# Patient Record
Sex: MALE | Race: WHITE | NOT HISPANIC OR LATINO | Employment: OTHER | URBAN - METROPOLITAN AREA
[De-identification: names, ages, dates, MRNs, and addresses within clinical notes are randomized per-mention and may not be internally consistent; named-entity substitution may affect disease eponyms.]

---

## 2018-05-30 LAB
ABSOL LYMPHOCYTES (HISTORICAL): 1.3 K/UL (ref 0.5–4)
ALBUMIN SERPL BCP-MCNC: 3.8 G/DL (ref 3–5.2)
ALP SERPL-CCNC: 51 U/L (ref 43–122)
ALT SERPL W P-5'-P-CCNC: 25 U/L (ref 9–52)
ANION GAP SERPL CALCULATED.3IONS-SCNC: 7 MMOL/L (ref 5–14)
AST SERPL W P-5'-P-CCNC: 19 U/L (ref 17–59)
BASOPHILS # BLD AUTO: 0.1 K/UL (ref 0–0.1)
BASOPHILS # BLD AUTO: 1 % (ref 0–1)
BILIRUB SERPL-MCNC: 0.4 MG/DL
BUN SERPL-MCNC: 24 MG/DL (ref 5–25)
CALCIUM SERPL-MCNC: 9.3 MG/DL (ref 8.4–10.2)
CHLORIDE SERPL-SCNC: 109 MEQ/L (ref 97–108)
CHOLEST SERPL-MCNC: 127 MG/DL
CHOLEST/HDLC SERPL: 2.7 {RATIO}
CO2 SERPL-SCNC: 25 MMOL/L (ref 22–30)
CREATINE, SERUM (HISTORICAL): 1.23 MG/DL (ref 0.7–1.5)
CREATININE, RANDOM URINE (HISTORICAL): 130.7 MG/DL (ref 50–200)
DEPRECATED RDW RBC AUTO: 13.9 %
EGFR (HISTORICAL): 56 ML/MIN/1.73 M2
EOSINOPHIL # BLD AUTO: 0.2 K/UL (ref 0–0.4)
EOSINOPHIL NFR BLD AUTO: 4 % (ref 0–6)
EST. AVERAGE GLUCOSE BLD GHB EST-MCNC: 143 MG/DL
GLUCOSE SERPL-MCNC: 105 MG/DL (ref 70–99)
HBA1C MFR BLD HPLC: 6.6 %
HCT VFR BLD AUTO: 43.6 % (ref 41–53)
HDLC SERPL-MCNC: 47 MG/DL
HGB BLD-MCNC: 14.4 G/DL (ref 13.5–17.5)
LDL/HDL RATIO (HISTORICAL): 1.2
LDLC SERPL CALC-MCNC: 58 MG/DL
LYMPHOCYTES NFR BLD AUTO: 21 % (ref 25–45)
MCH RBC QN AUTO: 30.8 PG (ref 26–34)
MCHC RBC AUTO-ENTMCNC: 32.9 % (ref 31–36)
MCV RBC AUTO: 94 FL (ref 80–100)
MICROALBUM.,U,RANDOM (HISTORICAL): <0.6 MG/DL
MICROALBUMIN/CREATININE RATIO (HISTORICAL): NORMAL
MONOCYTES # BLD AUTO: 0.6 K/UL (ref 0.2–0.9)
MONOCYTES NFR BLD AUTO: 10 % (ref 1–10)
NEUTROPHILS ABS COUNT (HISTORICAL): 4.1 K/UL (ref 1.8–7.8)
NEUTS SEG NFR BLD AUTO: 64 % (ref 45–65)
PLATELET # BLD AUTO: 200 K/MCL (ref 150–450)
POTASSIUM SERPL-SCNC: 4.7 MEQ/L (ref 3.6–5)
RBC # BLD AUTO: 4.67 M/MCL (ref 4.5–5.9)
SODIUM SERPL-SCNC: 142 MEQ/L (ref 137–147)
TOTAL PROTEIN (HISTORICAL): 6.2 G/DL (ref 5.9–8.4)
TRIGL SERPL-MCNC: 111 MG/DL
TSH SERPL DL<=0.05 MIU/L-ACNC: 2.84 UIU/ML (ref 0.47–4.68)
VLDLC SERPL CALC-MCNC: 22 MG/DL (ref 0–40)
WBC # BLD AUTO: 6.4 K/MCL (ref 4.5–11)

## 2018-07-31 DIAGNOSIS — I10 HYPERTENSION, UNSPECIFIED TYPE: Primary | ICD-10-CM

## 2018-07-31 RX ORDER — METOPROLOL SUCCINATE 100 MG/1
TABLET, EXTENDED RELEASE ORAL
COMMUNITY
Start: 2018-02-27 | End: 2018-07-31 | Stop reason: SDUPTHER

## 2018-07-31 RX ORDER — METOPROLOL SUCCINATE 100 MG/1
100 TABLET, EXTENDED RELEASE ORAL 2 TIMES DAILY
Qty: 60 TABLET | Refills: 2 | Status: SHIPPED | OUTPATIENT
Start: 2018-07-31 | End: 2018-11-05 | Stop reason: SDUPTHER

## 2018-07-31 RX ORDER — FENOFIBRATE 160 MG/1
160 TABLET ORAL DAILY
Refills: 2 | COMMUNITY
Start: 2018-04-26 | End: 2018-07-31 | Stop reason: SDUPTHER

## 2018-07-31 RX ORDER — FENOFIBRATE 160 MG/1
160 TABLET ORAL DAILY
Qty: 30 TABLET | Refills: 2 | Status: SHIPPED | OUTPATIENT
Start: 2018-07-31 | End: 2018-11-05 | Stop reason: SDUPTHER

## 2018-08-06 PROBLEM — I35.0 AORTIC VALVE STENOSIS: Status: ACTIVE | Noted: 2017-05-15

## 2018-08-08 ENCOUNTER — OFFICE VISIT (OUTPATIENT)
Dept: FAMILY MEDICINE CLINIC | Facility: CLINIC | Age: 83
End: 2018-08-08
Payer: MEDICARE

## 2018-08-08 VITALS
DIASTOLIC BLOOD PRESSURE: 80 MMHG | WEIGHT: 144 LBS | OXYGEN SATURATION: 98 % | SYSTOLIC BLOOD PRESSURE: 130 MMHG | HEART RATE: 66 BPM | HEIGHT: 60 IN | TEMPERATURE: 97.3 F | BODY MASS INDEX: 28.27 KG/M2

## 2018-08-08 DIAGNOSIS — Z11.4 ENCOUNTER FOR SCREENING FOR HIV: ICD-10-CM

## 2018-08-08 DIAGNOSIS — Z00.00 MEDICARE ANNUAL WELLNESS VISIT, SUBSEQUENT: Primary | ICD-10-CM

## 2018-08-08 DIAGNOSIS — Z11.59 NEED FOR HEPATITIS C SCREENING TEST: ICD-10-CM

## 2018-08-08 PROBLEM — H40.89 OTHER SPECIFIED GLAUCOMA: Status: ACTIVE | Noted: 2018-08-08

## 2018-08-08 PROCEDURE — G0439 PPPS, SUBSEQ VISIT: HCPCS | Performed by: FAMILY MEDICINE

## 2018-08-08 RX ORDER — NITROGLYCERIN 0.4 MG/1
TABLET SUBLINGUAL
COMMUNITY
Start: 2016-09-26

## 2018-08-08 RX ORDER — LATANOPROST 50 UG/ML
SOLUTION/ DROPS OPHTHALMIC
Refills: 3 | COMMUNITY
Start: 2018-07-19

## 2018-08-08 RX ORDER — CHOLECALCIFEROL (VITAMIN D3) 25 MCG
CAPSULE ORAL EVERY 24 HOURS
COMMUNITY
Start: 2017-08-23

## 2018-08-08 RX ORDER — DORZOLAMIDE HYDROCHLORIDE AND TIMOLOL MALEATE 20; 5 MG/ML; MG/ML
SOLUTION/ DROPS OPHTHALMIC EVERY 12 HOURS
COMMUNITY
Start: 2016-09-26

## 2018-08-08 RX ORDER — SIMVASTATIN 80 MG
40 TABLET ORAL DAILY
Refills: 2 | COMMUNITY
Start: 2018-06-27 | End: 2018-08-28 | Stop reason: SDUPTHER

## 2018-08-08 RX ORDER — EZETIMIBE 10 MG/1
10 TABLET ORAL DAILY
Refills: 2 | COMMUNITY
Start: 2018-06-27 | End: 2018-08-28 | Stop reason: SDUPTHER

## 2018-08-08 NOTE — PATIENT INSTRUCTIONS

## 2018-08-08 NOTE — PROGRESS NOTES
Assessment and Plan:    Problem List Items Addressed This Visit     None        Health Maintenance Due   Topic Date Due    Depression Screening PHQ-9  1934    Fall Risk  06/21/1999    GLAUCOMA SCREENING 72 + YR  06/21/2001    DTaP,Tdap,and Td Vaccines (1 - Tdap) 02/05/2010         HPI:  Beto Pendleton is a 80 y o  male here for his Subsequent Wellness Visit      Patient Active Problem List   Diagnosis    Anxiety    Aortic valve stenosis    Chronic coronary artery disease    [facility]    [facility] of [facility]    Coronary atherosclerosis    Osteoarthritis    Essential hypertension    Hepatitis A    Hyperlipidemia    Hydrocele    Hypertriglyceridemia    Hyperplasia of prostate without lower urinary tract symptoms (LUTS)    Impaired fasting glucose    Insomnia     Past Medical History:   Diagnosis Date    Anxiety     CAD (coronary artery disease)     Capsular cataract     mature    DJD (degenerative joint disease)     Hepatitis A     Hydrocele     Hyperglycemia     Hyperlipidemia     Hypertension     Myocardial infarct ([facility] ) 2007    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[facility])     Prostatic hyperplasia      Past Surgical History:   Procedure Laterality Date    ANGIOPLASTY  2008     Family History   Problem Relation Age of Onset    Coronary artery disease Father      History   Smoking Status    Former Smoker   Smokeless Tobacco    Former User     History   Alcohol Use No      History   Drug Use No       Current Outpatient Prescriptions   Medication Sig Dispense Refill    aspirin 81 MG tablet every 24 hours      Cholecalciferol (VITAMIN D-3) 1000 units CAPS every 24 hours      dorzolamide-timolol (COSOPT) 22 3-6 8 MG/ML ophthalmic solution Every 12 hours      ezetimibe (ZETIA) 10 mg tablet Take 10 mg by mouth daily  2    fenofibrate (TRIGLIDE) 160 MG tablet Take 1 tablet (160 mg total) by mouth daily 30 tablet 2    latanoprost (XALATAN) 0 005 % ophthalmic solution INSTILL 1 DROP AT BEDTIME INTO BOTH EYES  3    metoprolol succinate (TOPROL-XL) 100 mg 24 hr tablet Take 1 tablet (100 mg total) by mouth 2 (two) times a day 60 tablet 2    Multiple Vitamins-Minerals (MULTIVITAL) tablet every 24 hours      Multiple Vitamins-Minerals (PRESERVISION AREDS 2+MULTI VIT PO) take 2 tabs daily      nitroglycerin (NITROSTAT) 0 4 mg SL tablet place 1 tablet by sublingual route at the 1st sign of attack; may repeat every 5 min until relief; if pain persists after 3 tablets in 15 min, prompt medical attention is recommended      simvastatin (ZOCOR) 80 mg tablet 40 mg daily  2     No current facility-administered medications for this visit        No Known Allergies  Immunization History   Administered Date(s) Administered    Influenza Split 01/14/2014    Influenza Split High Dose Preservative Free IM 10/08/2014, 11/18/2015, 11/20/2015, 09/26/2016, 11/21/2017    Influenza TIV (IM) 11/01/2012    Pneumococcal Conjugate 13-Valent 01/29/2015    Pneumococcal Polysaccharide PPV23 11/01/1999, 11/06/2003    Td (adult), adsorbed 02/04/2010       Patient Care Team:  Priya Moses MD as PCP - General (Family Medicine)      Medicare Screening Tests and Risk Assessments:  AWV Clinical     ISAR:       Once in a Lifetime Medicare Screening:       Medicare Screening Tests and Risk Assessment:   AAA Risk Assessment    Osteoporosis Risk Assessment    HIV Risk Assessment        Drug and Alcohol Use:   Tobacco use    Cigarettes:  former smoker    Tobacco use duration    Tobacco Cessation Readiness    Alcohol use    Alcohol use:  never    Alcohol Treatment Readiness   Illicit Drug Use    Drug use:  never    Drug type:  no sedative use       Diet & Exercise:   Diet   What is your diet?:  Regular   How many servings a day of the following:   Fruits and Vegetables:  1-2 Meat:  1-2   Whole Grains:  0, 1 Simple Carbs:  2   Dairy:  1 Soda:  1   Coffee:  3 Tea:  1   Exercise    Do you currently exercise?:  currently not exercising       Cognitive Impairment Screening:   Anxiety screenings preformed:   Yes Anxiety screen score:  0   Depression screening preformed:  Yes Depression screen score:  0   Geriatric Depression scale score:  0 PHQ-9 Depression scale score:  0   Depression screening results:  negative for symptoms   Cognitive Impairment Screening    Do you have difficulty learning or retaining new information?:  No Do you have difficulty handling new tasks?:  No   Do you have difficulty with reasoning?:  No Do you have difficulty with spatial ability and orientation?:  No   Do you have difficulty with language?:  No Do you have difficulty with behavior?:  No       Functional Ability/Level of Safety:   Hearing    Hearing difficulties:  Yes Bilateral:  slightly decreased   Left:  slightly decreased Right:  slightly decreased   Hearing aid:  Yes    Hearing Impairment Assessment    Hearing status:  Hard of hearing   Do your family members ever complain that you turn on the radio or [employer]  too loudly?:  No Do you find that other people have to repeat themselves when talking to you?:  Yes   Do you have difficulty hearing while talking on the phone?:  No Has anyone ever told you that you are speaking too loudly when talking with them?:  No   Do you have trouble hearing the doorbell or phone ringing?:  No Do you have difficulty hearing such that you feel frustrated talking to people?:  No   Do you feel sad because you cannot hear well?:  No Do you feel inconvienced due to your hearing problem?:  No   Do you think you would be a happier person if you could hear better?:  Yes Would you be willing to go for a hearing aid fitting if suggested?:  Yes   Current Activities    Status:  unlimited ADL's, unlimited IADL's, unlimited social activities, unlimited driving   Help needed with the folllowing:    Using the phone:  No Transportation:  No   Shopping:  No Preparing Meals:  No   Doing Housework:  No Doing Laundry:  No   Managing Medications: No Managing Money:  No   ADL    Feeding:  Dependant for all tasks   Oral hygiene and Facial grooming:  Dependant for all tasks   Bathing:  Dependant for all tasks   Upper Body Dressing:  Dependant for all tasks   Lower Body Dressing:  Dependant for all tasks   Toileting:  Dependant for all tasks   Bed Mobility:  Dependant for all tasks   Fall Risk   Have you fallen in the last 12 months?:  No Are you unsteady on your feet?:  No    Are you taking any medications that may cause fatigue or dizziness?:  No    Do you rush to the bathroom potentially risking a fall?:  No   Injury History   Polypharmacy:  No Antidepressant Use:  No   Sedative Use:  No Antihypertensive Use:  No   Previous Fall:  No Alcohol Use:  No   Deconditioning:  No Visual Impairment:  No   Cogitive Impairment:  No Mmobility Impairment:  No   Postural Hypotension:  No Urinary Incontinence:  No       Home Safety:   Are there hazards in your environment?:  No   If you fell, would you need help to get back up from the ground?:  No Do you have problems or concerns getting in/out of a bed, chair, tub, or toilet?:  No   Do you feel unsteady when walking?:  No Is your activity limited by pain?:  No   Do you have handrails and grab-bars in the home?:  No Are emergency numbers kept by the phone and regularly updated?:  Yes   Are you and/or family members aware of the dangers of smoking in bed?:  No Are firearms stored securely?:  No   Do you have working smoke alarms and fire extinguisher?:  Yes Do all household members know how to use them?:  Yes   Have you left the stove on unsupervised?:  No    Home Safety Risk Factors   Unfamilar with surroundings:  No Uneven floors:  No   Stairs or handrail saftey risk:  No Loose rugs:  No   Household clutter:  No Poor household lighting:  No   No grab bars in bathroom:  No Further evaluation needed:  No       Advanced Directives:   Advanced Directives    Living Will:  Yes Durable POA for healthcare:  No   Advanced directive:  Yes    Patient's End of Life Decisions    Reviewed with patient:  Yes Provider agrees with end of life decisions:  Yes       Urinary Incontinence:   Do you have urinary incontinence?:  No Do you have incomplete emptying?:  No   Do you urinate frequently?:  No Do you have urinary urgency?:  No   Do you have urinary hesitancy?:  No Do you have dysuria (painful and/or difficult urination)?:  No   Do you have nocturia (waking up to urinate)?:  No Do you strain when urinating (have to push to urinate)?:  No   Do you have a weak stream when urinating?:  No Do you have intermittent streaming when urinating?:  No   Do you dribble urine after finishing?:  No        Glaucoma:            Provider Screening     Preventative Screening/Counseling:   Cardiovascular Screening/Counseling:   (Labs Q5 years, EKG optional one-time)   General:  Risks and Benefits Discussed, Screening Current Counseling:  Healthy Diet, Healthy Weight          Diabetes Screening/Counseling:   (2 tests/year if Pre-Diabetes or 1 test/year if no Diabetes)   General:  Risks and Benefits Discussed, Screening Current           Colorectal Cancer Screening/Counseling:   (FOBT Q1 yr; Flex Sig Q4 yrs or Q10 yrs after Screening Colonoscopy; Screening Colonoscpy Q2 yrs High Risk or Q10 yrs Low Risk; Barium Enema Q2 yrs High Risk or Q4 yrs Low Risk)   General:  Screening Not Indicated           Prostate Cancer Screening/Counseling:   (Annual)    General:  Screening Not Indicated          Breast Cancer Screening/Counseling:   (Baseline Age 28 - 43; Annual Age 36+)         Cervical Cancer Screening/Counseling:   (Annual for High Risk or Childbearing Age with Abnormal Pap in Last 3 yrs;  Every 2 all others)         Osteoporosis Screening/Counseling:   (Every 2 Yrs if at risk or more if medically necessary)   General:  Screening Not Indicated           AAA Screening/Counseling:   (Once per Lifetime with risk factors)    General:  Risks and Benefits Discussed, Screening Current           Glaucoma Screening/Counseling:   (Annual)   General:  Risks and Benefits Discussed, Screening Current          HIV Screening/Counseling:   (Voluntary; Once annually for high risk OR 3 times for Pregnancy at diagnosis of IUP; 3rd trimester; and at Labor   General:  Risks and Benefits Discussed  Due for: Labs:  Rapid Test         Hepatitis C Screening:   Hepatitis C Counseling Provided:  Yes Hepatitis C Screening Accepted: Yes              Immunizations:   Influenza (annual): Influenza UTD This Year   Pneumococcal (Once in a Lifetime):  Lifetime Vaccine Completed   Hepatitis B Series (low risk patients):  Prevention Counseling   Zostavax (Medicare D Coverage, Pt >72 yo):  Patient Declines   Tdap (Non-Medicare Wellness Visit required):   Tdap Vaccine UTD       Other Preventative Couseling (Non-Medicare Wellness Visit Required):   nutrition counseling performed, fall prevention education provided, dietary education for weight gain, sunscreen education provided       Referrals (Non-Medicare Wellness Visit Required):       Medical Equipment/Suppliers:

## 2018-08-28 DIAGNOSIS — E78.2 MIXED HYPERLIPIDEMIA: Primary | ICD-10-CM

## 2018-08-28 DIAGNOSIS — I10 ESSENTIAL HYPERTENSION: ICD-10-CM

## 2018-08-28 RX ORDER — SIMVASTATIN 80 MG
40 TABLET ORAL DAILY
Qty: 30 TABLET | Refills: 2 | Status: SHIPPED | OUTPATIENT
Start: 2018-08-28 | End: 2019-05-16 | Stop reason: SDUPTHER

## 2018-08-28 RX ORDER — EZETIMIBE 10 MG/1
10 TABLET ORAL DAILY
Qty: 30 TABLET | Refills: 2 | Status: SHIPPED | OUTPATIENT
Start: 2018-08-28 | End: 2018-12-07 | Stop reason: ALTCHOICE

## 2018-08-28 NOTE — TELEPHONE ENCOUNTER
Fax from [facility]  for Simvastatin 80 mg ezetimibe 10 mg req sent to [facility]

## 2018-09-07 ENCOUNTER — OFFICE VISIT (OUTPATIENT)
Dept: FAMILY MEDICINE CLINIC | Facility: CLINIC | Age: 83
End: 2018-09-07
Payer: MEDICARE

## 2018-09-07 VITALS
BODY MASS INDEX: 28.86 KG/M2 | WEIGHT: 147 LBS | HEIGHT: 60 IN | DIASTOLIC BLOOD PRESSURE: 60 MMHG | OXYGEN SATURATION: 99 % | HEART RATE: 61 BPM | SYSTOLIC BLOOD PRESSURE: 130 MMHG | TEMPERATURE: 97.8 F

## 2018-09-07 DIAGNOSIS — E78.2 MIXED HYPERLIPIDEMIA: Primary | ICD-10-CM

## 2018-09-07 DIAGNOSIS — I25.10 CHRONIC CORONARY ARTERY DISEASE: ICD-10-CM

## 2018-09-07 DIAGNOSIS — I10 ESSENTIAL HYPERTENSION: ICD-10-CM

## 2018-09-07 DIAGNOSIS — E55.9 VITAMIN D DEFICIENCY: ICD-10-CM

## 2018-09-07 DIAGNOSIS — R73.01 IMPAIRED FASTING GLUCOSE: ICD-10-CM

## 2018-09-07 PROCEDURE — 99214 OFFICE O/P EST MOD 30 MIN: CPT | Performed by: FAMILY MEDICINE

## 2018-09-07 NOTE — ASSESSMENT & PLAN NOTE
Uncontrolled start vitamin-D 35037 International Units once a week for 12 week proper use of medication discussed with the patient on possible side effect

## 2018-09-07 NOTE — PROGRESS NOTES
Assessment/Plan:    Hyperlipidemia   Chronic ,fair control on current medication  Advised to maintain a low-fat low-cholesterol diet consult regarding potential comorbidity including cardiovascular disease consult regarding important weight loss      Essential hypertension  Chronic with controlled continue current medication low-salt diet less than 2 g a day ,   low caffeine intake   regular aerobic exercise 20 to totally minute a day diet and important lose weight discussed with the patient      Impaired fasting glucose  Chronic fair control with a low carb diet continue encouraged patient to lose weight    Chronic coronary artery disease  Chronic asymptomatic patient already on statin beta-blocker and he is on aspirin    Vitamin D deficiency  Uncontrolled start vitamin-D 13739 International Units once a week for 12 week proper use of medication discussed with the patient on possible side effect       Diagnoses and all orders for this visit:    Mixed hyperlipidemia  -     CBC and differential; Future  -     Comprehensive metabolic panel; Future  -     Lipid Panel with Direct LDL reflex; Future  -     Vitamin D 25 hydroxy; Future  -     TSH, 3rd generation with Free T4 reflex; Future    Essential hypertension  -     CBC and differential; Future  -     Comprehensive metabolic panel; Future  -     Lipid Panel with Direct LDL reflex; Future  -     Vitamin D 25 hydroxy; Future  -     TSH, 3rd generation with Free T4 reflex; Future    Vitamin D deficiency  -     CBC and differential; Future  -     Comprehensive metabolic panel; Future  -     Lipid Panel with Direct LDL reflex; Future  -     Vitamin D 25 hydroxy; Future  -     TSH, 3rd generation with Free T4 reflex; Future    Impaired fasting glucose  -     CBC and differential; Future  -     Comprehensive metabolic panel; Future  -     Lipid Panel with Direct LDL reflex; Future  -     Vitamin D 25 hydroxy;  Future  -     TSH, 3rd generation with Free T4 reflex; Future    Chronic coronary artery disease    Other orders  -     Multiple Vitamins-Minerals (PRESERVISION AREDS 2+MULTI VIT PO); take 2 tabs daily          Subjective:   Chief Complaint   Patient presents with    Follow-up     chronic conditions        Patient ID: Maldonado Mccarty is a 80 y o  male  Patient here follow-up with a chronic condition The patient has long history of hypertension the blood pressure today is in control patient tolerates medication well and no chest pain or short of breath no palpitation no headache also patient has history of hyperlipidemia on statin tolerated well no side effect patient was history of coronary artery disease he is asymptomatic he is on beta-blocker and he is on statin and aspirin 81 mg patient's history of impaired fasting glucose a control with a low carb diet asymptomatic  Recent blood work discussed with the patient        The following portions of the patient's history were reviewed and updated as appropriate: allergies, current medications, past family history, past medical history, past social history, past surgical history and problem list     Review of Systems   Constitutional: Negative for fatigue and fever  HENT: Negative for ear pain, sinus pain, sinus pressure and sore throat  Eyes: Negative for pain and redness  Respiratory: Negative for cough, chest tightness and shortness of breath  Cardiovascular: Negative for chest pain, palpitations and leg swelling  Gastrointestinal: Negative for abdominal pain, blood in stool, constipation, diarrhea and nausea  Genitourinary: Negative for flank pain, frequency and hematuria  Musculoskeletal: Negative for back pain and joint swelling  Skin: Negative for rash  Neurological: Negative for dizziness, numbness and headaches  Hematological: Does not bruise/bleed easily           Objective:  Vitals:    09/07/18 0828   BP: 130/60   Pulse: 61   Temp: 97 8 °F (36 6 °C)   TempSrc: Oral   SpO2: 99%   Weight: 66 7 kg (147 lb)   Height: 5' (1 524 m)      Physical Exam   Constitutional: He is oriented to person, place, and time  He appears well-developed and well-nourished  HENT:   Head: Normocephalic  Right Ear: External ear normal    Left Ear: External ear normal    Eyes: Conjunctivae and EOM are normal  Right eye exhibits no discharge  Left eye exhibits no discharge  Neck: No JVD present  Cardiovascular: Normal rate and regular rhythm  Exam reveals no gallop  Murmur heard  Pulmonary/Chest: Effort normal  No respiratory distress  He has no wheezes  He has no rales  He exhibits no tenderness  Abdominal: He exhibits no mass  There is no tenderness  There is no rebound  Musculoskeletal: He exhibits no edema or tenderness  Neurological: He is alert and oriented to person, place, and time  Skin: No rash noted  No erythema

## 2018-09-07 NOTE — PATIENT INSTRUCTIONS

## 2018-11-05 DIAGNOSIS — I10 HYPERTENSION, UNSPECIFIED TYPE: ICD-10-CM

## 2018-11-05 RX ORDER — FENOFIBRATE 160 MG/1
160 TABLET ORAL DAILY
Qty: 30 TABLET | Refills: 2 | Status: SHIPPED | OUTPATIENT
Start: 2018-11-05 | End: 2019-02-05 | Stop reason: SDUPTHER

## 2018-11-05 RX ORDER — METOPROLOL SUCCINATE 100 MG/1
100 TABLET, EXTENDED RELEASE ORAL 2 TIMES DAILY
Qty: 60 TABLET | Refills: 2 | Status: SHIPPED | OUTPATIENT
Start: 2018-11-05 | End: 2019-02-05 | Stop reason: SDUPTHER

## 2018-12-07 ENCOUNTER — OFFICE VISIT (OUTPATIENT)
Dept: FAMILY MEDICINE CLINIC | Facility: CLINIC | Age: 83
End: 2018-12-07
Payer: MEDICARE

## 2018-12-07 VITALS
BODY MASS INDEX: 29.06 KG/M2 | HEIGHT: 60 IN | HEART RATE: 70 BPM | DIASTOLIC BLOOD PRESSURE: 70 MMHG | TEMPERATURE: 97.2 F | WEIGHT: 148 LBS | OXYGEN SATURATION: 98 % | SYSTOLIC BLOOD PRESSURE: 140 MMHG

## 2018-12-07 DIAGNOSIS — Z23 NEED FOR DIPHTHERIA-TETANUS-PERTUSSIS (TDAP) VACCINE: ICD-10-CM

## 2018-12-07 DIAGNOSIS — E78.2 MIXED HYPERLIPIDEMIA: ICD-10-CM

## 2018-12-07 DIAGNOSIS — I25.10 ATHEROSCLEROSIS OF CORONARY ARTERY OF NATIVE HEART WITHOUT ANGINA PECTORIS, UNSPECIFIED VESSEL OR LESION TYPE: ICD-10-CM

## 2018-12-07 DIAGNOSIS — R73.01 IMPAIRED FASTING GLUCOSE: Primary | ICD-10-CM

## 2018-12-07 DIAGNOSIS — I10 ESSENTIAL HYPERTENSION: ICD-10-CM

## 2018-12-07 DIAGNOSIS — E66.3 OVERWEIGHT (BMI 25.0-29.9): ICD-10-CM

## 2018-12-07 DIAGNOSIS — E55.9 VITAMIN D DEFICIENCY: ICD-10-CM

## 2018-12-07 DIAGNOSIS — I65.23 BILATERAL CAROTID ARTERY STENOSIS: ICD-10-CM

## 2018-12-07 PROCEDURE — 99214 OFFICE O/P EST MOD 30 MIN: CPT | Performed by: FAMILY MEDICINE

## 2018-12-07 RX ORDER — MECLIZINE HCL 12.5 MG/1
12.5 TABLET ORAL AS NEEDED
COMMUNITY

## 2018-12-07 RX ORDER — NETARSUDIL 0.2 MG/ML
SOLUTION/ DROPS OPHTHALMIC; TOPICAL
Refills: 1 | COMMUNITY
Start: 2018-11-12

## 2018-12-07 RX ORDER — ALPRAZOLAM 0.5 MG/1
0.5 TABLET ORAL AS NEEDED
COMMUNITY
End: 2019-06-19 | Stop reason: ALTCHOICE

## 2018-12-07 RX ORDER — ERGOCALCIFEROL 1.25 MG/1
50000 CAPSULE ORAL WEEKLY
Qty: 4 CAPSULE | Refills: 2 | Status: SHIPPED | OUTPATIENT
Start: 2018-12-07 | End: 2019-03-06 | Stop reason: SDUPTHER

## 2018-12-07 RX ORDER — ERYTHROMYCIN 5 MG/G
0.5 OINTMENT OPHTHALMIC
COMMUNITY
End: 2019-12-27 | Stop reason: ALTCHOICE

## 2018-12-07 NOTE — PROGRESS NOTES
Subjective:   Chief Complaint   Patient presents with    Follow-up     chronic conditions        Patient ID: Kiko Mims is a 80 y o  male  Patient here follow-up with a chronic condition  The patient has long history of hypertension the blood pressure today is in control patient tolerates medication well and no chest pain or short of breath no palpitation no headache also patient has history of hyperlipidemia on simvastatin 80 mg and Zetia 10 mg patient tolerated well without any muscle pain and no rash patient deny any chest pain short of breath no palpitation no headache no blurred vision no weakness or lateralized of the symptom  Patient's history of vitamin-D deficiency on vitamin-D supplement 1000 International Units once a day  Also patient was history of impaired fasting glucose the try to control it with the low carb diet and asymptomatic  Recent blood work discussed with the patient        The following portions of the patient's history were reviewed and updated as appropriate: allergies, current medications, past family history, past medical history, past social history, past surgical history and problem list     Review of Systems   Constitutional: Negative for fatigue and fever  HENT: Negative for ear pain, sinus pain, sinus pressure and sore throat  Eyes: Negative for pain and redness  Respiratory: Negative for cough, chest tightness and shortness of breath  Cardiovascular: Negative for chest pain, palpitations and leg swelling  Gastrointestinal: Negative for abdominal pain, blood in stool, constipation, diarrhea and nausea  Genitourinary: Negative for flank pain, frequency and hematuria  Musculoskeletal: Negative for back pain and joint swelling  Skin: Negative for rash  Neurological: Negative for dizziness, numbness and headaches  Hematological: Does not bruise/bleed easily           Objective:  Vitals:    12/07/18 0813   BP: 140/70   Pulse: 70   Temp: (!) 97 2 °F (36 2 °C)   TempSrc: Oral   SpO2: 98%   Weight: 67 1 kg (148 lb)   Height: 5' (1 524 m)      Physical Exam   Constitutional: He is oriented to person, place, and time  He appears well-developed and well-nourished  HENT:   Head: Normocephalic  Right Ear: External ear normal    Left Ear: External ear normal    Eyes: Conjunctivae and EOM are normal  Right eye exhibits no discharge  Left eye exhibits no discharge  Neck: No JVD present  Cardiovascular: Normal rate and regular rhythm  Exam reveals no gallop  Murmur heard  Pulmonary/Chest: Effort normal  No respiratory distress  He has no wheezes  He has no rales  He exhibits no tenderness  Abdominal: He exhibits no mass  There is no tenderness  There is no rebound  Musculoskeletal: He exhibits no edema or tenderness  Neurological: He is alert and oriented to person, place, and time  Skin: No rash noted  No erythema           Assessment/Plan:    Impaired fasting glucose  A chronic fair control with the low carb diet encouraged patient to continue , also discussed with the patient important lose weight    Carotid artery disease (HCC)  Chronic asymptomatic patient already on simvastatin 80 mg and he is on aspirin and patient is due for carotid duplex    Coronary atherosclerosis  Chronic asymptomatic status post stent the patient already on beta-blocker and he is on statin and aspirin her will order echocardiogram to check on the function of the heart    Essential hypertension  Chronic asymptomatic well-controlled patient on metoprolol 100 mg twice a day    low-salt diet less than 2 g a day ,   low caffeine intake   regular aerobic exercise 20 to totally minute a day diet and important lose weight discussed with the patient      Hyperlipidemia  Chronic ,fair control will continue simvastatin 80 mg once a day will stop the Zetia  Advised to maintain a low-fat low-cholesterol diet consult regarding potential comorbidity including cardiovascular disease consult regarding important weight loss      Vitamin D deficiency  Chronic fair control asymptomatic patient on vitamin-D 1000 once a day we encouraged patient to to take the vitamin D rich diet       Diagnoses and all orders for this visit:    Impaired fasting glucose  -     CBC and differential; Future  -     Comprehensive metabolic panel; Future  -     Lipid panel; Future  -     TSH, 3rd generation with Free T4 reflex; Future  -     Hemoglobin A1C; Future    Essential hypertension  -     CBC and differential; Future  -     Comprehensive metabolic panel; Future  -     Lipid panel; Future  -     TSH, 3rd generation with Free T4 reflex; Future  -     Hemoglobin A1C; Future    Mixed hyperlipidemia  -     CBC and differential; Future  -     Comprehensive metabolic panel; Future  -     Lipid panel; Future  -     TSH, 3rd generation with Free T4 reflex; Future  -     Hemoglobin A1C; Future    Vitamin D deficiency  -     ergocalciferol (VITAMIN D2) 50,000 units; Take 1 capsule (50,000 Units total) by mouth once a week  -     CBC and differential; Future  -     Comprehensive metabolic panel; Future  -     Lipid panel; Future  -     TSH, 3rd generation with Free T4 reflex; Future  -     Hemoglobin A1C; Future  -     Vitamin D 25 hydroxy    Atherosclerosis of coronary artery of native heart without angina pectoris, unspecified vessel or lesion type  -     VAS carotid complete study; Future  -     Echo complete with contrast if indicated; Future    Bilateral carotid artery stenosis  -     VAS carotid complete study; Future  -     Echo complete with contrast if indicated; Future    Overweight (BMI 25 0-29  9)    Need for diphtheria-tetanus-pertussis (Tdap) vaccine  -     tetanus-diphtheria-acellular pertussis (BOOSTRIX) injection; Inject 0 5 mL into a muscle once for 1 dose    Other orders  -     RHOPRESSA 0 02 % SOLN; INSTILL 1 DROP AT BEDTIME INTO BOTH EYES  -     ALPRAZolam (XANAX) 0 5 mg tablet;  Take 0 5 mg by mouth as needed for anxiety  -     meclizine (ANTIVERT) 12 5 MG tablet; Take 12 5 mg by mouth as needed for dizziness  -     erythromycin (ILOTYCIN) ophthalmic ointment; 0 5 inches daily at bedtime            BMI Counseling: Body mass index is 28 9 kg/m²  Discussed the patient's BMI with him  The BMI is above average  BMI counseling and education was provided to the patient  Nutrition recommendations include reducing portion sizes, decreasing overall calorie intake, 3-5 servings of fruits/vegetables daily, reducing fast food intake, consuming healthier snacks, decreasing soda and/or juice intake, moderation in carbohydrate intake and reducing intake of cholesterol  Exercise recommendations include exercising 3-5 times per week

## 2018-12-07 NOTE — PATIENT INSTRUCTIONS

## 2018-12-09 NOTE — ASSESSMENT & PLAN NOTE
Chronic asymptomatic patient already on simvastatin 80 mg and he is on aspirin and patient is due for carotid duplex

## 2018-12-09 NOTE — ASSESSMENT & PLAN NOTE
A chronic fair control with the low carb diet encouraged patient to continue , also discussed with the patient important lose weight

## 2018-12-09 NOTE — ASSESSMENT & PLAN NOTE
Chronic fair control asymptomatic patient on vitamin-D 1000 once a day we encouraged patient to to take the vitamin D rich diet

## 2018-12-09 NOTE — ASSESSMENT & PLAN NOTE
Chronic ,fair control will continue simvastatin 80 mg once a day will stop the Zetia  Advised to maintain a low-fat low-cholesterol diet consult regarding potential comorbidity including cardiovascular disease consult regarding important weight loss

## 2018-12-09 NOTE — ASSESSMENT & PLAN NOTE
Chronic asymptomatic well-controlled patient on metoprolol 100 mg twice a day    low-salt diet less than 2 g a day ,   low caffeine intake   regular aerobic exercise 20 to totally minute a day diet and important lose weight discussed with the patient

## 2018-12-09 NOTE — ASSESSMENT & PLAN NOTE
Chronic asymptomatic status post stent the patient already on beta-blocker and he is on statin and aspirin her will order echocardiogram to check on the function of the heart

## 2019-02-05 DIAGNOSIS — I10 HYPERTENSION, UNSPECIFIED TYPE: ICD-10-CM

## 2019-02-05 RX ORDER — FENOFIBRATE 160 MG/1
160 TABLET ORAL DAILY
Qty: 30 TABLET | Refills: 2 | Status: SHIPPED | OUTPATIENT
Start: 2019-02-05 | End: 2019-05-16 | Stop reason: SDUPTHER

## 2019-02-05 RX ORDER — METOPROLOL SUCCINATE 100 MG/1
100 TABLET, EXTENDED RELEASE ORAL 2 TIMES DAILY
Qty: 60 TABLET | Refills: 2 | Status: SHIPPED | OUTPATIENT
Start: 2019-02-05 | End: 2019-05-16 | Stop reason: SDUPTHER

## 2019-02-06 LAB
LEFT EYE DIABETIC RETINOPATHY: NORMAL
RIGHT EYE DIABETIC RETINOPATHY: NORMAL

## 2019-02-26 LAB — HBA1C MFR BLD HPLC: 7 %

## 2019-03-06 ENCOUNTER — OFFICE VISIT (OUTPATIENT)
Dept: FAMILY MEDICINE CLINIC | Facility: CLINIC | Age: 84
End: 2019-03-06
Payer: MEDICARE

## 2019-03-06 VITALS
SYSTOLIC BLOOD PRESSURE: 140 MMHG | TEMPERATURE: 96.1 F | HEIGHT: 61 IN | DIASTOLIC BLOOD PRESSURE: 70 MMHG | OXYGEN SATURATION: 98 % | HEART RATE: 66 BPM | BODY MASS INDEX: 27.94 KG/M2 | WEIGHT: 148 LBS

## 2019-03-06 DIAGNOSIS — N40.0 HYPERPLASIA OF PROSTATE WITHOUT LOWER URINARY TRACT SYMPTOMS (LUTS): ICD-10-CM

## 2019-03-06 DIAGNOSIS — B35.1 ONYCHOMYCOSIS OF GREAT TOE: ICD-10-CM

## 2019-03-06 DIAGNOSIS — E55.9 VITAMIN D DEFICIENCY: ICD-10-CM

## 2019-03-06 DIAGNOSIS — E78.2 MIXED HYPERLIPIDEMIA: ICD-10-CM

## 2019-03-06 DIAGNOSIS — I10 ESSENTIAL HYPERTENSION: ICD-10-CM

## 2019-03-06 DIAGNOSIS — E11.9 TYPE 2 DIABETES MELLITUS WITHOUT COMPLICATION, WITHOUT LONG-TERM CURRENT USE OF INSULIN (HCC): Primary | ICD-10-CM

## 2019-03-06 DIAGNOSIS — I65.23 BILATERAL CAROTID ARTERY STENOSIS: ICD-10-CM

## 2019-03-06 PROCEDURE — 99214 OFFICE O/P EST MOD 30 MIN: CPT | Performed by: FAMILY MEDICINE

## 2019-03-06 NOTE — PROGRESS NOTES
Subjective:   Chief Complaint   Patient presents with    Follow-up     chronic conditions        Patient ID: Mona Baron is a 80 y o  male  Patient here follow-up with a chronic condition patient who known to have history of hyperlipidemia on statin tolerated well without any side effect deny any and chest pain short of breath no palpitation no headache no blurred vision no weakness or lateralized of the symptom patient history of hypertension will control with the current the her regimen and high tolerated well deny any chest pain short of breath no palpitation no headache no weakness and no dyspnea on exertion patient's history of impaired fasting glucose the asymptomatic no increased thirsty no increased frequency urination no headache and no dizziness patient has not been compliant with his the diet and his current blood work show hemoglobin A1c is increasing per patient around the holiday time he has been eating more cookie patient was history of benign prostatic hypertrophy the asymptomatic deny any flank pain abdomen pain no increased frequency urination no blood in the urine  Recent blood work discussed with the patient      The following portions of the patient's history were reviewed and updated as appropriate: allergies, current medications, past family history, past medical history, past social history, past surgical history and problem list     Review of Systems   Constitutional: Negative for fatigue and fever  HENT: Negative for ear pain, sinus pressure, sinus pain and sore throat  Eyes: Negative for pain and redness  Respiratory: Negative for cough, chest tightness and shortness of breath  Cardiovascular: Negative for chest pain, palpitations and leg swelling  Gastrointestinal: Negative for abdominal pain, blood in stool, constipation, diarrhea and nausea  Genitourinary: Negative for flank pain, frequency and hematuria     Musculoskeletal: Negative for back pain and joint swelling  Skin: Negative for rash  Neurological: Negative for dizziness, numbness and headaches  Hematological: Does not bruise/bleed easily  Objective:  Vitals:    03/06/19 0920   BP: 140/70   Pulse: 66   Temp: (!) 96 1 °F (35 6 °C)   TempSrc: Oral   SpO2: 98%   Weight: 67 1 kg (148 lb)   Height: 5' 0 5" (1 537 m)      Physical Exam   Constitutional: He is oriented to person, place, and time  He appears well-developed and well-nourished  HENT:   Head: Normocephalic  Right Ear: External ear normal    Left Ear: External ear normal    Eyes: Conjunctivae and EOM are normal  Right eye exhibits no discharge  Left eye exhibits no discharge  Neck: No JVD present  Cardiovascular: Normal rate and regular rhythm  Exam reveals no gallop  Pulses are no weak pulses  Murmur heard  Pulses:       Dorsalis pedis pulses are 2+ on the right side, and 2+ on the left side  Pulmonary/Chest: Effort normal  No respiratory distress  He has no wheezes  He has no rales  He exhibits no tenderness  Abdominal: He exhibits no mass  There is no tenderness  There is no rebound  Musculoskeletal: He exhibits no edema or tenderness  Feet:    Feet:   Right Foot:   Skin Integrity: Negative for warmth  Left Foot:   Skin Integrity: Negative for warmth  Neurological: He is alert and oriented to person, place, and time  Skin: No rash noted  No erythema  Patient's shoes and socks removed  Right Foot/Ankle   Right Foot Inspection  Skin Exam: skin intact no warmth and no pre-ulcer                          Toe Exam: no swelling and erythema  Sensory       Monofilament testing: intact  Vascular  Capillary refills: < 3 seconds  The right DP pulse is 2+       Left Foot/Ankle  Left Foot Inspection  Skin Exam: skin intactno warmth and no pre-ulcer                         Toe Exam: no swelling and no erythema                   Sensory       Monofilament: intact  Vascular  Capillary refills: < 3 seconds  The left DP pulse is 2+  Assign Risk Category:  No deformity present; No loss of protective sensation; No weak pulses       Risk: 0      Assessment/Plan:    Type 2 diabetes mellitus without complication, without long-term current use of insulin ([facility])  Lab Results   Component Value Date    HGBA1C 7 0 02/26/2019    New diagnosis hemoglobin A1c 7 0 patient declined medication for today he wanted try to control his diet and increase the physical activity   patient already on statin    Essential hypertension  Chronic asymptomatic fair control continue current management encouraged patient to lose weight increased physical activity and    Hyperplasia of prostate without lower urinary tract symptoms (LUTS)  Chronic asymptomatic not on any medication    Hyperlipidemia  Chronic asymptomatic fair control with the current regimen continue current medication including simvastatin and fenofibrate the encouraged patient to follow low fat diet increase physical activity    Onychomycosis of great toe  New diagnosis discussed with the patient not sure of the problem recommend proper shoes where keep the area dry       Diagnoses and all orders for this visit:    Type 2 diabetes mellitus without complication, without long-term current use of insulin ([facility] [address] )  -     CBC and differential; Future  -     Comprehensive metabolic panel; Future  -     Lipid Panel with Direct LDL reflex; Future  -     TSH, 3rd generation with Free T4 reflex; Future  -     Vitamin D 25 hydroxy; Future  -     Microalbumin / creatinine urine ratio; Future    Bilateral carotid artery stenosis  -     CBC and differential; Future  -     Comprehensive metabolic panel; Future  -     Lipid Panel with Direct LDL reflex; Future  -     TSH, 3rd generation with Free T4 reflex; Future  -     Vitamin D 25 hydroxy; Future  -     Microalbumin / creatinine urine ratio; Future    Essential hypertension  -     CBC and differential; Future  -     Comprehensive metabolic panel;  Future  - Lipid Panel with Direct LDL reflex; Future  -     TSH, 3rd generation with Free T4 reflex; Future  -     Vitamin D 25 hydroxy; Future  -     Microalbumin / creatinine urine ratio; Future    Mixed hyperlipidemia  -     CBC and differential; Future  -     Comprehensive metabolic panel; Future  -     Lipid Panel with Direct LDL reflex; Future  -     TSH, 3rd generation with Free T4 reflex; Future  -     Vitamin D 25 hydroxy; Future  -     Microalbumin / creatinine urine ratio; Future    Hyperplasia of prostate without lower urinary tract symptoms (LUTS)  -     CBC and differential; Future  -     Comprehensive metabolic panel; Future  -     Lipid Panel with Direct LDL reflex; Future  -     TSH, 3rd generation with Free T4 reflex; Future  -     Vitamin D 25 hydroxy; Future  -     Microalbumin / creatinine urine ratio; Future    Vitamin D deficiency  -     CBC and differential; Future  -     Comprehensive metabolic panel; Future  -     Lipid Panel with Direct LDL reflex; Future  -     TSH, 3rd generation with Free T4 reflex; Future  -     Vitamin D 25 hydroxy; Future  -     Microalbumin / creatinine urine ratio;  Future    Onychomycosis of great toe

## 2019-03-06 NOTE — ASSESSMENT & PLAN NOTE
Chronic asymptomatic fair control with the current regimen continue current medication including simvastatin and fenofibrate the encouraged patient to follow low fat diet increase physical activity

## 2019-03-06 NOTE — ASSESSMENT & PLAN NOTE
New diagnosis discussed with the patient not sure of the problem recommend proper shoes where keep the area dry

## 2019-03-06 NOTE — PATIENT INSTRUCTIONS

## 2019-03-06 NOTE — ASSESSMENT & PLAN NOTE
Chronic asymptomatic fair control continue current management encouraged patient to lose weight increased physical activity and

## 2019-03-06 NOTE — ASSESSMENT & PLAN NOTE
Lab Results   Component Value Date    HGBA1C 7 0 02/26/2019    New diagnosis hemoglobin A1c 7 0 patient declined medication for today he wanted try to control his diet and increase the physical activity   patient already on statin

## 2019-03-08 ENCOUNTER — TELEPHONE (OUTPATIENT)
Dept: FAMILY MEDICINE CLINIC | Facility: CLINIC | Age: 84
End: 2019-03-08

## 2019-04-29 LAB
LEFT EYE DIABETIC RETINOPATHY: NORMAL
RIGHT EYE DIABETIC RETINOPATHY: NORMAL

## 2019-05-16 DIAGNOSIS — E78.2 MIXED HYPERLIPIDEMIA: ICD-10-CM

## 2019-05-16 DIAGNOSIS — I10 ESSENTIAL HYPERTENSION: Primary | ICD-10-CM

## 2019-05-16 DIAGNOSIS — I10 HYPERTENSION, UNSPECIFIED TYPE: ICD-10-CM

## 2019-05-16 RX ORDER — FENOFIBRATE 160 MG/1
160 TABLET ORAL DAILY
Qty: 30 TABLET | Refills: 2 | Status: SHIPPED | OUTPATIENT
Start: 2019-05-16 | End: 2019-08-13 | Stop reason: SDUPTHER

## 2019-05-16 RX ORDER — SIMVASTATIN 80 MG
40 TABLET ORAL DAILY
Qty: 30 TABLET | Refills: 2 | Status: SHIPPED | OUTPATIENT
Start: 2019-05-16 | End: 2019-11-18 | Stop reason: SDUPTHER

## 2019-05-16 RX ORDER — METOPROLOL SUCCINATE 100 MG/1
100 TABLET, EXTENDED RELEASE ORAL 2 TIMES DAILY
Qty: 60 TABLET | Refills: 2 | Status: SHIPPED | OUTPATIENT
Start: 2019-05-16 | End: 2019-08-13 | Stop reason: SDUPTHER

## 2019-06-19 ENCOUNTER — OFFICE VISIT (OUTPATIENT)
Dept: FAMILY MEDICINE CLINIC | Facility: CLINIC | Age: 84
End: 2019-06-19
Payer: MEDICARE

## 2019-06-19 VITALS
HEIGHT: 61 IN | WEIGHT: 143 LBS | TEMPERATURE: 97.3 F | BODY MASS INDEX: 27 KG/M2 | SYSTOLIC BLOOD PRESSURE: 114 MMHG | RESPIRATION RATE: 16 BRPM | DIASTOLIC BLOOD PRESSURE: 70 MMHG | OXYGEN SATURATION: 97 % | HEART RATE: 64 BPM

## 2019-06-19 DIAGNOSIS — E11.9 TYPE 2 DIABETES MELLITUS WITHOUT COMPLICATION, WITHOUT LONG-TERM CURRENT USE OF INSULIN (HCC): Primary | ICD-10-CM

## 2019-06-19 DIAGNOSIS — E78.2 MIXED HYPERLIPIDEMIA: ICD-10-CM

## 2019-06-19 DIAGNOSIS — E55.9 VITAMIN D DEFICIENCY: ICD-10-CM

## 2019-06-19 DIAGNOSIS — I10 ESSENTIAL HYPERTENSION: ICD-10-CM

## 2019-06-19 DIAGNOSIS — I25.10 ATHEROSCLEROSIS OF CORONARY ARTERY OF NATIVE HEART WITHOUT ANGINA PECTORIS, UNSPECIFIED VESSEL OR LESION TYPE: ICD-10-CM

## 2019-06-19 PROCEDURE — 99214 OFFICE O/P EST MOD 30 MIN: CPT | Performed by: FAMILY MEDICINE

## 2019-08-01 ENCOUNTER — TELEPHONE (OUTPATIENT)
Dept: FAMILY MEDICINE CLINIC | Facility: CLINIC | Age: 84
End: 2019-08-01

## 2019-08-01 NOTE — TELEPHONE ENCOUNTER
Incoming voicemail from [address] (daughter) to [facility]  Regarding bill for hiv test  Tara Mccarty it had wrong dx code  He gave verbal consent for her  to speak with us

## 2019-08-08 ENCOUNTER — TELEPHONE (OUTPATIENT)
Dept: FAMILY MEDICINE CLINIC | Facility: CLINIC | Age: 84
End: 2019-08-08

## 2019-08-13 DIAGNOSIS — E78.2 MIXED HYPERLIPIDEMIA: ICD-10-CM

## 2019-08-13 DIAGNOSIS — I10 ESSENTIAL HYPERTENSION: ICD-10-CM

## 2019-08-13 RX ORDER — METOPROLOL SUCCINATE 100 MG/1
100 TABLET, EXTENDED RELEASE ORAL 2 TIMES DAILY
Qty: 60 TABLET | Refills: 2 | Status: SHIPPED | OUTPATIENT
Start: 2019-08-13 | End: 2019-11-19 | Stop reason: SDUPTHER

## 2019-08-13 RX ORDER — FENOFIBRATE 160 MG/1
160 TABLET ORAL DAILY
Qty: 30 TABLET | Refills: 2 | Status: SHIPPED | OUTPATIENT
Start: 2019-08-13 | End: 2019-11-19 | Stop reason: SDUPTHER

## 2019-08-21 ENCOUNTER — OFFICE VISIT (OUTPATIENT)
Dept: FAMILY MEDICINE CLINIC | Facility: CLINIC | Age: 84
End: 2019-08-21
Payer: MEDICARE

## 2019-08-21 VITALS
OXYGEN SATURATION: 97 % | SYSTOLIC BLOOD PRESSURE: 124 MMHG | WEIGHT: 142 LBS | TEMPERATURE: 96.5 F | HEART RATE: 60 BPM | HEIGHT: 61 IN | BODY MASS INDEX: 26.81 KG/M2 | DIASTOLIC BLOOD PRESSURE: 72 MMHG | RESPIRATION RATE: 16 BRPM

## 2019-08-21 DIAGNOSIS — I10 ESSENTIAL HYPERTENSION: ICD-10-CM

## 2019-08-21 DIAGNOSIS — E11.9 TYPE 2 DIABETES MELLITUS WITHOUT COMPLICATION, WITHOUT LONG-TERM CURRENT USE OF INSULIN (HCC): ICD-10-CM

## 2019-08-21 DIAGNOSIS — Z00.01 ENCOUNTER FOR ROUTINE ADULT MEDICAL EXAM WITH ABNORMAL FINDINGS: Primary | ICD-10-CM

## 2019-08-21 DIAGNOSIS — E78.2 MIXED HYPERLIPIDEMIA: ICD-10-CM

## 2019-08-21 DIAGNOSIS — Z12.5 SCREENING PSA (PROSTATE SPECIFIC ANTIGEN): ICD-10-CM

## 2019-08-21 DIAGNOSIS — Z01.00 DIABETIC EYE EXAM (HCC): ICD-10-CM

## 2019-08-21 DIAGNOSIS — E11.9 DIABETIC EYE EXAM (HCC): ICD-10-CM

## 2019-08-21 LAB
CREAT UR-MCNC: 143 MG/DL
MICROALBUMIN UR-MCNC: 9.1 MG/L (ref 0–20)
MICROALBUMIN/CREAT 24H UR: 6 MG/G CREATININE (ref 0–30)

## 2019-08-21 PROCEDURE — 82043 UR ALBUMIN QUANTITATIVE: CPT | Performed by: FAMILY MEDICINE

## 2019-08-21 PROCEDURE — 82570 ASSAY OF URINE CREATININE: CPT | Performed by: FAMILY MEDICINE

## 2019-08-21 PROCEDURE — G0439 PPPS, SUBSEQ VISIT: HCPCS | Performed by: FAMILY MEDICINE

## 2019-08-21 NOTE — ASSESSMENT & PLAN NOTE
Advice and education were given regarding nutrition, aerobic exercises, weight bearing exercises, cardiovascular risk reduction, fall risk reduction, and age appropriate supplements  The patient was counseled regarding instructions for management, risk factor reductions, prognosis, risks and benefits of treatment options, patient and family education, and importance of compliance with treatment

## 2019-08-21 NOTE — PATIENT INSTRUCTIONS
Obesity   AMBULATORY CARE:   Obesity  is when your body mass index (BMI) is greater than 30  Your healthcare provider will use your height and weight to measure your BMI  The risks of obesity include  many health problems, such as injuries or physical disability  You may need tests to check for the following:  · Diabetes     · High blood pressure or high cholesterol     · Heart disease     · Gallbladder or liver disease     · Cancer of the colon, breast, prostate, liver, or kidney     · Sleep apnea     · Arthritis or gout  Seek care immediately if:   · You have a severe headache, confusion, or difficulty speaking  · You have weakness on one side of your body  · You have chest pain, sweating, or shortness of breath  Contact your healthcare provider if:   · You have symptoms of gallbladder or liver disease, such as pain in your upper abdomen  · You have knee or hip pain and discomfort while walking  · You have symptoms of diabetes, such as intense hunger and thirst, and frequent urination  · You have symptoms of sleep apnea, such as snoring or daytime sleepiness  · You have questions or concerns about your condition or care  Treatment for obesity  focuses on helping you lose weight to improve your health  Even a small decrease in BMI can reduce the risk for many health problems  Your healthcare provider will help you set a weight-loss goal   · Lifestyle changes  are the first step in treating obesity  These include making healthy food choices and getting regular physical activity  Your healthcare provider may suggest a weight-loss program that involves coaching, education, and therapy  · Medicine  may help you lose weight when it is used with a healthy diet and physical activity  · Surgery  can help you lose weight if you are very obese and have other health problems  There are several types of weight-loss surgery  Ask your healthcare provider for more information    Be successful losing weight:   · Set small, realistic goals  An example of a small goal is to walk for 20 minutes 5 days a week  Anther goal is to lose 5% of your body weight  · Tell friends, family members, and coworkers about your goals  and ask for their support  Ask a friend to lose weight with you, or join a weight-loss support group  · Identify foods or triggers that may cause you to overeat , and find ways to avoid them  Remove tempting high-calorie foods from your home and workplace  Place a bowl of fresh fruit on your kitchen counter  If stress causes you to eat, then find other ways to cope with stress  · Keep a diary to track what you eat and drink  Also write down how many minutes of physical activity you do each day  Weigh yourself once a week and record it in your diary  Eating changes: You will need to eat 500 to 1,000 fewer calories each day than you currently eat to lose 1 to 2 pounds a week  The following changes will help you cut calories:  · Eat smaller portions  Use small plates, no larger than 9 inches in diameter  Fill your plate half full of fruits and vegetables  Measure your food using measuring cups until you know what a serving size looks like  · Eat 3 meals and 1 or 2 snacks each day  Plan your meals in advance  Roxana Howell and eat at home most of the time  Eat slowly  · Eat fruits and vegetables at every meal   They are low in calories and high in fiber, which makes you feel full  Do not add butter, margarine, or cream sauce to vegetables  Use herbs to season steamed vegetables  · Eat less fat and fewer fried foods  Eat more baked or grilled chicken and fish  These protein sources are lower in calories and fat than red meat  Limit fast food  Dress your salads with olive oil and vinegar instead of bottled dressing  · Limit the amount of sugar you eat  Do not drink sugary beverages  Limit alcohol  Activity changes:  Physical activity is good for your body in many ways   It helps you burn calories and build strong muscles  It decreases stress and depression, and improves your mood  It can also help you sleep better  Talk to your healthcare provider before you begin an exercise program   · Exercise for at least 30 minutes 5 days a week  Start slowly  Set aside time each day for physical activity that you enjoy and that is convenient for you  It is best to do both weight training and an activity that increases your heart rate, such as walking, bicycling, or swimming  · Find ways to be more active  Do yard work and housecleaning  Walk up the stairs instead of using elevators  Spend your leisure time going to events that require walking, such as outdoor festivals or fairs  This extra physical activity can help you lose weight and keep it off  Follow up with your healthcare provider as directed: You may need to meet with a dietitian  Write down your questions so you remember to ask them during your visits  © 2017 [address] [place] Information is for End User's use only and may not be sold, redistributed or otherwise used for commercial purposes  All illustrations and images included in CareNotes® are the copyrighted property of [employer]  or Beny Lui  The above information is an  only  It is not intended as medical advice for individual conditions or treatments  Talk to your doctor, nurse or pharmacist before following any medical regimen to see if it is safe and effective for you  Urinary Incontinence   WHAT YOU NEED TO KNOW:   What is urinary incontinence? Urinary incontinence (UI) is when you lose control of your bladder  What causes UI? UI occurs because your bladder cannot store or empty urine properly  The following are the most common types of UI:  · Stress incontinence  is when you leak urine due to increased bladder pressure  This may happen when you cough, sneeze, or exercise       · Urge incontinence  is when you feel the need to urinate right away and leak urine accidentally  · Mixed incontinence  is when you have both stress and urge UI  What are the signs and symptoms of UI?   · You feel like your bladder does not empty completely when you urinate  · You urinate often and need to urinate immediately  · You leak urine when you sleep, or you wake up with the urge to urinate  · You leak urine when you cough, sneeze, exercise, or laugh  How is UI diagnosed? Your healthcare provider will ask how often you leak urine and whether you have stress or urge symptoms  Tell him which medicines you take, how often you urinate, and how much liquid you drink each day  You may need any of the following tests:  · Urine tests  may show infection or kidney function  · A pelvic exam  may be done to check for blockages  A pelvic exam will also show if your bladder, uterus, or other organs have moved out of place  · An x-ray, ultrasound, or CT  may show problems with parts of your urinary system  You may be given contrast liquid to help your organs show up better in the pictures  Tell the healthcare provider if you have ever had an allergic reaction to contrast liquid  Do not enter the MRI room with anything metal  Metal can cause serious injury  Tell the healthcare provider if you have any metal in or on your body  · A bladder scan  will show how much urine is left in your bladder after you urinate  You will be asked to urinate and then healthcare providers will use a small ultrasound machine to check the urine left in your bladder  · Cystometry  is used to check the function of your urinary system  Your healthcare provider checks the pressure in your bladder while filling it with fluid  Your bladder pressure may also be tested when your bladder is full and while you urinate  How is UI treated? · Medicines  can help strengthen your bladder control      · Electrical stimulation  is used to send a small amount of electrical energy to your pelvic floor muscles  This helps control your bladder function  Electrodes may be placed outside your body or in your rectum  For women, the electrodes may be placed in the vagina  · A bulking agent  may be injected into the wall of your urethra to make it thicker  This helps keep your urethra closed and decreases urine leakage  · Devices  such as a clamp, pessary, or tampon may help stop urine leaks  Ask your healthcare provider for more information about these and other devices  · Surgery  may be needed if other treatments do not work  Several types of surgery can help improve your bladder control  Ask your healthcare provider for more information about the surgery you may need  How can I manage my symptoms? · Do pelvic muscle exercises often  Your pelvic muscles help you stop urinating  Squeeze these muscles tight for 5 seconds, then relax for 5 seconds  Gradually work up to squeezing for 10 seconds  Do 3 sets of 15 repetitions a day, or as directed  This will help strengthen your pelvic muscles and improve bladder control  · A catheter  may be used to help empty your bladder  A catheter is a tiny, plastic tube that is put into your bladder to drain your urine  Your healthcare provider may tell you to use a catheter to prevent your bladder from getting too full and leaking urine  · Keep a UI record  Write down how often you leak urine and how much you leak  Make a note of what you were doing when you leaked urine  · Train your bladder  Go to the bathroom at set times, such as every 2 hours, even if you do not feel the urge to go  You can also try to hold your urine when you feel the urge to go  For example, hold your urine for 5 minutes when you feel the urge to go  As that becomes easier, hold your urine for 10 minutes  · Drink liquids as directed  Ask your healthcare provider how much liquid to drink each day and which liquids are best for you   You may need to limit the amount of liquid you drink to help control your urine leakage  Limit or do not have drinks that contain caffeine or alcohol  Do not drink any liquid right before you go to bed  · Prevent constipation  Eat a variety of high-fiber foods  Good examples are high-fiber cereals, beans, vegetables, and whole-grain breads  Prune juice may help make your bowel movement softer  Walking is the best way to trigger your intestines to have a bowel movement  · Exercise regularly and maintain a healthy weight  Ask your healthcare provider how much you should weigh and about the best exercise plan for you  Weight loss and exercise will decrease pressure on your bladder and help you control your leakage  Ask him to help you create a weight loss plan if you are overweight  When should I seek immediate care? · You have severe pain  · You are confused or cannot think clearly  When should I contact my healthcare provider? · You have a fever  · You see blood in your urine  · You have pain when you urinate  · You have new or worse pain, even after treatment  · Your mouth feels dry or you have vision changes  · Your urine is cloudy or smells bad  · You have questions or concerns about your condition or care  CARE AGREEMENT:   You have the right to help plan your care  Learn about your health condition and how it may be treated  Discuss treatment options with your caregivers to decide what care you want to receive  You always have the right to refuse treatment  The above information is an  only  It is not intended as medical advice for individual conditions or treatments  Talk to your doctor, nurse or pharmacist before following any medical regimen to see if it is safe and effective for you  © 2017 [address] [employer] [place] Information is for End User's use only and may not be sold, redistributed or otherwise used for commercial purposes   All illustrations and images included in CareNotes® are the copyrighted property of [employer]  or Beny Lui  Cigarette Smoking and Your Health   AMBULATORY CARE:   Risks to your health if you smoke:  Nicotine and other chemicals found in tobacco damage every cell in your body  Even if you are a light smoker, you have an increased risk for cancer, heart disease, and lung disease  If you are pregnant or have diabetes, smoking increases your risk for complications  Benefits to your health if you stop smoking:   · You decrease respiratory symptoms such as coughing, wheezing, and shortness of breath  · You reduce your risk for cancers of the lung, mouth, throat, kidney, bladder, pancreas, stomach, and cervix  If you already have cancer, you increase the benefits of chemotherapy  You also reduce your risk for cancer returning or a second cancer from developing  · You reduce your risk for heart disease, blood clots, heart attack, and stroke  · You reduce your risk for lung infections, and diseases such as pneumonia, asthma, chronic bronchitis, and emphysema  · Your circulation improves  More oxygen can be delivered to your body  If you have diabetes, you lower your risk for complications, such as kidney, artery, and eye diseases  You also lower your risk for nerve damage  Nerve damage can lead to amputations, poor vision, and blindness  · You improve your body's ability to heal and to fight infections  Benefits to the health of others if you stop smoking:  Tobacco is harmful to nonsmokers who breathe in your secondhand smoke  The following are ways the health of others around you may improve when you stop smoking:  · You lower the risks for lung cancer and heart disease in nonsmoking adults  · If you are pregnant, you lower the risk for miscarriage, early delivery, low birth weight, and stillbirth  You also lower your baby's risk for SIDS, obesity, developmental delay, and neurobehavioral problems, such as ADHD  · If you have children, you lower their risk for ear infections, colds, pneumonia, bronchitis, and asthma  For more information and support to stop smoking:   · Smokefree  gov  Phone: 7- 211 - 199-4156  Web Address: www smokefrQuantConnect  Follow up with your healthcare provider as directed:  Write down your questions so you remember to ask them during your visits  © 2017 [address] [place] Information is for End User's use only and may not be sold, redistributed or otherwise used for commercial purposes  All illustrations and images included in CareNotes® are the copyrighted property of [employer]  or Beny Lui  The above information is an  only  It is not intended as medical advice for individual conditions or treatments  Talk to your doctor, nurse or pharmacist before following any medical regimen to see if it is safe and effective for you  Fall Prevention   WHAT YOU NEED TO KNOW:   What is fall prevention? Fall prevention includes ways to make your home and other areas safer  It also includes ways you can move more carefully to prevent a fall  What increases my risk for falls? · Lack of vitamin D    · Not getting enough sleep each night    · Trouble walking or keeping your balance, or foot problems    · Health conditions that cause changes in your blood pressure, vision, or muscle strength and coordination    · Medicines that make you dizzy, weak, or sleepy    · Problems seeing clearly    · Shoes that have high heels or are not supportive    · Tripping hazards, such as items left on the floor, no handrails on the stairs, or broken steps  How can I help protect myself from falls? · Stand or sit up slowly  This may help you keep your balance and prevent falls  If you need to get up during the night, sit up first  Be sure you are fully awake before you stand  Turn on the light before you start walking  Go slowly in case you are still sleepy   Make sure you will not trip over any pets sleeping in the bedroom  · Use assistive devices as directed  Your healthcare provider may suggest that you use a cane or walker to help you keep your balance  You may need to have grab bars put in your bathroom near the toilet or in the shower  · Wear shoes that fit well and have soles that   Wear shoes both inside and outside  Use slippers with good   Do not wear shoes with high heels  · Wear a personal alarm  This is a device that allows you to call 911 if you fall and need help  Ask your healthcare provider for more information  · Stay active  Exercise can help strengthen your muscles and improve your balance  Your healthcare provider may recommend water aerobics or walking  He or she may also recommend physical therapy to improve your coordination  Never start an exercise program without talking to your healthcare provider first      · Manage medical conditions  Keep all appointments with your healthcare providers  Visit your eye doctor as directed  How can I make my home safer? · Add items to prevent falls in the bathroom  Put nonslip strips on your bath or shower floor to prevent you from slipping  Use a bath mat if you do not have carpet in the bathroom  This will prevent you from falling when you step out of the bath or shower  Use a shower seat so you do not need to stand while you shower  Sit on the toilet or a chair in your bathroom to dry yourself and put on clothing  This will prevent you from losing your balance from drying or dressing yourself while you are standing  · Keep paths clear  Remove books, shoes, and other objects from walkways and stairs  Place cords for telephones and lamps out of the way so that you do not need to walk over them  Tape them down if you cannot move them  Remove small rugs  If you cannot remove a rug, secure it with double-sided tape  This will prevent you from tripping  · Install bright lights in your home  Use night lights to help light paths to the bathroom or kitchen  Always turn on the light before you start walking  · Keep items you use often on shelves within reach  Do not use a step stool to help you reach an item  · Paint or place reflective tape on the edges of your stairs  This will help you see the stairs better  Call 911 or have someone else call if:   · You have fallen and are unconscious  · You have fallen and cannot move part of your body  Contact your healthcare provider if:   · You have fallen and have pain or a headache  · You have questions or concerns about your condition or care  CARE AGREEMENT:   You have the right to help plan your care  Learn about your health condition and how it may be treated  Discuss treatment options with your caregivers to decide what care you want to receive  You always have the right to refuse treatment  The above information is an  only  It is not intended as medical advice for individual conditions or treatments  Talk to your doctor, nurse or pharmacist before following any medical regimen to see if it is safe and effective for you  © 2017 [address] [facility] Information is for End User's use only and may not be sold, redistributed or otherwise used for commercial purposes  All illustrations and images included in CareNotes® are the copyrighted property of [employer]  or Beny Lui  Advance Directives   WHAT YOU NEED TO KNOW:   What are advance directives? Advance directives are legal documents that state your wishes and plans for medical care  These plans are made ahead of time in case you lose your ability to make decisions for yourself  Advance directives can apply to any medical decision, such as the treatments you want, and if you want to donate organs  What are the types of advance directives? There are many types of advance directives, and each state has rules about how to use them   You may choose a combination of any of the following:  · Living will: This is a written record of the treatment you want  You can also choose which treatments you do not want, which to limit, and which to stop at a certain time  This includes surgery, medicine, IV fluid, and tube feedings  · Durable power of  for healthcare [facility]): This is a written record that states who you want to make healthcare choices for you when you are unable to make them for yourself  This person, called a proxy, is usually a family member or a friend  You may choose more than 1 proxy  · Do not resuscitate (DNR) order:  A DNR order is used in case your heart stops beating or you stop breathing  It is a request not to have certain forms of treatment, such as CPR  A DNR order may be included in other types of advance directives  · Medical directive: This covers the care that you want if you are in a coma, near death, or unable to make decisions for yourself  You can list the treatments you want for each condition  Treatment may include pain medicine, surgery, blood transfusions, dialysis, IV or tube feedings, and a ventilator (breathing machine)  · Values history: This document has questions about your views, beliefs, and how you feel and think about life  This information can help others choose the care that you would choose  Why are advance directives important? An advance directive helps you control your care  Although spoken wishes may be used, it is better to have your wishes written down  Spoken wishes can be misunderstood, or not followed  Treatments may be given even if you do not want them  An advance directive may make it easier for your family to make difficult choices about your care  How do I decide what to put in my advance directives? · Make informed decisions:  Make sure you fully understand treatments or care you may receive   Think about the benefits and problems your decisions could cause for you or your family  Talk to healthcare providers if you have concerns or questions before you write down your wishes  You may also want to talk with your Jainism or , or a   Check your state laws to make sure that what you put in your advance directive is legal      · Sign all forms:  Sign and date your advance directive when you have finished  You may also need 2 witnesses to sign the forms  Witnesses cannot be your doctor or his staff, your spouse, heirs or beneficiaries, people you owe money to, or your chosen proxy  Talk to your family, proxy, and healthcare providers about your advance directive  Give each person a copy, and keep one for yourself in a place you can get to easily  Do not keep it hidden or locked away  · Review and revise your plans: You can revise your advance directive at any time, as long as you are able to make decisions  Review your plan every year, and when there are changes in your life, or your health  When you make changes, let your family, proxy, and healthcare providers know  Give each a new copy  Where can I find more information? · [employer]  [address] , [address]  Phone: 3- 458 - 100-1943  Phone: 1- 692 - [phone]  Web Address: http://www  aafp org  · [address] [facility])  [address] [facility] [place] [address]  Phone: 8- 871 - [phone]  Phone: [phone]  Web Address: Michael hood  [facility] AGREEMENT:   You have the right to help plan your care  To help with this plan, you must learn about your health condition and treatment options  You must also learn about advance directives and how they are used  Work with your healthcare providers to decide what care will be used to treat you  You always have the right to refuse treatment  The above information is an  only   It is not intended as medical advice for individual conditions or treatments  Talk to your doctor, nurse or pharmacist before following any medical regimen to see if it is safe and effective for you  © 2017 [address] [employer] Information is for End User's use only and may not be sold, redistributed or otherwise used for commercial purposes  All illustrations and images included in CareNotes® are the copyrighted property of [employer]  or Beny Lui

## 2019-08-21 NOTE — PROGRESS NOTES
Assessment and Plan:     Problem List Items Addressed This Visit        Endocrine    Type 2 diabetes mellitus without complication, without long-term current use of insulin ([facility] [address] )    Relevant Orders    CBC and differential    Basic metabolic panel    Lipid Panel with Direct LDL reflex    Hemoglobin A1C    Microalbumin / creatinine urine ratio (Completed)       Cardiovascular and Mediastinum    Essential hypertension    Relevant Orders    CBC and differential    Basic metabolic panel    Lipid Panel with Direct LDL reflex    Hemoglobin A1C       Other    Hyperlipidemia    Relevant Orders    CBC and differential    Basic metabolic panel    Lipid Panel with Direct LDL reflex    Hemoglobin A1C    Encounter for routine adult medical exam with abnormal findings - Primary       Advice and education were given regarding nutrition, aerobic exercises, weight bearing exercises, cardiovascular risk reduction, fall risk reduction, and age appropriate supplements  The patient was counseled regarding instructions for management, risk factor reductions, prognosis, risks and benefits of treatment options, patient and family education, and importance of compliance with treatment  BMI 27 0-27 9,adult      The BMI is above average  BMI counseling and education was provided to the patient  Nutrition recommendations include reducing portion sizes, decreasing overall calorie intake, 3-5 servings of fruits/vegetables daily, reducing fast food intake, consuming healthier snacks, decreasing soda and/or juice intake, moderation in carbohydrate intake and reducing intake of saturated fat and trans fat  Exercise recommendations include moderate aerobic physical activity for 150 minutes/week, exercising 3-5 times per week and joining a gym             Other Visit Diagnoses     Diabetic eye exam [facility])        Relevant Orders    Ambulatory Referral to Ophthalmology    Screening PSA (prostate specific antigen)        Relevant Orders    PSA, Total Screen         History of Present Illness:     Patient presents for Medicare Annual Wellness visit    Patient Care Team:  Lynn Ritchie MD as PCP - General (Family Medicine)     Problem List:     Patient Active Problem List   Diagnosis    Anxiety    Aortic valve stenosis    Chronic coronary artery disease    Carotid artery disease ([facility] )    Coronary atherosclerosis    Osteoarthritis    Essential hypertension    Hepatitis A    Hyperlipidemia    Hydrocele    Hypertriglyceridemia    Hyperplasia of prostate without lower urinary tract symptoms (LUTS)    Insomnia    Other specified glaucoma    Vitamin D deficiency    Type 2 diabetes mellitus without complication, without long-term current use of insulin ([facility] )    Onychomycosis of great toe    Encounter for routine adult medical exam with abnormal findings    BMI 27 0-27 9,adult      Past Medical and Surgical History:     Past Medical History:   Diagnosis Date    Anxiety     CAD (coronary artery disease)     Capsular cataract     mature    Chronic ulcer of skin ([facility] ) 5/18/2015    DJD (degenerative joint disease)     Hepatitis A     Hydrocele     Hyperglycemia     Hyperlipidemia     Hypertension     Impaired fasting glucose 4/30/2014    Myocardial infarct ([place] [address] ) 2007    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[facility])     Prostatic hyperplasia     Type 2 diabetes mellitus without complication, without long-term current use of insulin ([place] [address] ) 3/6/2019     Past Surgical History:   Procedure Laterality Date    ANGIOPLASTY  2008      Family History:     Family History   Problem Relation Age of Onset    Coronary artery disease Father       Social History:     Social History     Tobacco Use   Smoking Status Former Smoker   Smokeless Tobacco Former User     Social History     Substance and Sexual Activity   Alcohol Use No    Frequency: Never    Binge frequency: Never     Social History     Substance and Sexual Activity   Drug Use No      Medications and Allergies:     Current Outpatient Medications   Medication Sig Dispense Refill    aspirin 81 MG tablet every 24 hours      Cholecalciferol (VITAMIN D-3) 1000 units CAPS every 24 hours      dorzolamide-timolol (COSOPT) 22 3-6 8 MG/ML ophthalmic solution Every 12 hours      erythromycin (ILOTYCIN) ophthalmic ointment 0 5 inches daily at bedtime      fenofibrate (TRIGLIDE) 160 MG tablet Take 1 tablet (160 mg total) by mouth daily 30 tablet 2    latanoprost (XALATAN) 0 005 % ophthalmic solution INSTILL 1 DROP AT BEDTIME INTO BOTH EYES  3    meclizine (ANTIVERT) 12 5 MG tablet Take 12 5 mg by mouth as needed for dizziness      metoprolol succinate (TOPROL-XL) 100 mg 24 hr tablet Take 1 tablet (100 mg total) by mouth 2 (two) times a day 60 tablet 2    Multiple Vitamins-Minerals (MULTIVITAL) tablet every 24 hours      Multiple Vitamins-Minerals (PRESERVISION AREDS 2+MULTI VIT PO) take 2 tabs daily      nitroglycerin (NITROSTAT) 0 4 mg SL tablet place 1 tablet by sublingual route at the 1st sign of attack; may repeat every 5 min until relief; if pain persists after 3 tablets in 15 min, prompt medical attention is recommended      RHOPRESSA 0 02 % SOLN INSTILL 1 DROP AT BEDTIME INTO BOTH EYES  1    simvastatin (ZOCOR) 80 mg tablet Take 0 5 tablets (40 mg total) by mouth daily 30 tablet 2     No current facility-administered medications for this visit        No Known Allergies   Immunizations:     Immunization History   Administered Date(s) Administered     Influenza (IM) Preservative Free 11/06/2018    Influenza Split 01/14/2014    Influenza Split High Dose Preservative Free IM 10/08/2014, 11/18/2015, 11/20/2015, 09/26/2016, 11/21/2017    Influenza TIV (IM) 11/01/2012    Pneumococcal Conjugate 13-Valent 01/29/2015    Pneumococcal Polysaccharide PPV23 11/01/1999, 11/06/2003    Td (adult), adsorbed 02/04/2010    Tdap 01/31/2019      Medicare Screening Tests and Risk Assessments:     Cecy Kang is here for his Subsequent Wellness visit  Last Medicare Wellness visit information reviewed, patient interviewed and updates made to the record today  Health Risk Assessment:  Patient rates overall health as good  Patient feels that their physical health rating is Same  Eyesight was rated as Same  Hearing was rated as Same  Patient feels that their emotional and mental health rating is Slightly worse  Pain experienced by patient in the last 7 days has been Some  Patient's pain rating has been 5/10  Patient states that he has experienced no weight loss or gain in last 6 months  Emotional/Mental Health:  Patient has been feeling nervous/anxious  PHQ-9 Depression Screening:    Frequency of the following problems over the past two weeks:      1  Little interest or pleasure in doing things: 0 - not at all      2  Feeling down, depressed, or hopeless: 0 - not at all  PHQ-2 Score: 0          Broken Bones/Falls: Fall Risk Assessment:    In the past year, patient has experienced: No history of falling in past year          Bladder/Bowel:  Patient has not leaked urine accidently in the last six months  Patient reports no loss of bowel control  Immunizations:  Patient has had a flu vaccination within the last year  Patient has received a pneumonia shot  Patient has not received a shingles shot  Patient has received tetanus/diphtheria shot  Date of tetanus/diphtheria shot: 1/31/2019    Home Safety:  Patient does not have trouble with stairs inside or outside of their home  Patient currently reports that there are no safety hazards present in home, working smoke alarms, working carbon monoxide detectors        Preventative Screenings:   no prostate cancer screen performed, no colon cancer screen completed, cholesterol screen completed, glaucoma eye exam completed,     Nutrition:  Current diet: Regular and Limited junk food with servings of the following:    Medications:  Patient is currently taking over-the-counter supplements  List of OTC medications includes: vitamin d3  Patient is able to manage medications  Lifestyle Choices:  Patient reports no tobacco use  Patient has not smoked or used tobacco in the past   Patient has stopped his tobacco use  Patient reports no alcohol use  Patient drives a vehicle  Patient wears seat belt  Current level of exercise of physical activity described by patient as: active daily   Activities of Daily Living:  Can get out of bed by his or her self, able to dress self, able to make own meals, able to do own shopping, able to bathe self, can do own laundry/housekeeping, can manage own money, pay bills and track expenses    Previous Hospitalizations:  No hospitalization or ED visit in past 12 months        Advanced Directives:  Patient has decided on a power of   Patient has spoken to designated power of   Patient has completed advanced directive  Preventative Screening/Counseling:      Cardiovascular:      General: Risks and Benefits Discussed and Screening Current          Diabetes:      General: Risks and Benefits Discussed and Screening Current          Colorectal Cancer:      General: Risks and Benefits Discussed and Screening Not Indicated          Prostate Cancer:      General: Risks and Benefits Discussed          Osteoporosis:      General: Risks and Benefits Discussed and Screening Not Indicated          AAA:      General: Risks and Benefits Discussed          Glaucoma:      General: Risks and Benefits Discussed and Screening Current      Comments: Patient does F/up with Ophthomology        HIV:      General: Risks and Benefits Discussed and Screening Current          Hepatitis C:      General: Risks and Benefits Discussed and Screening Current        Advanced Directives:   5 wishes given  BMI Counseling: Body mass index is 27 28 kg/m²  Discussed the patient's BMI with him  The BMI is above average   BMI counseling and education was provided to the patient  Nutrition recommendations include 3-5 servings of fruits/vegetables daily and moderation in carbohydrate intake  Exercise recommendations include moderate aerobic physical activity for 150 minutes/week

## 2019-08-22 ENCOUNTER — TELEPHONE (OUTPATIENT)
Dept: FAMILY MEDICINE CLINIC | Facility: CLINIC | Age: 84
End: 2019-08-22

## 2019-08-22 NOTE — TELEPHONE ENCOUNTER
Spoke with patient and she wants to speak only with dr [employer]   Sent message to dr Mathieu Gregory

## 2019-08-22 NOTE — TELEPHONE ENCOUNTER
Dr [employer] please call maryana (daughter to riley medrano) only wants to speak with you regarding bill for hiv test  Her # [phone]

## 2019-09-19 LAB — HBA1C MFR BLD HPLC: 6.7 %

## 2019-09-27 ENCOUNTER — OFFICE VISIT (OUTPATIENT)
Dept: FAMILY MEDICINE CLINIC | Facility: CLINIC | Age: 84
End: 2019-09-27
Payer: MEDICARE

## 2019-09-27 VITALS
DIASTOLIC BLOOD PRESSURE: 84 MMHG | BODY MASS INDEX: 27.19 KG/M2 | TEMPERATURE: 97.4 F | WEIGHT: 144 LBS | SYSTOLIC BLOOD PRESSURE: 132 MMHG | RESPIRATION RATE: 16 BRPM | HEIGHT: 61 IN | OXYGEN SATURATION: 98 % | HEART RATE: 64 BPM

## 2019-09-27 DIAGNOSIS — E78.2 MIXED HYPERLIPIDEMIA: ICD-10-CM

## 2019-09-27 DIAGNOSIS — I35.0 NONRHEUMATIC AORTIC VALVE STENOSIS: ICD-10-CM

## 2019-09-27 DIAGNOSIS — E11.9 TYPE 2 DIABETES MELLITUS WITHOUT COMPLICATION, WITHOUT LONG-TERM CURRENT USE OF INSULIN (HCC): ICD-10-CM

## 2019-09-27 DIAGNOSIS — Z23 NEED FOR INFLUENZA VACCINATION: Primary | ICD-10-CM

## 2019-09-27 DIAGNOSIS — I10 ESSENTIAL HYPERTENSION: ICD-10-CM

## 2019-09-27 PROCEDURE — G0008 ADMIN INFLUENZA VIRUS VAC: HCPCS | Performed by: FAMILY MEDICINE

## 2019-09-27 PROCEDURE — 90662 IIV NO PRSV INCREASED AG IM: CPT | Performed by: FAMILY MEDICINE

## 2019-09-27 PROCEDURE — 99214 OFFICE O/P EST MOD 30 MIN: CPT | Performed by: FAMILY MEDICINE

## 2019-09-27 NOTE — PROGRESS NOTES
Subjective:   Chief Complaint   Patient presents with    Follow-up     chronic conditions        Patient ID: Sundeep Medellin is a 80 y o  male  Patient here follow-up with a chronic condition patient was history of non-insulin-dependent diabetic try to controlled with the low carb diet and he deny any increased thirsty increased frequency urination no dizziness no headache and no abdomen pain his hemoglobin A1c dropped from 7 0-6 7  Patient with a history of hyperlipidemia on statin tolerated well deny any chest pain short of breath no palpitation no TIA symptom patient's history of coronary artery disease a he has a on the beta-blocker and statin and aspirin asymptomatic and no chest pain or short of breath no palpitation and he had history also aortic stenosis echocardiogram done on 2018 no change in the size of the vomit he deny any light headache no syncope and no short of breath on activity patient history of hypertension fair control on current medication deny any chest pain short of breath no palpitation no dyspnea on exertion no lower extremity edema  Recent blood work discussed with the patient      The following portions of the patient's history were reviewed and updated as appropriate: allergies, current medications, past family history, past medical history, past social history, past surgical history and problem list     Review of Systems   Constitutional: Negative for fatigue and fever  HENT: Negative for ear pain, sinus pressure, sinus pain and sore throat  Eyes: Negative for pain and redness  Respiratory: Negative for cough, chest tightness and shortness of breath  Cardiovascular: Negative for chest pain, palpitations and leg swelling  Gastrointestinal: Negative for abdominal pain, blood in stool, constipation, diarrhea and nausea  Genitourinary: Negative for flank pain, frequency and hematuria  Musculoskeletal: Negative for back pain and joint swelling  Skin: Negative for rash  Neurological: Negative for dizziness, numbness and headaches  Hematological: Does not bruise/bleed easily  Objective:  Vitals:    09/27/19 0819   BP: 132/84   BP Location: Left arm   Patient Position: Sitting   Cuff Size: Large   Pulse: 64   Resp: 16   Temp: (!) 97 4 °F (36 3 °C)   TempSrc: Tympanic   SpO2: 98%   Weight: 65 3 kg (144 lb)   Height: 5' 0 5" (1 537 m)      Physical Exam   Constitutional: He is oriented to person, place, and time  He appears well-developed and well-nourished  HENT:   Head: Normocephalic  Right Ear: External ear normal    Left Ear: External ear normal    Eyes: Conjunctivae and EOM are normal  Right eye exhibits no discharge  Left eye exhibits no discharge  Neck: No JVD present  Cardiovascular: Normal rate and regular rhythm  Exam reveals no gallop  Murmur heard  Systolic murmur 2/6 in the 2nd intercostal on the right side   Pulmonary/Chest: Effort normal  No respiratory distress  He has no wheezes  He has no rales  He exhibits no tenderness  Abdominal: He exhibits no mass  There is no tenderness  There is no rebound  Musculoskeletal: He exhibits no edema or tenderness  Neurological: He is alert and oriented to person, place, and time  Skin: No rash noted  No erythema           Assessment/Plan:    Type 2 diabetes mellitus without complication, without long-term current use of insulin ([facility])    Lab Results   Component Value Date    HGBA1C 6 7 09/19/2019    Chronic asymptomatic improve with the low carb diet hemoglobin A1c dropped from 7 0-6 7 patient still declined medication recommend the to continue with the low carb diet and important lose weight  The patient already on statin    Essential hypertension  A chronic asymptomatic fair control continue current management encouraged patient to lose weight low-salt diet and increased physical activity    Aortic valve stenosis  Chronic asymptomatic echocardiogram was done in 2018 stable    Hyperlipidemia  Chronic asymptomatic fair control continue current management encouraged patient to continue low fat diet and important lose weight       Diagnoses and all orders for this visit:    Need for influenza vaccination  -     influenza vaccine, 5684-0250, high-dose, PF 0 5 mL (FLUZONE HIGH-DOSE)    Type 2 diabetes mellitus without complication, without long-term current use of insulin (HCC)  -     CBC and differential; Future  -     Basic metabolic panel; Future  -     Lipid Panel with Direct LDL reflex; Future  -     TSH, 3rd generation with Free T4 reflex; Future  -     Hemoglobin A1C; Future    Essential hypertension  -     CBC and differential; Future  -     Basic metabolic panel; Future  -     Lipid Panel with Direct LDL reflex; Future  -     TSH, 3rd generation with Free T4 reflex; Future  -     Hemoglobin A1C; Future    Nonrheumatic aortic valve stenosis  -     CBC and differential; Future  -     Basic metabolic panel; Future  -     Lipid Panel with Direct LDL reflex; Future  -     TSH, 3rd generation with Free T4 reflex; Future  -     Hemoglobin A1C; Future    Mixed hyperlipidemia  -     CBC and differential; Future  -     Basic metabolic panel; Future  -     Lipid Panel with Direct LDL reflex; Future  -     TSH, 3rd generation with Free T4 reflex;  Future  -     Hemoglobin A1C; Future

## 2019-09-27 NOTE — ASSESSMENT & PLAN NOTE
Lab Results   Component Value Date    HGBA1C 6 7 09/19/2019    Chronic asymptomatic improve with the low carb diet hemoglobin A1c dropped from 7 0-6 7 patient still declined medication recommend the to continue with the low carb diet and important lose weight  The patient already on statin

## 2019-09-29 NOTE — ASSESSMENT & PLAN NOTE
A chronic asymptomatic fair control continue current management encouraged patient to lose weight low-salt diet and increased physical activity

## 2019-09-29 NOTE — ASSESSMENT & PLAN NOTE
Chronic asymptomatic fair control continue current management encouraged patient to continue low fat diet and important lose weight

## 2019-11-18 DIAGNOSIS — E78.2 MIXED HYPERLIPIDEMIA: ICD-10-CM

## 2019-11-18 RX ORDER — SIMVASTATIN 80 MG
40 TABLET ORAL DAILY
Qty: 30 TABLET | Refills: 2 | Status: SHIPPED | OUTPATIENT
Start: 2019-11-18 | End: 2020-05-12 | Stop reason: SDUPTHER

## 2019-11-19 DIAGNOSIS — E78.2 MIXED HYPERLIPIDEMIA: ICD-10-CM

## 2019-11-19 DIAGNOSIS — I10 ESSENTIAL HYPERTENSION: ICD-10-CM

## 2019-11-19 RX ORDER — METOPROLOL SUCCINATE 100 MG/1
TABLET, EXTENDED RELEASE ORAL
Qty: 60 TABLET | Refills: 2 | Status: SHIPPED | OUTPATIENT
Start: 2019-11-19 | End: 2020-02-18 | Stop reason: SDUPTHER

## 2019-11-19 RX ORDER — FENOFIBRATE 160 MG/1
TABLET ORAL
Qty: 30 TABLET | Refills: 2 | Status: SHIPPED | OUTPATIENT
Start: 2019-11-19 | End: 2020-02-18 | Stop reason: SDUPTHER

## 2019-12-20 LAB — HBA1C MFR BLD HPLC: 6.7 %

## 2019-12-27 ENCOUNTER — OFFICE VISIT (OUTPATIENT)
Dept: FAMILY MEDICINE CLINIC | Facility: CLINIC | Age: 84
End: 2019-12-27
Payer: MEDICARE

## 2019-12-27 VITALS
OXYGEN SATURATION: 99 % | DIASTOLIC BLOOD PRESSURE: 80 MMHG | BODY MASS INDEX: 27 KG/M2 | RESPIRATION RATE: 16 BRPM | HEIGHT: 61 IN | SYSTOLIC BLOOD PRESSURE: 128 MMHG | WEIGHT: 143 LBS | TEMPERATURE: 96.9 F | HEART RATE: 59 BPM

## 2019-12-27 DIAGNOSIS — I65.23 BILATERAL CAROTID ARTERY STENOSIS: ICD-10-CM

## 2019-12-27 DIAGNOSIS — I25.10 CHRONIC CORONARY ARTERY DISEASE: ICD-10-CM

## 2019-12-27 DIAGNOSIS — E87.5 POTASSIUM (K) EXCESS: ICD-10-CM

## 2019-12-27 DIAGNOSIS — E78.2 MIXED HYPERLIPIDEMIA: ICD-10-CM

## 2019-12-27 DIAGNOSIS — E11.9 TYPE 2 DIABETES MELLITUS WITHOUT COMPLICATION, WITHOUT LONG-TERM CURRENT USE OF INSULIN (HCC): Primary | ICD-10-CM

## 2019-12-27 DIAGNOSIS — I10 ESSENTIAL HYPERTENSION: ICD-10-CM

## 2019-12-27 PROCEDURE — 99214 OFFICE O/P EST MOD 30 MIN: CPT | Performed by: FAMILY MEDICINE

## 2019-12-27 NOTE — ASSESSMENT & PLAN NOTE
Chronic asymptomatic fair control patient already on beta-blocker and patient on aspirin and statin patient is due for echocardiogram check no dysfunction of the heart will order today

## 2019-12-27 NOTE — PROGRESS NOTES
Subjective:   Chief Complaint   Patient presents with    Follow-up     chronic conditions        Patient ID: Kaye Case is a 80 y o  male  Patient here follow-up with a chronic condition patient history of non-insulin-dependent diabetic try to controlled with the low carb diet deny increased thirsty increased frequency urination no dizziness no headache and no abdomen pain his hemoglobin A1c is stable patient history of hyperlipidemia on simvastatin 80 mg once a day tolerated well deny any muscle pain or rash and he is asymptomatic no chest pain or short of breath no palpitation no TIA symptom patient history of hypertension blood pressure fair control on current medication tolerated well without side effect deny any chest pain short of breath no palpitation no dyspnea on exertion and no lower extremity edema patient history of coronary artery disease a deny any chest pain short of breath and no lower extremity edema no dyspnea on exertion patient on beta-blocker he is due for echocardiogram was order in December 2018 patient did not do it in patient also had history of carotid  Artery stenosis no headache no dizziness no light headache no blurred vision no weakness no numbness or lateralized of the symptom   recent blood work discussed with the patient      The following portions of the patient's history were reviewed and updated as appropriate: allergies, current medications, past family history, past medical history, past social history, past surgical history and problem list     Review of Systems   Constitutional: Negative for fatigue and fever  HENT: Negative for ear pain, sinus pressure, sinus pain and sore throat  Eyes: Negative for pain and redness  Respiratory: Negative for cough, chest tightness and shortness of breath  Cardiovascular: Negative for chest pain, palpitations and leg swelling     Gastrointestinal: Negative for abdominal pain, blood in stool, constipation, diarrhea and nausea  Genitourinary: Negative for flank pain, frequency and hematuria  Musculoskeletal: Negative for back pain and joint swelling  Skin: Negative for rash  Neurological: Negative for dizziness, numbness and headaches  Hematological: Does not bruise/bleed easily  Objective:  Vitals:    12/27/19 0752   BP: 128/80   BP Location: Left arm   Patient Position: Sitting   Cuff Size: Adult   Pulse: 59   Resp: 16   Temp: (!) 96 9 °F (36 1 °C)   TempSrc: Tympanic   SpO2: 99%   Weight: 64 9 kg (143 lb)   Height: 5' 0 5" (1 537 m)      Physical Exam   Constitutional: He is oriented to person, place, and time  He appears well-developed and well-nourished  HENT:   Head: Normocephalic  Right Ear: External ear normal    Left Ear: External ear normal    Eyes: Conjunctivae and EOM are normal  Right eye exhibits no discharge  Left eye exhibits no discharge  Neck: No JVD present  Cardiovascular: Normal rate and regular rhythm  Exam reveals no gallop  Murmur heard  Systolic heart murmur in the 2nd intercostal right side 3/6   Pulmonary/Chest: Effort normal  No respiratory distress  He has no wheezes  He has no rales  He exhibits no tenderness  Abdominal: He exhibits no mass  There is no tenderness  There is no rebound  Musculoskeletal: He exhibits no edema or tenderness  Neurological: He is alert and oriented to person, place, and time  Skin: No rash noted  No erythema           Assessment/Plan:    Type 2 diabetes mellitus without complication, without long-term current use of insulin ([facility])    Lab Results   Component Value Date    HGBA1C 6 7 12/20/2019   A chronic asymptomatic patient try to controlled with the low carb diet hemoglobin A1c is stable encouraged patient to continue with the low carb diet and important lose weight patient is up-to-date with his diabetic eye exam and no retinopathy and diabetic foot exam also patient negative for microalbuminuria    Chronic coronary artery disease Chronic asymptomatic fair control patient already on beta-blocker and patient on aspirin and statin patient is due for echocardiogram check no dysfunction of the heart will order today    Essential hypertension   A chronic asymptomatic fair control continue current management low-salt diet discussed with the patient    Carotid artery disease ([address] )   Chronic asymptomatic fair control patient now and statin on on aspirin patient is due for carotid duplex     Hyperlipidemia   Chronic asymptomatic fair control LDL close to therapeutic in diabetic patient encouraged patient to follow up with the low-fat diet encouraged continue simvastatin 80 mg once a day    Potassium (K) excess   A new finding on recent blood work will repeat potassium level to rule out  Lab error        Diagnoses and all orders for this visit:    Type 2 diabetes mellitus without complication, without long-term current use of insulin (HCC)  -     CBC and differential; Future  -     Basic metabolic panel; Future  -     Hemoglobin A1C; Future    Chronic coronary artery disease  -     Echo complete with contrast if indicated; Future    Essential hypertension  -     CBC and differential; Future  -     Basic metabolic panel; Future  -     Hemoglobin A1C; Future    Mixed hyperlipidemia  -     CBC and differential; Future  -     Basic metabolic panel; Future  -     Hemoglobin A1C; Future    Bilateral carotid artery stenosis  -     VAS carotid complete study; Future    Potassium (K) excess  -     Potassium;  Future

## 2019-12-27 NOTE — ASSESSMENT & PLAN NOTE
Chronic asymptomatic fair control patient now and statin on on aspirin patient is due for carotid duplex

## 2019-12-27 NOTE — ASSESSMENT & PLAN NOTE
A chronic asymptomatic fair control continue current management low-salt diet discussed with the patient

## 2019-12-27 NOTE — ASSESSMENT & PLAN NOTE
Chronic asymptomatic fair control LDL close to therapeutic in diabetic patient encouraged patient to follow up with the low-fat diet encouraged continue simvastatin 80 mg once a day

## 2019-12-27 NOTE — ASSESSMENT & PLAN NOTE
Lab Results   Component Value Date    HGBA1C 6 7 12/20/2019   A chronic asymptomatic patient try to controlled with the low carb diet hemoglobin A1c is stable encouraged patient to continue with the low carb diet and important lose weight patient is up-to-date with his diabetic eye exam and no retinopathy and diabetic foot exam also patient negative for microalbuminuria

## 2019-12-30 ENCOUNTER — TELEPHONE (OUTPATIENT)
Dept: FAMILY MEDICINE CLINIC | Facility: CLINIC | Age: 84
End: 2019-12-30

## 2020-02-18 DIAGNOSIS — I10 ESSENTIAL HYPERTENSION: ICD-10-CM

## 2020-02-18 DIAGNOSIS — E78.2 MIXED HYPERLIPIDEMIA: ICD-10-CM

## 2020-02-19 RX ORDER — METOPROLOL SUCCINATE 100 MG/1
100 TABLET, EXTENDED RELEASE ORAL 2 TIMES DAILY
Qty: 60 TABLET | Refills: 2 | Status: SHIPPED | OUTPATIENT
Start: 2020-02-19 | End: 2020-05-25

## 2020-02-19 RX ORDER — FENOFIBRATE 160 MG/1
160 TABLET ORAL DAILY
Qty: 30 TABLET | Refills: 2 | Status: SHIPPED | OUTPATIENT
Start: 2020-02-19 | End: 2020-05-25

## 2020-03-21 ENCOUNTER — TELEPHONE (OUTPATIENT)
Dept: OTHER | Facility: OTHER | Age: 85
End: 2020-03-21

## 2020-05-05 LAB — HBA1C MFR BLD HPLC: 6.7 %

## 2020-05-12 ENCOUNTER — OFFICE VISIT (OUTPATIENT)
Dept: FAMILY MEDICINE CLINIC | Facility: CLINIC | Age: 85
End: 2020-05-12
Payer: MEDICARE

## 2020-05-12 VITALS
WEIGHT: 142 LBS | HEIGHT: 60 IN | DIASTOLIC BLOOD PRESSURE: 70 MMHG | HEART RATE: 64 BPM | OXYGEN SATURATION: 98 % | SYSTOLIC BLOOD PRESSURE: 130 MMHG | BODY MASS INDEX: 27.88 KG/M2 | TEMPERATURE: 96.5 F

## 2020-05-12 DIAGNOSIS — E11.9 TYPE 2 DIABETES MELLITUS WITHOUT COMPLICATION, WITHOUT LONG-TERM CURRENT USE OF INSULIN (HCC): ICD-10-CM

## 2020-05-12 DIAGNOSIS — E78.2 MIXED HYPERLIPIDEMIA: ICD-10-CM

## 2020-05-12 DIAGNOSIS — I65.23 BILATERAL CAROTID ARTERY STENOSIS: ICD-10-CM

## 2020-05-12 DIAGNOSIS — I10 ESSENTIAL HYPERTENSION: ICD-10-CM

## 2020-05-12 PROCEDURE — 2022F DILAT RTA XM EVC RTNOPTHY: CPT | Performed by: FAMILY MEDICINE

## 2020-05-12 PROCEDURE — 4040F PNEUMOC VAC/ADMIN/RCVD: CPT | Performed by: FAMILY MEDICINE

## 2020-05-12 PROCEDURE — 3075F SYST BP GE 130 - 139MM HG: CPT | Performed by: FAMILY MEDICINE

## 2020-05-12 PROCEDURE — 1036F TOBACCO NON-USER: CPT | Performed by: FAMILY MEDICINE

## 2020-05-12 PROCEDURE — 1160F RVW MEDS BY RX/DR IN RCRD: CPT | Performed by: FAMILY MEDICINE

## 2020-05-12 PROCEDURE — 3078F DIAST BP <80 MM HG: CPT | Performed by: FAMILY MEDICINE

## 2020-05-12 PROCEDURE — 3044F HG A1C LEVEL LT 7.0%: CPT | Performed by: FAMILY MEDICINE

## 2020-05-12 PROCEDURE — 3008F BODY MASS INDEX DOCD: CPT | Performed by: FAMILY MEDICINE

## 2020-05-12 PROCEDURE — 99214 OFFICE O/P EST MOD 30 MIN: CPT | Performed by: FAMILY MEDICINE

## 2020-05-12 RX ORDER — ERYTHROMYCIN 5 MG/G
0.5 OINTMENT OPHTHALMIC
COMMUNITY
End: 2021-03-23 | Stop reason: ALTCHOICE

## 2020-05-12 RX ORDER — SIMVASTATIN 80 MG
40 TABLET ORAL DAILY
Qty: 30 TABLET | Refills: 2 | Status: SHIPPED | OUTPATIENT
Start: 2020-05-12 | End: 2020-10-02

## 2020-05-12 RX ORDER — ALPRAZOLAM 0.5 MG/1
0.5 TABLET ORAL DAILY PRN
COMMUNITY

## 2020-05-24 DIAGNOSIS — I10 ESSENTIAL HYPERTENSION: ICD-10-CM

## 2020-05-24 DIAGNOSIS — E78.2 MIXED HYPERLIPIDEMIA: ICD-10-CM

## 2020-05-25 RX ORDER — FENOFIBRATE 160 MG/1
TABLET ORAL
Qty: 30 TABLET | Refills: 2 | Status: SHIPPED | OUTPATIENT
Start: 2020-05-25 | End: 2020-09-07

## 2020-05-25 RX ORDER — METOPROLOL SUCCINATE 100 MG/1
TABLET, EXTENDED RELEASE ORAL
Qty: 60 TABLET | Refills: 2 | Status: SHIPPED | OUTPATIENT
Start: 2020-05-25 | End: 2020-09-07

## 2020-08-21 ENCOUNTER — OFFICE VISIT (OUTPATIENT)
Dept: FAMILY MEDICINE CLINIC | Facility: CLINIC | Age: 85
End: 2020-08-21
Payer: MEDICARE

## 2020-08-21 VITALS
BODY MASS INDEX: 26.9 KG/M2 | DIASTOLIC BLOOD PRESSURE: 80 MMHG | HEIGHT: 60 IN | WEIGHT: 137 LBS | SYSTOLIC BLOOD PRESSURE: 120 MMHG | OXYGEN SATURATION: 99 % | TEMPERATURE: 97.9 F | HEART RATE: 66 BPM

## 2020-08-21 DIAGNOSIS — E11.9 TYPE 2 DIABETES MELLITUS WITHOUT COMPLICATION, WITHOUT LONG-TERM CURRENT USE OF INSULIN (HCC): ICD-10-CM

## 2020-08-21 DIAGNOSIS — Z78.9: ICD-10-CM

## 2020-08-21 DIAGNOSIS — E55.9 VITAMIN D DEFICIENCY: ICD-10-CM

## 2020-08-21 DIAGNOSIS — E78.2 MIXED HYPERLIPIDEMIA: ICD-10-CM

## 2020-08-21 DIAGNOSIS — Z00.00 MEDICARE ANNUAL WELLNESS VISIT, SUBSEQUENT: Primary | ICD-10-CM

## 2020-08-21 DIAGNOSIS — I65.23 BILATERAL CAROTID ARTERY STENOSIS: ICD-10-CM

## 2020-08-21 DIAGNOSIS — I10 ESSENTIAL HYPERTENSION: ICD-10-CM

## 2020-08-21 LAB
CREAT UR-MCNC: 69.6 MG/DL
MICROALBUMIN UR-MCNC: 6.5 MG/L (ref 0–20)
MICROALBUMIN/CREAT 24H UR: 9 MG/G CREATININE (ref 0–30)

## 2020-08-21 PROCEDURE — 82043 UR ALBUMIN QUANTITATIVE: CPT | Performed by: FAMILY MEDICINE

## 2020-08-21 PROCEDURE — 2022F DILAT RTA XM EVC RTNOPTHY: CPT | Performed by: FAMILY MEDICINE

## 2020-08-21 PROCEDURE — 1036F TOBACCO NON-USER: CPT | Performed by: FAMILY MEDICINE

## 2020-08-21 PROCEDURE — 1125F AMNT PAIN NOTED PAIN PRSNT: CPT | Performed by: FAMILY MEDICINE

## 2020-08-21 PROCEDURE — 3008F BODY MASS INDEX DOCD: CPT | Performed by: FAMILY MEDICINE

## 2020-08-21 PROCEDURE — 4040F PNEUMOC VAC/ADMIN/RCVD: CPT | Performed by: FAMILY MEDICINE

## 2020-08-21 PROCEDURE — G0439 PPPS, SUBSEQ VISIT: HCPCS | Performed by: FAMILY MEDICINE

## 2020-08-21 PROCEDURE — 3044F HG A1C LEVEL LT 7.0%: CPT | Performed by: FAMILY MEDICINE

## 2020-08-21 PROCEDURE — 99214 OFFICE O/P EST MOD 30 MIN: CPT | Performed by: FAMILY MEDICINE

## 2020-08-21 PROCEDURE — 1160F RVW MEDS BY RX/DR IN RCRD: CPT | Performed by: FAMILY MEDICINE

## 2020-08-21 PROCEDURE — 82570 ASSAY OF URINE CREATININE: CPT | Performed by: FAMILY MEDICINE

## 2020-08-21 PROCEDURE — 3079F DIAST BP 80-89 MM HG: CPT | Performed by: FAMILY MEDICINE

## 2020-08-21 PROCEDURE — 1170F FXNL STATUS ASSESSED: CPT | Performed by: FAMILY MEDICINE

## 2020-08-21 PROCEDURE — 3074F SYST BP LT 130 MM HG: CPT | Performed by: FAMILY MEDICINE

## 2020-08-21 NOTE — PATIENT INSTRUCTIONS

## 2020-08-21 NOTE — PROGRESS NOTES
Assessment and Plan:     Problem List Items Addressed This Visit        Endocrine    Type 2 diabetes mellitus without complication, without long-term current use of insulin ([facility] [address] )       Lab Results   Component Value Date    HGBA1C 6 7 (H) 05/05/2020     Chronic asymptomatic hemoglobin A1c 6 7 patient try to controlled with the low carb diet and he does not want to be on any medication a continue low carb diet continue watch for the portion and important lose weight patient already on statin  A negative for microalbuminuria         Relevant Orders    Microalbumin / creatinine urine ratio    CBC and differential    Basic metabolic panel    Lipid Panel with Direct LDL reflex       Cardiovascular and Mediastinum    Carotid artery disease (HCC)     A chronic asymptomatic patient already on statin and aspirin in he does not want do a carotid duplex         Essential hypertension     Chronic asymptomatic fair control continue current medication including metoprolol succinate low-salt diet and important lose weight discussed with the patient         Relevant Orders    CBC and differential    Basic metabolic panel    Lipid Panel with Direct LDL reflex       Other    Hyperlipidemia     Chronic asymptomatic the patient continue with the simvastatin 80 mg half tablet once a day and he is on fenofibrate low-fat diet important lose weight discussed the patient due for blood work for next appointment         Relevant Orders    CBC and differential    Basic metabolic panel    Lipid Panel with Direct LDL reflex    Vitamin D deficiency     Chronic asymptomatic continue vitamin-D supplement 1000 International Units once a day due for vitamin-D level before next appointment         Unable to determine patient do not resuscitate (DNR) status     I discussed with the patient a DNR status and he did not make up his mind yet the the 5 wishes booklet has been given previously and he did not feel it yet            Other Visit Diagnoses Medicare annual wellness visit, subsequent    -  Primary           Preventive health issues were discussed with patient, and age appropriate screening tests were ordered as noted in patient's After Visit Summary  Personalized health advice and appropriate referrals for health education or preventive services given if needed, as noted in patient's After Visit Summary       History of Present Illness:     Patient presents for Medicare Annual Wellness visit    Patient Care Team:  Sallee Goldmann, MD as PCP - General (Family Medicine)  Hortencia Arreola MD (Ophthalmology)  Vikki Dubois MD (Ophthalmology)     Problem List:     Patient Active Problem List   Diagnosis    Anxiety    Aortic valve stenosis    Chronic coronary artery disease    Carotid artery disease ([facility] [address] )    Coronary atherosclerosis    Osteoarthritis    Essential hypertension    Hepatitis A    Hyperlipidemia    Hydrocele    Hypertriglyceridemia    Hyperplasia of prostate without lower urinary tract symptoms (LUTS)    Insomnia    Other specified glaucoma    Vitamin D deficiency    Type 2 diabetes mellitus without complication, without long-term current use of insulin ([facility] [address] )    Onychomycosis of great toe    Encounter for routine adult medical exam with abnormal findings    BMI 27 0-27 9,adult    Potassium (K) excess    Unable to determine patient do not resuscitate (DNR) status      Past Medical and Surgical History:     Past Medical History:   Diagnosis Date    Anxiety     CAD (coronary artery disease)     Capsular cataract     mature    Chronic ulcer of skin ([facility] [address] ) 5/18/2015    DJD (degenerative joint disease)     Hepatitis A     Hydrocele     Hyperglycemia     Hyperlipidemia     Hypertension     Impaired fasting glucose 4/30/2014    Myocardial infarct ([facility] [address] ) 2007    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)     Prostatic hyperplasia     Type 2 diabetes mellitus without complication, without long-term current use of insulin [facility] 3/6/2019     Past Surgical History:   Procedure Laterality Date    ANGIOPLASTY  2008      Family History:     Family History   Problem Relation Age of Onset    Coronary artery disease Father       Social History:        Social History     Socioeconomic History    Marital status:      Spouse name: None    Number of children: None    Years of education: None    Highest education level: None   Occupational History    None   Social Needs    Financial resource strain: Not hard at all   Whitney-Louann insecurity     Worry: Never true     Inability: Never true   [employer] needs     Medical: No     Non-medical: No   Tobacco Use    Smoking status: Former Smoker    Smokeless tobacco: Former User   Substance and Sexual Activity    Alcohol use: No     Frequency: Never     Binge frequency: Never    Drug use: No    Sexual activity: Not Currently     Partners: Female   Lifestyle    Physical activity     Days per week: 0 days     Minutes per session: 0 min    Stress: Not at all   Relationships    Social connections     Talks on phone: More than three times a week     Gets together: More than three times a week     Attends Religion service: Never     Active member of club or organization: No     Attends meetings of clubs or organizations: Never     Relationship status:      Intimate partner violence     Fear of current or ex partner: No     Emotionally abused: No     Physically abused: No     Forced sexual activity: No   Other Topics Concern    None   Social History Narrative    No risk for falls    Activity level: sedentary    No sleep changes    No animals    No firearms    Always uses a seatbelt      Medications and Allergies:     Current Outpatient Medications   Medication Sig Dispense Refill    ALPRAZolam (XANAX) 0 5 mg tablet Take 0 5 mg by mouth daily as needed for anxiety      aspirin 81 MG tablet every 24 hours      Cholecalciferol (VITAMIN D-3) 1000 units CAPS every 24 hours      dorzolamide-timolol (COSOPT) 22 3-6 8 MG/ML ophthalmic solution Every 12 hours      erythromycin (ILOTYCIN) ophthalmic ointment 0 5 inches daily at bedtime      fenofibrate (TRIGLIDE) 160 MG tablet TAKE 1 TABLET BY MOUTH EVERY DAY 30 tablet 2    latanoprost (XALATAN) 0 005 % ophthalmic solution INSTILL 1 DROP AT BEDTIME INTO BOTH EYES  3    meclizine (ANTIVERT) 12 5 MG tablet Take 12 5 mg by mouth as needed for dizziness      metoprolol succinate (TOPROL-XL) 100 mg 24 hr tablet TAKE 1 TABLET BY MOUTH TWICE A DAY 60 tablet 2    Multiple Vitamins-Minerals (MULTIVITAL) tablet every 24 hours      Multiple Vitamins-Minerals (PRESERVISION AREDS 2+MULTI VIT PO) take 2 tabs daily      nitroglycerin (NITROSTAT) 0 4 mg SL tablet place 1 tablet by sublingual route at the 1st sign of attack; may repeat every 5 min until relief; if pain persists after 3 tablets in 15 min, prompt medical attention is recommended      RHOPRESSA 0 02 % SOLN INSTILL 1 DROP AT BEDTIME INTO BOTH EYES  1    simvastatin (ZOCOR) 80 mg tablet Take 0 5 tablets (40 mg total) by mouth daily 30 tablet 2     No current facility-administered medications for this visit  No Known Allergies   Immunizations:     Immunization History   Administered Date(s) Administered     Influenza (IM) Preservative Free 11/06/2018    Influenza Split 01/14/2014    Influenza Split High Dose Preservative Free IM 10/08/2014, 11/18/2015, 11/20/2015, 09/26/2016, 11/21/2017    Influenza TIV (IM) 11/01/2012    Influenza, high dose seasonal 0 7 mL 09/27/2019    Pneumococcal Conjugate 13-Valent 01/29/2015    Pneumococcal Polysaccharide PPV23 11/01/1999, 11/06/2003    Td (adult), adsorbed 02/04/2010    Tdap 01/31/2019      Health Maintenance: There are no preventive care reminders to display for this patient        Topic Date Due    Influenza Vaccine  07/01/2020      Medicare Health Risk Assessment:     /80   Pulse 66   Temp 97 9 °F (36 6 °C) (Tympanic)   Ht 4' 11" (1 499 m) Wt 62 1 kg (137 lb)   SpO2 99%   BMI 27 67 kg/m²      Daphne Burton is here for his Subsequent Wellness visit  Last Medicare Wellness visit information reviewed, patient interviewed and updates made to the record today  Health Risk Assessment:   Patient rates overall health as very good  Patient feels that their physical health rating is same  Eyesight was rated as same  Hearing was rated as same  Patient feels that their emotional and mental health rating is same  Pain experienced in the last 7 days has been none  Patient states that he has experienced no weight loss or gain in last 6 months  Depression Screening:   PHQ-2 Score: 0      Fall Risk Screening: In the past year, patient has experienced: no history of falling in past year      Home Safety:  Patient does not have trouble with stairs inside or outside of their home  Patient has working smoke alarms and has working carbon monoxide detector  Home safety hazards include: none  Nutrition:   Current diet is Regular  Medications:   Patient is currently taking over-the-counter supplements  OTC medications include: see medication list  Patient is able to manage medications  Activities of Daily Living (ADLs)/Instrumental Activities of Daily Living (IADLs):   Walk and transfer into and out of bed and chair?: No  Dress and groom yourself?: No    Bathe or shower yourself?: No    Feed yourself?  No  Do your laundry/housekeeping?: No  Manage your money, pay your bills and track your expenses?: No  Make your own meals?: No    Do your own shopping?: No    Durable Medical Equipment Suppliers  none    Previous Hospitalizations:   Any hospitalizations or ED visits within the last 12 months?: No      Advance Care Planning:   Living will: Yes    Durable POA for healthcare: No    Advanced directive: Yes    Advanced directive counseling given: Yes    Five wishes given: Yes    Patient declined ACP directive: No    End of Life Decisions reviewed with patient: Yes    Provider agrees with end of life decisions: Yes      Cognitive Screening:   Provider or family/friend/caregiver concerned regarding cognition?: No    PREVENTIVE SCREENINGS      Cardiovascular Screening:    General: Screening Not Indicated and History Lipid Disorder      Diabetes Screening:     General: Screening Not Indicated and History Diabetes      Colorectal Cancer Screening:     General: Screening Not Indicated      Prostate Cancer Screening:    General: Screening Not Indicated      Osteoporosis Screening:    General: Screening Not Indicated      Abdominal Aortic Aneurysm (AAA) Screening:    Risk factors include: tobacco use        Lung Cancer Screening:     General: Screening Not Indicated      Hepatitis C Screening:    General: Screening Not Indicated      Sony Barboza MD

## 2020-08-21 NOTE — ASSESSMENT & PLAN NOTE
Chronic asymptomatic continue vitamin-D supplement 1000 International Units once a day due for vitamin-D level before next appointment

## 2020-08-21 NOTE — ASSESSMENT & PLAN NOTE
Chronic asymptomatic fair control continue current medication including metoprolol succinate low-salt diet and important lose weight discussed with the patient

## 2020-08-21 NOTE — ASSESSMENT & PLAN NOTE
A chronic asymptomatic patient already on statin and aspirin in he does not want do a carotid duplex

## 2020-08-21 NOTE — ASSESSMENT & PLAN NOTE
Chronic asymptomatic the patient continue with the simvastatin 80 mg half tablet once a day and he is on fenofibrate low-fat diet important lose weight discussed the patient due for blood work for next appointment

## 2020-08-21 NOTE — PROGRESS NOTES
Subjective:   Chief Complaint   Patient presents with    Medicare Wellness Visit        Patient ID: Joan Jha is a 80 y o  male  Patient here for Medicare physical exam follow-up with a chronic condition patient history of non-insulin-dependent diabetic try to controlled with the low carb diet deny increased thirsty increased frequency urination no dizziness no headache and no abdomen pain patient already on statin he is not on Ace or Arb secondary no protein in the urine patient's history of hyperlipidemia on statin deny any chest pain or short of breath no palpitation no a TIA symptom and he tolerated well without side effect no rash or muscle pain patient history of hypertension blood pressure fair control on current medication tolerated well without any side effect deny any chest pain short of breath no palpitation no lower extremity edema no dyspnea on exertion patient history of vitamin-D deficiency on vitamin-D supplement deny any bone pain joint pain no muscle pain no fatigue and no fall  Patient did not do the blood work yet      The following portions of the patient's history were reviewed and updated as appropriate: allergies, current medications, past family history, past medical history, past social history, past surgical history and problem list     Review of Systems   Constitutional: Negative for activity change, appetite change, fatigue and fever  HENT: Negative for congestion, ear pain, sinus pressure, sinus pain and sore throat  Eyes: Negative for pain, discharge, redness and itching  Respiratory: Negative for cough, chest tightness, shortness of breath and stridor  Cardiovascular: Negative for chest pain, palpitations and leg swelling  Gastrointestinal: Negative for abdominal pain, blood in stool, constipation, diarrhea and nausea  Genitourinary: Negative for dysuria, flank pain, frequency and hematuria     Musculoskeletal: Negative for back pain, joint swelling and neck pain    Skin: Negative for pallor and rash  Neurological: Negative for dizziness, tremors, weakness, numbness and headaches  Hematological: Does not bruise/bleed easily  Objective:  Vitals:    08/21/20 0840   BP: 120/80   Pulse: 66   Temp: 97 9 °F (36 6 °C)   TempSrc: Tympanic   SpO2: 99%   Weight: 62 1 kg (137 lb)   Height: 4' 11" (1 499 m)      Physical Exam  Constitutional:       General: He is not in acute distress  Appearance: He is well-developed  He is not diaphoretic  HENT:      Head: Normocephalic  Right Ear: Tympanic membrane, ear canal and external ear normal       Left Ear: Tympanic membrane, ear canal and external ear normal       Mouth/Throat:      Mouth: Mucous membranes are moist       Pharynx: Oropharynx is clear  No oropharyngeal exudate or posterior oropharyngeal erythema  Eyes:      General:         Right eye: No discharge  Left eye: No discharge  Conjunctiva/sclera: Conjunctivae normal    Neck:      Musculoskeletal: Normal range of motion and neck supple  Vascular: No JVD  Cardiovascular:      Rate and Rhythm: Normal rate and regular rhythm  Heart sounds: Normal heart sounds  No murmur  No gallop  Pulmonary:      Effort: Pulmonary effort is normal  No respiratory distress  Breath sounds: Normal breath sounds  No stridor  No wheezing or rales  Chest:      Chest wall: No tenderness  Abdominal:      General: There is no distension  Palpations: Abdomen is soft  There is no mass  Tenderness: There is no abdominal tenderness  There is no rebound  Musculoskeletal: Normal range of motion  General: No tenderness  Lymphadenopathy:      Cervical: No cervical adenopathy  Skin:     General: Skin is warm  Findings: No erythema or rash  Neurological:      Mental Status: He is alert and oriented to person, place, and time  Sensory: No sensory deficit        Gait: Gait normal    Psychiatric:         Mood and Affect: Mood normal          Behavior: Behavior normal            Assessment/Plan:    Encounter for routine adult medical exam with abnormal findings  Advice and education were given regarding nutrition, aerobic exercises, weight bearing exercises, cardiovascular risk reduction, fall risk reduction, and age appropriate supplements  The patient was counseled regarding instructions for management, risk factor reductions, prognosis, risks and benefits of treatment options, patient and family education, and importance of compliance with treatment           Type 2 diabetes mellitus without complication, without long-term current use of insulin ([facility])    Lab Results   Component Value Date    HGBA1C 6 7 (H) 05/05/2020     Chronic asymptomatic hemoglobin A1c 6 7 patient try to controlled with the low carb diet and he does not want to be on any medication a continue low carb diet continue watch for the portion and important lose weight patient already on statin  A negative for microalbuminuria    Carotid artery disease ([facility] [address] )  A chronic asymptomatic patient already on statin and aspirin in he does not want do a carotid duplex    Essential hypertension  Chronic asymptomatic fair control continue current medication including metoprolol succinate low-salt diet and important lose weight discussed with the patient    Hyperlipidemia  Chronic asymptomatic the patient continue with the simvastatin 80 mg half tablet once a day and he is on fenofibrate low-fat diet important lose weight discussed the patient due for blood work for next appointment    Vitamin D deficiency  Chronic asymptomatic continue vitamin-D supplement 1000 International Units once a day due for vitamin-D level before next appointment    Unable to determine patient do not resuscitate (DNR) status  I discussed with the patient a DNR status and he did not make up his mind yet the the 5 wishes booklet has been given previously and he did not feel it yet Diagnoses and all orders for this visit:    Medicare annual wellness visit, subsequent    Unable to determine patient do not resuscitate (DNR) status    Type 2 diabetes mellitus without complication, without long-term current use of insulin [facility]  -     Microalbumin / creatinine urine ratio  -     CBC and differential; Future  -     Basic metabolic panel; Future  -     Lipid Panel with Direct LDL reflex; Future    Mixed hyperlipidemia  -     CBC and differential; Future  -     Basic metabolic panel; Future  -     Lipid Panel with Direct LDL reflex; Future    Essential hypertension  -     CBC and differential; Future  -     Basic metabolic panel; Future  -     Lipid Panel with Direct LDL reflex;  Future    Bilateral carotid artery stenosis    Vitamin D deficiency

## 2020-08-21 NOTE — ASSESSMENT & PLAN NOTE
Lab Results   Component Value Date    HGBA1C 6 7 (H) 05/05/2020     Chronic asymptomatic hemoglobin A1c 6 7 patient try to controlled with the low carb diet and he does not want to be on any medication a continue low carb diet continue watch for the portion and important lose weight patient already on statin  A negative for microalbuminuria

## 2020-09-05 DIAGNOSIS — E78.2 MIXED HYPERLIPIDEMIA: ICD-10-CM

## 2020-09-05 DIAGNOSIS — I10 ESSENTIAL HYPERTENSION: ICD-10-CM

## 2020-09-07 RX ORDER — FENOFIBRATE 160 MG/1
TABLET ORAL
Qty: 30 TABLET | Refills: 2 | Status: SHIPPED | OUTPATIENT
Start: 2020-09-07 | End: 2020-12-17

## 2020-09-07 RX ORDER — METOPROLOL SUCCINATE 100 MG/1
TABLET, EXTENDED RELEASE ORAL
Qty: 60 TABLET | Refills: 2 | Status: SHIPPED | OUTPATIENT
Start: 2020-09-07 | End: 2020-12-17

## 2020-10-02 DIAGNOSIS — E78.2 MIXED HYPERLIPIDEMIA: ICD-10-CM

## 2020-10-02 RX ORDER — SIMVASTATIN 80 MG
40 TABLET ORAL DAILY
Qty: 15 TABLET | Refills: 2 | Status: SHIPPED | OUTPATIENT
Start: 2020-10-02 | End: 2021-01-18

## 2020-11-23 ENCOUNTER — OFFICE VISIT (OUTPATIENT)
Dept: FAMILY MEDICINE CLINIC | Facility: CLINIC | Age: 85
End: 2020-11-23
Payer: MEDICARE

## 2020-11-23 VITALS
DIASTOLIC BLOOD PRESSURE: 60 MMHG | BODY MASS INDEX: 25.91 KG/M2 | WEIGHT: 132 LBS | TEMPERATURE: 97.7 F | SYSTOLIC BLOOD PRESSURE: 120 MMHG | OXYGEN SATURATION: 100 % | HEIGHT: 60 IN | HEART RATE: 60 BPM

## 2020-11-23 DIAGNOSIS — E78.2 MIXED HYPERLIPIDEMIA: ICD-10-CM

## 2020-11-23 DIAGNOSIS — I10 ESSENTIAL HYPERTENSION: ICD-10-CM

## 2020-11-23 DIAGNOSIS — I65.23 BILATERAL CAROTID ARTERY STENOSIS: ICD-10-CM

## 2020-11-23 DIAGNOSIS — E55.9 VITAMIN D DEFICIENCY: ICD-10-CM

## 2020-11-23 DIAGNOSIS — E11.9 TYPE 2 DIABETES MELLITUS WITHOUT COMPLICATION, WITHOUT LONG-TERM CURRENT USE OF INSULIN (HCC): Primary | ICD-10-CM

## 2020-11-23 PROBLEM — E87.5 POTASSIUM (K) EXCESS: Status: RESOLVED | Noted: 2019-12-27 | Resolved: 2020-11-23

## 2020-11-23 LAB — SL AMB POCT HEMOGLOBIN AIC: 6 (ref ?–6.5)

## 2020-11-23 PROCEDURE — 83036 HEMOGLOBIN GLYCOSYLATED A1C: CPT | Performed by: FAMILY MEDICINE

## 2020-11-23 PROCEDURE — 99214 OFFICE O/P EST MOD 30 MIN: CPT | Performed by: FAMILY MEDICINE

## 2020-12-17 DIAGNOSIS — I10 ESSENTIAL HYPERTENSION: ICD-10-CM

## 2020-12-17 DIAGNOSIS — E78.2 MIXED HYPERLIPIDEMIA: ICD-10-CM

## 2020-12-17 RX ORDER — METOPROLOL SUCCINATE 100 MG/1
TABLET, EXTENDED RELEASE ORAL
Qty: 60 TABLET | Refills: 2 | Status: SHIPPED | OUTPATIENT
Start: 2020-12-17 | End: 2021-03-26

## 2020-12-17 RX ORDER — FENOFIBRATE 160 MG/1
TABLET ORAL
Qty: 30 TABLET | Refills: 2 | Status: SHIPPED | OUTPATIENT
Start: 2020-12-17 | End: 2021-03-26

## 2021-01-18 DIAGNOSIS — E78.2 MIXED HYPERLIPIDEMIA: ICD-10-CM

## 2021-01-18 RX ORDER — SIMVASTATIN 80 MG
40 TABLET ORAL DAILY
Qty: 45 TABLET | Refills: 0 | Status: SHIPPED | OUTPATIENT
Start: 2021-01-18 | End: 2021-04-01

## 2021-03-23 ENCOUNTER — OFFICE VISIT (OUTPATIENT)
Dept: FAMILY MEDICINE CLINIC | Facility: CLINIC | Age: 86
End: 2021-03-23
Payer: MEDICARE

## 2021-03-23 ENCOUNTER — TELEPHONE (OUTPATIENT)
Dept: FAMILY MEDICINE CLINIC | Facility: CLINIC | Age: 86
End: 2021-03-23

## 2021-03-23 VITALS
OXYGEN SATURATION: 98 % | HEIGHT: 60 IN | HEART RATE: 63 BPM | DIASTOLIC BLOOD PRESSURE: 70 MMHG | BODY MASS INDEX: 26.7 KG/M2 | TEMPERATURE: 96.9 F | WEIGHT: 136 LBS | SYSTOLIC BLOOD PRESSURE: 120 MMHG

## 2021-03-23 DIAGNOSIS — I65.23 BILATERAL CAROTID ARTERY STENOSIS: ICD-10-CM

## 2021-03-23 DIAGNOSIS — M21.619 BUNION OF UNSPECIFIED FOOT: ICD-10-CM

## 2021-03-23 DIAGNOSIS — E11.9 TYPE 2 DIABETES MELLITUS WITHOUT COMPLICATION, WITHOUT LONG-TERM CURRENT USE OF INSULIN (HCC): Primary | ICD-10-CM

## 2021-03-23 DIAGNOSIS — I10 ESSENTIAL HYPERTENSION: ICD-10-CM

## 2021-03-23 DIAGNOSIS — E78.2 MIXED HYPERLIPIDEMIA: ICD-10-CM

## 2021-03-23 LAB — SL AMB POCT HEMOGLOBIN AIC: 6.1 (ref ?–6.5)

## 2021-03-23 PROCEDURE — 99214 OFFICE O/P EST MOD 30 MIN: CPT | Performed by: FAMILY MEDICINE

## 2021-03-23 PROCEDURE — 83036 HEMOGLOBIN GLYCOSYLATED A1C: CPT | Performed by: FAMILY MEDICINE

## 2021-03-23 NOTE — ASSESSMENT & PLAN NOTE
Lab Results   Component Value Date    HGBA1C 6 1 03/23/2021      chronic asymptomatic fair control low carb diet the encouraged patient to continue discussed also important lose weight the patient continue with the statin a patient in the due for diabetic eye exam

## 2021-03-23 NOTE — ASSESSMENT & PLAN NOTE
Chronic asymptomatic fair control continue with the simvastatin 80 mg once a day low-fat diet discussed the patient

## 2021-03-23 NOTE — PROGRESS NOTES
BMI Counseling: Body mass index is 27 01 kg/m²  The BMI is above normal  Nutrition recommendations include decreasing portion sizes, decreasing fast food intake and limiting drinks that contain sugar  Exercise recommendations include exercising 3-5 times per week  No pharmacotherapy was ordered  Patient referred to PCP due to patient being overweight  Subjective:   Chief Complaint   Patient presents with    Follow-up     chronic conditions        Patient ID: Sundar Johnson is a 80 y o  male  Patient follow-up with a chronic condition had history of hypertension blood pressure fair control current medication and tolerated well without side effect patient history of hyperlipidemia on statin tolerated well without any rash or muscle pain patient asymptomatic deny any chest pain short of breath no palpitation no lower extremity edema no TIA symptom patient with the history crusted artery stenosis deny any dizziness no light headache no numbness no weakness no lateralized of the symptom patient on statin and aspirin her recent blood work discussed the patient      The following portions of the patient's history were reviewed and updated as appropriate: allergies, current medications, past family history, past medical history, past social history, past surgical history and problem list     Review of Systems   Constitutional: Negative for activity change, appetite change, fatigue and fever  HENT: Negative for congestion, ear pain, sinus pressure, sinus pain and sore throat  Eyes: Negative for pain, discharge, redness and itching  Respiratory: Negative for cough, chest tightness, shortness of breath and stridor  Cardiovascular: Negative for chest pain, palpitations and leg swelling  Gastrointestinal: Negative for abdominal pain, blood in stool, constipation, diarrhea and nausea  Genitourinary: Negative for dysuria, flank pain, frequency and hematuria     Musculoskeletal: Negative for back pain, joint swelling and neck pain  Skin: Negative for pallor and rash  Neurological: Negative for dizziness, tremors, weakness, numbness and headaches  Hematological: Does not bruise/bleed easily  Objective:  Vitals:    03/23/21 0833   BP: 120/70   Pulse: 63   Temp: (!) 96 9 °F (36 1 °C)   TempSrc: Tympanic   SpO2: 98%   Weight: 61 7 kg (136 lb)   Height: 4' 11 5" (1 511 m)      Physical Exam  Vitals signs and nursing note reviewed  Constitutional:       General: He is not in acute distress  Appearance: Normal appearance  He is well-developed  He is not diaphoretic  HENT:      Head: Normocephalic  Right Ear: Tympanic membrane, ear canal and external ear normal       Left Ear: Tympanic membrane, ear canal and external ear normal       Nose: Nose normal  No congestion or rhinorrhea  Mouth/Throat:      Mouth: Mucous membranes are moist       Pharynx: Oropharynx is clear  No oropharyngeal exudate or posterior oropharyngeal erythema  Eyes:      General:         Right eye: No discharge  Left eye: No discharge  Conjunctiva/sclera: Conjunctivae normal    Neck:      Musculoskeletal: Normal range of motion and neck supple  Vascular: No JVD  Cardiovascular:      Rate and Rhythm: Normal rate and regular rhythm  Pulses: no weak pulses          Dorsalis pedis pulses are 2+ on the right side and 2+ on the left side  Heart sounds: Normal heart sounds  No murmur  No gallop  Pulmonary:      Effort: Pulmonary effort is normal  No respiratory distress  Breath sounds: Normal breath sounds  No stridor  No wheezing or rales  Chest:      Chest wall: No tenderness  Abdominal:      General: There is no distension  Palpations: Abdomen is soft  There is no mass  Tenderness: There is no abdominal tenderness  There is no rebound  Musculoskeletal: Normal range of motion  General: No tenderness          Feet:    Feet:      Right foot:      Skin integrity: No warmth  Left foot:      Skin integrity: No warmth  Lymphadenopathy:      Cervical: No cervical adenopathy  Skin:     General: Skin is warm  Findings: No erythema or rash  Neurological:      Mental Status: He is alert and oriented to person, place, and time  Sensory: No sensory deficit  Gait: Gait normal    Psychiatric:         Mood and Affect: Mood normal          Behavior: Behavior normal          Patient's shoes and socks removed  Right Foot/Ankle   Right Foot Inspection  Skin Exam: skin intact no warmth and no pre-ulcer                          Toe Exam: no swelling and erythema  Sensory       Monofilament testing: intact  Vascular  Capillary refills: < 3 seconds  The right DP pulse is 2+  Left Foot/Ankle  Left Foot Inspection  Skin Exam: skin intactno warmth and no pre-ulcer                         Toe Exam: no swelling and no erythema                   Sensory       Monofilament: intact  Vascular  Capillary refills: < 3 seconds  The left DP pulse is 2+  Assign Risk Category:  No deformity present; No loss of protective sensation;  No weak pulses       Risk: 0      Assessment/Plan:    Type 2 diabetes mellitus without complication, without long-term current use of insulin ([facility])    Lab Results   Component Value Date    HGBA1C 6 1 03/23/2021      chronic asymptomatic fair control low carb diet the encouraged patient to continue discussed also important lose weight the patient continue with the statin a patient in the due for diabetic eye exam     Carotid artery disease ([facility] [address] )   Chronic asymptomatic patient on statin aspirin patient decline to do the carotid duplex    Essential hypertension   A chronic asymptomatic fair control continue current management including metoprolol succinate 100 mg once a day low-salt diet discussed the patient    BMI 27 0-27 9,adult  Chronic ,portion control ,low carb ow fat diet discuss with patient    Hyperlipidemia   Chronic asymptomatic fair control continue with the simvastatin 80 mg once a day low-fat diet discussed the patient    Bunion of unspecified foot  Finding on recent foot exam ,disucss refer to Podiatric   Proper shoes wear and proper foot exam discuss with patient       Diagnoses and all orders for this visit:    Type 2 diabetes mellitus without complication, without long-term current use of insulin (HCC)  -     POCT hemoglobin A1c  -     CBC and differential; Future  -     Basic metabolic panel; Future  -     Lipid Panel with Direct LDL reflex; Future  -     TSH, 3rd generation with Free T4 reflex; Future  -     Hemoglobin A1C; Future    BMI 27 0-27 9,adult  -     CBC and differential; Future  -     Basic metabolic panel; Future  -     Lipid Panel with Direct LDL reflex; Future  -     TSH, 3rd generation with Free T4 reflex;  Future  -     Hemoglobin A1C; Future    Bilateral carotid artery stenosis    Essential hypertension    Mixed hyperlipidemia    Bunion of unspecified foot

## 2021-03-23 NOTE — ASSESSMENT & PLAN NOTE
A chronic asymptomatic fair control continue current management including metoprolol succinate 100 mg once a day low-salt diet discussed the patient

## 2021-03-23 NOTE — ASSESSMENT & PLAN NOTE
Finding on recent foot exam ,disucss refer to Podiatric   Proper shoes wear and proper foot exam discuss with patient

## 2021-03-26 DIAGNOSIS — I10 ESSENTIAL HYPERTENSION: ICD-10-CM

## 2021-03-26 DIAGNOSIS — E78.2 MIXED HYPERLIPIDEMIA: ICD-10-CM

## 2021-03-26 RX ORDER — METOPROLOL SUCCINATE 100 MG/1
TABLET, EXTENDED RELEASE ORAL
Qty: 60 TABLET | Refills: 2 | Status: SHIPPED | OUTPATIENT
Start: 2021-03-26 | End: 2021-07-05

## 2021-03-26 RX ORDER — FENOFIBRATE 160 MG/1
TABLET ORAL
Qty: 30 TABLET | Refills: 2 | Status: SHIPPED | OUTPATIENT
Start: 2021-03-26 | End: 2021-07-05

## 2021-04-01 DIAGNOSIS — E78.2 MIXED HYPERLIPIDEMIA: ICD-10-CM

## 2021-04-01 RX ORDER — SIMVASTATIN 80 MG
40 TABLET ORAL DAILY
Qty: 45 TABLET | Refills: 0 | Status: SHIPPED | OUTPATIENT
Start: 2021-04-01 | End: 2021-06-24

## 2021-06-24 DIAGNOSIS — E78.2 MIXED HYPERLIPIDEMIA: ICD-10-CM

## 2021-06-24 RX ORDER — SIMVASTATIN 80 MG
40 TABLET ORAL DAILY
Qty: 45 TABLET | Refills: 0 | Status: SHIPPED | OUTPATIENT
Start: 2021-06-24 | End: 2021-07-28 | Stop reason: SDUPTHER

## 2021-07-03 DIAGNOSIS — I10 ESSENTIAL HYPERTENSION: ICD-10-CM

## 2021-07-03 DIAGNOSIS — E78.2 MIXED HYPERLIPIDEMIA: ICD-10-CM

## 2021-07-05 RX ORDER — FENOFIBRATE 160 MG/1
TABLET ORAL
Qty: 30 TABLET | Refills: 2 | Status: SHIPPED | OUTPATIENT
Start: 2021-07-05 | End: 2021-08-25 | Stop reason: SDUPTHER

## 2021-07-05 RX ORDER — METOPROLOL SUCCINATE 100 MG/1
TABLET, EXTENDED RELEASE ORAL
Qty: 60 TABLET | Refills: 2 | Status: SHIPPED | OUTPATIENT
Start: 2021-07-05 | End: 2021-08-25 | Stop reason: SDUPTHER

## 2021-07-28 ENCOUNTER — OFFICE VISIT (OUTPATIENT)
Dept: FAMILY MEDICINE CLINIC | Facility: CLINIC | Age: 86
End: 2021-07-28
Payer: MEDICARE

## 2021-07-28 VITALS
TEMPERATURE: 97.7 F | DIASTOLIC BLOOD PRESSURE: 80 MMHG | SYSTOLIC BLOOD PRESSURE: 140 MMHG | HEART RATE: 61 BPM | HEIGHT: 60 IN | BODY MASS INDEX: 26.7 KG/M2 | OXYGEN SATURATION: 100 % | WEIGHT: 136 LBS

## 2021-07-28 DIAGNOSIS — F32.1 MODERATE MAJOR DEPRESSION (HCC): ICD-10-CM

## 2021-07-28 DIAGNOSIS — E11.9 TYPE 2 DIABETES MELLITUS WITHOUT COMPLICATION, WITHOUT LONG-TERM CURRENT USE OF INSULIN (HCC): ICD-10-CM

## 2021-07-28 DIAGNOSIS — I65.23 BILATERAL CAROTID ARTERY STENOSIS: ICD-10-CM

## 2021-07-28 DIAGNOSIS — E78.2 MIXED HYPERLIPIDEMIA: Primary | ICD-10-CM

## 2021-07-28 DIAGNOSIS — I10 ESSENTIAL HYPERTENSION: ICD-10-CM

## 2021-07-28 PROCEDURE — 99214 OFFICE O/P EST MOD 30 MIN: CPT | Performed by: FAMILY MEDICINE

## 2021-07-28 RX ORDER — SIMVASTATIN 80 MG
40 TABLET ORAL DAILY
Qty: 45 TABLET | Refills: 0 | Status: SHIPPED | OUTPATIENT
Start: 2021-07-28 | End: 2021-08-25 | Stop reason: SDUPTHER

## 2021-07-28 NOTE — PROGRESS NOTES
Depression Screening and Follow-up Plan: Patient's depression screening was positive with a PHQ-2 score of 3  Their PHQ-9 score was 10  Patient assessed for underlying major depression  Brief counseling provided and recommend additional follow-up/re-evaluation next office visit  Subjective:   Chief Complaint   Patient presents with    Follow-up     chronic conditions        Patient ID: Hannah Odom is a 80 y o  male  Patient here follow-up with a chronic condition patient history of non-insulin-dependent diabetic try to controlled with the low carb diet deny increased thirsty increased frequency urination no dizziness no headache and no abdomen pain he was not compliant with his diet for the lost the couple months some family member brought him a candy and the a sweet and the he been 8 in the every day the patient's history of hypertension blood pressure fair control on current medication deny any chest pain short of breath no palpitation no lower extremity edema patient's history of hyperlipidemia on statin also deny any chest pain short of breath no TIA symptom the recent blood work review with the patient      The following portions of the patient's history were reviewed and updated as appropriate: allergies, current medications, past family history, past medical history, past social history, past surgical history and problem list     Review of Systems   Constitutional: Negative for activity change, appetite change, fatigue and fever  HENT: Negative for congestion, ear pain, sinus pressure, sinus pain and sore throat  Eyes: Negative for pain, discharge, redness and itching  Respiratory: Negative for cough, chest tightness, shortness of breath and stridor  Cardiovascular: Negative for chest pain, palpitations and leg swelling  Gastrointestinal: Negative for abdominal pain, blood in stool, constipation, diarrhea and nausea     Genitourinary: Negative for dysuria, flank pain, frequency and hematuria  Musculoskeletal: Negative for back pain, joint swelling and neck pain  Skin: Negative for pallor and rash  Neurological: Negative for dizziness, tremors, weakness, numbness and headaches  Hematological: Does not bruise/bleed easily  Psychiatric/Behavioral: Positive for sleep disturbance  Negative for agitation, self-injury and suicidal ideas  Objective:  Vitals:    07/28/21 0807   BP: 140/80   Pulse: 61   Temp: 97 7 °F (36 5 °C)   TempSrc: Tympanic   SpO2: 100%   Weight: 61 7 kg (136 lb)   Height: 5' (1 524 m)      Physical Exam  Vitals and nursing note reviewed  Constitutional:       General: He is not in acute distress  Appearance: Normal appearance  He is well-developed  He is not diaphoretic  HENT:      Head: Normocephalic  Right Ear: Tympanic membrane, ear canal and external ear normal       Left Ear: Tympanic membrane, ear canal and external ear normal       Nose: Nose normal  No congestion or rhinorrhea  Mouth/Throat:      Mouth: Mucous membranes are moist       Pharynx: Oropharynx is clear  No oropharyngeal exudate or posterior oropharyngeal erythema  Eyes:      General:         Right eye: No discharge  Left eye: No discharge  Conjunctiva/sclera: Conjunctivae normal    Neck:      Vascular: No JVD  Cardiovascular:      Rate and Rhythm: Normal rate and regular rhythm  Heart sounds: Normal heart sounds  No gallop  Pulmonary:      Effort: Pulmonary effort is normal  No respiratory distress  Breath sounds: Normal breath sounds  No stridor  No wheezing or rales  Chest:      Chest wall: No tenderness  Abdominal:      General: There is no distension  Palpations: Abdomen is soft  There is no mass  Tenderness: There is no abdominal tenderness  There is no rebound  Musculoskeletal:         General: No tenderness  Normal range of motion  Cervical back: Normal range of motion and neck supple     Lymphadenopathy: Cervical: No cervical adenopathy  Skin:     General: Skin is warm  Findings: No erythema or rash  Neurological:      Mental Status: He is alert and oriented to person, place, and time  Sensory: No sensory deficit  Gait: Gait normal    Psychiatric:         Mood and Affect: Mood normal          Behavior: Behavior normal            Assessment/Plan:    Type 2 diabetes mellitus without complication, without long-term current use of insulin ([facility])    Lab Results   Component Value Date    HGBA1C 6 8 (H) 2021      chronic asymptomatic increase in the hemoglobin A1c compared with before we discussed the patient start him on medication he 1 hold on that for now the patient was not compliant with his low carb diet patient will try to be strict with low carb diet and till next blood work a discussed important lose weight patient already on statin   discussed with the patient he is due for diabetic eye exam    Essential hypertension   Chronic asymptomatic fair control continue current management including metoprolol succinate 100 mg once a day low-salt diet and important lose weight discussed with the patient    Carotid artery disease ([facility] [address] )   A chronic asymptomatic patient already on statin and aspirin he decline the carotid duplex    Moderate major depression ([facility] [address] )   A new diagnosis his PHQ -9=10  Symptomatic patient since his wife  live alone and he does have family member with regards to have a multiple problem and he sometimes feels overwhelmed with the problem no suicide the ideation or attempt a discussed with the patient the refer to therapy and he will hold on that for now    Hyperlipidemia   Chronic asymptomatic fair control continue with the current management simvastatin 80 mg once a day       Diagnoses and all orders for this visit:    Mixed hyperlipidemia  -     simvastatin (ZOCOR) 80 mg tablet;  Take 0 5 tablets (40 mg total) by mouth daily    Type 2 diabetes mellitus without complication, without long-term current use of insulin (HCC)    Essential hypertension    Moderate major depression ([address] )    Bilateral carotid artery stenosis

## 2021-07-29 NOTE — ASSESSMENT & PLAN NOTE
A new diagnosis his PHQ -9=10  Symptomatic patient since his wife  live alone and he does have family member with regards to have a multiple problem and he sometimes feels overwhelmed with the problem no suicide the ideation or attempt a discussed with the patient the refer to therapy and he will hold on that for now

## 2021-07-29 NOTE — ASSESSMENT & PLAN NOTE
Chronic asymptomatic fair control continue current management including metoprolol succinate 100 mg once a day low-salt diet and important lose weight discussed with the patient

## 2021-07-29 NOTE — ASSESSMENT & PLAN NOTE
Lab Results   Component Value Date    HGBA1C 6 8 (H) 07/21/2021      chronic asymptomatic increase in the hemoglobin A1c compared with before we discussed the patient start him on medication he 1 hold on that for now the patient was not compliant with his low carb diet patient will try to be strict with low carb diet and till next blood work a discussed important lose weight patient already on statin   discussed with the patient he is due for diabetic eye exam

## 2021-08-25 ENCOUNTER — OFFICE VISIT (OUTPATIENT)
Dept: FAMILY MEDICINE CLINIC | Facility: CLINIC | Age: 86
End: 2021-08-25
Payer: MEDICARE

## 2021-08-25 VITALS
BODY MASS INDEX: 26.31 KG/M2 | TEMPERATURE: 97 F | DIASTOLIC BLOOD PRESSURE: 60 MMHG | OXYGEN SATURATION: 99 % | SYSTOLIC BLOOD PRESSURE: 130 MMHG | HEART RATE: 64 BPM | WEIGHT: 134 LBS | HEIGHT: 60 IN

## 2021-08-25 DIAGNOSIS — Z66 DNR (DO NOT RESUSCITATE): ICD-10-CM

## 2021-08-25 DIAGNOSIS — E11.9 TYPE 2 DIABETES MELLITUS WITHOUT COMPLICATION, WITHOUT LONG-TERM CURRENT USE OF INSULIN (HCC): ICD-10-CM

## 2021-08-25 DIAGNOSIS — E66.3 OVERWEIGHT WITH BODY MASS INDEX (BMI) OF 27 TO 27.9 IN ADULT: ICD-10-CM

## 2021-08-25 DIAGNOSIS — I10 ESSENTIAL HYPERTENSION: ICD-10-CM

## 2021-08-25 DIAGNOSIS — Z13.820 SCREENING FOR OSTEOPOROSIS: ICD-10-CM

## 2021-08-25 DIAGNOSIS — E78.2 MIXED HYPERLIPIDEMIA: ICD-10-CM

## 2021-08-25 DIAGNOSIS — I65.23 BILATERAL CAROTID ARTERY STENOSIS: ICD-10-CM

## 2021-08-25 DIAGNOSIS — Z00.00 MEDICARE ANNUAL WELLNESS VISIT, SUBSEQUENT: Primary | ICD-10-CM

## 2021-08-25 LAB
CREAT UR-MCNC: 125 MG/DL
MICROALBUMIN UR-MCNC: 5.7 MG/L (ref 0–20)
MICROALBUMIN/CREAT 24H UR: 5 MG/G CREATININE (ref 0–30)

## 2021-08-25 PROCEDURE — G0439 PPPS, SUBSEQ VISIT: HCPCS | Performed by: FAMILY MEDICINE

## 2021-08-25 PROCEDURE — 82570 ASSAY OF URINE CREATININE: CPT | Performed by: FAMILY MEDICINE

## 2021-08-25 PROCEDURE — 1123F ACP DISCUSS/DSCN MKR DOCD: CPT | Performed by: FAMILY MEDICINE

## 2021-08-25 PROCEDURE — 82043 UR ALBUMIN QUANTITATIVE: CPT | Performed by: FAMILY MEDICINE

## 2021-08-25 RX ORDER — METOPROLOL SUCCINATE 100 MG/1
100 TABLET, EXTENDED RELEASE ORAL 2 TIMES DAILY
Qty: 60 TABLET | Refills: 2 | Status: SHIPPED | OUTPATIENT
Start: 2021-08-25 | End: 2021-10-01

## 2021-08-25 RX ORDER — SIMVASTATIN 80 MG
40 TABLET ORAL DAILY
Qty: 45 TABLET | Refills: 0 | Status: SHIPPED | OUTPATIENT
Start: 2021-08-25 | End: 2021-11-03 | Stop reason: SDUPTHER

## 2021-08-25 RX ORDER — FENOFIBRATE 160 MG/1
160 TABLET ORAL DAILY
Qty: 30 TABLET | Refills: 2 | Status: SHIPPED | OUTPATIENT
Start: 2021-08-25 | End: 2021-10-01

## 2021-08-25 NOTE — PATIENT INSTRUCTIONS

## 2021-08-25 NOTE — PROGRESS NOTES
Assessment and Plan:     Problem List Items Addressed This Visit        Endocrine    Type 2 diabetes mellitus without complication, without long-term current use of insulin ([facility] )    Relevant Orders    Microalbumin / creatinine urine ratio (Completed)    CBC and differential    Basic metabolic panel    Lipid panel    Hemoglobin A1C       Cardiovascular and Mediastinum    Carotid artery disease ([facility] [address] )      A carotid artery stenosis patient is asymptomatic already on a statin and he is on aspirin patient is due for carotid duplex discussed the patient and he decline it          Essential hypertension    Relevant Medications    metoprolol succinate (TOPROL-XL) 100 mg 24 hr tablet    Other Relevant Orders    CBC and differential    Basic metabolic panel    Lipid panel    Hemoglobin A1C       Other    Hyperlipidemia    Relevant Medications    fenofibrate 160 MG tablet    simvastatin (ZOCOR) 80 mg tablet    Other Relevant Orders    CBC and differential    Basic metabolic panel    Lipid panel    Hemoglobin A1C    Overweight with body mass index (BMI) of 27 to 27 9 in adult      The BMI is above average  BMI counseling and education was provided to the patient  Nutrition recommendations include reducing portion sizes, decreasing overall calorie intake, 3-5 servings of fruits/vegetables daily, reducing fast food intake, consuming healthier snacks, decreasing soda and/or juice intake, moderation in carbohydrate intake and reducing intake of saturated fat and trans fat  Exercise recommendations include moderate aerobic physical activity for 150 minutes/week, exercising 3-5 times per week and joining a gym           DNR (do not resuscitate)     The patient is a DNR we did give him the 5 wishes booklet the he did not fit yet we discussed the patient important discussed the his wishes with the family and have a copy at home and copy at his the medical record         Medicare annual wellness visit, subsequent - Primary    Screening for osteoporosis      A discussed with the patient in osteoporosis and risk of fall and fracture and recommend the DEXA scan as screening patient decline             BMI Counseling: Body mass index is 27 06 kg/m²  The BMI is above normal  Nutrition recommendations include decreasing portion sizes, decreasing fast food intake and limiting drinks that contain sugar  Exercise recommendations include exercising 3-5 times per week  No pharmacotherapy was ordered  Preventive health issues were discussed with patient, and age appropriate screening tests were ordered as noted in patient's After Visit Summary  Personalized health advice and appropriate referrals for health education or preventive services given if needed, as noted in patient's After Visit Summary       History of Present Illness:     Patient presents for Medicare Annual Wellness visit    Patient Care Team:  Rick Metcalf MD as PCP - General (Family Medicine)  Andreas Parson MD (Ophthalmology)  Gab Amaro MD (Ophthalmology)     Problem List:     Patient Active Problem List   Diagnosis    Anxiety    Aortic valve stenosis    Chronic coronary artery disease    Carotid artery disease ([facility] [address] )    Coronary atherosclerosis    Osteoarthritis    Essential hypertension    Hepatitis A    Hyperlipidemia    Hydrocele    Hypertriglyceridemia    Hyperplasia of prostate without lower urinary tract symptoms (LUTS)    Insomnia    Other specified glaucoma    Vitamin D deficiency    Type 2 diabetes mellitus without complication, without long-term current use of insulin ([facility] [address] )    Onychomycosis of great toe    Encounter for routine adult medical exam with abnormal findings    Overweight with body mass index (BMI) of 27 to 27 9 in adult    DNR (do not resuscitate)    Bunion of unspecified foot    Moderate major depression ([facility] )    Medicare annual wellness visit, subsequent    Screening for osteoporosis      Past Medical and Surgical History: Past Medical History:   Diagnosis Date    Anxiety     CAD (coronary artery disease)     Capsular cataract     mature    Chronic ulcer of skin ([place] [address] ) 5/18/2015    DJD (degenerative joint disease)     Hepatitis A     Hydrocele     Hyperglycemia     Hyperlipidemia     Hypertension     Impaired fasting glucose 4/30/2014    Myocardial infarct ([place] [address] ) 2007    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[address] )     Potassium (K) excess 12/27/2019    Prostatic hyperplasia     Type 2 diabetes mellitus without complication, without long-term current use of insulin ([place] [address] ) 3/6/2019     Past Surgical History:   Procedure Laterality Date    ANGIOPLASTY  2008      Family History:     Family History   Problem Relation Age of Onset    Coronary artery disease Father       Social History:     Social History     Socioeconomic History    Marital status:      Spouse name: None    Number of children: None    Years of education: None    Highest education level: None   Occupational History    None   Tobacco Use    Smoking status: Former Smoker    Smokeless tobacco: Former User   Substance and Sexual Activity    Alcohol use: No    Drug use: No    Sexual activity: Not Currently     Partners: Female   Other Topics Concern    None   Social History Narrative    No risk for falls    Activity level: sedentary    No sleep changes    No animals    No firearms    Always uses a seatbelt     Social Determinants of Health     Financial Resource Strain: Low Risk     Difficulty of Paying Living Expenses: Not hard at all   Food Insecurity: No Food Insecurity    Worried About [address] [employer] in the Last Year: Never true    [facility] in the Last Year: Never true   Transportation Needs: No Transportation Needs    Lack of Transportation (Medical): No    Lack of Transportation (Non-Medical):  No   Physical Activity: Inactive    Days of Exercise per Week: 0 days    Minutes of Exercise per Session: 0 min   Stress: No Stress Concern Present  Feeling of Stress : Not at all   Social Connections: Socially Isolated    Frequency of Communication with Friends and Family: More than three times a week    Frequency of Social Gatherings with Friends and Family: More than three times a week    Attends Confucianism Services: Never    Active Member of Clubs or Organizations: No    Attends Club or Organization Meetings: Never    Marital Status:     Intimate Partner Violence: Not At Risk    Fear of Current or Ex-Partner: No    Emotionally Abused: No    Physically Abused: No    Sexually Abused: No      Medications and Allergies:     Current Outpatient Medications   Medication Sig Dispense Refill    ALPRAZolam (XANAX) 0 5 mg tablet Take 0 5 mg by mouth daily as needed for anxiety      aspirin 81 MG tablet every 24 hours      Cholecalciferol (VITAMIN D-3) 1000 units CAPS every 24 hours      dorzolamide-timolol (COSOPT) 22 3-6 8 MG/ML ophthalmic solution Every 12 hours      fenofibrate 160 MG tablet Take 1 tablet (160 mg total) by mouth daily 30 tablet 2    latanoprost (XALATAN) 0 005 % ophthalmic solution INSTILL 1 DROP AT BEDTIME INTO BOTH EYES  3    meclizine (ANTIVERT) 12 5 MG tablet Take 12 5 mg by mouth as needed for dizziness      metoprolol succinate (TOPROL-XL) 100 mg 24 hr tablet Take 1 tablet (100 mg total) by mouth 2 (two) times a day 60 tablet 2    Multiple Vitamins-Minerals (MULTIVITAL) tablet every 24 hours      Multiple Vitamins-Minerals (PRESERVISION AREDS 2+MULTI VIT PO) take 2 tabs daily      nitroglycerin (NITROSTAT) 0 4 mg SL tablet place 1 tablet by sublingual route at the 1st sign of attack; may repeat every 5 min until relief; if pain persists after 3 tablets in 15 min, prompt medical attention is recommended      RHOPRESSA 0 02 % SOLN INSTILL 1 DROP AT BEDTIME INTO BOTH EYES  1    simvastatin (ZOCOR) 80 mg tablet Take 0 5 tablets (40 mg total) by mouth daily 45 tablet 0     No current facility-administered medications for this visit  No Known Allergies   Immunizations:     Immunization History   Administered Date(s) Administered    Influenza Split 01/14/2014    Influenza Split High Dose Preservative Free IM 10/08/2014, 11/18/2015, 11/20/2015, 09/26/2016, 11/21/2017    Influenza, high dose seasonal 0 7 mL 09/27/2019, 10/30/2020    Influenza, seasonal, injectable 11/01/2012    Influenza, seasonal, injectable, preservative free 11/06/2018    Pneumococcal Conjugate 13-Valent 01/29/2015    Pneumococcal Polysaccharide PPV23 11/01/1999, 11/06/2003    SARS-CoV-2 / COVID-19 mRNA IM (Moderna) 01/05/2021, 02/05/2021    Td (adult), adsorbed 02/04/2010    Tdap 01/31/2019      Health Maintenance: There are no preventive care reminders to display for this patient  Topic Date Due    Influenza Vaccine (1) 09/01/2021      Medicare Health Risk Assessment:     /60   Pulse 64   Temp (!) 97 °F (36 1 °C) (Tympanic)   Ht 4' 11" (1 499 m)   Wt 60 8 kg (134 lb)   SpO2 99%   BMI 27 06 kg/m²      Chucho Kirk is here for his Subsequent Wellness visit  Last Medicare Wellness visit information reviewed, patient interviewed and updates made to the record today  Health Risk Assessment:   Patient rates overall health as good  Patient feels that their physical health rating is same  Patient is satisfied with their life  Eyesight was rated as same  Hearing was rated as same  Patient feels that their emotional and mental health rating is slightly worse  Patients states they are never, rarely angry  Patient states they are often unusually tired/fatigued  Pain experienced in the last 7 days has been none  Patient states that he has experienced no weight loss or gain in last 6 months  Depression Screening:   PHQ-2 Score: 0  PHQ-9 Score: 0      Fall Risk Screening:    In the past year, patient has experienced: no history of falling in past year      Home Safety:  Patient does not have trouble with stairs inside or outside of their home  Patient has working smoke alarms and has working carbon monoxide detector  Home safety hazards include: none  Nutrition:   Current diet is Regular  Medications:   Patient is currently taking over-the-counter supplements  OTC medications include: see medication list  Patient is able to manage medications  Activities of Daily Living (ADLs)/Instrumental Activities of Daily Living (IADLs):   Walk and transfer into and out of bed and chair?: Yes  Dress and groom yourself?: Yes    Bathe or shower yourself?: Yes    Feed yourself?  Yes  Do your laundry/housekeeping?: Yes  Manage your money, pay your bills and track your expenses?: Yes  Make your own meals?: Yes    Do your own shopping?: Yes    Durable Medical Equipment Suppliers  none    Previous Hospitalizations:   Any hospitalizations or ED visits within the last 12 months?: No      Advance Care Planning:   Living will: Yes    Durable POA for healthcare: No    Advanced directive: Yes    Advanced directive counseling given: Yes    Five wishes given: Yes    Patient declined ACP directive: No    End of Life Decisions reviewed with patient: Yes    Provider agrees with end of life decisions: Yes      Cognitive Screening:   Provider or family/friend/caregiver concerned regarding cognition?: No    PREVENTIVE SCREENINGS      Cardiovascular Screening:    General: Screening Not Indicated and History Lipid Disorder      Diabetes Screening:     General: Screening Not Indicated and History Diabetes      Colorectal Cancer Screening:     General: Screening Not Indicated      Prostate Cancer Screening:    General: Screening Not Indicated      Osteoporosis Screening:    General: Patient Declines      Abdominal Aortic Aneurysm (AAA) Screening:    Risk factors include: tobacco use        General: Patient Declines      Lung Cancer Screening:     General: Screening Not Indicated      Hepatitis C Screening:    General: Screening Not Indicated    Screening, Brief Intervention, and Referral to Treatment (SBIRT)    Screening  Typical number of drinks in a day: 0  Typical number of drinks in a week: 0  Interpretation: Low risk drinking behavior  Single Item Drug Screening:  How often have you used an illegal drug (including marijuana) or a prescription medication for non-medical reasons in the past year? never    Single Item Drug Screen Score: 0  Interpretation: Negative screen for possible drug use disorder    Brief Intervention  Alcohol & drug use screenings were reviewed  No concerns regarding substance use disorder identified  Other Counseling Topics:   Calcium and vitamin D intake and regular weightbearing exercise         Felicita Cullen MD

## 2021-08-28 PROBLEM — E66.3 OVERWEIGHT WITH BODY MASS INDEX (BMI) OF 27 TO 27.9 IN ADULT: Status: ACTIVE | Noted: 2019-08-21

## 2021-08-28 PROBLEM — Z13.820 SCREENING FOR OSTEOPOROSIS: Status: ACTIVE | Noted: 2021-08-25

## 2021-08-28 PROBLEM — Z13.820 SCREENING FOR OSTEOPOROSIS: Status: ACTIVE | Noted: 2021-08-28

## 2021-08-28 NOTE — ASSESSMENT & PLAN NOTE
A carotid artery stenosis patient is asymptomatic already on a statin and he is on aspirin patient is due for carotid duplex discussed the patient and he decline it

## 2021-08-28 NOTE — ASSESSMENT & PLAN NOTE
A discussed with the patient in osteoporosis and risk of fall and fracture and recommend the DEXA scan as screening patient decline

## 2021-10-01 DIAGNOSIS — I10 ESSENTIAL HYPERTENSION: ICD-10-CM

## 2021-10-01 DIAGNOSIS — E78.2 MIXED HYPERLIPIDEMIA: ICD-10-CM

## 2021-10-01 RX ORDER — FENOFIBRATE 160 MG/1
TABLET ORAL
Qty: 90 TABLET | Refills: 0 | Status: SHIPPED | OUTPATIENT
Start: 2021-10-01 | End: 2021-11-03 | Stop reason: SDUPTHER

## 2021-10-01 RX ORDER — METOPROLOL SUCCINATE 100 MG/1
TABLET, EXTENDED RELEASE ORAL
Qty: 180 TABLET | Refills: 0 | Status: SHIPPED | OUTPATIENT
Start: 2021-10-01 | End: 2021-11-03 | Stop reason: SDUPTHER

## 2021-11-03 ENCOUNTER — OFFICE VISIT (OUTPATIENT)
Dept: FAMILY MEDICINE CLINIC | Facility: CLINIC | Age: 86
End: 2021-11-03
Payer: MEDICARE

## 2021-11-03 VITALS — DIASTOLIC BLOOD PRESSURE: 80 MMHG | SYSTOLIC BLOOD PRESSURE: 130 MMHG | HEART RATE: 69 BPM

## 2021-11-03 DIAGNOSIS — I10 ESSENTIAL HYPERTENSION: ICD-10-CM

## 2021-11-03 DIAGNOSIS — Z23 NEED FOR INFLUENZA VACCINATION: ICD-10-CM

## 2021-11-03 DIAGNOSIS — E11.9 TYPE 2 DIABETES MELLITUS WITHOUT COMPLICATION, WITHOUT LONG-TERM CURRENT USE OF INSULIN (HCC): ICD-10-CM

## 2021-11-03 DIAGNOSIS — E55.9 VITAMIN D DEFICIENCY: ICD-10-CM

## 2021-11-03 DIAGNOSIS — I65.23 BILATERAL CAROTID ARTERY STENOSIS: ICD-10-CM

## 2021-11-03 DIAGNOSIS — E78.2 MIXED HYPERLIPIDEMIA: Primary | ICD-10-CM

## 2021-11-03 PROCEDURE — G0008 ADMIN INFLUENZA VIRUS VAC: HCPCS | Performed by: FAMILY MEDICINE

## 2021-11-03 PROCEDURE — 90662 IIV NO PRSV INCREASED AG IM: CPT | Performed by: FAMILY MEDICINE

## 2021-11-03 PROCEDURE — 99214 OFFICE O/P EST MOD 30 MIN: CPT | Performed by: FAMILY MEDICINE

## 2021-11-03 RX ORDER — SIMVASTATIN 80 MG
40 TABLET ORAL DAILY
Qty: 45 TABLET | Refills: 0 | Status: SHIPPED | OUTPATIENT
Start: 2021-11-03 | End: 2022-03-14 | Stop reason: SDUPTHER

## 2021-11-03 RX ORDER — FENOFIBRATE 160 MG/1
160 TABLET ORAL DAILY
Qty: 90 TABLET | Refills: 0 | Status: SHIPPED | OUTPATIENT
Start: 2021-11-03 | End: 2022-03-14 | Stop reason: SDUPTHER

## 2021-11-03 RX ORDER — METOPROLOL SUCCINATE 100 MG/1
100 TABLET, EXTENDED RELEASE ORAL 2 TIMES DAILY
Qty: 180 TABLET | Refills: 0 | Status: SHIPPED | OUTPATIENT
Start: 2021-11-03 | End: 2022-03-14 | Stop reason: SDUPTHER

## 2021-11-04 ENCOUNTER — TELEPHONE (OUTPATIENT)
Dept: ADMINISTRATIVE | Facility: OTHER | Age: 86
End: 2021-11-04

## 2022-02-03 ENCOUNTER — TELEPHONE (OUTPATIENT)
Dept: ADMINISTRATIVE | Facility: OTHER | Age: 87
End: 2022-02-03

## 2022-02-03 NOTE — TELEPHONE ENCOUNTER
----- Message from Alis Velasco sent at 2/2/2022  4:10 PM EST -----  02/02/22 4:11 PM    Hello, our patient Darion Odonnell has had Diabetic Eye Exam completed/performed  Please assist in updating the patient chart by pulling the document from the Media Tab     Thank you,  Kyle Summers MA  [address]

## 2022-02-03 NOTE — TELEPHONE ENCOUNTER
Upon review of the In Basket request we have found this is a duplicate request and no further action is needed  This request was completed upon initial request, the patient chart is up to date, and this message will now be closed  Any additional questions or concerns should be emailed to the Practice Liaisons via [email or web address] email, please do not reply via In Basket   requires one Annual DM eye exam resulted to satisfy measure  All other eye exams for the year should be scanned into media section -   Next eye exam resulted should be for the 2022 year      Thank you  Alexia Arreola MA

## 2022-02-04 ENCOUNTER — OFFICE VISIT (OUTPATIENT)
Dept: FAMILY MEDICINE CLINIC | Facility: CLINIC | Age: 87
End: 2022-02-04
Payer: MEDICARE

## 2022-02-04 VITALS
DIASTOLIC BLOOD PRESSURE: 70 MMHG | WEIGHT: 135 LBS | HEART RATE: 63 BPM | SYSTOLIC BLOOD PRESSURE: 132 MMHG | TEMPERATURE: 97.4 F | BODY MASS INDEX: 26.5 KG/M2 | OXYGEN SATURATION: 100 % | HEIGHT: 60 IN

## 2022-02-04 DIAGNOSIS — E66.3 OVERWEIGHT WITH BODY MASS INDEX (BMI) OF 27 TO 27.9 IN ADULT: ICD-10-CM

## 2022-02-04 DIAGNOSIS — E11.9 TYPE 2 DIABETES MELLITUS WITHOUT COMPLICATION, WITHOUT LONG-TERM CURRENT USE OF INSULIN (HCC): ICD-10-CM

## 2022-02-04 DIAGNOSIS — B35.1 ONYCHOMYCOSIS OF TOENAIL: ICD-10-CM

## 2022-02-04 DIAGNOSIS — L84 CALLUS OF FOOT: ICD-10-CM

## 2022-02-04 DIAGNOSIS — F32.1 MODERATE MAJOR DEPRESSION (HCC): ICD-10-CM

## 2022-02-04 DIAGNOSIS — M21.619 BUNION OF UNSPECIFIED FOOT: Primary | ICD-10-CM

## 2022-02-04 DIAGNOSIS — I65.23 BILATERAL CAROTID ARTERY STENOSIS: ICD-10-CM

## 2022-02-04 PROCEDURE — 99214 OFFICE O/P EST MOD 30 MIN: CPT | Performed by: FAMILY MEDICINE

## 2022-02-04 NOTE — PROGRESS NOTES
BMI Counseling: Body mass index is 27 27 kg/m²  The BMI is above normal  Nutrition recommendations include decreasing portion sizes, decreasing fast food intake and limiting drinks that contain sugar  Exercise recommendations include exercising 3-5 times per week  No pharmacotherapy was ordered  Rationale for BMI follow-up plan is due to patient being overweight or obese  Subjective:   Chief Complaint   Patient presents with    Follow-up     chronic conditions        Patient ID: Moy Mckeon is a 80 y o  male  Patient here follow-up with a chronic condition patient history of non-insulin-dependent diabetic carotid artery stenosis a patient tolerated his medication well without side effect the no new concern recent blood work review with the patient      The following portions of the patient's history were reviewed and updated as appropriate: allergies, current medications, past family history, past medical history, past social history, past surgical history and problem list     Review of Systems   Constitutional: Negative for activity change, appetite change, fatigue and fever  HENT: Negative for congestion, ear pain, sinus pressure, sinus pain and sore throat  Eyes: Negative for pain, discharge, redness and itching  Respiratory: Negative for cough, chest tightness, shortness of breath and stridor  Cardiovascular: Negative for chest pain, palpitations and leg swelling  Gastrointestinal: Negative for abdominal pain, blood in stool, constipation, diarrhea and nausea  Genitourinary: Negative for dysuria, flank pain, frequency and hematuria  Musculoskeletal: Negative for back pain, joint swelling and neck pain  Skin: Negative for pallor and rash  Neurological: Negative for dizziness, tremors, weakness, numbness and headaches  Hematological: Does not bruise/bleed easily               Objective:  Vitals:    02/04/22 0839   BP: 132/70   Pulse: 63   Temp: (!) 97 4 °F (36 3 °C)   TempSrc: Tympanic   SpO2: 100%   Weight: 61 2 kg (135 lb)   Height: 4' 11" (1 499 m)      Physical Exam  Constitutional:       General: He is not in acute distress  Appearance: He is well-developed  He is not diaphoretic  HENT:      Head: Normocephalic and atraumatic  Nose: Nose normal    Eyes:      General: No scleral icterus  Right eye: No discharge  Left eye: No discharge  Conjunctiva/sclera: Conjunctivae normal    Neck:      Vascular: No JVD  Comments: No visible masses  Cardiovascular:      Pulses: no weak pulses          Dorsalis pedis pulses are 2+ on the right side and 2+ on the left side  Pulmonary:      Effort: Pulmonary effort is normal  No respiratory distress  Breath sounds: Normal breath sounds  No stridor  No wheezing or rales  Abdominal:      General: There is no distension  Palpations: Abdomen is soft  Comments: Per patient abdomen is soft nontender and also abdomen not distended   Patient deny any visible or palpable bulging so just hernia  I was able to visualize the abdomen no erythema no distension no bulging so just mass or hernia   Musculoskeletal:         General: Normal range of motion  Cervical back: Normal range of motion and neck supple  Feet:    Feet:      Right foot:      Skin integrity: Dry skin present  No warmth  Left foot:      Skin integrity: Dry skin present  No warmth  Lymphadenopathy:      Cervical: No cervical adenopathy  Skin:     Coloration: Skin is not pale  Findings: No rash  Neurological:      Mental Status: He is alert and oriented to person, place, and time  Psychiatric:         Behavior: Behavior normal          Patient's shoes and socks removed  Right Foot/Ankle   Right Foot Inspection  Skin Exam: skin intact and dry skin  No warmth and no pre-ulcer       Toe Exam: No swelling and erythema    Sensory   Monofilament testing: intact    Vascular  Capillary refills: < 3 seconds  The right DP pulse is 2+  Left Foot/Ankle  Left Foot Inspection  Skin Exam: skin intact and dry skin  No warmth and no pre-ulcer  Toe Exam: No swelling and no erythema  Sensory   Monofilament testing: intact    Vascular  Capillary refills: < 3 seconds  The left DP pulse is 2+       Assign Risk Category  No deformity present  No loss of protective sensation  No weak pulses  Risk: 2        Assessment/Plan:    Type 2 diabetes mellitus without complication, without long-term current use of insulin ([facility])    Lab Results   Component Value Date    HGBA1C 6 6 (H) 01/28/2022   Chronic asymptomatic fair control with the diet encouraged patient to continue the patient already on statin a patient up-to-date with the a diabetic eye exam recommend patient to be follow-up with the podiatric    Carotid artery disease ([facility] [address] )  Chronic asymptomatic patient already on statin and aspirin patient is due for carotid duplex a patient decline it    Bunion of unspecified foot  The finding on the exam chronic bilateral foot the patient's history of diabetic we discussed the proper shoes wear proper foot care and recommend to be evaluated by podiatric    Onychomycosis of toenail  Chronic uncontrolled and with long kneel will recommend the patient proper shoes wear and recommend to be evaluated by podiatric    Callus of foot  A new diagnosis finding on the physical exam callus on the bottom of the right foot the refer the patient to see podiatric    Overweight with body mass index (BMI) of 27 to 27 9 in adult  A discussed the patient portion control low carb low-fat diet and increased physical activity    Moderate major depression ([address] )  A chronic patient try to treated conservatively with the supportive treatment not interested in medication       Diagnoses and all orders for this visit:    Bunion of unspecified foot  -     Ambulatory Referral to ; Future    Moderate major depression ([address] )  -     CBC and differential; Future  -     Basic metabolic panel; Future    Bilateral carotid artery stenosis  -     CBC and differential; Future  -     Basic metabolic panel; Future    Type 2 diabetes mellitus without complication, without long-term current use of insulin ([facility])  -     CBC and differential; Future  -     Basic metabolic panel; Future  -     Hemoglobin A1C; Future  -     Ambulatory Referral to ; Future    Callus of foot  -     Ambulatory Referral to ; Future    Onychomycosis of toenail  -     Ambulatory Referral to ; Future    Overweight with body mass index (BMI) of 27 to 27 9 in adult  -     CBC and differential; Future  -     Basic metabolic panel;  Future

## 2022-02-06 NOTE — ASSESSMENT & PLAN NOTE
Chronic uncontrolled and with long kneel will recommend the patient proper shoes wear and recommend to be evaluated by podiatric

## 2022-02-06 NOTE — ASSESSMENT & PLAN NOTE
A chronic patient try to treated conservatively with the supportive treatment not interested in medication

## 2022-02-06 NOTE — ASSESSMENT & PLAN NOTE
A new diagnosis finding on the physical exam callus on the bottom of the right foot the refer the patient to see podiatric

## 2022-02-06 NOTE — ASSESSMENT & PLAN NOTE
The finding on the exam chronic bilateral foot the patient's history of diabetic we discussed the proper shoes wear proper foot care and recommend to be evaluated by podiatric

## 2022-02-06 NOTE — ASSESSMENT & PLAN NOTE
Chronic asymptomatic patient already on statin and aspirin patient is due for carotid duplex a patient decline it

## 2022-02-06 NOTE — ASSESSMENT & PLAN NOTE
Lab Results   Component Value Date    HGBA1C 6 6 (H) 01/28/2022   Chronic asymptomatic fair control with the diet encouraged patient to continue the patient already on statin a patient up-to-date with the a diabetic eye exam recommend patient to be follow-up with the podiatric

## 2022-03-14 DIAGNOSIS — E78.2 MIXED HYPERLIPIDEMIA: ICD-10-CM

## 2022-03-14 DIAGNOSIS — I10 ESSENTIAL HYPERTENSION: ICD-10-CM

## 2022-03-14 RX ORDER — FENOFIBRATE 160 MG/1
160 TABLET ORAL DAILY
Qty: 90 TABLET | Refills: 0 | Status: SHIPPED | OUTPATIENT
Start: 2022-03-14 | End: 2022-07-12 | Stop reason: SDUPTHER

## 2022-03-14 RX ORDER — METOPROLOL SUCCINATE 100 MG/1
100 TABLET, EXTENDED RELEASE ORAL 2 TIMES DAILY
Qty: 180 TABLET | Refills: 0 | Status: SHIPPED | OUTPATIENT
Start: 2022-03-14 | End: 2022-07-12 | Stop reason: SDUPTHER

## 2022-03-14 RX ORDER — SIMVASTATIN 80 MG
40 TABLET ORAL DAILY
Qty: 45 TABLET | Refills: 0 | Status: SHIPPED | OUTPATIENT
Start: 2022-03-14 | End: 2022-07-12 | Stop reason: SDUPTHER

## 2022-04-28 ENCOUNTER — APPOINTMENT (OUTPATIENT)
Dept: LAB | Facility: CLINIC | Age: 87
End: 2022-04-28
Payer: MEDICARE

## 2022-04-28 DIAGNOSIS — E11.9 TYPE 2 DIABETES MELLITUS WITHOUT COMPLICATION, WITHOUT LONG-TERM CURRENT USE OF INSULIN (HCC): ICD-10-CM

## 2022-04-28 DIAGNOSIS — E66.3 OVERWEIGHT WITH BODY MASS INDEX (BMI) OF 27 TO 27.9 IN ADULT: ICD-10-CM

## 2022-04-28 DIAGNOSIS — I65.23 BILATERAL CAROTID ARTERY STENOSIS: ICD-10-CM

## 2022-04-28 DIAGNOSIS — F32.1 MODERATE MAJOR DEPRESSION (HCC): ICD-10-CM

## 2022-04-28 LAB
ANION GAP SERPL CALCULATED.3IONS-SCNC: 5 MMOL/L (ref 4–13)
BASOPHILS # BLD AUTO: 0.08 THOUSANDS/ΜL (ref 0–0.1)
BASOPHILS NFR BLD AUTO: 1 % (ref 0–1)
BUN SERPL-MCNC: 24 MG/DL (ref 5–25)
CALCIUM SERPL-MCNC: 9.2 MG/DL (ref 8.3–10.1)
CHLORIDE SERPL-SCNC: 105 MMOL/L (ref 100–108)
CO2 SERPL-SCNC: 26 MMOL/L (ref 21–32)
CREAT SERPL-MCNC: 1.27 MG/DL (ref 0.6–1.3)
EOSINOPHIL # BLD AUTO: 0.37 THOUSAND/ΜL (ref 0–0.61)
EOSINOPHIL NFR BLD AUTO: 5 % (ref 0–6)
ERYTHROCYTE [DISTWIDTH] IN BLOOD BY AUTOMATED COUNT: 13.9 % (ref 11.6–15.1)
EST. AVERAGE GLUCOSE BLD GHB EST-MCNC: 131 MG/DL
GFR SERPL CREATININE-BSD FRML MDRD: 50 ML/MIN/1.73SQ M
GLUCOSE P FAST SERPL-MCNC: 109 MG/DL (ref 65–99)
HBA1C MFR BLD: 6.2 %
HCT VFR BLD AUTO: 46.7 % (ref 36.5–49.3)
HGB BLD-MCNC: 14.3 G/DL (ref 12–17)
IMM GRANULOCYTES # BLD AUTO: 0.04 THOUSAND/UL (ref 0–0.2)
IMM GRANULOCYTES NFR BLD AUTO: 1 % (ref 0–2)
LYMPHOCYTES # BLD AUTO: 1.45 THOUSANDS/ΜL (ref 0.6–4.47)
LYMPHOCYTES NFR BLD AUTO: 18 % (ref 14–44)
MCH RBC QN AUTO: 29 PG (ref 26.8–34.3)
MCHC RBC AUTO-ENTMCNC: 30.6 G/DL (ref 31.4–37.4)
MCV RBC AUTO: 95 FL (ref 82–98)
MONOCYTES # BLD AUTO: 0.82 THOUSAND/ΜL (ref 0.17–1.22)
MONOCYTES NFR BLD AUTO: 10 % (ref 4–12)
NEUTROPHILS # BLD AUTO: 5.34 THOUSANDS/ΜL (ref 1.85–7.62)
NEUTS SEG NFR BLD AUTO: 65 % (ref 43–75)
NRBC BLD AUTO-RTO: 0 /100 WBCS
PLATELET # BLD AUTO: 243 THOUSANDS/UL (ref 149–390)
PMV BLD AUTO: 10.9 FL (ref 8.9–12.7)
POTASSIUM SERPL-SCNC: 4.8 MMOL/L (ref 3.5–5.3)
RBC # BLD AUTO: 4.93 MILLION/UL (ref 3.88–5.62)
SODIUM SERPL-SCNC: 136 MMOL/L (ref 136–145)
WBC # BLD AUTO: 8.1 THOUSAND/UL (ref 4.31–10.16)

## 2022-04-28 PROCEDURE — 85025 COMPLETE CBC W/AUTO DIFF WBC: CPT

## 2022-04-28 PROCEDURE — 80048 BASIC METABOLIC PNL TOTAL CA: CPT

## 2022-04-28 PROCEDURE — 83036 HEMOGLOBIN GLYCOSYLATED A1C: CPT

## 2022-04-28 PROCEDURE — 36415 COLL VENOUS BLD VENIPUNCTURE: CPT

## 2022-05-04 ENCOUNTER — OFFICE VISIT (OUTPATIENT)
Dept: FAMILY MEDICINE CLINIC | Facility: CLINIC | Age: 87
End: 2022-05-04
Payer: MEDICARE

## 2022-05-04 VITALS
HEART RATE: 68 BPM | DIASTOLIC BLOOD PRESSURE: 60 MMHG | HEIGHT: 60 IN | TEMPERATURE: 97.7 F | OXYGEN SATURATION: 98 % | SYSTOLIC BLOOD PRESSURE: 128 MMHG | BODY MASS INDEX: 26.58 KG/M2 | WEIGHT: 135.4 LBS

## 2022-05-04 DIAGNOSIS — I65.23 BILATERAL CAROTID ARTERY STENOSIS: ICD-10-CM

## 2022-05-04 DIAGNOSIS — E11.9 TYPE 2 DIABETES MELLITUS WITHOUT COMPLICATION, WITHOUT LONG-TERM CURRENT USE OF INSULIN (HCC): ICD-10-CM

## 2022-05-04 DIAGNOSIS — E78.2 MIXED HYPERLIPIDEMIA: ICD-10-CM

## 2022-05-04 DIAGNOSIS — I25.10 CHRONIC CORONARY ARTERY DISEASE: ICD-10-CM

## 2022-05-04 DIAGNOSIS — N18.31 STAGE 3A CHRONIC KIDNEY DISEASE (HCC): Primary | ICD-10-CM

## 2022-05-04 PROCEDURE — 99214 OFFICE O/P EST MOD 30 MIN: CPT | Performed by: FAMILY MEDICINE

## 2022-05-04 NOTE — ASSESSMENT & PLAN NOTE
Lab Results   Component Value Date    HGBA1C 6 2 (H) 04/28/2022   A chronic asymptomatic fair control continue current management low carb diet and weight loss patient already on statin the patient up-to-date with diabetic eye in foot exam

## 2022-05-04 NOTE — ASSESSMENT & PLAN NOTE
A chronic asymptomatic patient on metoprolol early statin and aspirin due for echocardiogram a decline it

## 2022-05-04 NOTE — PROGRESS NOTES
Subjective:   Chief Complaint   Patient presents with    Follow-up     3 month    Results     Labs 04/28/22        Patient ID: Kadi Frye is a 80 y o  male  Patient here follow-up with a chronic condition compliant with the medication tolerated well without side effect no new concern recent blood work review with the patient      The following portions of the patient's history were reviewed and updated as appropriate: allergies, current medications, past family history, past medical history, past social history, past surgical history and problem list     Review of Systems   Constitutional: Negative for activity change, appetite change, fatigue and fever  HENT: Negative for congestion, ear pain, sinus pressure, sinus pain and sore throat  Eyes: Negative for pain, discharge, redness and itching  Respiratory: Negative for cough, chest tightness, shortness of breath and stridor  Cardiovascular: Negative for chest pain, palpitations and leg swelling  Gastrointestinal: Negative for abdominal pain, blood in stool, constipation, diarrhea and nausea  Genitourinary: Negative for dysuria, flank pain, frequency and hematuria  Musculoskeletal: Negative for back pain, joint swelling and neck pain  Skin: Negative for pallor and rash  Neurological: Negative for dizziness, tremors, weakness, numbness and headaches  Hematological: Does not bruise/bleed easily  Objective:  Vitals:    05/04/22 1029   BP: 128/60   BP Location: Left arm   Patient Position: Sitting   Cuff Size: Standard   Pulse: 68   Temp: 97 7 °F (36 5 °C)   TempSrc: Tympanic   SpO2: 98%   Weight: 61 4 kg (135 lb 6 4 oz)   Height: 4' 11" (1 499 m)      Physical Exam  Vitals and nursing note reviewed  Constitutional:       General: He is not in acute distress  Appearance: Normal appearance  He is well-developed  He is not diaphoretic  HENT:      Head: Normocephalic        Right Ear: Tympanic membrane, ear canal and external ear normal       Left Ear: Tympanic membrane, ear canal and external ear normal       Nose: Nose normal  No congestion or rhinorrhea  Mouth/Throat:      Mouth: Mucous membranes are moist       Pharynx: Oropharynx is clear  No oropharyngeal exudate or posterior oropharyngeal erythema  Eyes:      General:         Right eye: No discharge  Left eye: No discharge  Conjunctiva/sclera: Conjunctivae normal    Neck:      Vascular: No JVD  Cardiovascular:      Rate and Rhythm: Normal rate and regular rhythm  Heart sounds: Normal heart sounds  No murmur heard  No gallop  Pulmonary:      Effort: Pulmonary effort is normal  No respiratory distress  Breath sounds: Normal breath sounds  No stridor  No wheezing or rales  Chest:      Chest wall: No tenderness  Abdominal:      General: There is no distension  Palpations: Abdomen is soft  There is no mass  Tenderness: There is no abdominal tenderness  There is no rebound  Musculoskeletal:         General: No tenderness  Normal range of motion  Cervical back: Normal range of motion and neck supple  Lymphadenopathy:      Cervical: No cervical adenopathy  Skin:     General: Skin is warm  Findings: No erythema or rash  Neurological:      Mental Status: He is alert and oriented to person, place, and time  Sensory: No sensory deficit        Gait: Gait normal    Psychiatric:         Mood and Affect: Mood normal          Behavior: Behavior normal            Assessment/Plan:    Type 2 diabetes mellitus without complication, without long-term current use of insulin ([facility])    Lab Results   Component Value Date    HGBA1C 6 2 (H) 04/28/2022   A chronic asymptomatic fair control continue current management low carb diet and weight loss patient already on statin the patient up-to-date with diabetic eye in foot exam    Carotid artery disease ([address] )  A chronic asymptomatic patient is due for carotid duplex he decline it patient on statin and aspirin    Chronic coronary artery disease  A chronic asymptomatic patient on metoprolol early statin and aspirin due for echocardiogram a decline it    Stage 3a chronic kidney disease ([facility] [address] )  Lab Results   Component Value Date    EGFR 50 04/28/2022    CREATININE 1 27 04/28/2022   A new diagnosis finding on recent blood work it can be multifactorial including hypertension and age discussed the patient will hydration avoid nonsteroidal anti-inflammatory drugs a blood pressure at the goal and the hemoglobin A1c at the goal for patient's chronic kidney disease    Hyperlipidemia  A chronic asymptomatic LDL therapeutic in diabetic patient continue current dose of simvastatin 80 milligram once a day low-fat diet review with the patient       Diagnoses and all orders for this visit:    Stage 3a chronic kidney disease ([address] )    Bilateral carotid artery stenosis    Type 2 diabetes mellitus without complication, without long-term current use of insulin ([facility] [address] )    Chronic coronary artery disease    Mixed hyperlipidemia

## 2022-05-04 NOTE — ASSESSMENT & PLAN NOTE
A chronic asymptomatic LDL therapeutic in diabetic patient continue current dose of simvastatin 80 milligram once a day low-fat diet review with the patient

## 2022-05-04 NOTE — ASSESSMENT & PLAN NOTE
A chronic asymptomatic patient is due for carotid duplex he decline it patient on statin and aspirin

## 2022-05-04 NOTE — ASSESSMENT & PLAN NOTE
Lab Results   Component Value Date    EGFR 50 04/28/2022    CREATININE 1 27 04/28/2022   A new diagnosis finding on recent blood work it can be multifactorial including hypertension and age discussed the patient will hydration avoid nonsteroidal anti-inflammatory drugs a blood pressure at the goal and the hemoglobin A1c at the goal for patient's chronic kidney disease

## 2022-05-06 ENCOUNTER — TELEPHONE (OUTPATIENT)
Dept: ADMINISTRATIVE | Facility: OTHER | Age: 87
End: 2022-05-06

## 2022-05-06 NOTE — TELEPHONE ENCOUNTER
----- Message from Robina Esparza, [address] [place] sent at 5/6/2022 11:06 AM EDT -----  05/06/22 11:06 AM    Hello, our patient Laura Chapa has had Diabetic Foot Exam completed/performed  Please assist in updating the patient chart by pulling the document from the Media Tab       Thank you,  Robina Esparza MA  [address]  [facility] [address]

## 2022-05-06 NOTE — TELEPHONE ENCOUNTER
Upon review of the In Basket request we were able to locate, review, and update the patient chart as requested for Diabetic Foot Exam     Any additional questions or concerns should be emailed to the Practice Liaisons via [email or web address] email, please do not reply via In Basket      Thank you  Shin Carrillo MA

## 2022-07-12 DIAGNOSIS — E78.2 MIXED HYPERLIPIDEMIA: ICD-10-CM

## 2022-07-12 DIAGNOSIS — I10 ESSENTIAL HYPERTENSION: ICD-10-CM

## 2022-07-12 RX ORDER — SIMVASTATIN 80 MG
40 TABLET ORAL DAILY
Qty: 45 TABLET | Refills: 0 | Status: SHIPPED | OUTPATIENT
Start: 2022-07-12 | End: 2022-09-21 | Stop reason: SDUPTHER

## 2022-07-12 RX ORDER — FENOFIBRATE 160 MG/1
160 TABLET ORAL DAILY
Qty: 90 TABLET | Refills: 0 | Status: SHIPPED | OUTPATIENT
Start: 2022-07-12 | End: 2022-09-21 | Stop reason: SDUPTHER

## 2022-07-12 RX ORDER — METOPROLOL SUCCINATE 100 MG/1
100 TABLET, EXTENDED RELEASE ORAL 2 TIMES DAILY
Qty: 180 TABLET | Refills: 0 | Status: SHIPPED | OUTPATIENT
Start: 2022-07-12 | End: 2022-09-21 | Stop reason: SDUPTHER

## 2022-09-20 ENCOUNTER — TELEPHONE (OUTPATIENT)
Dept: ADMINISTRATIVE | Facility: OTHER | Age: 87
End: 2022-09-20

## 2022-09-20 NOTE — TELEPHONE ENCOUNTER
Upon review of the In Basket request we have found/obtained the documentation  After careful review of the document we are unable to complete this request for Diabetic Eye Exam because the documentation does not have the proper verbiage (wording) needed to close the requested care gap(s)  Any additional questions or concerns should be emailed to the Practice Liaisons via [email or web address] email, please do not reply via In Basket      Thank you  Olinda Jolley

## 2022-09-20 NOTE — TELEPHONE ENCOUNTER
----- Message from Michelle Vaz sent at 9/19/2022  1:16 PM EDT -----  Regarding: care gap request  09/19/22 1:16 PM    Hello, our patient attached above has had Diabetic Eye Exam completed/performed  Please assist in updating the patient chart by pulling the document from the Media Tab  The date of service is 8/18/2022       Thank you,  [address] [place] [id number]

## 2022-09-21 ENCOUNTER — OFFICE VISIT (OUTPATIENT)
Dept: FAMILY MEDICINE CLINIC | Facility: CLINIC | Age: 87
End: 2022-09-21
Payer: MEDICARE

## 2022-09-21 VITALS
HEIGHT: 60 IN | SYSTOLIC BLOOD PRESSURE: 124 MMHG | HEART RATE: 75 BPM | TEMPERATURE: 97.1 F | RESPIRATION RATE: 16 BRPM | OXYGEN SATURATION: 100 % | BODY MASS INDEX: 26.11 KG/M2 | WEIGHT: 133 LBS | DIASTOLIC BLOOD PRESSURE: 62 MMHG

## 2022-09-21 DIAGNOSIS — E11.9 TYPE 2 DIABETES MELLITUS WITHOUT COMPLICATION, WITHOUT LONG-TERM CURRENT USE OF INSULIN (HCC): ICD-10-CM

## 2022-09-21 DIAGNOSIS — I10 ESSENTIAL HYPERTENSION: ICD-10-CM

## 2022-09-21 DIAGNOSIS — Z23 NEED FOR INFLUENZA VACCINATION: ICD-10-CM

## 2022-09-21 DIAGNOSIS — I65.23 BILATERAL CAROTID ARTERY STENOSIS: ICD-10-CM

## 2022-09-21 DIAGNOSIS — I35.0 NONRHEUMATIC AORTIC VALVE STENOSIS: ICD-10-CM

## 2022-09-21 DIAGNOSIS — E78.2 MIXED HYPERLIPIDEMIA: ICD-10-CM

## 2022-09-21 DIAGNOSIS — E66.3 OVERWEIGHT WITH BODY MASS INDEX (BMI) OF 26 TO 26.9 IN ADULT: ICD-10-CM

## 2022-09-21 DIAGNOSIS — Z00.00 MEDICARE ANNUAL WELLNESS VISIT, SUBSEQUENT: Primary | ICD-10-CM

## 2022-09-21 PROBLEM — Z00.01 ENCOUNTER FOR ROUTINE ADULT MEDICAL EXAM WITH ABNORMAL FINDINGS: Status: RESOLVED | Noted: 2019-08-21 | Resolved: 2022-09-21

## 2022-09-21 LAB
CREAT UR-MCNC: 70.5 MG/DL
MICROALBUMIN UR-MCNC: <5 MG/L (ref 0–20)
MICROALBUMIN/CREAT 24H UR: <7 MG/G CREATININE (ref 0–30)

## 2022-09-21 PROCEDURE — 90662 IIV NO PRSV INCREASED AG IM: CPT | Performed by: FAMILY MEDICINE

## 2022-09-21 PROCEDURE — 99214 OFFICE O/P EST MOD 30 MIN: CPT | Performed by: FAMILY MEDICINE

## 2022-09-21 PROCEDURE — G0008 ADMIN INFLUENZA VIRUS VAC: HCPCS | Performed by: FAMILY MEDICINE

## 2022-09-21 PROCEDURE — 82043 UR ALBUMIN QUANTITATIVE: CPT | Performed by: FAMILY MEDICINE

## 2022-09-21 PROCEDURE — 82570 ASSAY OF URINE CREATININE: CPT | Performed by: FAMILY MEDICINE

## 2022-09-21 PROCEDURE — G0439 PPPS, SUBSEQ VISIT: HCPCS | Performed by: FAMILY MEDICINE

## 2022-09-21 RX ORDER — FENOFIBRATE 160 MG/1
160 TABLET ORAL DAILY
Qty: 90 TABLET | Refills: 0 | Status: SHIPPED | OUTPATIENT
Start: 2022-09-21

## 2022-09-21 RX ORDER — SIMVASTATIN 80 MG
40 TABLET ORAL DAILY
Qty: 45 TABLET | Refills: 0 | Status: SHIPPED | OUTPATIENT
Start: 2022-09-21

## 2022-09-21 RX ORDER — METOPROLOL SUCCINATE 100 MG/1
100 TABLET, EXTENDED RELEASE ORAL 2 TIMES DAILY
Qty: 180 TABLET | Refills: 0 | Status: SHIPPED | OUTPATIENT
Start: 2022-09-21

## 2022-09-21 NOTE — ASSESSMENT & PLAN NOTE
BMI today 26 86 a better from before encouraged patient to watch for the portion low carb low-fat diet and increased physical activity

## 2022-09-21 NOTE — PROGRESS NOTES
Assessment and Plan:     Problem List Items Addressed This Visit        Endocrine    Type 2 diabetes mellitus without complication, without long-term current use of insulin ([facility] [address] )    Relevant Orders    CBC and differential    Basic metabolic panel    Lipid panel    Hemoglobin A1C    Microalbumin / creatinine urine ratio       Cardiovascular and Mediastinum    Aortic valve stenosis     Chronic asymptomatic the patient is due for echocardiogram and he decline it         Relevant Medications    metoprolol succinate (TOPROL-XL) 100 mg 24 hr tablet    Carotid artery disease (H[facility])     Chronic asymptomatic patient decline carotid duplex a patient already on statin and aspirin         Essential hypertension     Chronic asymptomatic fair control continue current management low-salt diet and important lose weight review         Relevant Medications    metoprolol succinate (TOPROL-XL) 100 mg 24 hr tablet    Other Relevant Orders    CBC and differential    Basic metabolic panel    Lipid panel    Hemoglobin A1C       Other    Hyperlipidemia    Relevant Medications    simvastatin (ZOCOR) 80 mg tablet    fenofibrate 160 MG tablet    Other Relevant Orders    CBC and differential    Basic metabolic panel    Lipid panel    Hemoglobin A1C    Overweight with body mass index (BMI) of 26 to 26 9 in adult     BMI today 26 86 a better from before encouraged patient to watch for the portion low carb low-fat diet and increased physical activity         Medicare annual wellness visit, subsequent - Primary     Advice and education were given regarding nutrition, aerobic exercises, weight bearing exercises, cardiovascular risk reduction, fall risk reduction, and age appropriate supplements  The patient was counseled regarding instructions for management, risk factor reductions, prognosis, risks and benefits of treatment options, patient and family education, and importance of compliance with treatment       Discussed with the patient flu shot and he agree a day tolerated well without side effect           Other Visit Diagnoses     Need for influenza vaccination        Relevant Orders    FLUZONE HIGH-DOSE: influenza vaccine, high-dose, preservative-free 0 7 mL (Completed)        BMI Counseling: Body mass index is 26 86 kg/m²  The BMI is above normal  Nutrition recommendations include decreasing portion sizes, decreasing fast food intake and limiting drinks that contain sugar  Exercise recommendations include exercising 3-5 times per week  No pharmacotherapy was ordered  Rationale for BMI follow-up plan is due to patient being overweight or obese  Preventive health issues were discussed with patient, and age appropriate screening tests were ordered as noted in patient's After Visit Summary  Personalized health advice and appropriate referrals for health education or preventive services given if needed, as noted in patient's After Visit Summary  History of Present Illness:     Patient presents for a Medicare Wellness Visit    Patient here for well exam follow-up with a chronic condition patient compliant with the medication tolerated well without side effect no new concern     Patient Care Team:  William Dumont MD as PCP - General (Family Medicine)  Caity Vale MD (Ophthalmology)  Gissel Marquez MD (Ophthalmology)     Review of Systems:     Review of Systems   Constitutional: Negative for chills and fever  HENT: Negative for ear pain and sore throat  Eyes: Negative for pain and visual disturbance  Respiratory: Negative for cough and shortness of breath  Cardiovascular: Negative for chest pain and palpitations  Gastrointestinal: Negative for abdominal pain and vomiting  Genitourinary: Negative for dysuria and hematuria  Musculoskeletal: Negative for arthralgias and back pain  Skin: Negative for color change and rash  Neurological: Negative for seizures and syncope  All other systems reviewed and are negative  Problem List:     Patient Active Problem List   Diagnosis    Anxiety    Aortic valve stenosis    Chronic coronary artery disease    Carotid artery disease ([place] [address] )    Coronary atherosclerosis    Osteoarthritis    Essential hypertension    Hepatitis A    Hyperlipidemia    Hydrocele    Hypertriglyceridemia    Hyperplasia of prostate without lower urinary tract symptoms (LUTS)    Insomnia    Other specified glaucoma    Vitamin D deficiency    Type 2 diabetes mellitus without complication, without long-term current use of insulin ([facility])    Onychomycosis of toenail    Overweight with body mass index (BMI) of 26 to 26 9 in adult    DNR (do not resuscitate)    Bunion of unspecified foot    Moderate major depression ([place] [address] )    Medicare annual wellness visit, subsequent    Screening for osteoporosis    Callus of foot    Stage 3a chronic kidney disease ([place] [address] )      Past Medical and Surgical History:     Past Medical History:   Diagnosis Date    Anxiety     CAD (coronary artery disease)     Capsular cataract     mature    Chronic ulcer of skin ([place] [address] ) 5/18/2015    DJD (degenerative joint disease)     Encounter for routine adult medical exam with abnormal findings 8/21/2019    Hepatitis A     Hydrocele     Hyperglycemia     Hyperlipidemia     Hypertension     Impaired fasting glucose 4/30/2014    Myocardial infarct ([place] [address] ) 2007    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[address] )     Potassium (K) excess 12/27/2019    Prostatic hyperplasia     Stage 3a chronic kidney disease ([place] [address] ) 5/4/2022    Type 2 diabetes mellitus without complication, without long-term current use of insulin ([place] [address] ) 3/6/2019     Past Surgical History:   Procedure Laterality Date    ANGIOPLASTY  2008      Family History:     Family History   Problem Relation Age of Onset    Coronary artery disease Father       Social History:     Social History     Socioeconomic History    Marital status:       Spouse name: None    Number of children: None    Years of education: None    Highest education level: None   Occupational History    None   Tobacco Use    Smoking status: Never Smoker    Smokeless tobacco: Never Used   Vaping Use    Vaping Use: Never used   Substance and Sexual Activity    Alcohol use: No    Drug use: No    Sexual activity: Not Currently     Partners: Female   Other Topics Concern    None   Social History Narrative    No risk for falls    Activity level: sedentary    No sleep changes    No animals    No firearms    Always uses a seatbelt     Social Determinants of Health     Financial Resource Strain: Low Risk     Difficulty of Paying Living Expenses: Not hard at all   Food Insecurity: No Food Insecurity    Worried About Running Out of Food in the Last Year: Never true    Valentina of Food in the Last Year: Never true   Transportation Needs: No Transportation Needs    Lack of Transportation (Medical): No    Lack of Transportation (Non-Medical): No   Physical Activity: Insufficiently Active    Days of Exercise per Week: 2 days    Minutes of Exercise per Session: 30 min   Stress: Stress Concern Present    Feeling of Stress : To some extent   Social Connections: Moderately Isolated    Frequency of Communication with Friends and Family: More than three times a week    Frequency of Social Gatherings with Friends and Family: More than three times a week    Attends Mosque Services: More than 4 times per year    Active Member of Clubs or Organizations: No    Attends Club or Organization Meetings: Never    Marital Status:     Intimate Partner Violence: Not At Risk    Fear of Current or Ex-Partner: No    Emotionally Abused: No    Physically Abused: No    Sexually Abused: No   Housing Stability: Unknown    Unable to Pay for Housing in the Last Year: No    Number of Places Lived in the Last Year: Not on file    Unstable Housing in the Last Year: No      Medications and Allergies:     Current Outpatient Medications Medication Sig Dispense Refill    ALPRAZolam (XANAX) 0 5 mg tablet Take 0 5 mg by mouth daily as needed for anxiety      aspirin 81 MG tablet every 24 hours      Cholecalciferol (VITAMIN D-3) 1000 units CAPS every 24 hours      dorzolamide-timolol (COSOPT) 22 3-6 8 MG/ML ophthalmic solution Every 12 hours      fenofibrate 160 MG tablet Take 1 tablet (160 mg total) by mouth daily 90 tablet 0    latanoprost (XALATAN) 0 005 % ophthalmic solution INSTILL 1 DROP AT BEDTIME INTO BOTH EYES  3    meclizine (ANTIVERT) 12 5 MG tablet Take 12 5 mg by mouth as needed for dizziness      metoprolol succinate (TOPROL-XL) 100 mg 24 hr tablet Take 1 tablet (100 mg total) by mouth 2 (two) times a day 180 tablet 0    Multiple Vitamins-Minerals (MULTIVITAL) tablet every 24 hours      Multiple Vitamins-Minerals (PRESERVISION AREDS 2+MULTI VIT PO) take 2 tabs daily      nitroglycerin (NITROSTAT) 0 4 mg SL tablet place 1 tablet by sublingual route at the 1st sign of attack; may repeat every 5 min until relief; if pain persists after 3 tablets in 15 min, prompt medical attention is recommended      RHOPRESSA 0 02 % SOLN INSTILL 1 DROP AT BEDTIME INTO BOTH EYES  1    simvastatin (ZOCOR) 80 mg tablet Take 0 5 tablets (40 mg total) by mouth daily 45 tablet 0     No current facility-administered medications for this visit       No Known Allergies   Immunizations:     Immunization History   Administered Date(s) Administered    COVID-19 MODERNA VACC 0 5 ML IM 01/05/2021, 02/05/2021    Influenza Split 01/14/2014    Influenza Split High Dose Preservative Free IM 10/08/2014, 11/18/2015, 11/20/2015, 09/26/2016, 11/21/2017    Influenza, high dose seasonal 0 7 mL 09/27/2019, 10/30/2020, 11/03/2021, 09/21/2022    Influenza, seasonal, injectable 11/01/2012    Influenza, seasonal, injectable, preservative free 11/06/2018    Pneumococcal Conjugate 13-Valent 01/29/2015    Pneumococcal Polysaccharide PPV23 11/01/1999, 11/06/2003    Td (adult), adsorbed 02/04/2010    Tdap 01/31/2019      Health Maintenance: There are no preventive care reminders to display for this patient  Topic Date Due    COVID-19 Vaccine (3 - Booster for Moderna series) 07/05/2021      Medicare Screening Tests and Risk Assessments:     Vonda Falcon is here for his Subsequent Wellness visit  Last Medicare Wellness visit information reviewed, patient interviewed and updates made to the record today  Health Risk Assessment:   Patient rates overall health as very good  Patient feels that their physical health rating is same  Patient is satisfied with their life  Eyesight was rated as slightly worse  Hearing was rated as slightly worse  Patient feels that their emotional and mental health rating is same  Patient states they are sometimes unusually tired/fatigued  Pain experienced in the last 7 days has been none  Patient states that he has experienced no weight loss or gain in last 6 months  Depression Screening:   PHQ-2 Score: 0      Fall Risk Screening: In the past year, patient has experienced: no history of falling in past year      Home Safety:  Patient does not have trouble with stairs inside or outside of their home  Patient has working smoke alarms and has working carbon monoxide detector  Home safety hazards include: none  Nutrition:   Current diet is Regular  Medications:   Patient is currently taking over-the-counter supplements  OTC medications include: see medication list  Patient is able to manage medications  Activities of Daily Living (ADLs)/Instrumental Activities of Daily Living (IADLs):   Walk and transfer into and out of bed and chair?: Yes  Dress and groom yourself?: Yes    Bathe or shower yourself?: Yes    Feed yourself?  Yes  Do your laundry/housekeeping?: Yes  Manage your money, pay your bills and track your expenses?: Yes  Make your own meals?: Yes    Do your own shopping?: Yes    Previous Hospitalizations:   Any hospitalizations or ED visits within the last 12 months?: No      Advance Care Planning:   Living will: No    Durable POA for healthcare: No    Advanced directive: No    Five wishes given: Yes      Cognitive Screening:   Provider or family/friend/caregiver concerned regarding cognition?: No    PREVENTIVE SCREENINGS      Cardiovascular Screening:    General: Screening Not Indicated and History Lipid Disorder      Diabetes Screening:     General: Screening Not Indicated and History Diabetes      Colorectal Cancer Screening:     General: Screening Not Indicated      Prostate Cancer Screening:    General: Screening Not Indicated      Osteoporosis Screening:    General: Patient Declines      Abdominal Aortic Aneurysm (AAA) Screening:        General: Screening Not Indicated      Lung Cancer Screening:     General: Screening Not Indicated      Hepatitis C Screening:    General: Screening Not Indicated    Screening, Brief Intervention, and Referral to Treatment (SBIRT)    Screening  Typical number of drinks in a day: 0  Typical number of drinks in a week: 0  Interpretation: Low risk drinking behavior      AUDIT-C Screenin) How often did you have a drink containing alcohol in the past year? never  2) How many drinks did you have on a typical day when you were drinking in the past year? 0  3) How often did you have 6 or more drinks on one occasion in the past year? never    AUDIT-C Score: 0  Interpretation: Score 0-3 (male): Negative screen for alcohol misuse    Single Item Drug Screening:  How often have you used an illegal drug (including marijuana) or a prescription medication for non-medical reasons in the past year? never    Single Item Drug Screen Score: 0  Interpretation: Negative screen for possible drug use disorder    No exam data present     Physical Exam:     /62 (BP Location: Left arm, Patient Position: Sitting, Cuff Size: Large)   Pulse 75   Temp (!) 97 1 °F (36 2 °C) (Tympanic)   Resp 16   Ht 4' 11" (1 499 m) Wt 60 3 kg (133 lb)   SpO2 100%   BMI 26 86 kg/m²     Physical Exam  Vitals and nursing note reviewed  Constitutional:       Appearance: He is well-developed  HENT:      Head: Normocephalic and atraumatic  Right Ear: Tympanic membrane normal       Left Ear: Tympanic membrane normal    Eyes:      Conjunctiva/sclera: Conjunctivae normal    Cardiovascular:      Rate and Rhythm: Normal rate and regular rhythm  Heart sounds: No murmur heard  Pulmonary:      Effort: Pulmonary effort is normal  No respiratory distress  Breath sounds: Normal breath sounds  Abdominal:      Palpations: Abdomen is soft  Tenderness: There is no abdominal tenderness  Musculoskeletal:      Cervical back: Neck supple  Skin:     General: Skin is warm and dry  Findings: No rash  Neurological:      Mental Status: He is alert and oriented to person, place, and time     Psychiatric:         Mood and Affect: Mood normal           Lizzie Pickens MD

## 2022-09-21 NOTE — ASSESSMENT & PLAN NOTE
Advice and education were given regarding nutrition, aerobic exercises, weight bearing exercises, cardiovascular risk reduction, fall risk reduction, and age appropriate supplements  The patient was counseled regarding instructions for management, risk factor reductions, prognosis, risks and benefits of treatment options, patient and family education, and importance of compliance with treatment       Discussed with the patient flu shot and he agree a day tolerated well without side effect

## 2022-09-21 NOTE — PATIENT INSTRUCTIONS
Medicare Preventive Visit Patient Instructions  Thank you for completing your Welcome to Medicare Visit or Medicare Annual Wellness Visit today  Your next wellness visit will be due in one year (9/22/2023)  The screening/preventive services that you may require over the next 5-10 years are detailed below  Some tests may not apply to you based off risk factors and/or age  Screening tests ordered at today's visit but not completed yet may show as past due  Also, please note that scanned in results may not display below  Preventive Screenings:  Service Recommendations Previous Testing/Comments   Colorectal Cancer Screening  · Colonoscopy    · Fecal Occult Blood Test (FOBT)/Fecal Immunochemical Test (FIT)  · Fecal DNA/Cologuard Test  · Flexible Sigmoidoscopy Age: 39-70 years old   Colonoscopy: every 10 years (May be performed more frequently if at higher risk)  OR  FOBT/FIT: every 1 year  OR  Cologuard: every 3 years  OR  Sigmoidoscopy: every 5 years  Screening may be recommended earlier than age 39 if at higher risk for colorectal cancer  Also, an individualized decision between you and your healthcare provider will decide whether screening between the ages of 74-80 would be appropriate   Colonoscopy: Not on file  FOBT/FIT: Not on file  Cologuard: Not on file  Sigmoidoscopy: Not on file    Screening Not Indicated     Prostate Cancer Screening Individualized decision between patient and health care provider in men between ages of 53-78   Medicare will cover every 12 months beginning on the day after your 50th birthday PSA: No results in last 5 years     Screening Not Indicated     Hepatitis C Screening Once for adults born between [birth year] and 1965  More frequently in patients at high risk for Hepatitis C Hep C Antibody: Not on file        Diabetes Screening 1-2 times per year if you're at risk for diabetes or have pre-diabetes Fasting glucose: 109 mg/dL (4/28/2022)  A1C: 6 2 % (4/28/2022)  Screening Not Indicated  History Diabetes   Cholesterol Screening Once every 5 years if you don't have a lipid disorder  May order more often based on risk factors  Lipid panel: 01/28/2022  Screening Not Indicated  History Lipid Disorder      Other Preventive Screenings Covered by Medicare:  1  Abdominal Aortic Aneurysm (AAA) Screening: covered once if your at risk  You're considered to be at risk if you have a family history of AAA or a male between the age of 73-68 who smoking at least 100 cigarettes in your lifetime  2  Lung Cancer Screening: covers low dose CT scan once per year if you meet all of the following conditions: (1) Age 50-69; (2) No signs or symptoms of lung cancer; (3) Current smoker or have quit smoking within the last 15 years; (4) You have a tobacco smoking history of at least 20 pack years (packs per day x number of years you smoked); (5) You get a written order from a healthcare provider  3  Glaucoma Screening: covered annually if you're considered high risk: (1) You have diabetes OR (2) Family history of glaucoma OR (3)  aged 48 and older OR (3)  American aged 72 and older  3  Osteoporosis Screening: covered every 2 years if you meet one of the following conditions: (1) Have a vertebral abnormality; (2) On glucocorticoid therapy for more than 3 months; (3) Have primary hyperparathyroidism; (4) On osteoporosis medications and need to assess response to drug therapy  5  HIV Screening: covered annually if you're between the age of 12-76  Also covered annually if you are younger than 13 and older than 72 with risk factors for HIV infection  For pregnant patients, it is covered up to 3 times per pregnancy      Immunizations:  Immunization Recommendations   Influenza Vaccine Annual influenza vaccination during flu season is recommended for all persons aged >= 6 months who do not have contraindications   Pneumococcal Vaccine   * Pneumococcal conjugate vaccine = PCV13 (Prevnar 13), PCV15 (Vaxneuvance), PCV20 (Prevnar 20)  * Pneumococcal polysaccharide vaccine = PPSV23 (Pneumovax) Adults 2364 years old: 1-3 doses may be recommended based on certain risk factors  Adults 72 years old: 1-2 doses may be recommended based off what pneumonia vaccine you previously received   Hepatitis B Vaccine 3 dose series if at intermediate or high risk (ex: diabetes, end stage renal disease, liver disease)   Tetanus (Td) Vaccine - COST NOT COVERED BY MEDICARE PART B Following completion of primary series, a booster dose should be given every 10 years to maintain immunity against tetanus  Td may also be given as tetanus wound prophylaxis  Tdap Vaccine - COST NOT COVERED BY MEDICARE PART B Recommended at least once for all adults  For pregnant patients, recommended with each pregnancy  Shingles Vaccine (Shingrix) - COST NOT COVERED BY MEDICARE PART B  2 shot series recommended in those aged 48 and above     Health Maintenance Due:  There are no preventive care reminders to display for this patient  Immunizations Due:      Topic Date Due    COVID-19 Vaccine (3 - Booster for Moderna series) 07/05/2021    Influenza Vaccine (1) 09/01/2022     Advance Directives   What are advance directives? Advance directives are legal documents that state your wishes and plans for medical care  These plans are made ahead of time in case you lose your ability to make decisions for yourself  Advance directives can apply to any medical decision, such as the treatments you want, and if you want to donate organs  What are the types of advance directives? There are many types of advance directives, and each state has rules about how to use them  You may choose a combination of any of the following:  · Living will: This is a written record of the treatment you want  You can also choose which treatments you do not want, which to limit, and which to stop at a certain time  This includes surgery, medicine, IV fluid, and tube feedings  · Durable power of  for healthcare [facility]): This is a written record that states who you want to make healthcare choices for you when you are unable to make them for yourself  This person, called a proxy, is usually a family member or a friend  You may choose more than 1 proxy  · Do not resuscitate (DNR) order:  A DNR order is used in case your heart stops beating or you stop breathing  It is a request not to have certain forms of treatment, such as CPR  A DNR order may be included in other types of advance directives  · Medical directive: This covers the care that you want if you are in a coma, near death, or unable to make decisions for yourself  You can list the treatments you want for each condition  Treatment may include pain medicine, surgery, blood transfusions, dialysis, IV or tube feedings, and a ventilator (breathing machine)  · Values history: This document has questions about your views, beliefs, and how you feel and think about life  This information can help others choose the care that you would choose  Why are advance directives important? An advance directive helps you control your care  Although spoken wishes may be used, it is better to have your wishes written down  Spoken wishes can be misunderstood, or not followed  Treatments may be given even if you do not want them  An advance directive may make it easier for your family to make difficult choices about your care  Weight Management   Why it is important to manage your weight:  Being overweight increases your risk of health conditions such as heart disease, high blood pressure, type 2 diabetes, and certain types of cancer  It can also increase your risk for osteoarthritis, sleep apnea, and other respiratory problems  Aim for a slow, steady weight loss  Even a small amount of weight loss can lower your risk of health problems    How to lose weight safely:  A safe and healthy way to lose weight is to eat fewer calories and get regular exercise  You can lose up about 1 pound a week by decreasing the number of calories you eat by 500 calories each day  Healthy meal plan for weight management:  A healthy meal plan includes a variety of foods, contains fewer calories, and helps you stay healthy  A healthy meal plan includes the following:  · Eat whole-grain foods more often  A healthy meal plan should contain fiber  Fiber is the part of grains, fruits, and vegetables that is not broken down by your body  Whole-grain foods are healthy and provide extra fiber in your diet  Some examples of whole-grain foods are whole-wheat breads and pastas, oatmeal, brown rice, and bulgur  · Eat a variety of vegetables every day  Include dark, leafy greens such as spinach, kale, roque greens, and mustard greens  Eat yellow and orange vegetables such as carrots, sweet potatoes, and winter squash  · Eat a variety of fruits every day  Choose fresh or canned fruit (canned in its own juice or light syrup) instead of juice  Fruit juice has very little or no fiber  · Eat low-fat dairy foods  Drink fat-free (skim) milk or 1% milk  Eat fat-free yogurt and low-fat cottage cheese  Try low-fat cheeses such as mozzarella and other reduced-fat cheeses  · Choose meat and other protein foods that are low in fat  Choose beans or other legumes such as split peas or lentils  Choose fish, skinless poultry (chicken or turkey), or lean cuts of red meat (beef or pork)  Before you cook meat or poultry, cut off any visible fat  · Use less fat and oil  Try baking foods instead of frying them  Add less fat, such as margarine, sour cream, regular salad dressing and mayonnaise to foods  Eat fewer high-fat foods  Some examples of high-fat foods include french fries, doughnuts, ice cream, and cakes  · Eat fewer sweets  Limit foods and drinks that are high in sugar  This includes candy, cookies, regular soda, and sweetened drinks    Exercise:  Exercise at least 30 minutes per day on most days of the week  Some examples of exercise include walking, biking, dancing, and swimming  You can also fit in more physical activity by taking the stairs instead of the elevator or parking farther away from stores  Ask your healthcare provider about the best exercise plan for you  © Copyright [employer] 2018 Information is for End User's use only and may not be sold, redistributed or otherwise used for commercial purposes   All illustrations and images included in CareNotes® are the copyrighted property of [employer]  or [address] [place] [address] [employer]

## 2022-09-21 NOTE — ASSESSMENT & PLAN NOTE
Chronic asymptomatic fair control continue current management low-salt diet and important lose weight review

## 2022-10-12 PROBLEM — Z00.00 MEDICARE ANNUAL WELLNESS VISIT, SUBSEQUENT: Status: RESOLVED | Noted: 2021-08-25 | Resolved: 2022-10-12

## 2022-10-12 PROBLEM — Z13.820 SCREENING FOR OSTEOPOROSIS: Status: RESOLVED | Noted: 2021-08-25 | Resolved: 2022-10-12

## 2022-11-03 ENCOUNTER — APPOINTMENT (OUTPATIENT)
Dept: LAB | Facility: CLINIC | Age: 87
End: 2022-11-03

## 2022-11-03 DIAGNOSIS — E55.9 VITAMIN D DEFICIENCY: ICD-10-CM

## 2022-11-03 DIAGNOSIS — E78.2 MIXED HYPERLIPIDEMIA: ICD-10-CM

## 2022-11-03 DIAGNOSIS — E11.9 TYPE 2 DIABETES MELLITUS WITHOUT COMPLICATION, WITHOUT LONG-TERM CURRENT USE OF INSULIN (HCC): ICD-10-CM

## 2022-11-03 DIAGNOSIS — I10 ESSENTIAL HYPERTENSION: ICD-10-CM

## 2022-11-03 LAB
ANION GAP SERPL CALCULATED.3IONS-SCNC: -1 MMOL/L (ref 4–13)
BASOPHILS # BLD AUTO: 0.1 THOUSANDS/ÂΜL (ref 0–0.1)
BASOPHILS NFR BLD AUTO: 1 % (ref 0–1)
BUN SERPL-MCNC: 21 MG/DL (ref 5–25)
CALCIUM SERPL-MCNC: 9.2 MG/DL (ref 8.3–10.1)
CHLORIDE SERPL-SCNC: 109 MMOL/L (ref 96–108)
CHOLEST SERPL-MCNC: 157 MG/DL
CO2 SERPL-SCNC: 28 MMOL/L (ref 21–32)
CREAT SERPL-MCNC: 1.19 MG/DL (ref 0.6–1.3)
EOSINOPHIL # BLD AUTO: 0.29 THOUSAND/ÂΜL (ref 0–0.61)
EOSINOPHIL NFR BLD AUTO: 4 % (ref 0–6)
ERYTHROCYTE [DISTWIDTH] IN BLOOD BY AUTOMATED COUNT: 14.3 % (ref 11.6–15.1)
EST. AVERAGE GLUCOSE BLD GHB EST-MCNC: 134 MG/DL
GFR SERPL CREATININE-BSD FRML MDRD: 54 ML/MIN/1.73SQ M
GLUCOSE P FAST SERPL-MCNC: 110 MG/DL (ref 65–99)
HBA1C MFR BLD: 6.3 %
HCT VFR BLD AUTO: 44.5 % (ref 36.5–49.3)
HDLC SERPL-MCNC: 53 MG/DL
HGB BLD-MCNC: 13.7 G/DL (ref 12–17)
IMM GRANULOCYTES # BLD AUTO: 0.02 THOUSAND/UL (ref 0–0.2)
IMM GRANULOCYTES NFR BLD AUTO: 0 % (ref 0–2)
LDLC SERPL CALC-MCNC: 84 MG/DL (ref 0–100)
LYMPHOCYTES # BLD AUTO: 1.51 THOUSANDS/ÂΜL (ref 0.6–4.47)
LYMPHOCYTES NFR BLD AUTO: 22 % (ref 14–44)
MCH RBC QN AUTO: 29.3 PG (ref 26.8–34.3)
MCHC RBC AUTO-ENTMCNC: 30.8 G/DL (ref 31.4–37.4)
MCV RBC AUTO: 95 FL (ref 82–98)
MONOCYTES # BLD AUTO: 0.69 THOUSAND/ÂΜL (ref 0.17–1.22)
MONOCYTES NFR BLD AUTO: 10 % (ref 4–12)
NEUTROPHILS # BLD AUTO: 4.34 THOUSANDS/ÂΜL (ref 1.85–7.62)
NEUTS SEG NFR BLD AUTO: 63 % (ref 43–75)
NONHDLC SERPL-MCNC: 104 MG/DL
NRBC BLD AUTO-RTO: 0 /100 WBCS
PLATELET # BLD AUTO: 260 THOUSANDS/UL (ref 149–390)
PMV BLD AUTO: 10.4 FL (ref 8.9–12.7)
POTASSIUM SERPL-SCNC: 4.8 MMOL/L (ref 3.5–5.3)
RBC # BLD AUTO: 4.67 MILLION/UL (ref 3.88–5.62)
SODIUM SERPL-SCNC: 136 MMOL/L (ref 135–147)
TRIGL SERPL-MCNC: 101 MG/DL
TSH SERPL DL<=0.05 MIU/L-ACNC: 2.63 UIU/ML (ref 0.45–4.5)
WBC # BLD AUTO: 6.95 THOUSAND/UL (ref 4.31–10.16)

## 2022-11-11 ENCOUNTER — OFFICE VISIT (OUTPATIENT)
Dept: FAMILY MEDICINE CLINIC | Facility: CLINIC | Age: 87
End: 2022-11-11

## 2022-11-11 VITALS
TEMPERATURE: 96.4 F | DIASTOLIC BLOOD PRESSURE: 60 MMHG | SYSTOLIC BLOOD PRESSURE: 118 MMHG | OXYGEN SATURATION: 99 % | BODY MASS INDEX: 25.87 KG/M2 | RESPIRATION RATE: 16 BRPM | WEIGHT: 131.8 LBS | HEART RATE: 65 BPM | HEIGHT: 60 IN

## 2022-11-11 DIAGNOSIS — I65.23 BILATERAL CAROTID ARTERY STENOSIS: ICD-10-CM

## 2022-11-11 DIAGNOSIS — E11.9 TYPE 2 DIABETES MELLITUS WITHOUT COMPLICATION, WITHOUT LONG-TERM CURRENT USE OF INSULIN (HCC): Primary | ICD-10-CM

## 2022-11-11 DIAGNOSIS — E78.2 MIXED HYPERLIPIDEMIA: ICD-10-CM

## 2022-11-11 DIAGNOSIS — N18.31 STAGE 3A CHRONIC KIDNEY DISEASE (HCC): ICD-10-CM

## 2022-11-11 NOTE — ASSESSMENT & PLAN NOTE
Lab Results   Component Value Date    HGBA1C 6 3 (H) 11/03/2022   Chronic asymptomatic slight increase in the hemoglobin A1c from 6 2-6 3 a discussed result with the patient recommend the low carb diet important lose weight

## 2022-11-11 NOTE — ASSESSMENT & PLAN NOTE
Chronic asymptomatic fair control continue with the current dose of statin a LDL therapeutic in patient with carotid artery stenosis No pain meds ordered, per MD

## 2022-11-11 NOTE — ASSESSMENT & PLAN NOTE
Lab Results   Component Value Date    EGFR 54 11/03/2022    EGFR 50 04/28/2022    CREATININE 1 19 11/03/2022    CREATININE 1 27 04/28/2022   A chronic asymptomatic can be multifactorial including age high blood pressure patient improve in the GFR recommend the increased fluid intake will hydration avoid nonsteroidal anti-inflammatory drugs

## 2022-11-11 NOTE — ASSESSMENT & PLAN NOTE
Chronic asymptomatic patient the a decline carotid duplex the he is already on statin and the aspirin

## 2022-11-11 NOTE — PROGRESS NOTES
Name: Lu Florence      : 1934      MRN: [id number]  Encounter Provider: Lola Hitchcock MD  Encounter Date: 2022   Encounter department: [place] [address]  Type 2 diabetes mellitus without complication, without long-term current use of insulin ([facility]ly [facility])  Assessment & Plan:    Lab Results   Component Value Date    HGBA1C 6 3 (H) 2022   Chronic asymptomatic slight increase in the hemoglobin A1c from 6 2-6 3 a discussed result with the patient recommend the low carb diet important lose weight    Orders:  -     CBC and differential; Future; Expected date: 2023  -     Basic metabolic panel; Future; Expected date: 2023  -     Lipid panel; Future; Expected date: 2023    2  Bilateral carotid artery stenosis  Assessment & Plan:  Chronic asymptomatic patient the a decline carotid duplex the he is already on statin and the aspirin    Orders:  -     CBC and differential; Future; Expected date: 2023  -     Basic metabolic panel; Future; Expected date: 2023  -     Lipid panel; Future; Expected date: 2023    3  Mixed hyperlipidemia  Assessment & Plan:  Chronic asymptomatic fair control continue with the current dose of statin a LDL therapeutic in patient with carotid artery stenosis    Orders:  -     CBC and differential; Future; Expected date: 2023  -     Basic metabolic panel; Future; Expected date: 2023  -     Lipid panel; Future; Expected date: 2023    4   Stage 3a chronic kidney disease [facility])  Assessment & Plan:  Lab Results   Component Value Date    EGFR 54 2022    EGFR 50 2022    CREATININE 1 19 2022    CREATININE 1 27 2022   A chronic asymptomatic can be multifactorial including age high blood pressure patient improve in the GFR recommend the increased fluid intake will hydration avoid nonsteroidal anti-inflammatory drugs             Subjective      Patient here follow-up with a chronic condition compliant with the medication tolerated well without side effect no new concern recent blood work review with the patient    Review of Systems   Constitutional: Negative for chills and fever  HENT: Negative for ear pain and sore throat  Eyes: Negative for pain and visual disturbance  Respiratory: Negative for cough and shortness of breath  Cardiovascular: Negative for chest pain and palpitations  Gastrointestinal: Negative for abdominal pain and vomiting  Genitourinary: Negative for dysuria and hematuria  Musculoskeletal: Negative for arthralgias and back pain  Skin: Negative for color change and rash  Neurological: Negative for seizures and syncope  All other systems reviewed and are negative        Current Outpatient Medications on File Prior to Visit   Medication Sig   • ALPRAZolam (XANAX) 0 5 mg tablet Take 0 5 mg by mouth daily as needed for anxiety   • aspirin 81 MG tablet every 24 hours   • Cholecalciferol (VITAMIN D-3) 1000 units CAPS every 24 hours   • dorzolamide-timolol (COSOPT) 22 3-6 8 MG/ML ophthalmic solution Every 12 hours   • fenofibrate 160 MG tablet Take 1 tablet (160 mg total) by mouth daily   • latanoprost (XALATAN) 0 005 % ophthalmic solution INSTILL 1 DROP AT BEDTIME INTO BOTH EYES   • meclizine (ANTIVERT) 12 5 MG tablet Take 12 5 mg by mouth as needed for dizziness   • metoprolol succinate (TOPROL-XL) 100 mg 24 hr tablet Take 1 tablet (100 mg total) by mouth 2 (two) times a day   • Multiple Vitamins-Minerals (MULTIVITAL) tablet every 24 hours   • Multiple Vitamins-Minerals (PRESERVISION AREDS 2+MULTI VIT PO) take 2 tabs daily   • nitroglycerin (NITROSTAT) 0 4 mg SL tablet place 1 tablet by sublingual route at the 1st sign of attack; may repeat every 5 min until relief; if pain persists after 3 tablets in 15 min, prompt medical attention is recommended   • RHOPRESSA 0 02 % SOLN INSTILL 1 DROP AT BEDTIME INTO BOTH EYES   • simvastatin (ZOCOR) 80 mg tablet Take 0 5 tablets (40 mg total) by mouth daily       Objective     /60 (BP Location: Left arm, Patient Position: Sitting, Cuff Size: Large)   Pulse 65   Temp (!) 96 4 °F (35 8 °C) (Tympanic)   Resp 16   Ht 4' 11" (1 499 m)   Wt 59 8 kg (131 lb 12 8 oz)   SpO2 99%   BMI 26 62 kg/m²     Physical Exam  Vitals and nursing note reviewed  Constitutional:       General: He is not in acute distress  Appearance: He is well-developed  He is not diaphoretic  HENT:      Head: Normocephalic  Right Ear: External ear normal       Left Ear: External ear normal       Nose: Nose normal    Eyes:      General:         Right eye: No discharge  Left eye: No discharge  Conjunctiva/sclera: Conjunctivae normal    Neck:      Vascular: No JVD  Cardiovascular:      Rate and Rhythm: Normal rate and regular rhythm  Heart sounds: Murmur heard  No gallop  Pulmonary:      Effort: Pulmonary effort is normal  No respiratory distress  Breath sounds: Normal breath sounds  No stridor  No wheezing or rales  Chest:      Chest wall: No tenderness  Abdominal:      General: There is no distension  Palpations: Abdomen is soft  There is no mass  Tenderness: There is no abdominal tenderness  There is no rebound  Musculoskeletal:         General: No tenderness  Cervical back: Normal range of motion and neck supple  Lymphadenopathy:      Cervical: No cervical adenopathy  Skin:     General: Skin is warm  Findings: No erythema or rash  Neurological:      Mental Status: He is alert and oriented to person, place, and time         Juany Millard MD

## 2023-03-03 ENCOUNTER — APPOINTMENT (OUTPATIENT)
Dept: LAB | Facility: CLINIC | Age: 88
End: 2023-03-03

## 2023-03-03 DIAGNOSIS — I65.23 BILATERAL CAROTID ARTERY STENOSIS: ICD-10-CM

## 2023-03-03 DIAGNOSIS — E78.2 MIXED HYPERLIPIDEMIA: ICD-10-CM

## 2023-03-03 DIAGNOSIS — E11.9 TYPE 2 DIABETES MELLITUS WITHOUT COMPLICATION, WITHOUT LONG-TERM CURRENT USE OF INSULIN (HCC): ICD-10-CM

## 2023-03-03 LAB
ANION GAP SERPL CALCULATED.3IONS-SCNC: 0 MMOL/L (ref 4–13)
BASOPHILS # BLD AUTO: 0.09 THOUSANDS/ÂΜL (ref 0–0.1)
BASOPHILS NFR BLD AUTO: 1 % (ref 0–1)
BUN SERPL-MCNC: 30 MG/DL (ref 5–25)
CALCIUM SERPL-MCNC: 9.1 MG/DL (ref 8.3–10.1)
CHLORIDE SERPL-SCNC: 108 MMOL/L (ref 96–108)
CHOLEST SERPL-MCNC: 162 MG/DL
CO2 SERPL-SCNC: 30 MMOL/L (ref 21–32)
CREAT SERPL-MCNC: 1.24 MG/DL (ref 0.6–1.3)
EOSINOPHIL # BLD AUTO: 0.36 THOUSAND/ÂΜL (ref 0–0.61)
EOSINOPHIL NFR BLD AUTO: 5 % (ref 0–6)
ERYTHROCYTE [DISTWIDTH] IN BLOOD BY AUTOMATED COUNT: 14.1 % (ref 11.6–15.1)
GFR SERPL CREATININE-BSD FRML MDRD: 51 ML/MIN/1.73SQ M
GLUCOSE P FAST SERPL-MCNC: 113 MG/DL (ref 65–99)
HCT VFR BLD AUTO: 41.7 % (ref 36.5–49.3)
HDLC SERPL-MCNC: 56 MG/DL
HGB BLD-MCNC: 13 G/DL (ref 12–17)
IMM GRANULOCYTES # BLD AUTO: 0.03 THOUSAND/UL (ref 0–0.2)
IMM GRANULOCYTES NFR BLD AUTO: 0 % (ref 0–2)
LDLC SERPL CALC-MCNC: 88 MG/DL (ref 0–100)
LYMPHOCYTES # BLD AUTO: 1.37 THOUSANDS/ÂΜL (ref 0.6–4.47)
LYMPHOCYTES NFR BLD AUTO: 18 % (ref 14–44)
MCH RBC QN AUTO: 29.5 PG (ref 26.8–34.3)
MCHC RBC AUTO-ENTMCNC: 31.2 G/DL (ref 31.4–37.4)
MCV RBC AUTO: 95 FL (ref 82–98)
MONOCYTES # BLD AUTO: 0.76 THOUSAND/ÂΜL (ref 0.17–1.22)
MONOCYTES NFR BLD AUTO: 10 % (ref 4–12)
NEUTROPHILS # BLD AUTO: 5.21 THOUSANDS/ÂΜL (ref 1.85–7.62)
NEUTS SEG NFR BLD AUTO: 66 % (ref 43–75)
NONHDLC SERPL-MCNC: 106 MG/DL
NRBC BLD AUTO-RTO: 0 /100 WBCS
PLATELET # BLD AUTO: 264 THOUSANDS/UL (ref 149–390)
PMV BLD AUTO: 10.5 FL (ref 8.9–12.7)
POTASSIUM SERPL-SCNC: 4.6 MMOL/L (ref 3.5–5.3)
RBC # BLD AUTO: 4.4 MILLION/UL (ref 3.88–5.62)
SODIUM SERPL-SCNC: 138 MMOL/L (ref 135–147)
TRIGL SERPL-MCNC: 89 MG/DL
WBC # BLD AUTO: 7.82 THOUSAND/UL (ref 4.31–10.16)

## 2023-03-10 ENCOUNTER — OFFICE VISIT (OUTPATIENT)
Dept: FAMILY MEDICINE CLINIC | Facility: CLINIC | Age: 88
End: 2023-03-10

## 2023-03-10 VITALS
DIASTOLIC BLOOD PRESSURE: 70 MMHG | HEART RATE: 76 BPM | HEIGHT: 60 IN | TEMPERATURE: 97.8 F | WEIGHT: 133 LBS | BODY MASS INDEX: 26.11 KG/M2 | OXYGEN SATURATION: 98 % | SYSTOLIC BLOOD PRESSURE: 120 MMHG

## 2023-03-10 DIAGNOSIS — I65.23 BILATERAL CAROTID ARTERY STENOSIS: ICD-10-CM

## 2023-03-10 DIAGNOSIS — I10 ESSENTIAL HYPERTENSION: ICD-10-CM

## 2023-03-10 DIAGNOSIS — N18.31 STAGE 3A CHRONIC KIDNEY DISEASE (HCC): ICD-10-CM

## 2023-03-10 DIAGNOSIS — F32.1 MODERATE MAJOR DEPRESSION (HCC): ICD-10-CM

## 2023-03-10 DIAGNOSIS — E78.2 MIXED HYPERLIPIDEMIA: ICD-10-CM

## 2023-03-10 DIAGNOSIS — E11.9 TYPE 2 DIABETES MELLITUS WITHOUT COMPLICATION, WITHOUT LONG-TERM CURRENT USE OF INSULIN (HCC): Primary | ICD-10-CM

## 2023-03-10 DIAGNOSIS — M21.619 BUNION: ICD-10-CM

## 2023-03-10 PROBLEM — E11.51 ANGIOPATHY, DIABETIC (HCC): Status: ACTIVE | Noted: 2023-03-10

## 2023-03-10 PROBLEM — E11.51 ANGIOPATHY, DIABETIC (HCC): Status: RESOLVED | Noted: 2023-03-10 | Resolved: 2023-03-10

## 2023-03-10 LAB
CREAT UR-MCNC: 117 MG/DL
MICROALBUMIN UR-MCNC: 7.3 MG/L (ref 0–20)
MICROALBUMIN/CREAT 24H UR: 6 MG/G CREATININE (ref 0–30)
SL AMB POCT HEMOGLOBIN AIC: 5.9 (ref ?–6.5)

## 2023-03-10 RX ORDER — SIMVASTATIN 80 MG
40 TABLET ORAL DAILY
Qty: 45 TABLET | Refills: 0 | Status: SHIPPED | OUTPATIENT
Start: 2023-03-10

## 2023-03-10 RX ORDER — METOPROLOL SUCCINATE 100 MG/1
100 TABLET, EXTENDED RELEASE ORAL 2 TIMES DAILY
Qty: 180 TABLET | Refills: 0 | Status: SHIPPED | OUTPATIENT
Start: 2023-03-10

## 2023-03-10 RX ORDER — FENOFIBRATE 160 MG/1
160 TABLET ORAL DAILY
Qty: 90 TABLET | Refills: 0 | Status: SHIPPED | OUTPATIENT
Start: 2023-03-10

## 2023-03-10 NOTE — PROGRESS NOTES
Name: Sayda Quezada      : 1934      MRN: [id number]  Encounter Provider: Erin Carroll MD  Encounter Date: 3/10/2023   Encounter department: [place] [address]  Type 2 diabetes mellitus without complication, without long-term current use of insulin (HCC)  Assessment & Plan:    Lab Results   Component Value Date    HGBA1C 5 9 03/10/2023     Chronic asymptomatic fair control continue current management patient doing well carb diet discussed with potential lose weight patient already on statin    Orders:  -     POCT hemoglobin A1c  -     Microalbumin / creatinine urine ratio  -     CBC and differential; Future  -     Comprehensive metabolic panel; Future  -     Lipid Panel with Direct LDL reflex; Future  -     TSH, 3rd generation with Free T4 reflex; Future  -     Hemoglobin A1C; Future    2  Mixed hyperlipidemia  -     fenofibrate 160 MG tablet; Take 1 tablet (160 mg total) by mouth daily  -     simvastatin (ZOCOR) 80 mg tablet; Take 0 5 tablets (40 mg total) by mouth daily  -     CBC and differential; Future  -     Comprehensive metabolic panel; Future  -     Lipid Panel with Direct LDL reflex; Future  -     TSH, 3rd generation with Free T4 reflex; Future    3  Essential hypertension  Assessment & Plan:  Chronic asymptomatic continue current management including metoprolol succinate 100 mg tolerated well low-salt diet reviewed and important lose weight    Orders:  -     metoprolol succinate (TOPROL-XL) 100 mg 24 hr tablet; Take 1 tablet (100 mg total) by mouth 2 (two) times a day  -     CBC and differential; Future  -     Comprehensive metabolic panel; Future  -     Lipid Panel with Direct LDL reflex; Future  -     TSH, 3rd generation with Free T4 reflex; Future    4   Moderate major depression (HCC)  Assessment & Plan:  Chronic in remission currently not on any medication    Orders:  -     CBC and differential; Future  -     Comprehensive metabolic panel; Future  -     TSH, 3rd generation with Free T4 reflex; Future    5  Bilateral carotid artery stenosis  Assessment & Plan:  Chronic asymptomatic patient on statin and aspirin he declined carotid duplex before    Orders:  -     CBC and differential; Future  -     Comprehensive metabolic panel; Future  -     Lipid Panel with Direct LDL reflex; Future  -     TSH, 3rd generation with Free T4 reflex; Future    6  Stage 3a chronic kidney disease [facility])  Assessment & Plan:  Lab Results   Component Value Date    EGFR 51 03/03/2023    EGFR 54 11/03/2022    EGFR 50 04/28/2022    CREATININE 1 24 03/03/2023    CREATININE 1 19 11/03/2022    CREATININE 1 27 04/28/2022     Chronic asymptomatic patient has declined GFR compared with before patient hemoglobin and A1c at the goal and his blood pressure at the goal discussed so well hydration avoid midstate anti-inflammatory drugs    Orders:  -     CBC and differential; Future  -     Comprehensive metabolic panel; Future  -     Lipid Panel with Direct LDL reflex; Future  -     TSH, 3rd generation with Free T4 reflex; Future    7  Bunion  Assessment & Plan:  Chronic patient aware of diagnosis he follow-up with the podiatric proper shoes wear proper foot care discussed with patient             Subjective      Patient here follow-up with a chronic condition patient tolerated well without side effect no new concerns recent blood work discussed with the patient    Review of Systems   Constitutional: Negative for chills and fever  HENT: Negative for ear pain and sore throat  Eyes: Negative for pain and visual disturbance  Respiratory: Negative for cough and shortness of breath  Cardiovascular: Negative for chest pain and palpitations  Gastrointestinal: Negative for abdominal pain, constipation, diarrhea and vomiting  Genitourinary: Negative for dysuria and hematuria  Musculoskeletal: Negative for arthralgias and back pain  Skin: Negative for color change and rash  Neurological: Negative for seizures and syncope  All other systems reviewed and are negative  Current Outpatient Medications on File Prior to Visit   Medication Sig   • ALPRAZolam (XANAX) 0 5 mg tablet Take 0 5 mg by mouth daily as needed for anxiety   • aspirin 81 MG tablet every 24 hours   • Cholecalciferol (VITAMIN D-3) 1000 units CAPS every 24 hours   • dorzolamide-timolol (COSOPT) 22 3-6 8 MG/ML ophthalmic solution Every 12 hours   • latanoprost (XALATAN) 0 005 % ophthalmic solution INSTILL 1 DROP AT BEDTIME INTO BOTH EYES   • meclizine (ANTIVERT) 12 5 MG tablet Take 12 5 mg by mouth as needed for dizziness   • Multiple Vitamins-Minerals (MULTIVITAL) tablet every 24 hours   • Multiple Vitamins-Minerals (PRESERVISION AREDS 2+MULTI VIT PO) take 2 tabs daily   • nitroglycerin (NITROSTAT) 0 4 mg SL tablet place 1 tablet by sublingual route at the 1st sign of attack; may repeat every 5 min until relief; if pain persists after 3 tablets in 15 min, prompt medical attention is recommended   • RHOPRESSA 0 02 % SOLN INSTILL 1 DROP AT BEDTIME INTO BOTH EYES   • [DISCONTINUED] fenofibrate 160 MG tablet Take 1 tablet (160 mg total) by mouth daily   • [DISCONTINUED] metoprolol succinate (TOPROL-XL) 100 mg 24 hr tablet Take 1 tablet (100 mg total) by mouth 2 (two) times a day   • [DISCONTINUED] simvastatin (ZOCOR) 80 mg tablet Take 0 5 tablets (40 mg total) by mouth daily       Objective     /70 (BP Location: Left arm, Patient Position: Sitting)   Pulse 76   Temp 97 8 °F (36 6 °C) (Tympanic)   Ht 4' 11" (1 499 m)   Wt 60 3 kg (133 lb)   SpO2 98%   BMI 26 86 kg/m²     Physical Exam  Vitals and nursing note reviewed  Constitutional:       General: He is not in acute distress  Appearance: He is well-developed  He is not diaphoretic  HENT:      Head: Normocephalic  Right Ear: External ear normal       Left Ear: External ear normal       Nose: No congestion        Mouth/Throat:      Pharynx: No oropharyngeal exudate  Eyes:      General:         Right eye: No discharge  Left eye: No discharge  Conjunctiva/sclera: Conjunctivae normal    Neck:      Vascular: No JVD  Cardiovascular:      Rate and Rhythm: Normal rate and regular rhythm  Pulses: no weak pulses          Dorsalis pedis pulses are 2+ on the right side and 2+ on the left side  Heart sounds: Normal heart sounds  No gallop  Pulmonary:      Effort: Pulmonary effort is normal  No respiratory distress  Breath sounds: Normal breath sounds  No stridor  No wheezing or rales  Chest:      Chest wall: No tenderness  Abdominal:      General: There is no distension  Palpations: Abdomen is soft  There is no mass  Tenderness: There is no abdominal tenderness  There is no rebound  Musculoskeletal:         General: No tenderness  Cervical back: Normal range of motion and neck supple  Feet:    Feet:      Right foot:      Skin integrity: No warmth  Left foot:      Skin integrity: No warmth  Lymphadenopathy:      Cervical: No cervical adenopathy  Skin:     General: Skin is warm  Findings: No erythema or rash  Neurological:      Mental Status: He is alert and oriented to person, place, and time  Patient's shoes and socks removed  Right Foot/Ankle   Right Foot Inspection  Skin Exam: skin intact  No warmth and no pre-ulcer  Toe Exam: No swelling and erythema    Sensory   Monofilament testing: intact    Vascular  Capillary refills: < 3 seconds  The right DP pulse is 2+  Left Foot/Ankle  Left Foot Inspection  Skin Exam: skin intact  No warmth and no pre-ulcer  Toe Exam: No swelling and no erythema  Sensory   Monofilament testing: intact    Vascular  Capillary refills: < 3 seconds  The left DP pulse is 2+       Assign Risk Category  No deformity present  No loss of protective sensation  No weak pulses  Risk: 2        Veronia Hodgkins, MD

## 2023-03-10 NOTE — ASSESSMENT & PLAN NOTE
Lab Results   Component Value Date    EGFR 51 03/03/2023    EGFR 54 11/03/2022    EGFR 50 04/28/2022    CREATININE 1 24 03/03/2023    CREATININE 1 19 11/03/2022    CREATININE 1 27 04/28/2022     Chronic asymptomatic patient has declined GFR compared with before patient hemoglobin and A1c at the goal and his blood pressure at the goal discussed so well hydration avoid midstate anti-inflammatory drugs

## 2023-03-10 NOTE — ASSESSMENT & PLAN NOTE
Chronic patient aware of diagnosis he follow-up with the podiatric proper shoes wear proper foot care discussed with patient

## 2023-03-10 NOTE — ASSESSMENT & PLAN NOTE
Lab Results   Component Value Date    HGBA1C 5 9 03/10/2023     Chronic asymptomatic fair control continue current management patient doing well carb diet discussed with potential lose weight patient already on statin

## 2023-03-10 NOTE — ASSESSMENT & PLAN NOTE
Chronic asymptomatic continue current management including metoprolol succinate 100 mg tolerated well low-salt diet reviewed and important lose weight

## 2023-07-07 ENCOUNTER — APPOINTMENT (OUTPATIENT)
Dept: LAB | Facility: CLINIC | Age: 88
End: 2023-07-07
Payer: COMMERCIAL

## 2023-07-07 DIAGNOSIS — I65.23 BILATERAL CAROTID ARTERY STENOSIS: ICD-10-CM

## 2023-07-07 DIAGNOSIS — F32.1 MODERATE MAJOR DEPRESSION (HCC): ICD-10-CM

## 2023-07-07 DIAGNOSIS — E11.9 TYPE 2 DIABETES MELLITUS WITHOUT COMPLICATION, WITHOUT LONG-TERM CURRENT USE OF INSULIN (HCC): ICD-10-CM

## 2023-07-07 DIAGNOSIS — N18.31 STAGE 3A CHRONIC KIDNEY DISEASE (HCC): ICD-10-CM

## 2023-07-07 DIAGNOSIS — E78.2 MIXED HYPERLIPIDEMIA: ICD-10-CM

## 2023-07-07 DIAGNOSIS — I10 ESSENTIAL HYPERTENSION: ICD-10-CM

## 2023-07-07 LAB
ALBUMIN SERPL BCP-MCNC: 3.5 G/DL (ref 3.5–5)
ALP SERPL-CCNC: 62 U/L (ref 46–116)
ALT SERPL W P-5'-P-CCNC: 23 U/L (ref 12–78)
ANION GAP SERPL CALCULATED.3IONS-SCNC: 7 MMOL/L
AST SERPL W P-5'-P-CCNC: 16 U/L (ref 5–45)
BASOPHILS # BLD AUTO: 0.1 THOUSANDS/ÂΜL (ref 0–0.1)
BASOPHILS NFR BLD AUTO: 1 % (ref 0–1)
BILIRUB SERPL-MCNC: 0.51 MG/DL (ref 0.2–1)
BUN SERPL-MCNC: 21 MG/DL (ref 5–25)
CALCIUM SERPL-MCNC: 9 MG/DL (ref 8.3–10.1)
CHLORIDE SERPL-SCNC: 112 MMOL/L (ref 96–108)
CHOLEST SERPL-MCNC: 158 MG/DL
CO2 SERPL-SCNC: 25 MMOL/L (ref 21–32)
CREAT SERPL-MCNC: 1.33 MG/DL (ref 0.6–1.3)
EOSINOPHIL # BLD AUTO: 0.39 THOUSAND/ÂΜL (ref 0–0.61)
EOSINOPHIL NFR BLD AUTO: 5 % (ref 0–6)
ERYTHROCYTE [DISTWIDTH] IN BLOOD BY AUTOMATED COUNT: 14.2 % (ref 11.6–15.1)
GFR SERPL CREATININE-BSD FRML MDRD: 47 ML/MIN/1.73SQ M
GLUCOSE P FAST SERPL-MCNC: 122 MG/DL (ref 65–99)
HCT VFR BLD AUTO: 41.8 % (ref 36.5–49.3)
HDLC SERPL-MCNC: 49 MG/DL
HGB BLD-MCNC: 13.1 G/DL (ref 12–17)
IMM GRANULOCYTES # BLD AUTO: 0.02 THOUSAND/UL (ref 0–0.2)
IMM GRANULOCYTES NFR BLD AUTO: 0 % (ref 0–2)
LDLC SERPL CALC-MCNC: 87 MG/DL (ref 0–100)
LYMPHOCYTES # BLD AUTO: 1.57 THOUSANDS/ÂΜL (ref 0.6–4.47)
LYMPHOCYTES NFR BLD AUTO: 20 % (ref 14–44)
MCH RBC QN AUTO: 28.7 PG (ref 26.8–34.3)
MCHC RBC AUTO-ENTMCNC: 31.3 G/DL (ref 31.4–37.4)
MCV RBC AUTO: 92 FL (ref 82–98)
MONOCYTES # BLD AUTO: 0.84 THOUSAND/ÂΜL (ref 0.17–1.22)
MONOCYTES NFR BLD AUTO: 11 % (ref 4–12)
NEUTROPHILS # BLD AUTO: 4.95 THOUSANDS/ÂΜL (ref 1.85–7.62)
NEUTS SEG NFR BLD AUTO: 63 % (ref 43–75)
NRBC BLD AUTO-RTO: 0 /100 WBCS
PLATELET # BLD AUTO: 272 THOUSANDS/UL (ref 149–390)
PMV BLD AUTO: 10 FL (ref 8.9–12.7)
POTASSIUM SERPL-SCNC: 4.2 MMOL/L (ref 3.5–5.3)
PROT SERPL-MCNC: 6.7 G/DL (ref 6.4–8.4)
RBC # BLD AUTO: 4.56 MILLION/UL (ref 3.88–5.62)
SODIUM SERPL-SCNC: 144 MMOL/L (ref 135–147)
TRIGL SERPL-MCNC: 110 MG/DL
TSH SERPL DL<=0.05 MIU/L-ACNC: 2.62 UIU/ML (ref 0.45–4.5)
WBC # BLD AUTO: 7.87 THOUSAND/UL (ref 4.31–10.16)

## 2023-07-07 PROCEDURE — 80053 COMPREHEN METABOLIC PANEL: CPT

## 2023-07-07 PROCEDURE — 85025 COMPLETE CBC W/AUTO DIFF WBC: CPT

## 2023-07-07 PROCEDURE — 84443 ASSAY THYROID STIM HORMONE: CPT

## 2023-07-07 PROCEDURE — 83036 HEMOGLOBIN GLYCOSYLATED A1C: CPT

## 2023-07-07 PROCEDURE — 36415 COLL VENOUS BLD VENIPUNCTURE: CPT

## 2023-07-07 PROCEDURE — 80061 LIPID PANEL: CPT

## 2023-07-09 LAB
EST. AVERAGE GLUCOSE BLD GHB EST-MCNC: 131 MG/DL
HBA1C MFR BLD: 6.2 %

## 2023-07-14 ENCOUNTER — OFFICE VISIT (OUTPATIENT)
Dept: FAMILY MEDICINE CLINIC | Facility: CLINIC | Age: 88
End: 2023-07-14
Payer: COMMERCIAL

## 2023-07-14 VITALS
OXYGEN SATURATION: 99 % | HEIGHT: 60 IN | HEART RATE: 70 BPM | WEIGHT: 131.8 LBS | TEMPERATURE: 97.9 F | DIASTOLIC BLOOD PRESSURE: 62 MMHG | SYSTOLIC BLOOD PRESSURE: 100 MMHG | BODY MASS INDEX: 25.87 KG/M2

## 2023-07-14 DIAGNOSIS — E78.2 MIXED HYPERLIPIDEMIA: ICD-10-CM

## 2023-07-14 DIAGNOSIS — I65.23 BILATERAL CAROTID ARTERY STENOSIS: ICD-10-CM

## 2023-07-14 DIAGNOSIS — N18.31 STAGE 3A CHRONIC KIDNEY DISEASE (HCC): Primary | ICD-10-CM

## 2023-07-14 DIAGNOSIS — E11.9 TYPE 2 DIABETES MELLITUS WITHOUT COMPLICATION, WITHOUT LONG-TERM CURRENT USE OF INSULIN (HCC): ICD-10-CM

## 2023-07-14 DIAGNOSIS — I10 ESSENTIAL HYPERTENSION: ICD-10-CM

## 2023-07-14 PROCEDURE — 99214 OFFICE O/P EST MOD 30 MIN: CPT | Performed by: FAMILY MEDICINE

## 2023-07-14 RX ORDER — SIMVASTATIN 80 MG
40 TABLET ORAL DAILY
Qty: 45 TABLET | Refills: 0 | Status: CANCELLED | OUTPATIENT
Start: 2023-07-14

## 2023-07-14 RX ORDER — FENOFIBRATE 54 MG/1
54 TABLET ORAL DAILY
Qty: 30 TABLET | Refills: 5 | Status: SHIPPED | OUTPATIENT
Start: 2023-07-14

## 2023-07-14 RX ORDER — SIMVASTATIN 40 MG
40 TABLET ORAL
Qty: 90 TABLET | Refills: 0 | Status: SHIPPED | OUTPATIENT
Start: 2023-07-14

## 2023-07-14 RX ORDER — METOPROLOL SUCCINATE 100 MG/1
100 TABLET, EXTENDED RELEASE ORAL 2 TIMES DAILY
Qty: 180 TABLET | Refills: 0 | Status: SHIPPED | OUTPATIENT
Start: 2023-07-14

## 2023-07-14 RX ORDER — FENOFIBRATE 160 MG/1
160 TABLET ORAL DAILY
Qty: 90 TABLET | Refills: 0 | Status: CANCELLED | OUTPATIENT
Start: 2023-07-14

## 2023-07-14 NOTE — ASSESSMENT & PLAN NOTE
Chronic asymptomatic fair control continue current management metoprolol succinate 100 mg once a day low-salt diet review

## 2023-07-14 NOTE — ASSESSMENT & PLAN NOTE
Chronic discussed with patient to decrease the dose of simvastatin from 20 mg to 40 mg and the fenofibrate to 54 mg once a day proper use and possible side effects reviewed

## 2023-07-14 NOTE — ASSESSMENT & PLAN NOTE
Lab Results   Component Value Date    HGBA1C 6.2 (H) 07/07/2023   Chronic asymptomatic slight increase in the fasting blood sugar were discussed with the patient low-carb diet important lose weight

## 2023-07-14 NOTE — ASSESSMENT & PLAN NOTE
Lab Results   Component Value Date    EGFR 47 07/07/2023    EGFR 51 03/03/2023    EGFR 54 11/03/2022    CREATININE 1.33 (H) 07/07/2023    CREATININE 1.24 03/03/2023    CREATININE 1.19 11/03/2022   Chronic slight decline in the kidney function discussed with patient keep well hydration he is at the goal for the blood pressure at the goal for the hemoglobin A1c avoid NSAIDs

## 2023-07-14 NOTE — PROGRESS NOTES
Name: Barbette Gosselin      : 1934      MRN: [id number]  Encounter Provider: Penelope Escalante MD  Encounter Date: 2023   Encounter department: [address] [place] [address]     1. Stage 3a chronic kidney disease [facility])  Assessment & Plan:  Lab Results   Component Value Date    EGFR 47 2023    EGFR 51 2023    EGFR 54 2022    CREATININE 1.33 (H) 2023    CREATININE 1.24 2023    CREATININE 1.19 2022   Chronic slight decline in the kidney function discussed with patient keep well hydration he is at the goal for the blood pressure at the goal for the hemoglobin A1c avoid NSAIDs      2. Mixed hyperlipidemia  Assessment & Plan:  Chronic discussed with patient to decrease the dose of simvastatin from 20 mg to 40 mg and the fenofibrate to 54 mg once a day proper use and possible side effects reviewed    Orders:  -     simvastatin (ZOCOR) 40 mg tablet; Take 1 tablet (40 mg total) by mouth daily at bedtime  -     fenofibrate (TRICOR) 54 MG tablet; Take 1 tablet (54 mg total) by mouth daily    3. Essential hypertension  Assessment & Plan:  Chronic asymptomatic fair control continue current management metoprolol succinate 100 mg once a day low-salt diet review    Orders:  -     metoprolol succinate (TOPROL-XL) 100 mg 24 hr tablet; Take 1 tablet (100 mg total) by mouth 2 (two) times a day    4. Bilateral carotid artery stenosis  Assessment & Plan:  Patient declined carotid duplex he is already on statin and aspirin for symptomatic      5.  Type 2 diabetes mellitus without complication, without long-term current use of insulin ([facility])  Assessment & Plan:    Lab Results   Component Value Date    HGBA1C 6.2 (H) 2023   Chronic asymptomatic slight increase in the fasting blood sugar were discussed with the patient low-carb diet important lose weight             Subjective      Patient here follow-up with a chronic condition compliant with the medication tolerated well without side effect no new concerns recent blood work reviewed with the patient    Review of Systems   Constitutional: Negative for chills and fever. HENT: Negative for ear pain and sore throat. Eyes: Negative for pain and visual disturbance. Respiratory: Negative for cough and shortness of breath. Cardiovascular: Negative for chest pain and palpitations. Gastrointestinal: Negative for abdominal pain, constipation, diarrhea and vomiting. Genitourinary: Negative for dysuria and hematuria. Musculoskeletal: Negative for arthralgias and back pain. Skin: Negative for color change and rash. Neurological: Negative for seizures and syncope. All other systems reviewed and are negative.       Current Outpatient Medications on File Prior to Visit   Medication Sig   • ALPRAZolam (XANAX) 0.5 mg tablet Take 0.5 mg by mouth daily as needed for anxiety   • aspirin 81 MG tablet every 24 hours   • dorzolamide-timolol (COSOPT) 22.3-6.8 MG/ML ophthalmic solution Every 12 hours   • latanoprost (XALATAN) 0.005 % ophthalmic solution INSTILL 1 DROP AT BEDTIME INTO BOTH EYES   • Multiple Vitamins-Minerals (MULTIVITAL) tablet every 24 hours   • Multiple Vitamins-Minerals (PRESERVISION AREDS 2+MULTI VIT PO) take 2 tabs daily   • nitroglycerin (NITROSTAT) 0.4 mg SL tablet place 1 tablet by sublingual route at the 1st sign of attack; may repeat every 5 min until relief; if pain persists after 3 tablets in 15 min, prompt medical attention is recommended   • RHOPRESSA 0.02 % SOLN INSTILL 1 DROP AT BEDTIME INTO BOTH EYES   • [DISCONTINUED] fenofibrate 160 MG tablet Take 1 tablet (160 mg total) by mouth daily   • [DISCONTINUED] metoprolol succinate (TOPROL-XL) 100 mg 24 hr tablet Take 1 tablet (100 mg total) by mouth 2 (two) times a day   • [DISCONTINUED] simvastatin (ZOCOR) 80 mg tablet Take 0.5 tablets (40 mg total) by mouth daily   • Cholecalciferol (VITAMIN D-3) 1000 units CAPS every 24 hours (Patient not taking: Reported on 7/14/2023)   • meclizine (ANTIVERT) 12.5 MG tablet Take 12.5 mg by mouth as needed for dizziness (Patient not taking: Reported on 7/14/2023)       Objective     /62 (BP Location: Left arm, Patient Position: Sitting)   Pulse 70   Temp 97.9 °F (36.6 °C) (Tympanic)   Ht 4' 11" (1.499 m)   Wt 59.8 kg (131 lb 12.8 oz)   SpO2 99%   BMI 26.62 kg/m²     Physical Exam  Vitals and nursing note reviewed. Constitutional:       General: He is not in acute distress. Appearance: He is well-developed. He is not diaphoretic. HENT:      Head: Normocephalic. Right Ear: External ear normal.      Left Ear: External ear normal.      Nose: No congestion or rhinorrhea. Mouth/Throat:      Pharynx: No oropharyngeal exudate or posterior oropharyngeal erythema. Eyes:      General:         Right eye: No discharge. Left eye: No discharge. Conjunctiva/sclera: Conjunctivae normal.   Neck:      Vascular: No JVD. Cardiovascular:      Rate and Rhythm: Normal rate and regular rhythm. Heart sounds: Murmur heard. No gallop. Pulmonary:      Effort: Pulmonary effort is normal. No respiratory distress. Breath sounds: Normal breath sounds. No stridor. No wheezing or rales. Chest:      Chest wall: No tenderness. Abdominal:      General: There is no distension. Palpations: Abdomen is soft. There is no mass. Tenderness: There is no abdominal tenderness. There is no rebound. Musculoskeletal:         General: No tenderness. Cervical back: Normal range of motion and neck supple. Lymphadenopathy:      Cervical: No cervical adenopathy. Skin:     General: Skin is warm. Findings: No erythema or rash. Neurological:      Mental Status: He is alert and oriented to person, place, and time.        Leonie Senior MD

## 2023-09-22 ENCOUNTER — OFFICE VISIT (OUTPATIENT)
Dept: FAMILY MEDICINE CLINIC | Facility: CLINIC | Age: 88
End: 2023-09-22
Payer: COMMERCIAL

## 2023-09-22 VITALS
DIASTOLIC BLOOD PRESSURE: 82 MMHG | WEIGHT: 135.4 LBS | SYSTOLIC BLOOD PRESSURE: 120 MMHG | HEART RATE: 60 BPM | OXYGEN SATURATION: 98 % | HEIGHT: 60 IN | TEMPERATURE: 96.9 F | BODY MASS INDEX: 26.58 KG/M2

## 2023-09-22 DIAGNOSIS — Z00.00 MEDICARE ANNUAL WELLNESS VISIT, SUBSEQUENT: Primary | ICD-10-CM

## 2023-09-22 DIAGNOSIS — I25.10 CHRONIC CORONARY ARTERY DISEASE: ICD-10-CM

## 2023-09-22 DIAGNOSIS — F32.1 MODERATE MAJOR DEPRESSION (HCC): ICD-10-CM

## 2023-09-22 DIAGNOSIS — N40.0 HYPERPLASIA OF PROSTATE WITHOUT LOWER URINARY TRACT SYMPTOMS (LUTS): ICD-10-CM

## 2023-09-22 DIAGNOSIS — I65.23 BILATERAL CAROTID ARTERY STENOSIS: ICD-10-CM

## 2023-09-22 DIAGNOSIS — E66.3 OVERWEIGHT (BMI 25.0-29.9): ICD-10-CM

## 2023-09-22 DIAGNOSIS — E78.2 MIXED HYPERLIPIDEMIA: ICD-10-CM

## 2023-09-22 DIAGNOSIS — E55.9 VITAMIN D DEFICIENCY: ICD-10-CM

## 2023-09-22 DIAGNOSIS — Z23 NEED FOR IMMUNIZATION AGAINST INFLUENZA: ICD-10-CM

## 2023-09-22 PROCEDURE — 99214 OFFICE O/P EST MOD 30 MIN: CPT | Performed by: FAMILY MEDICINE

## 2023-09-22 PROCEDURE — 90662 IIV NO PRSV INCREASED AG IM: CPT

## 2023-09-22 PROCEDURE — G0008 ADMIN INFLUENZA VIRUS VAC: HCPCS

## 2023-09-22 PROCEDURE — G0439 PPPS, SUBSEQ VISIT: HCPCS | Performed by: FAMILY MEDICINE

## 2023-09-22 NOTE — ASSESSMENT & PLAN NOTE
Increase in the BMI compared with before his BMI today 27.35 discussed with patient portion control low-carb low-fat diet increase physical activity

## 2023-09-22 NOTE — PROGRESS NOTES
Assessment and Plan:     Problem List Items Addressed This Visit        Cardiovascular and Mediastinum    Chronic coronary artery disease     Chronic asymptomatic. Patient already on beta-blocker he is on statin and aspirin continue current management         Relevant Orders    CBC and differential    Basic metabolic panel    Lipid panel    Carotid artery disease (HCC)     Chronic asymptomatic patient on statin and aspirin he declined carotid duplex            Genitourinary    Hyperplasia of prostate without lower urinary tract symptoms (LUTS)    Relevant Orders    CBC and differential    Basic metabolic panel    Lipid panel       Other    Hyperlipidemia    Vitamin D deficiency    Relevant Orders    CBC and differential    Basic metabolic panel    Lipid panel    Overweight (BMI 25.0-29. 9)     Increase in the BMI compared with before his BMI today 27.35 discussed with patient portion control low-carb low-fat diet increase physical activity         Moderate major depression (HCC)     Chronic asymptomatic currently not on any medication in remission         Medicare annual wellness visit, subsequent - Primary     Advice and education were given regarding nutrition, aerobic exercises, weight-bearing exercises, cardiovascular risk reduction, fall risk reduction, and age-appropriate supplements. The patient was counseled regarding instructions for management, risk factor reductions, prognosis, risks and benefits of treatment options, patient and family education, and importance of compliance with treatment. Discussed with the patient flu shot he agreed he tolerated well without side effect            Other Visit Diagnoses     Need for immunization against influenza        Relevant Orders    FLUZONE HIGH-DOSE: influenza vaccine, high-dose, preservative-free 0.7 mL (Completed)        BMI Counseling: Body mass index is 27.35 kg/m².  The BMI is above normal. Nutrition recommendations include decreasing portion sizes, decreasing fast food intake and limiting drinks that contain sugar. Exercise recommendations include exercising 3-5 times per week. No pharmacotherapy was ordered. Rationale for BMI follow-up plan is due to patient being overweight or obese. Preventive health issues were discussed with patient, and age appropriate screening tests were ordered as noted in patient's After Visit Summary. Personalized health advice and appropriate referrals for health education or preventive services given if needed, as noted in patient's After Visit Summary. History of Present Illness:     Patient presents for a Medicare Wellness Visit    Patient here for Medicare exam and follow-up with a chronic condition he been compliant with the medication tolerated well without side effect no new concerns recent     Patient Care Team:  Mar Lozano MD as PCP - General (Family Medicine)  Karen Concepcion MD (Ophthalmology)  Josue Dumont MD (Ophthalmology)     Review of Systems:     Review of Systems   Constitutional: Negative for chills and fever. HENT: Negative for ear pain and sore throat. Eyes: Negative for pain and visual disturbance. Respiratory: Negative for cough and shortness of breath. Cardiovascular: Negative for chest pain and palpitations. Gastrointestinal: Negative for abdominal pain, blood in stool, constipation, diarrhea and vomiting. Genitourinary: Negative for dysuria and hematuria. Musculoskeletal: Negative for arthralgias and back pain. Skin: Negative for color change and rash. Neurological: Negative for seizures and syncope. All other systems reviewed and are negative.        Problem List:     Patient Active Problem List   Diagnosis   • Anxiety   • Aortic valve stenosis   • Chronic coronary artery disease   • Carotid artery disease (HCC)   • Coronary atherosclerosis   • Osteoarthritis   • Essential hypertension   • Hepatitis A   • Hyperlipidemia   • Hydrocele   • Hypertriglyceridemia   • Hyperplasia of prostate without lower urinary tract symptoms (LUTS)   • Insomnia   • Other specified glaucoma   • Vitamin D deficiency   • Type 2 diabetes mellitus without complication, without long-term current use of insulin ([facility])   • Onychomycosis of toenail   • Overweight (BMI 25.0-29. 9)   • DNR (do not resuscitate)   • Bunion   • Moderate major depression ([address])   • Callus of foot   • Stage 3a chronic kidney disease ([address])   • Medicare annual wellness visit, subsequent      Past Medical and Surgical History:     Past Medical History:   Diagnosis Date   • Anxiety    • CAD (coronary artery disease)    • Capsular cataract     mature   • Chronic ulcer of skin ([address]) 5/18/2015   • DJD (degenerative joint disease)    • Encounter for routine adult medical exam with abnormal findings 8/21/2019   • Hepatitis A    • Hydrocele    • Hyperglycemia    • Hyperlipidemia    • Hypertension    • Impaired fasting glucose 4/30/2014   • Myocardial infarct ([address]) 2007   • 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[address])    • Potassium (K) excess 12/27/2019   • Prostatic hyperplasia    • Stage 3a chronic kidney disease ([address]) 5/4/2022   • Type 2 diabetes mellitus without complication, without long-term current use of insulin ([address]) 3/6/2019     Past Surgical History:   Procedure Laterality Date   • ANGIOPLASTY  2008      Family History:     Family History   Problem Relation Age of Onset   • Coronary artery disease Father       Social History:     Social History     Socioeconomic History   • Marital status:       Spouse name: None   • Number of children: None   • Years of education: None   • Highest education level: None   Occupational History   • None   Tobacco Use   • Smoking status: Never     Passive exposure: Never   • Smokeless tobacco: Never   Vaping Use   • Vaping Use: Never used   Substance and Sexual Activity   • Alcohol use: No   • Drug use: No   • Sexual activity: Not Currently     Partners: Female   Other Topics Concern   • None   Social History Narrative    No risk for falls    Activity level: sedentary    No sleep changes    No animals    No firearms    Always uses a seatbelt     Social Determinants of Health     Financial Resource Strain: Medium Risk (9/22/2023)    Overall Financial Resource Strain (CARDIA)    • Difficulty of Paying Living Expenses: Somewhat hard   Food Insecurity: No Food Insecurity (9/21/2022)    Hunger Vital Sign    • Worried About Running Out of Food in the Last Year: Never true    • Ran Out of Food in the Last Year: Never true   Transportation Needs: No Transportation Needs (9/22/2023)    PRAPARE - Transportation    • Lack of Transportation (Medical): No    • Lack of Transportation (Non-Medical): No   Physical Activity: Insufficiently Active (9/21/2022)    Exercise Vital Sign    • Days of Exercise per Week: 2 days    • Minutes of Exercise per Session: 30 min   Stress: Stress Concern Present (9/21/2022)    [facility]    • Feeling of Stress : To some extent   Social Connections: Moderately Isolated (9/21/2022)    Social Connection and Isolation Panel [NHANES]    • Frequency of Communication with Friends and Family: More than three times a week    • Frequency of Social Gatherings with Friends and Family: More than three times a week    • Attends [employer] Services: More than 4 times per year    • Active Member of Clubs or Organizations: No    • Attends Club or Organization Meetings: Never    • Marital Status:     Intimate Partner Violence: Not At Risk (9/21/2022)    Humiliation, Afraid, Rape, and Kick questionnaire    • Fear of Current or Ex-Partner: No    • Emotionally Abused: No    • Physically Abused: No    • Sexually Abused: No   Housing Stability: Unknown (9/21/2022)    Housing Stability Vital Sign    • Unable to Pay for Housing in the Last Year: No    • Number of Places Lived in the Last Year: Not on file    • Unstable Housing in the Last Year: No Medications and Allergies:     Current Outpatient Medications   Medication Sig Dispense Refill   • ALPRAZolam (XANAX) 0.5 mg tablet Take 0.5 mg by mouth daily as needed for anxiety     • aspirin 81 MG tablet every 24 hours     • dorzolamide-timolol (COSOPT) 22.3-6.8 MG/ML ophthalmic solution Every 12 hours     • fenofibrate (TRICOR) 54 MG tablet Take 1 tablet (54 mg total) by mouth daily 30 tablet 5   • latanoprost (XALATAN) 0.005 % ophthalmic solution INSTILL 1 DROP AT BEDTIME INTO BOTH EYES  3   • metoprolol succinate (TOPROL-XL) 100 mg 24 hr tablet Take 1 tablet (100 mg total) by mouth 2 (two) times a day 180 tablet 0   • Multiple Vitamins-Minerals (MULTIVITAL) tablet every 24 hours     • Multiple Vitamins-Minerals (PRESERVISION AREDS 2+MULTI VIT PO) take 2 tabs daily     • nitroglycerin (NITROSTAT) 0.4 mg SL tablet place 1 tablet by sublingual route at the 1st sign of attack; may repeat every 5 min until relief; if pain persists after 3 tablets in 15 min, prompt medical attention is recommended     • RHOPRESSA 0.02 % SOLN INSTILL 1 DROP AT BEDTIME INTO BOTH EYES  1   • simvastatin (ZOCOR) 40 mg tablet Take 1 tablet (40 mg total) by mouth daily at bedtime 90 tablet 0   • Cholecalciferol (VITAMIN D-3) 1000 units CAPS every 24 hours (Patient not taking: Reported on 7/14/2023)     • meclizine (ANTIVERT) 12.5 MG tablet Take 12.5 mg by mouth as needed for dizziness (Patient not taking: Reported on 7/14/2023)       No current facility-administered medications for this visit.      No Known Allergies   Immunizations:     Immunization History   Administered Date(s) Administered   • COVID-19 MODERNA VACC 0.5 ML IM 01/05/2021, 02/05/2021   • Influenza Split 01/14/2014   • Influenza Split High Dose Preservative Free IM 10/08/2014, 11/18/2015, 11/20/2015, 09/26/2016, 11/21/2017   • Influenza, high dose seasonal 0.7 mL 09/27/2019, 10/30/2020, 11/03/2021, 09/21/2022, 09/22/2023   • Influenza, seasonal, injectable 11/01/2012   • Influenza, seasonal, injectable, preservative free 11/06/2018   • Pneumococcal Conjugate 13-Valent 01/29/2015   • Pneumococcal Polysaccharide PPV23 11/01/1999, 11/06/2003   • Td (adult), adsorbed 02/04/2010   • Tdap 01/31/2019      Health Maintenance: There are no preventive care reminders to display for this patient. Topic Date Due   • COVID-19 Vaccine (3 - Moderna series) 04/02/2021      Medicare Screening Tests and Risk Assessments:     Fany Angel is here for his Subsequent Wellness visit. Health Risk Assessment:   Patient rates overall health as very good. Patient feels that their physical health rating is same. Patient is satisfied with their life. Eyesight was rated as same. Hearing was rated as same. Patient feels that their emotional and mental health rating is slightly worse. Patients states they are sometimes angry. Patient states they are sometimes unusually tired/fatigued. Pain experienced in the last 7 days has been none. Patient states that he has experienced no weight loss or gain in last 6 months. Depression Screening:   PHQ-9 Score: 0      Fall Risk Screening: In the past year, patient has experienced: no history of falling in past year      Home Safety:  Patient does not have trouble with stairs inside or outside of their home. Patient has working smoke alarms and has working carbon monoxide detector. Home safety hazards include: none. Nutrition:   Current diet is Regular. Medications:   Patient is currently taking over-the-counter supplements. OTC medications include: see medication list. Patient is able to manage medications. Activities of Daily Living (ADLs)/Instrumental Activities of Daily Living (IADLs):   Walk and transfer into and out of bed and chair?: Yes  Dress and groom yourself?: Yes    Bathe or shower yourself?: Yes    Feed yourself?  Yes  Do your laundry/housekeeping?: Yes  Manage your money, pay your bills and track your expenses?: Yes  Make your own meals?: Yes    Do your own shopping?: Yes    Previous Hospitalizations:   Any hospitalizations or ED visits within the last 12 months?: No      Advance Care Planning:   Living will: No    Durable POA for healthcare: No    Advanced directive: No      Cognitive Screening:   Provider or family/friend/caregiver concerned regarding cognition?: No    PREVENTIVE SCREENINGS      Cardiovascular Screening:    General: Screening Not Indicated and History Lipid Disorder      Diabetes Screening:     General: Screening Not Indicated and History Diabetes      Colorectal Cancer Screening:     General: Screening Not Indicated      Prostate Cancer Screening:    General: Screening Not Indicated      Osteoporosis Screening:    General: Screening Not Indicated      Abdominal Aortic Aneurysm (AAA) Screening:        General: Screening Not Indicated      Lung Cancer Screening:     General: Screening Not Indicated      Hepatitis C Screening:    General: Patient Declines    Screening, Brief Intervention, and Referral to Treatment (SBIRT)    Screening      AUDIT-C Screenin) How often did you have a drink containing alcohol in the past year? never  2) How many drinks did you have on a typical day when you were drinking in the past year? 0  3) How often did you have 6 or more drinks on one occasion in the past year? never    AUDIT-C Score: 0  Interpretation: Score 0-3 (male): Negative screen for alcohol misuse    Single Item Drug Screening:  How often have you used an illegal drug (including marijuana) or a prescription medication for non-medical reasons in the past year? never    Single Item Drug Screen Score: 0  Interpretation: Negative screen for possible drug use disorder    Brief Intervention  Alcohol & drug use screenings were reviewed. No concerns regarding substance use disorder identified. No results found.      Physical Exam:     /82 (BP Location: Left arm, Patient Position: Sitting, Cuff Size: Standard)   Pulse 60   Temp (!) 96.9 °F (36.1 °C) (Tympanic)   Ht 4' 11" (1.499 m)   Wt 61.4 kg (135 lb 6.4 oz)   SpO2 98%   BMI 27.35 kg/m²     Physical Exam  Vitals and nursing note reviewed. Constitutional:       General: He is not in acute distress. Appearance: He is well-developed. HENT:      Head: Normocephalic and atraumatic. Right Ear: Tympanic membrane normal. There is no impacted cerumen. Left Ear: Tympanic membrane normal. There is no impacted cerumen. Nose: No rhinorrhea. Mouth/Throat:      Pharynx: No posterior oropharyngeal erythema. Eyes:      General:         Right eye: No discharge. Left eye: No discharge. Conjunctiva/sclera: Conjunctivae normal.   Cardiovascular:      Rate and Rhythm: Normal rate and regular rhythm. Heart sounds: Murmur heard. Pulmonary:      Effort: Pulmonary effort is normal. No respiratory distress. Breath sounds: Normal breath sounds. Abdominal:      Palpations: Abdomen is soft. Tenderness: There is no abdominal tenderness. Musculoskeletal:         General: No swelling. Cervical back: Neck supple. Skin:     General: Skin is warm and dry. Capillary Refill: Capillary refill takes less than 2 seconds. Findings: No erythema or rash. Neurological:      Mental Status: He is alert and oriented to person, place, and time.    Psychiatric:         Mood and Affect: Mood normal.          Marthena Denver, MD

## 2023-09-22 NOTE — ASSESSMENT & PLAN NOTE
Advice and education were given regarding nutrition, aerobic exercises, weight-bearing exercises, cardiovascular risk reduction, fall risk reduction, and age-appropriate supplements. The patient was counseled regarding instructions for management, risk factor reductions, prognosis, risks and benefits of treatment options, patient and family education, and importance of compliance with treatment.   Discussed with the patient flu shot he agreed he tolerated well without side effect

## 2023-09-22 NOTE — PATIENT INSTRUCTIONS
Medicare Preventive Visit Patient Instructions  Thank you for completing your Welcome to Medicare Visit or Medicare Annual Wellness Visit today. Your next wellness visit will be due in one year (9/22/2024). The screening/preventive services that you may require over the next 5-10 years are detailed below. Some tests may not apply to you based off risk factors and/or age. Screening tests ordered at today's visit but not completed yet may show as past due. Also, please note that scanned in results may not display below. Preventive Screenings:  Service Recommendations Previous Testing/Comments   Colorectal Cancer Screening  · Colonoscopy    · Fecal Occult Blood Test (FOBT)/Fecal Immunochemical Test (FIT)  · Fecal DNA/Cologuard Test  · Flexible Sigmoidoscopy Age: 43-73 years old   Colonoscopy: every 10 years (May be performed more frequently if at higher risk)  OR  FOBT/FIT: every 1 year  OR  Cologuard: every 3 years  OR  Sigmoidoscopy: every 5 years  Screening may be recommended earlier than age 39 if at higher risk for colorectal cancer. Also, an individualized decision between you and your healthcare provider will decide whether screening between the ages of 77-80 would be appropriate.  Colonoscopy: Not on file  FOBT/FIT: Not on file  Cologuard: Not on file  Sigmoidoscopy: Not on file    Screening Not Indicated     Prostate Cancer Screening Individualized decision between patient and health care provider in men between ages of 53-66   Medicare will cover every 12 months beginning on the day after your 50th birthday PSA: No results in last 5 years     Screening Not Indicated     Hepatitis C Screening Once for adults born between 1945 and 1965  More frequently in patients at high risk for Hepatitis C Hep C Antibody: Not on file        Diabetes Screening 1-2 times per year if you're at risk for diabetes or have pre-diabetes Fasting glucose: 122 mg/dL (7/7/2023)  A1C: 6.2 % (7/7/2023)  Screening Not Indicated  History Diabetes   Cholesterol Screening Once every 5 years if you don't have a lipid disorder. May order more often based on risk factors. Lipid panel: 07/07/2023  Screening Not Indicated  History Lipid Disorder      Other Preventive Screenings Covered by Medicare:  1. Abdominal Aortic Aneurysm (AAA) Screening: covered once if your at risk. You're considered to be at risk if you have a family history of AAA or a male between the age of 70-76 who smoking at least 100 cigarettes in your lifetime. 2. Lung Cancer Screening: covers low dose CT scan once per year if you meet all of the following conditions: (1) Age 48-67; (2) No signs or symptoms of lung cancer; (3) Current smoker or have quit smoking within the last 15 years; (4) You have a tobacco smoking history of at least 20 pack years (packs per day x number of years you smoked); (5) You get a written order from a healthcare provider. 3. Glaucoma Screening: covered annually if you're considered high risk: (1) You have diabetes OR (2) Family history of glaucoma OR (3)  aged 48 and older OR (3)  American aged 72 and older  3. Osteoporosis Screening: covered every 2 years if you meet one of the following conditions: (1) Have a vertebral abnormality; (2) On glucocorticoid therapy for more than 3 months; (3) Have primary hyperparathyroidism; (4) On osteoporosis medications and need to assess response to drug therapy. 5. HIV Screening: covered annually if you're between the age of 14-79. Also covered annually if you are younger than 13 and older than 72 with risk factors for HIV infection. For pregnant patients, it is covered up to 3 times per pregnancy.     Immunizations:  Immunization Recommendations   Influenza Vaccine Annual influenza vaccination during flu season is recommended for all persons aged >= 6 months who do not have contraindications   Pneumococcal Vaccine   * Pneumococcal conjugate vaccine = PCV13 (Prevnar 13), PCV15 (Vaxneuvance), PCV20 (Prevnar 20)  * Pneumococcal polysaccharide vaccine = PPSV23 (Pneumovax) Adults 2364 years old: 1-3 doses may be recommended based on certain risk factors  Adults 72 years old: 1-2 doses may be recommended based off what pneumonia vaccine you previously received   Hepatitis B Vaccine 3 dose series if at intermediate or high risk (ex: diabetes, end stage renal disease, liver disease)   Tetanus (Td) Vaccine - COST NOT COVERED BY MEDICARE PART B Following completion of primary series, a booster dose should be given every 10 years to maintain immunity against tetanus. Td may also be given as tetanus wound prophylaxis. Tdap Vaccine - COST NOT COVERED BY MEDICARE PART B Recommended at least once for all adults. For pregnant patients, recommended with each pregnancy. Shingles Vaccine (Shingrix) - COST NOT COVERED BY MEDICARE PART B  2 shot series recommended in those aged 48 and above     Health Maintenance Due:  There are no preventive care reminders to display for this patient. Immunizations Due:      Topic Date Due   • COVID-19 Vaccine (3 - Moderna series) 04/02/2021   • Influenza Vaccine (1) 09/01/2023     Advance Directives   What are advance directives? Advance directives are legal documents that state your wishes and plans for medical care. These plans are made ahead of time in case you lose your ability to make decisions for yourself. Advance directives can apply to any medical decision, such as the treatments you want, and if you want to donate organs. What are the types of advance directives? There are many types of advance directives, and each state has rules about how to use them. You may choose a combination of any of the following:  · Living will: This is a written record of the treatment you want. You can also choose which treatments you do not want, which to limit, and which to stop at a certain time. This includes surgery, medicine, IV fluid, and tube feedings.    · Durable power of  for [facility]): This is a written record that states who you want to make healthcare choices for you when you are unable to make them for yourself. This person, called a proxy, is usually a family member or a friend. You may choose more than 1 proxy. · Do not resuscitate (DNR) order:  A DNR order is used in case your heart stops beating or you stop breathing. It is a request not to have certain forms of treatment, such as CPR. A DNR order may be included in other types of advance directives. · Medical directive: This covers the care that you want if you are in a coma, near death, or unable to make decisions for yourself. You can list the treatments you want for each condition. Treatment may include pain medicine, surgery, blood transfusions, dialysis, IV or tube feedings, and a ventilator (breathing machine). · Values history: This document has questions about your views, beliefs, and how you feel and think about life. This information can help others choose the care that you would choose. Why are advance directives important? An advance directive helps you control your care. Although spoken wishes may be used, it is better to have your wishes written down. Spoken wishes can be misunderstood, or not followed. Treatments may be given even if you do not want them. An advance directive may make it easier for your family to make difficult choices about your care. Weight Management   Why it is important to manage your weight:  Being overweight increases your risk of health conditions such as heart disease, high blood pressure, type 2 diabetes, and certain types of cancer. It can also increase your risk for osteoarthritis, sleep apnea, and other respiratory problems. Aim for a slow, steady weight loss. Even a small amount of weight loss can lower your risk of health problems. How to lose weight safely:  A safe and healthy way to lose weight is to eat fewer calories and get regular exercise.  You can lose up about 1 pound a week by decreasing the number of calories you eat by 500 calories each day. Healthy meal plan for weight management:  A healthy meal plan includes a variety of foods, contains fewer calories, and helps you stay healthy. A healthy meal plan includes the following:  · Eat whole-grain foods more often. A healthy meal plan should contain fiber. Fiber is the part of grains, fruits, and vegetables that is not broken down by your body. Whole-grain foods are healthy and provide extra fiber in your diet. Some examples of whole-grain foods are whole-wheat breads and pastas, oatmeal, brown rice, and bulgur. · Eat a variety of vegetables every day. Include dark, leafy greens such as spinach, kale, roque greens, and mustard greens. Eat yellow and orange vegetables such as carrots, sweet potatoes, and winter squash. · Eat a variety of fruits every day. Choose fresh or canned fruit (canned in its own juice or light syrup) instead of juice. Fruit juice has very little or no fiber. · Eat low-fat dairy foods. Drink fat-free (skim) milk or 1% milk. Eat fat-free yogurt and low-fat cottage cheese. Try low-fat cheeses such as mozzarella and other reduced-fat cheeses. · Choose meat and other protein foods that are low in fat. Choose beans or other legumes such as split peas or lentils. Choose fish, skinless poultry (chicken or turkey), or lean cuts of red meat (beef or pork). Before you cook meat or poultry, cut off any visible fat. · Use less fat and oil. Try baking foods instead of frying them. Add less fat, such as margarine, sour cream, regular salad dressing and mayonnaise to foods. Eat fewer high-fat foods. Some examples of high-fat foods include french fries, doughnuts, ice cream, and cakes. · Eat fewer sweets. Limit foods and drinks that are high in sugar. This includes candy, cookies, regular soda, and sweetened drinks. Exercise:  Exercise at least 30 minutes per day on most days of the week. Some examples of exercise include walking, biking, dancing, and swimming. You can also fit in more physical activity by taking the stairs instead of the elevator or parking farther away from stores. Ask your healthcare provider about the best exercise plan for you. © Copyright [address] 2018 Information is for End User's use only and may not be sold, redistributed or otherwise used for commercial purposes.  All illustrations and images included in CareNotes® are the copyrighted property of [employer] Inc[employer] or [address] [place] [address]

## 2023-09-22 NOTE — ASSESSMENT & PLAN NOTE
Chronic asymptomatic.   Patient already on beta-blocker he is on statin and aspirin continue current management

## 2023-11-20 DIAGNOSIS — I10 ESSENTIAL HYPERTENSION: ICD-10-CM

## 2023-11-20 DIAGNOSIS — E78.2 MIXED HYPERLIPIDEMIA: ICD-10-CM

## 2023-11-20 RX ORDER — SIMVASTATIN 40 MG
40 TABLET ORAL
Qty: 90 TABLET | Refills: 0 | Status: SHIPPED | OUTPATIENT
Start: 2023-11-20

## 2023-11-20 RX ORDER — METOPROLOL SUCCINATE 100 MG/1
100 TABLET, EXTENDED RELEASE ORAL 2 TIMES DAILY
Qty: 180 TABLET | Refills: 0 | Status: SHIPPED | OUTPATIENT
Start: 2023-11-20

## 2023-11-20 RX ORDER — FENOFIBRATE 54 MG/1
54 TABLET ORAL DAILY
Qty: 90 TABLET | Refills: 0 | Status: SHIPPED | OUTPATIENT
Start: 2023-11-20

## 2023-11-21 PROBLEM — Z00.00 MEDICARE ANNUAL WELLNESS VISIT, SUBSEQUENT: Status: RESOLVED | Noted: 2023-09-22 | Resolved: 2023-11-21

## 2024-01-22 ENCOUNTER — OFFICE VISIT (OUTPATIENT)
Dept: FAMILY MEDICINE CLINIC | Facility: CLINIC | Age: 89
End: 2024-01-22
Payer: COMMERCIAL

## 2024-01-22 VITALS
DIASTOLIC BLOOD PRESSURE: 78 MMHG | SYSTOLIC BLOOD PRESSURE: 120 MMHG | HEIGHT: 60 IN | TEMPERATURE: 96.3 F | OXYGEN SATURATION: 99 % | BODY MASS INDEX: 26.07 KG/M2 | WEIGHT: 132.8 LBS

## 2024-01-22 DIAGNOSIS — M21.619 BUNION: ICD-10-CM

## 2024-01-22 DIAGNOSIS — F32.1 MODERATE MAJOR DEPRESSION (HCC): ICD-10-CM

## 2024-01-22 DIAGNOSIS — N18.31 STAGE 3A CHRONIC KIDNEY DISEASE (HCC): ICD-10-CM

## 2024-01-22 DIAGNOSIS — I65.23 BILATERAL CAROTID ARTERY STENOSIS: ICD-10-CM

## 2024-01-22 DIAGNOSIS — L84 CALLUS OF FOOT: ICD-10-CM

## 2024-01-22 DIAGNOSIS — B35.1 ONYCHOMYCOSIS OF TOENAIL: ICD-10-CM

## 2024-01-22 DIAGNOSIS — E11.9 TYPE 2 DIABETES MELLITUS WITHOUT COMPLICATION, WITHOUT LONG-TERM CURRENT USE OF INSULIN (HCC): Primary | ICD-10-CM

## 2024-01-22 LAB
LEFT EYE DIABETIC RETINOPATHY: ABNORMAL
LEFT EYE IMAGE QUALITY: ABNORMAL
LEFT EYE MACULAR EDEMA: ABNORMAL
LEFT EYE OTHER RETINOPATHY: ABNORMAL
RIGHT EYE DIABETIC RETINOPATHY: ABNORMAL
RIGHT EYE IMAGE QUALITY: ABNORMAL
RIGHT EYE MACULAR EDEMA: ABNORMAL
RIGHT EYE OTHER RETINOPATHY: ABNORMAL
SEVERITY (EYE EXAM): ABNORMAL
SL AMB POCT HEMOGLOBIN AIC: 6.3 (ref ?–6.5)

## 2024-01-22 PROCEDURE — 99214 OFFICE O/P EST MOD 30 MIN: CPT | Performed by: FAMILY MEDICINE

## 2024-01-22 PROCEDURE — 83036 HEMOGLOBIN GLYCOSYLATED A1C: CPT | Performed by: FAMILY MEDICINE

## 2024-01-22 NOTE — PROGRESS NOTES
Name: Saud Ardon      : 1934      MRN: [id number]  Encounter Provider: Juan Antonio Rojo MD  Encounter Date: 2024   Encounter department: [facility]    Assessment & Plan     1. Type 2 diabetes mellitus without complication, without long-term current use of insulin (HCC)  Assessment & Plan:    Lab Results   Component Value Date    HGBA1C 6.3 2024   Chronic asymptomatic fair control continue low- carb diet discussed important lose weight      Orders:  -     POCT hemoglobin A1c  -     IRIS Diabetic eye exam  -     Albumin / creatinine urine ratio; Future; Expected date: 2024    2. Moderate major depression (HCC)  Assessment & Plan:  Chronic stable currently not on any medication tried to treat it conservatively      3. Bilateral carotid artery stenosis  Assessment & Plan:  Chronic asymptomatic patient already on statin and aspirin he declined carotid duplex      4. Stage 3a chronic kidney disease (HCC)  Assessment & Plan:  Lab Results   Component Value Date    EGFR 47 2023    EGFR 51 2023    EGFR 54 2022    CREATININE 1.33 (H) 2023    CREATININE 1.24 2023    CREATININE 1.19 2022   Chronic patient overdue for BMP had forgot to do his blood work blood pressure at the goal avoid NSAIDs keep well hydration    Orders:  -     Albumin / creatinine urine ratio; Future; Expected date: 2024    5. Bunion  Assessment & Plan:  Chronic proper shoes wear proper footcare discussed with patient he follow-up with the podiatric      6. Callus of foot  Assessment & Plan:  Patient follow-up with the podiatric periodically      7. Onychomycosis of toenail  Assessment & Plan:  Chronic no pain and patient follow-up with podiatric             Subjective      Patient here follow-up with a chronic condition compliant with the medication tolerated well without side effect no new concerns recent blood work discussed with the patient      Review  The patient has been without seizure for at least 2 years  He continues with Trileptal without side effect  Will get CBC CMP and lipids today  Will follow up with him when results are available  Will see him back in 6 months or sooner as needed  He agrees  of Systems   Constitutional:  Negative for chills and fever.   HENT:  Negative for ear pain and sore throat.    Eyes:  Negative for pain and visual disturbance.   Respiratory:  Negative for cough and shortness of breath.    Cardiovascular:  Negative for chest pain and palpitations.   Gastrointestinal:  Negative for abdominal pain, constipation, diarrhea and vomiting.   Genitourinary:  Negative for dysuria and hematuria.   Musculoskeletal:  Negative for arthralgias and back pain.   Skin:  Negative for color change and rash.   Neurological:  Negative for seizures and syncope.   All other systems reviewed and are negative.      Current Outpatient Medications on File Prior to Visit   Medication Sig   • ALPRAZolam (XANAX) 0.5 mg tablet Take 0.5 mg by mouth daily as needed for anxiety   • aspirin 81 MG tablet every 24 hours   • dorzolamide-timolol (COSOPT) 22.3-6.8 MG/ML ophthalmic solution Every 12 hours   • fenofibrate (TRICOR) 54 MG tablet Take 1 tablet (54 mg total) by mouth daily   • latanoprost (XALATAN) 0.005 % ophthalmic solution INSTILL 1 DROP AT BEDTIME INTO BOTH EYES   • metoprolol succinate (TOPROL-XL) 100 mg 24 hr tablet Take 1 tablet (100 mg total) by mouth 2 (two) times a day   • Multiple Vitamins-Minerals (MULTIVITAL) tablet every 24 hours   • Multiple Vitamins-Minerals (PRESERVISION AREDS 2+MULTI VIT PO) take 2 tabs daily   • nitroglycerin (NITROSTAT) 0.4 mg SL tablet place 1 tablet by sublingual route at the 1st sign of attack; may repeat every 5 min until relief; if pain persists after 3 tablets in 15 min, prompt medical attention is recommended   • RHOPRESSA 0.02 % SOLN INSTILL 1 DROP AT BEDTIME INTO BOTH EYES   • simvastatin (ZOCOR) 40 mg tablet Take 1 tablet (40 mg total) by mouth daily at bedtime   • [DISCONTINUED] Cholecalciferol (VITAMIN D-3) 1000 units CAPS every 24 hours (Patient not taking: Reported on 7/14/2023)   • [DISCONTINUED] meclizine (ANTIVERT) 12.5 MG tablet Take 12.5 mg by mouth as  "needed for dizziness (Patient not taking: Reported on 7/14/2023)       Objective     /78 (BP Location: Left arm, Patient Position: Sitting)   Temp (!) 96.3 °F (35.7 °C) (Tympanic)   Ht 4' 11\" (1.499 m)   Wt 60.2 kg (132 lb 12.8 oz)   SpO2 99%   BMI 26.82 kg/m²     Physical Exam  Vitals and nursing note reviewed.   Constitutional:       General: He is not in acute distress.     Appearance: Normal appearance. He is well-developed. He is not diaphoretic.   HENT:      Head: Normocephalic.      Right Ear: Tympanic membrane, ear canal and external ear normal.      Left Ear: Tympanic membrane, ear canal and external ear normal.      Nose: Nose normal. No congestion or rhinorrhea.      Mouth/Throat:      Mouth: Mucous membranes are moist.      Pharynx: Oropharynx is clear. No oropharyngeal exudate or posterior oropharyngeal erythema.   Eyes:      General:         Right eye: No discharge.         Left eye: No discharge.      Conjunctiva/sclera: Conjunctivae normal.   Neck:      Vascular: No JVD.   Cardiovascular:      Rate and Rhythm: Normal rate and regular rhythm.      Pulses: no weak pulses          Dorsalis pedis pulses are 2+ on the right side and 2+ on the left side.      Heart sounds: Murmur heard.      No gallop.   Pulmonary:      Effort: Pulmonary effort is normal. No respiratory distress.      Breath sounds: Normal breath sounds. No stridor. No wheezing or rales.   Chest:      Chest wall: No tenderness.   Abdominal:      General: There is no distension.      Palpations: Abdomen is soft. There is no mass.      Tenderness: There is no abdominal tenderness. There is no rebound.   Musculoskeletal:         General: Normal range of motion.      Cervical back: Normal range of motion and neck supple.        Feet:    Feet:      Right foot:      Skin integrity: Dry skin present. No warmth.      Left foot:      Skin integrity: Dry skin present. No warmth.   Lymphadenopathy:      Cervical: No cervical adenopathy. "   Skin:     General: Skin is warm.   Neurological:      Mental Status: He is alert and oriented to person, place, and time.       Patient's shoes and socks removed.    Right Foot/Ankle   Right Foot Inspection  Skin Exam: skin intact and dry skin. No warmth and no pre-ulcer.     Toe Exam: No swelling and erythema    Sensory   Monofilament testing: intact    Vascular  Capillary refills: < 3 seconds  The right DP pulse is 2+.     Left Foot/Ankle  Left Foot Inspection  Skin Exam: skin intact and dry skin. No warmth and no pre-ulcer.     Toe Exam: No swelling and no erythema.     Sensory   Monofilament testing: intact    Vascular  Capillary refills: < 3 seconds  The left DP pulse is 2+.     Assign Risk Category  No deformity present  No loss of protective sensation  No weak pulses  Risk: 2         Juan Antonio Rojo MD

## 2024-01-22 NOTE — ASSESSMENT & PLAN NOTE
Lab Results   Component Value Date    HGBA1C 6.3 01/22/2024   Chronic asymptomatic fair control continue low- carb diet discussed important lose weight

## 2024-01-22 NOTE — ASSESSMENT & PLAN NOTE
Lab Results   Component Value Date    EGFR 47 07/07/2023    EGFR 51 03/03/2023    EGFR 54 11/03/2022    CREATININE 1.33 (H) 07/07/2023    CREATININE 1.24 03/03/2023    CREATININE 1.19 11/03/2022   Chronic patient overdue for BMP had forgot to do his blood work blood pressure at the goal avoid NSAIDs keep well hydration

## 2024-02-15 NOTE — ASSESSMENT & PLAN NOTE
Chronic asymptomatic currently not on any medication in remission [FreeTextEntry1] : 40 year old female with PMH of T2DM and recent admission (IUFD, pulmonary edema and pre-eclampsia), presents for management of diabetes  #T2DM, robust c peptide and NEGATIVE antibodies  Fructosamine equivalent of HbA1c 7.1% (above target of 6.5%)  Patient counseled extensively about the complications of diabetes including but not limited to nephropathy, neuropathy, and retinopathy. We discussed the importance of annual foot and optho exams. Explained that ideally blood sugars in the morning prior to breakfast should be between 80 and 130. Blood sugars should be checked 2 hours after eating and should be <180. If blood sugar is <70, patient should treat the blood sugar FIRST and then contact provider. Advised patient to let us know if BG persistently <70 or >200  Contraception - patient has IUD . Patient aware to reach out if planning to concieive in future to ensure appropriate glycemic control and switching to medications safe for pregnancy  Can increase victoza to 1.8mg daily and reduce lantus to 42 units pre bed   Will put in script for carloz 3. Will check if covered by insurance.  #HTN -At target today Nifedipine ceased as per cardiology.  #HLD -LDL>70 -Continue lifestyle modifications for now with view to start statin if stilll > target next visit  Review in 5 months and CDE in 2 months

## 2024-05-06 DIAGNOSIS — I10 ESSENTIAL HYPERTENSION: ICD-10-CM

## 2024-05-06 DIAGNOSIS — E78.2 MIXED HYPERLIPIDEMIA: ICD-10-CM

## 2024-05-06 RX ORDER — SIMVASTATIN 40 MG
40 TABLET ORAL
Qty: 90 TABLET | Refills: 1 | Status: SHIPPED | OUTPATIENT
Start: 2024-05-06

## 2024-05-06 RX ORDER — METOPROLOL SUCCINATE 100 MG/1
100 TABLET, EXTENDED RELEASE ORAL 2 TIMES DAILY
Qty: 60 TABLET | Refills: 0 | Status: SHIPPED | OUTPATIENT
Start: 2024-05-06 | End: 2024-06-05

## 2024-05-06 RX ORDER — FENOFIBRATE 54 MG/1
54 TABLET ORAL DAILY
Qty: 30 TABLET | Refills: 0 | Status: SHIPPED | OUTPATIENT
Start: 2024-05-06 | End: 2024-06-05

## 2024-05-06 NOTE — TELEPHONE ENCOUNTER
Reason for call:   [x] Refill   [] Prior Auth  [] Other:     Office:   [x] PCP/Provider -   [] Specialty/Provider -     Medication:   Simvastatin 40mg- take 1 tablet by mouth daily at bedtime  Fenofibrate 54mg- take 1 tablet by mouth daily  Metoprolol 100mg- 1 tablet by mouth 2 times daily      Quantity: 90 days    Ph[facility]: [email or web address]    Does the patient have enough for 3 days?   [] Yes   [x] No - Send as HP to POD

## 2024-05-13 ENCOUNTER — RA CDI HCC (OUTPATIENT)
Dept: OTHER | Facility: HOSPITAL | Age: 89
End: 2024-05-13

## 2024-05-22 ENCOUNTER — OFFICE VISIT (OUTPATIENT)
Dept: FAMILY MEDICINE CLINIC | Facility: CLINIC | Age: 89
End: 2024-05-22
Payer: COMMERCIAL

## 2024-05-22 VITALS
BODY MASS INDEX: 25.32 KG/M2 | HEART RATE: 66 BPM | HEIGHT: 60 IN | DIASTOLIC BLOOD PRESSURE: 60 MMHG | TEMPERATURE: 99 F | OXYGEN SATURATION: 99 % | WEIGHT: 129 LBS | SYSTOLIC BLOOD PRESSURE: 122 MMHG

## 2024-05-22 DIAGNOSIS — N18.31 TYPE 2 DIABETES MELLITUS WITH STAGE 3A CHRONIC KIDNEY DISEASE, WITHOUT LONG-TERM CURRENT USE OF INSULIN (HCC): Primary | ICD-10-CM

## 2024-05-22 DIAGNOSIS — I10 ESSENTIAL HYPERTENSION: ICD-10-CM

## 2024-05-22 DIAGNOSIS — I65.29 STENOSIS OF CAROTID ARTERY, UNSPECIFIED LATERALITY: ICD-10-CM

## 2024-05-22 DIAGNOSIS — E78.2 MIXED HYPERLIPIDEMIA: ICD-10-CM

## 2024-05-22 DIAGNOSIS — E11.22 TYPE 2 DIABETES MELLITUS WITH STAGE 3A CHRONIC KIDNEY DISEASE, WITHOUT LONG-TERM CURRENT USE OF INSULIN (HCC): Primary | ICD-10-CM

## 2024-05-22 LAB — SL AMB POCT HEMOGLOBIN AIC: 6.1 (ref ?–6.5)

## 2024-05-22 PROCEDURE — 83036 HEMOGLOBIN GLYCOSYLATED A1C: CPT | Performed by: FAMILY MEDICINE

## 2024-05-22 PROCEDURE — 99214 OFFICE O/P EST MOD 30 MIN: CPT | Performed by: FAMILY MEDICINE

## 2024-05-22 NOTE — ASSESSMENT & PLAN NOTE
Chronic asymptomatic fair control continue current management patient on metoprolol succinate 100 mg twice a day low-salt diet review

## 2024-05-22 NOTE — ASSESSMENT & PLAN NOTE
Lab Results   Component Value Date    HGBA1C 6.1 05/22/2024   Chronic asymptomatic fair control patient try to control it with diet encouraged patient to continue

## 2024-05-22 NOTE — ASSESSMENT & PLAN NOTE
Chronic asymptomatic patient on simvastatin 40 mg continue current management he is overdue to check lipid panel forgot to do blood work recommend to do blood work

## 2024-05-22 NOTE — PROGRESS NOTES
Ambulatory Visit  Name: Saud Ardon      : 1934      MRN: [id number]  Encounter Provider: Juan Antonio Rojo MD  Encounter Date: 2024   Encounter department: [facility]    Assessment & Plan   1. Type 2 diabetes mellitus with stage 3a chronic kidney disease, without long-term current use of insulin (HCC)  Assessment & Plan:    Lab Results   Component Value Date    HGBA1C 6.1 2024   Chronic asymptomatic fair control patient try to control it with diet encouraged patient to continue  Orders:  -     POCT hemoglobin A1c  2. Stenosis of carotid artery, unspecified laterality  Assessment & Plan:  Chronic asymptomatic patient declined carotid duplex he is already on statin and aspirin  3. Essential hypertension  Assessment & Plan:  Chronic asymptomatic fair control continue current management patient on metoprolol succinate 100 mg twice a day low-salt diet review  4. Mixed hyperlipidemia  Assessment & Plan:  Chronic asymptomatic patient on simvastatin 40 mg continue current management he is overdue to check lipid panel forgot to do blood work recommend to do blood work       History of Present Illness     Patient here follow-up with a chronic condition compliant with the medication tolerated well without side effect no new concern patient forgot to do his blood work        Review of Systems   Constitutional:  Negative for chills and fever.   HENT:  Negative for ear pain and sore throat.    Eyes:  Negative for pain and visual disturbance.   Respiratory:  Negative for cough and shortness of breath.    Cardiovascular:  Negative for chest pain and palpitations.   Gastrointestinal:  Negative for abdominal pain, constipation, diarrhea and vomiting.   Genitourinary:  Negative for dysuria and hematuria.   Skin:  Negative for color change and rash.   Neurological:  Negative for seizures and syncope.   All other systems reviewed and are negative.      Objective     /60 (BP  "Location: Left arm, Patient Position: Sitting)   Pulse 66   Temp 99 °F (37.2 °C) (Tympanic)   Ht 4' 11\" (1.499 m)   Wt 58.5 kg (129 lb)   SpO2 99%   BMI 26.05 kg/m²     Physical Exam  Vitals and nursing note reviewed.   Constitutional:       General: He is not in acute distress.     Appearance: He is well-developed. He is not diaphoretic.   HENT:      Head: Normocephalic.      Right Ear: Tympanic membrane and external ear normal.      Left Ear: Tympanic membrane and external ear normal.      Nose: No rhinorrhea.      Mouth/Throat:      Pharynx: No posterior oropharyngeal erythema.   Eyes:      General:         Right eye: No discharge.         Left eye: No discharge.      Conjunctiva/sclera: Conjunctivae normal.   Neck:      Vascular: No JVD.   Cardiovascular:      Rate and Rhythm: Normal rate and regular rhythm.      Heart sounds: Murmur heard.      No gallop.   Pulmonary:      Effort: Pulmonary effort is normal. No respiratory distress.      Breath sounds: Normal breath sounds. No stridor. No wheezing or rales.   Chest:      Chest wall: No tenderness.   Abdominal:      General: There is no distension.      Palpations: Abdomen is soft. There is no mass.      Tenderness: There is no abdominal tenderness. There is no rebound.   Musculoskeletal:         General: No tenderness.      Cervical back: Normal range of motion and neck supple.   Lymphadenopathy:      Cervical: No cervical adenopathy.   Skin:     General: Skin is warm.      Findings: No erythema or rash.   Neurological:      Mental Status: He is alert and oriented to person, place, and time.       Administrative Statements     "

## 2024-06-17 ENCOUNTER — OFFICE VISIT (OUTPATIENT)
Dept: URGENT CARE | Facility: CLINIC | Age: 89
End: 2024-06-17
Payer: COMMERCIAL

## 2024-06-17 ENCOUNTER — APPOINTMENT (OUTPATIENT)
Dept: LAB | Facility: CLINIC | Age: 89
End: 2024-06-17
Payer: COMMERCIAL

## 2024-06-17 VITALS
TEMPERATURE: 98.6 F | HEART RATE: 82 BPM | DIASTOLIC BLOOD PRESSURE: 62 MMHG | HEIGHT: 60 IN | BODY MASS INDEX: 23.56 KG/M2 | OXYGEN SATURATION: 98 % | WEIGHT: 120 LBS | RESPIRATION RATE: 18 BRPM | SYSTOLIC BLOOD PRESSURE: 122 MMHG
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
## 2024-06-17 DIAGNOSIS — R53.83 OTHER FATIGUE: ICD-10-CM
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
## 2024-06-17 LAB
ATRIAL RATE: 72 BPM
B BURGDOR IGG SERPL QL IA: POSITIVE
B BURGDOR IGG+IGM SER QL IA: POSITIVE
B BURGDOR IGM SERPL QL IA: POSITIVE
P AXIS: 40 DEGREES
PR INTERVAL: 142 MS
QRS AXIS: 31 DEGREES
QRSD INTERVAL: 90 MS
QT INTERVAL: 414 MS
QTC INTERVAL: 453 MS
T WAVE AXIS: 81 DEGREES
VENTRICULAR RATE: 72 BPM

## 2024-06-17 PROCEDURE — 99213 OFFICE O/P EST LOW 20 MIN: CPT | Performed by: NURSE PRACTITIONER

## 2024-06-17 PROCEDURE — S9083 URGENT CARE CENTER GLOBAL: HCPCS | Performed by: NURSE PRACTITIONER

## 2024-06-17 PROCEDURE — 86618 LYME DISEASE ANTIBODY: CPT

## 2024-06-17 PROCEDURE — 36415 COLL VENOUS BLD VENIPUNCTURE: CPT

## 2024-06-17 PROCEDURE — 93010 ELECTROCARDIOGRAM REPORT: CPT | Performed by: INTERNAL MEDICINE

## 2024-06-17 PROCEDURE — 86617 LYME DISEASE ANTIBODY: CPT

## 2024-06-17 PROCEDURE — 93005 ELECTROCARDIOGRAM TRACING: CPT | Performed by: NURSE PRACTITIONER

## 2024-06-20 ENCOUNTER — TELEPHONE (OUTPATIENT)
Dept: URGENT CARE | Facility: CLINIC | Age: 89
End: 2024-06-20

## 2024-06-20 NOTE — TELEPHONE ENCOUNTER
Called primary phone number , no answer. Attempted to also fall patients daughter Cassy, no answer. Voicemail left on patient's cell phone number to call back to the [place] office. Patient does have an appointment tomorrow with PCP per [facility] at 2:20 pm. Awaiting patient to call back to confirm no allergies and will call in doxycyline for patient to get started on treatment for lyme disease prior to PCP appointment.

## 2024-06-20 NOTE — TELEPHONE ENCOUNTER
Leola returned a call back from Mary regarding labs. Tried to call over to the office no answer.     Please call Leola back regarding the labs at .    Thank you.

## 2024-06-21 ENCOUNTER — TELEPHONE (OUTPATIENT)
Age: 89
End: 2024-06-21

## 2024-06-21 ENCOUNTER — OFFICE VISIT (OUTPATIENT)
Dept: FAMILY MEDICINE CLINIC | Facility: CLINIC | Age: 89
End: 2024-06-21
Payer: COMMERCIAL

## 2024-06-21 VITALS
SYSTOLIC BLOOD PRESSURE: 122 MMHG | HEIGHT: 60 IN | BODY MASS INDEX: 23.56 KG/M2 | WEIGHT: 120 LBS | OXYGEN SATURATION: 98 % | TEMPERATURE: 97.9 F | DIASTOLIC BLOOD PRESSURE: 64 MMHG | HEART RATE: 80 BPM

## 2024-06-21 DIAGNOSIS — S11.90XA OPEN WOUND OF NECK, INITIAL ENCOUNTER: ICD-10-CM

## 2024-06-21 DIAGNOSIS — A69.20 LYME DISEASE: ICD-10-CM

## 2024-06-21 DIAGNOSIS — I10 ESSENTIAL HYPERTENSION: ICD-10-CM

## 2024-06-21 DIAGNOSIS — E78.2 MIXED HYPERLIPIDEMIA: ICD-10-CM

## 2024-06-21 DIAGNOSIS — I77.9 CAROTID ARTERY DISEASE, UNSPECIFIED LATERALITY, UNSPECIFIED TYPE (HCC): ICD-10-CM

## 2024-06-21 DIAGNOSIS — L98.9 LESION OF NECK: Primary | ICD-10-CM

## 2024-06-21 PROCEDURE — G2211 COMPLEX E/M VISIT ADD ON: HCPCS | Performed by: FAMILY MEDICINE

## 2024-06-21 PROCEDURE — 99214 OFFICE O/P EST MOD 30 MIN: CPT | Performed by: FAMILY MEDICINE

## 2024-06-21 RX ORDER — SIMVASTATIN 40 MG
40 TABLET ORAL
Qty: 90 TABLET | Refills: 1 | Status: SHIPPED | OUTPATIENT
Start: 2024-06-21

## 2024-06-21 RX ORDER — FENOFIBRATE 54 MG/1
54 TABLET ORAL DAILY
Qty: 30 TABLET | Refills: 0 | Status: SHIPPED | OUTPATIENT
Start: 2024-06-21 | End: 2024-07-21

## 2024-06-21 RX ORDER — METOPROLOL SUCCINATE 100 MG/1
100 TABLET, EXTENDED RELEASE ORAL 2 TIMES DAILY
Qty: 60 TABLET | Refills: 0 | Status: SHIPPED | OUTPATIENT
Start: 2024-06-21 | End: 2024-07-21

## 2024-06-21 RX ORDER — DOXYCYCLINE HYCLATE 100 MG
100 TABLET ORAL 2 TIMES DAILY
Qty: 28 TABLET | Refills: 0 | Status: SHIPPED | OUTPATIENT
Start: 2024-06-21 | End: 2024-07-05

## 2024-06-21 NOTE — ASSESSMENT & PLAN NOTE
EKG WNL and given +IgM will treat x 2 weeks; advised if any signs of more progressed Lyme, reviewed with granddaughter and patient to follow up with office; tx with doxycycline x 2 weeks; reviewed potential SE; f/u guidance given

## 2024-06-21 NOTE — TELEPHONE ENCOUNTER
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 The Lesion has blood pus & turning dark. Picture on lesion in his chart       Please Advise

## 2024-06-21 NOTE — PROGRESS NOTES
Assessment/Plan:     1. Lesion of neck  Assessment & Plan:  Highly suspicious for skin cancer given growth in size and appearance; suspicious for squamous cell CA; referred to dermatology ASAP  Orders:  -     Ambulatory Referral to Dermatology; Future  2. Open wound of neck, initial encounter  -     Ambulatory Referral to Dermatology; Future  3. Carotid artery disease, unspecified laterality, unspecified type (HCC)  Assessment & Plan:  On statin and ASA  Orders:  -     Aspirin 81 MG CAPS; Take 1 tablet by mouth in the morning  4. Essential hypertension  -     metoprolol succinate (TOPROL-XL) 100 mg 24 hr tablet; Take 1 tablet (100 mg total) by mouth 2 (two) times a day  5. Mixed hyperlipidemia  -     simvastatin (ZOCOR) 40 mg tablet; Take 1 tablet (40 mg total) by mouth daily at bedtime  -     fenofibrate (TRICOR) 54 MG tablet; Take 1 tablet (54 mg total) by mouth daily  6. Lyme disease  Assessment & Plan:  EKG WNL and given +IgM will treat x 2 weeks; advised if any signs of more progressed Lyme, reviewed with granddaughter and patient to follow up with office; tx with doxycycline x 2 weeks; reviewed potential SE; f/u guidance given   Orders:  -     doxycycline hyclate (VIBRA-TABS) 100 mg tablet; Take 1 tablet (100 mg total) by mouth 2 (two) times a day for 14 days        Subjective:      Patient ID: Saud Ardon is a 89 y.o. male.    Patient is here to establish care.       Patient is here with concerns of Lyme. Patient's granddaughter pulled a tick off of his neck a few weeks ago.   Pt admits to fatigue that started about 2-3 weeks ago. Complains of arm and leg pain. No swollen joints. No CP or SOB. No known fevers. No neck pain. No light sensitivity or headaches.     Patient mentions he is embarrassed to admit he would like me to look at a skin lesion on his back. Had seen his prior PCP awhile ago and was referred to dermatology. Admits he never went being afraid to go. Since then, the area keeps increasing in  size. He is keeping it covered with small tissues but does seem to drain and bleed at times. No fevers. Started to wonder if his fatigue was related to cancer on his back.         The following portions of the patient's history were reviewed and updated as appropriate: allergies, current medications, past family history, past medical history, past social history, past surgical history, and problem list.    Review of Systems   Constitutional:  Positive for fatigue. Negative for chills and fever.   Respiratory:  Negative for shortness of breath.    Cardiovascular:  Negative for chest pain, palpitations and leg swelling.   Musculoskeletal:  Positive for arthralgias.   Skin:  Positive for wound.         Objective:      /64 (BP Location: Left arm, Patient Position: Sitting, Cuff Size: Standard)   Pulse 80   Temp 97.9 °F (36.6 °C) (Temporal)   Ht 5' (1.524 m)   Wt 54.4 kg (120 lb)   SpO2 98%   BMI 23.44 kg/m²          Physical Exam  Vitals reviewed.   Constitutional:       General: He is not in acute distress.     Appearance: Normal appearance. He is not ill-appearing, toxic-appearing or diaphoretic.   HENT:      Head: Normocephalic and atraumatic.   Eyes:      General: No scleral icterus.        Right eye: No discharge.         Left eye: No discharge.      Conjunctiva/sclera: Conjunctivae normal.   Cardiovascular:      Rate and Rhythm: Normal rate and regular rhythm.      Pulses: Normal pulses.      Heart sounds: Normal heart sounds. No murmur heard.     No gallop.   Pulmonary:      Effort: Pulmonary effort is normal. No respiratory distress.      Breath sounds: Normal breath sounds. No stridor. No wheezing, rhonchi or rales.   Musculoskeletal:      Right lower leg: No edema.      Left lower leg: No edema.   Skin:         Neurological:      General: No focal deficit present.      Mental Status: He is alert and oriented to person, place, and time.   Psychiatric:         Mood and Affect: Mood normal.          Behavior: Behavior normal.         Thought Content: Thought content normal.         Judgment: Judgment normal.

## 2024-06-21 NOTE — ASSESSMENT & PLAN NOTE
Highly suspicious for skin cancer given growth in size and appearance; suspicious for squamous cell CA; referred to dermatology ASAP

## 2024-06-21 NOTE — TELEPHONE ENCOUNTER
This is a giant skin cancer and needs to be seen ASAP - please overbook on Tuesday 6/25 in the morning during [facility] - cleared with Dr. Rodriguez who is staffing [facility] that day.

## 2024-06-25 ENCOUNTER — OFFICE VISIT (OUTPATIENT)
Dept: DERMATOLOGY | Facility: CLINIC | Age: 89
End: 2024-06-25

## 2024-06-25 VITALS — TEMPERATURE: 97.2 F | HEIGHT: 60 IN | WEIGHT: 120 LBS | BODY MASS INDEX: 23.56 KG/M2

## 2024-06-25 DIAGNOSIS — S11.90XA OPEN WOUND OF NECK, INITIAL ENCOUNTER: ICD-10-CM

## 2024-06-25 DIAGNOSIS — D48.9 NEOPLASM OF UNCERTAIN BEHAVIOR: Primary | ICD-10-CM

## 2024-06-25 DIAGNOSIS — L98.9 LESION OF NECK: ICD-10-CM

## 2024-06-25 PROCEDURE — NC001 PR NO CHARGE: Performed by: DERMATOLOGY

## 2024-06-25 PROCEDURE — 88305 TISSUE EXAM BY PATHOLOGIST: CPT | Performed by: PATHOLOGY

## 2024-06-25 NOTE — PROGRESS NOTES
"INDIRECT SUPERVISION   [facility] Note     Patient Name: Saud Ardon  Encounter Date: 06/25/2024     Have you been cared for by a [facility] D[facility]atologist in the last 3 years and, if so, which description applies to you?    NO.   I am considered a \"new\" patient and must complete all patient intake questions. I am MALE/not capable of bearing children.    REVIEW OF SYSTEMS:  Have you recently had or currently have any of the following? Recent fever or chills? No  Any non-healing wound? YES, SCC on his back   PAST MEDICAL HISTORY:  Have you personally ever had or currently have any of the following?  If \"YES,\" then please provide more detail. Skin cancer (such as Melanoma, Basal Cell Carcinoma, Squamous Cell Carcinoma?  YES, currently on his back  Tuberculosis, HIV/AIDS, Hepatitis B or C: No  Radiation Treatment No   HISTORY OF IMMUNOSUPPRESSION:   Do you have a history of any of the following:  Systemic Immunosuppression such as Diabetes, Biologic or Immunotherapy, Chemotherapy, Organ Transplantation, Bone Marrow Transplantation?  No     Answering \"YES\" requires the addition of the dotphrase \"IMMUNOSUPPRESSED\" as the first diagnosis of the patient's visit.   FAMILY HISTORY:  Any \"first degree relatives\" (parent, brother, sister, or child) with the following?    Skin Cancer, Pancreatic or Other Cancer? No   PATIENT EXPERIENCE:    Do you want the Dermatologist to perform a COMPLETE skin exam today including a clinical examination under the \"bra and underwear\" areas?  Yes  If necessary, do we have your permission to call and leave a detailed message on your Preferred Phone number that includes your specific medical information?  NO      No Known Allergies   Current Outpatient Medications:     ALPRAZolam (XANAX) 0.5 mg tablet, Take 0.5 mg by mouth daily as needed for anxiety, Disp: , Rfl:     Aspirin 81 MG CAPS, Take 1 tablet by mouth in the morning, Disp: 90 capsule, Rfl: 1    dorzolamide-timolol " (COSOPT) 22.3-6.8 MG/ML ophthalmic solution, Every 12 hours, Disp: , Rfl:     doxycycline hyclate (VIBRA-TABS) 100 mg tablet, Take 1 tablet (100 mg total) by mouth 2 (two) times a day for 14 days, Disp: 28 tablet, Rfl: 0    fenofibrate (TRICOR) 54 MG tablet, Take 1 tablet (54 mg total) by mouth daily, Disp: 30 tablet, Rfl: 0    latanoprost (XALATAN) 0.005 % ophthalmic solution, INSTILL 1 DROP AT BEDTIME INTO BOTH EYES, Disp: , Rfl: 3    metoprolol succinate (TOPROL-XL) 100 mg 24 hr tablet, Take 1 tablet (100 mg total) by mouth 2 (two) times a day, Disp: 60 tablet, Rfl: 0    Multiple Vitamins-Minerals (MULTIVITAL) tablet, every 24 hours, Disp: , Rfl:     Multiple Vitamins-Minerals (PRESERVISION AREDS 2+MULTI VIT PO), take 2 tabs daily, Disp: , Rfl:     mupirocin (BACTROBAN) 2 % ointment, Apply topically daily During wound bandage changes, Disp: 100 g, Rfl: 0    nitroglycerin (NITROSTAT) 0.4 mg SL tablet, , Disp: , Rfl:     RHOPRESSA 0.02 % SOLN, INSTILL 1 DROP AT BEDTIME INTO BOTH EYES, Disp: , Rfl: 1    simvastatin (ZOCOR) 40 mg tablet, Take 1 tablet (40 mg total) by mouth daily at bedtime, Disp: 90 tablet, Rfl: 1          Whom besides the patient is providing clinical information about today's encounter?   Caretaker provided history (patient is institutionalized)    Physical Exam and Assessment/Plan by Diagnosis:  NEOPLASM OF UNCERTAIN BEHAVIOR OF SKIN    Physical Exam:  (Anatomic Location); (Size and Morphological Description); (Differential Diagnosis):  Specimen A: lateral posterior neck; skin; 3mm scouting punch; 89 y.o male with a 9.5 cm x 9 cm ulcerated plaque of 6 years duration, slowly growing larger   Specimen B: central posterior neck; skin; 3mm scouting punch; 89 y.o male with a 9.5 cm x 9 cm ulcerated plaque of 6 years duration, slowly growing larger   Specimen C: medial posterior neck; skin; shave; 89 y.o male with a 9.5 cm x 9 cm ulcerated plaque of 6 years duration, slowly growing larger   Specimen  "D:Right posterior shoulder; skin; shave; 1.2 x 1 cm pearly plaque; DDX BCC  Pertinent Positives:  Pertinent Negatives:    Additional History of Present Condition:  Patient is present with an 9.5 x 9 cm ulcerated plaque lesion behind his neck. Patient states it is present for 6 years    Assessment and Plan:  I have discussed with the patient that a sample of skin via a \"skin biopsy” would be potentially helpful to further make a specific diagnosis under the microscope.  Based on a thorough discussion of this condition and the management approach to it (including a comprehensive discussion of the known risks, side effects and potential benefits of treatment), the patient (family) agrees to implement the following specific plan:    Procedure:  Skin Biopsy.  After a thorough discussion of treatment options and risk/benefits/alternatives (including but not limited to local pain, scarring, dyspigmentation, blistering, possible superinfection, and inability to confirm a diagnosis via histopathology), verbal and written consent were obtained and portion of the rash was biopsied for tissue sample.  See below for consent that was obtained from patient and subsequent Procedure Note.  Discussed once results are in there is a possibility of referral to Dr. Wall due to the severity of the lesion.         PROCEDURE NOTE:  PUNCH BIOPSY      Performing Physician:     Anatomic Location; Clinical Description with size (cm); Pre-Op Diagnosis:    Specimen A: lateral posterior neck; skin; 3mm scouting punch; 89 y.o male with a 9.5 cm x 9 cm ulcerated plaque of 6 years duration, slowly growing larger   Specimen B: central posterior neck; skin; 3mm scouting punch; 89 y.o male with a 9.5 cm x 9 cm ulcerated plaque of 6 years duration, slowly growing larger          Anesthesia: 3:1 1% xylocaine with epi and 1-100,000 buffered      Topical anesthesia: None       Indications: To indicate diagnosis and management plan.    Procedure " Details     Patient informed of the risks (including bleeding,scaring and infection) and benefits of the procedure explained. Verbal and written informed consent obtained. The area was prepped and draped in the usual fashion. Anesthesia was obtained with 1% lidocaine with epinephrine. The skin was then stretched perpendicular to the skin tension lines and a punch biopsy to an appropriate sampling depth was obtained with a 3 mm punch with a forceps and iris scissors.     Hemostasis was obtained with Gelfoam x 0 sutures.     Complications:  None      Specimen has been sent for review by Dermatopathology.      Plan:  1. Instructed to keep the wound dry and covered for 24-48h and clean thereafter.  2. Warning signs of infection were reviewed.    3. Recommended that the patient use acetaminophen as needed for pain  4. No sutures placed today       Standard post-procedure care has been explained and has been included in written form within the patient's copy of Informed Consent.    PROCEDURE TANGENTIAL (SHAVE) BIOPSY NOTE:    Performing Physician:   Anatomic Location; Clinical Description with size (cm); Pre-Op Diagnosis:   Specimen C: medial posterior neck; skin; shave; 89 y.o male with a 9.5 cm x 9 cm ulcerated plaque of 6 years duration, slowly growing larger   Specimen D:Right posterior shoulder; skin; shave; 1.2 x 1 cm pearly plaque; DDX BCC          Post-op diagnosis: Same     Local anesthesia: 3:1 1% xylocaine with epi and 1-100,000 buffered     Topical anesthesia: None    Hemostasis: Aluminum chloride and electrocautery       After obtaining informed consent  at which time there was a discussion about the purpose of biopsy  and low risks of infection and bleeding.  The area was prepped and draped in the usual fashion. Anesthesia was obtained with 1% lidocaine with epinephrine. A shave biopsy to an appropriate sampling depth was obtained. The resulting wound was covered with surgical ointment and bandaged  "appropriately.     The patient tolerated the procedure well without complications and was without signs of functional compromise.      Specimen has been sent for review by Dermatopathology.    Standard post-procedure care has been explained and has been included in written form within the patient's copy of Informed Consent.    INFORMED CONSENT DISCUSSION AND POST-OPERATIVE INSTRUCTIONS FOR PATIENT    I.  RATIONALE FOR PROCEDURE  I understand that a skin biopsy allows the Dermatologist to test a lesion or rash under the microscope to obtain a diagnosis.  It usually involves numbing the area with numbing medication and removing a small piece of skin; sometimes the area will be closed with sutures. In this specific procedure, sutures are not usually needed.  If any sutures are placed, then they are usually need to be removed in 2 weeks or less.    I understand that my Dermatologist recommends that a skin \"shave\" biopsy be performed today.  A local anesthetic, similar to the kind that a dentist uses when filling a cavity, will be injected with a very small needle into the skin area to be sampled.  The injected skin and tissue underneath \"will go to sleep” and become numb so no pain should be felt afterwards.  An instrument shaped like a tiny \"razor blade\" (shave biopsy instrument) will be used to cut a small piece of tissue and skin from the area so that a sample of tissue can be taken and examined more closely under the microscope.  A slight amount of bleeding will occur, but it will be stopped with direct pressure and a pressure bandage and any other appropriate methods.  I understands that a scar will form where the wound was created.  Surgical ointment will be applied to help protect the wound.  Sutures are not usually needed.    II.  RISKS AND POTENTIAL COMPLICATIONS   I understand the risks and potential complications of a skin biopsy include but are not limited to the " "following:  Bleeding  Infection  Pain  Scar/keloid  Skin discoloration  Incomplete Removal  Recurrence  Nerve Damage/Numbness/Loss of Function  Allergic Reaction to Anesthesia  Biopsies are diagnostic procedures and based on findings additional treatment or evaluation may be required  Loss or destruction of specimen resulting in no additional findings    My Dermatologist has explained to me the nature of the condition, the nature of the procedure, and the benefits to be reasonably expected compared with alternative approaches.  My Dermatologist has discussed the likelihood of major risks or complications of this procedure including the specific risks listed above, such as bleeding, infection, and scarring/keloid.  I understand that a scar is expected after this procedure.  I understand that my physician cannot predict if the scar will form a \"keloid,\" which extends beyond the borders of the wound that is created.  A keloid is a thick, painful, and bumpy scar.  A keloid can be difficult to treat, as it does not always respond well to therapy, which includes injecting cortisone directly into the keloid every few weeks.  While this usually reduces the pain and size of the scar, it does not eliminate it.      I understand that photographs may be taken before and after the procedure.  These will be maintained as part of the medical providers confidential records and may not be made available to me.  I further authorize the medical provider to use the photographs for teaching purposes or to illustrate scientific papers, books, or lectures if in his/her judgment, medical research, education, or science may benefit from its use.    I have had an opportunity to fully inquire about the risks and benefits of this procedure and its alternatives.   I have been given ample time and opportunity to ask questions and to seek a second opinion if I wished to do so.  I acknowledge that there have specifically been no guarantees as to " "the cosmetic results from the procedure.  I am aware that with any procedure there is always the possibility of an unexpected complication.    III. POST-PROCEDURAL CARE (WHAT YOU WILL NEED TO DO \"AFTER THE BIOPSY\" TO OPTIMIZE HEALING)    Keep the area clean and dry.  Try NOT to remove the bandage or get it wet for the first 24 hours.    Gently clean the area and apply surgical ointment (such as Vaseline petrolatum ointment, which is available \"over the counter\" and not a prescription) to the biopsy site for up to 2 weeks straight.  This acts to protect the wound from the outside world.      Sutures are not usually placed in this procedure.  If any sutures were placed, return for suture removal as instructed (generally 1 week for the face, 2 weeks for the body).      Take Acetaminophen (Tylenol) for discomfort, if no contraindications.  Ibuprofen or aspirin could make bleeding worse.    Call our office immediately for signs of infection: fever, chills, increased redness, warmth, tenderness, discomfort/pain, or pus or foul smell coming from the wound.    WHAT TO DO IF THERE IS ANY BLEEDING?  If a small amount of bleeding is noticed, place a clean cloth over the area and apply firm pressure for ten minutes.  Check the wound after 10 minutes of direct pressure.  If bleeding persists, try one more time for an additional 10 minutes of direct pressure on the area.  If the bleeding becomes heavier or does not stop after the second attempt, or if you have any other questions about this procedure, then please call your [facility]'s Dermatologist by calling [phone] (SKIN).     I hereby acknowledge that I have reviewed and verified the site with my Dermatologist and have requested and authorized my Dermatologist to proceed with the procedure.            Hoda Murry MD  Dermatology, PGY-3    "

## 2024-06-25 NOTE — PATIENT INSTRUCTIONS
"SKIN SHAVE BIOPSY  Rationale for Procedure  A skin shave biopsy allows the dermatologist to further examine a lesion or rash under the microscope to potentially obtain a more specific diagnosis.  It usually involves numbing the area with numbing medication and removing a small piece of skin.  Usually, the area will be closed with sutures at the end of the procedure. Sutures are not usually needed.   Description of Procedure  We would like to perform a skin shave biopsy today.  A local anesthetic, similar to the kind that a dentist uses when filling a cavity, will be injected with a very small needle into the skin area to be sampled.  The injected skin and tissue underneath should “go to sleep” and become numbed so that no further pain should be felt.  An instrument shaped like a tiny \"razor blade\" (i.e., the shave biopsy instrument) will be used to cut a small round piece of skin and tissue from the area so that the sample may be taken and examined more closely under the microscope.  A slight amount of bleeding will occur, but it is usually stopped with direct pressure, chemical cautery, and/or a pressure bandage; rarely, electrocautery and other means of intervention may be necessary to help stop the bleeding.  Sutures are not usually needed.  Surgical “Vaseline-type” ointment will also applied after the procedure to help create a barrier between the wound and the outside world.    Risks and Potential Complications  While the advantage of a skin shave biopsy is that it allows us to potentially examine the skin more closely under the microscope, there are some risks and potential complications that include but are not limited to the following:  Bleeding  Infection  Pain  Scar/keloid  Skin discoloration  Incomplete removal of the lesion or rash being sampled (in other words, this procedure is intended as a sampling and is not considered a definitive treatment)  Recurrence of the lesion or rash being sampled  Nerve " Damage/Numbness/Loss of Function  Allergic Reaction to Anesthesia  Biopsies are diagnostic procedures and, based on findings, additional treatment or evaluation may be required (in other words, this procedure is intended as a sampling and is not considered a definitive treatment)  Loss or destruction of the sample specimen could result in no additional findings  The person at the microscope may not be able to provide additional information other than what we already know or suspect  What You Will Need to Do After the Procedure  Keep the area clean and dry.  Try NOT to remove the bandage for the first 24 hours.  Gently clean the area and apply Vaseline ointment (this is over the counter and not a prescription) to the biopsy site for up to 2 weeks.    Generally, sutures are not needed.  If any sutures were placed, return for suture removal as instructed (generally 1 week for the face, 2 weeks for the body).  Take Acetaminophen (Tylenol) for discomfort, if no contraindications.  Do NOT take Ibuprofen or aspirin unless specifically told to do so by your Dermatologist because these medications can make bleeding worse.  Call our office immediately for signs of infection: fever, chills, increased redness, warmth, tenderness, discomfort/pain, or pus or foul smell coming from the wound.  If a small amount of bleeding is noticed, place a clean cloth over the area and apply firm pressure for ten minutes.  Check the wound ONLY after 10 minutes of direct pressure; do not cheat and sneak a peak, as that does not count.  If bleeding persists after 10 minutes of legitimate direct pressure, then try one more round of direct pressure for an additional 10 minutes to the area.  Should the bleeding become heavier or not stop after the second attempt, call [facility] directly at [phone] (SKIN) or, if after hours, go to your local Emergency Room/Emergency Department.    SKIN PUNCH BIOPSY    Rationale for Procedure  A  "skin punch biopsy allows the dermatologist to further examine a lesion or rash under the microscope to potentially obtain a more specific diagnosis.  It usually involves numbing the area with numbing medication and removing a small piece of skin.  Usually, the area will be closed with sutures at the end of the procedure.   Description of Procedure  We would like to perform a skin punch biopsy today.  A local anesthetic, similar to the kind that a dentist uses when filling a cavity, will be injected with a very small needle into the skin area to be sampled.  The injected skin and tissue underneath should “go to sleep” and become numbed so that no further pain should be felt.  An instrument shaped like a tiny \"cookie cutter\" (i.e., the punch biopsy instrument) will be used to cut a small round piece of skin and tissue from the area so that the sample may be taken and examined more closely under the microscope.  A slight amount of bleeding will occur, but it is usually stopped with direct pressure, chemical cautery, and/or a pressure bandage; rarely, electrocautery and other means of intervention may be necessary to help stop the bleeding.  A few sutures/stitches are usually applied to help aid in wound healing.  Surgical “Vaseline-type” ointment will also applied after the procedure to help create a barrier between the wound and the outside world.    Risks and Potential Complications  While the advantage of a skin punch biopsy is that it allows us to potentially examine the skin more closely under the microscope, there are some risks and potential complications that include but are not limited to the following:  Bleeding  Infection  Pain  Scar/keloid  Skin discoloration  Incomplete removal of the lesion or rash being sampled (in other words, this procedure is intended as a sampling and is not considered a definitive treatment)  Recurrence of the lesion or rash being sampled  Nerve Damage/Numbness/Loss of " Function  Allergic Reaction to Anesthesia  Biopsies are diagnostic procedures and, based on findings, additional treatment or evaluation may be required (in other words, this procedure is intended as a sampling and is not considered a definitive treatment)  Loss or destruction of the sample specimen could result in no additional findings  The person at the microscope may not be able to provide additional information other than what we already know or suspect  What You Will Need to Do After the Procedure  Keep the area clean and dry.  Try NOT to remove the bandage for the first 24 hours.  Gently clean the area and apply Vaseline ointment (this is over the counter and not a prescription) to the biopsy site for up to 2 weeks.    If any sutures were placed, return for suture removal as instructed (generally 1 week for the face, 2 weeks for the body).  Take Acetaminophen (Tylenol) for discomfort, if no contraindications.  Do NOT take Ibuprofen or aspirin unless specifically told to do so by your Dermatologist because these medications can make bleeding worse.  Call our office immediately for signs of infection: fever, chills, increased redness, warmth, tenderness, discomfort/pain, or pus or foul smell coming from the wound.  If a small amount of bleeding is noticed, place a clean cloth over the area and apply firm pressure for ten minutes.  Check the wound ONLY after 10 minutes of direct pressure; do not cheat and sneak a peak, as that does not count.  If bleeding persists after 10 minutes of legitimate direct pressure, then try one more round of direct pressure for an additional 10 minutes to the area.  Should the bleeding become heavier or not stop after the second attempt, call [facility] directly at [phone] (SKIN) or, if after hours, go to your local Emergency Room/Emergency Department.

## 2024-06-28 DIAGNOSIS — I25.10 CHRONIC CORONARY ARTERY DISEASE: Primary | ICD-10-CM

## 2024-06-28 PROCEDURE — 88305 TISSUE EXAM BY PATHOLOGIST: CPT | Performed by: PATHOLOGY

## 2024-06-28 RX ORDER — ALPRAZOLAM 0.5 MG/1
0.5 TABLET ORAL DAILY PRN
Refills: 0 | OUTPATIENT
Start: 2024-06-28

## 2024-06-28 RX ORDER — NITROGLYCERIN 0.4 MG/1
TABLET SUBLINGUAL
Qty: 25 TABLET | Refills: 1 | Status: SHIPPED | OUTPATIENT
Start: 2024-06-28

## 2024-06-28 NOTE — TELEPHONE ENCOUNTER
I called granddaughter to ask about medications as it appears that he has not had alprazolam filled for 4 years.  She asked me not to fill that.  And she told me that the nitroglycerin would go to [facility] pharmacy.

## 2024-07-01 ENCOUNTER — PATIENT OUTREACH (OUTPATIENT)
Dept: HEMATOLOGY ONCOLOGY | Facility: CLINIC | Age: 89
End: 2024-07-01

## 2024-07-01 ENCOUNTER — DOCUMENTATION (OUTPATIENT)
Dept: HEMATOLOGY ONCOLOGY | Facility: CLINIC | Age: 89
End: 2024-07-01

## 2024-07-01 DIAGNOSIS — C44.519 BASAL CELL CARCINOMA (BCC) OF SKIN OF OTHER PART OF TORSO: Primary | ICD-10-CM

## 2024-07-01 NOTE — PROGRESS NOTES
Outreach to patient, left voicemail introducing myself and role as Oncology Nurse Navigator to assist with coordination of cancer care, preparation for upcoming appointment, be a point of contact prior to oncology consult,  provide support and connect with available resources.  Provided contact information. Call back requested.      Future Appointments   Date Time Provider Department Center   9/25/2024 10:40 AM Annia Edwards DO [facility]

## 2024-07-01 NOTE — RESULT ENCOUNTER NOTE
DERMATOPATHOLOGY RESULT NOTE    Results reviewed by ordering physician.  Called patient to personally discuss results. Discussed results with patient's granddaughter Leola. Will discuss at tumor board Friday. Will place referrals for medical, surgical, and radiation oncology for consideration of options regarding treatment of large BCC of left posterior neck/upper back.    Will get patient scheduled for excision of site D with dermatology.        Instructions for Clinical Derm Team:   (remember to route Result Note to appropriate staff):    Call patient and schedule for excision of site D during resident excision clinic.     Result & Plan by Specimen:    Specimen A: malignant  Plan: referral to Med onc, rad onc, surg onc      Specimen B: malignant  Plan: referral to Med onc, rad onc, surg onc      Specimen C: malignant  Plan: referral to Med onc, rad onc, surg onc      Specimen D: malignant  Plan: excision    Tissue Exam: [id number]  Order: [id number]   Collected 6/25/2024 10:06 AM      Status: Final result      Visible to patient: Yes (seen)      Dx: Neoplasm of uncertain behavior    0 Result Notes     Component   Case Report  Surgical Pathology Report                         Case: [id number]                                  Authorizing Provider:  Hoda Murry MD           Collected:           06/25/2024 1006              Ordering Location:     [facility]      Received:            06/25/2024 [address] [place] [address]                                                                      Pathologist:           Fransisco Roberts MD                                                      Specimens:   A) - Skin, Other, A:  lateral posterior neck;                                                       B) - Skin, Other, B: central posterior neck                                                         C) - Skin, Other, C: medial posterior neck                                                 "         D) - Skin, Other, D: Right posterior shoulder                                            Final Diagnosis  A. Skin, lateral posterior neck:   BASAL CELL CARCINOMA, INFILTRATIVE AND NODULAR    Note: This lesion is also ulcerated and crusted.    B. Skin, central posterior neck:   BASAL CELL CARCINOMA, MORPHEAFORM TYPE    C. Skin, medial posterior neck:   BASAL CELL CARCINOMA, INFILTRATIVE AND NODULAR    D. Skin, right posterior shoulder:   BASAL CELL CARCINOMA, NODULAR    Electronically signed by Fransisco Roberts MD on 6/28/2024 at  1:25 PM  Note   Interpretation performed at [facility] [address] [place] [id number]    Additional Information   All reported additional testing was performed with appropriately reactive controls.  These tests were developed and their performance characteristics determined by [facility] or appropriate performing facility, though some tests may be performed on tissues which have not been validated for performance characteristics (such as staining performed on alcohol exposed cell blocks and decalcified tissues).  Results should be interpreted with caution and in the context of the patients' clinical condition. These tests may not be cleared or approved by the U.S. Food and Drug Administration, though the FDA has determined that such clearance or approval is not necessary. These tests are used for clinical purposes and they should not be regarded as investigational or for research. This laboratory has been approved by CLIA 88, designated as a high-complexity laboratory and is qualified to perform these tests.  .  Gross Description   A. The specimen is received in formalin, labeled with the patient's name and hospital number, and is designated \" Left posterior neck\".  The specimen consists of a tan portion of skin measuring 0.3 cm in diameter, excised to a depth of 0.5 centimeters.  The skin surface appears erythematous.  The margin of " "resection is inked green.  The skin surface is inked red.  Entirely submitted. One cassette.  Between sponges.  B. The specimen is received in formalin, labeled with the patient's name and hospital number, and is designated \" central posterior neck\".  The specimen consists of a tan portion of skin measuring 0.2 to 0.3 cm in diameter, excised to a depth of 0.6 cm.  The skin surface appears erythematous and the underlying tissue is firm.  The margin of resection is inked green.  The skin surface is inked red.  Entirely submitted. One cassette.  Between sponges.  C. The specimen is received in formalin, labeled with the patient's name and hospital number, and is designated \" medial posterior neck\".  The specimen consists of a tan superficial portion of skin measuring 1.5 x 0.9 x 0.1 cm.  The skin surface appears erythematous and possibly ulcerated.  The margin of resection is inked green.  The skin surface is inked red.  The specimen is trisected.  Entirely submitted. One cassette.  Between sponges.  D. The specimen is received in formalin, labeled with the patient's name and hospital number, and is designated \" right posterior shoulder\".  The specimen consists of a tan superficial shaving of skin measuring 1.3 x 0.9 x 0.1 cm.  The skin surface appears keratotic and sloughing.  The margin of resection is inked green.  The skin surface is inked red.  The specimen is trisected.  Entirely submitted. One cassette.  Between sponges.    Note: The estimated total formalin fixation time based upon information provided by the submitting clinician and the standard processing schedule is under 72 hours.    [facility]  Clinical Information   ATTN DERMPATH    Specimen A: lateral posterior neck; skin; 3mm scouting punch; 89 y.o male with a 9.5 cm x 9 cm ulcerated plaque of 6 years duration, slowly growing larger  Specimen B: central posterior neck; skin; 3mm scouting punch; 89 y.o male with a 9.5 cm x 9 cm ulcerated plaque of 6 years " duration, slowly growing larger  Specimen C: medial posterior neck; skin; Shave; 89 y.o male with a 9.5 cm x 9 cm ulcerated plaque of 6 years duration, slowly growing larger  Specimen D:Right posterior shoulder; skin; shave; 1.2 x 1 cm pearly plaque; DDX BCC  Resulting Agency [id number]          Specimen Collected: 06/25/24 10:06 AM Last Resulted: 06/28/24  1:26 PM

## 2024-07-01 NOTE — PROGRESS NOTES
Referral received/ Chart reviewed for work up completed     Imaging completed: n/a    Pathology completed:  6/25/24 at [facility]  Final Diagnosis   A. Skin, lateral posterior neck:   BASAL CELL CARCINOMA, INFILTRATIVE AND NODULAR     Note: This lesion is also ulcerated and crusted.     B. Skin, central posterior neck:   BASAL CELL CARCINOMA, MORPHEAFORM TYPE     C. Skin, medial posterior neck:   BASAL CELL CARCINOMA, INFILTRATIVE AND NODULAR     D. Skin, right posterior shoulder:   BASAL CELL CARCINOMA, NODULAR         All records needed are in patients chart. No records retrieval needed at this time.

## 2024-07-02 ENCOUNTER — PATIENT OUTREACH (OUTPATIENT)
Dept: HEMATOLOGY ONCOLOGY | Facility: CLINIC | Age: 89
End: 2024-07-02

## 2024-07-02 NOTE — PROGRESS NOTES
Initial outreach. Returned call received from Leola Andre, patient's granddaughter-in-law. Introduced myself and explained the role of an Oncology Nurse Navigator to assist with coordination of cancer care, preparation for upcoming appointment, be a point of contact prior to oncology consult, provide support and connect him with available resources. Discussed surgical oncology, medical oncology and radiation oncology referrals placed by Dr Murry for basal cell carcinoma of his left posterior neck/upper back area. Patient also has basal cell carcinoma in the right posterior shoulder area which will be excised by dermatology. Explained I will be their main contact until they are established with their team and a treatment plan is established. Patient's main support system is his grandson, Fransisco and herself. She states that patient does drive but she helps with taking him to his medical appointments.     Introduced Ria, and explained she will be his patient navigator, who will reach out after the first consult to introduce themselves. The PN will walk alongside to provide support, make sure he has a good understanding of the plan and schedule and to connect with additional resources if/when needed.     Reviewed preferred sequence of oncology appointments, informing her that patient should be seen by surgical and medical oncology first and then radiation oncology. Assessed for barriers of care. My direct contact information provided. Leola is aware that she can call me should she need any further assistance. She was appreciative of information provided.     Cutaneous Symptoms:   Rash No  Itching No  Wounds Yes, describe: daily dressing change to open areas. Daily gentle cleansing w/Dove soap, then applying mupirocin and covering with gauze bandage. Occasional bleeding noted with wound care to large left post neck/upper back wound  Other:

## 2024-07-03 PROBLEM — C44.41 BASAL CELL CARCINOMA (BCC) OF SKIN OF NECK: Status: ACTIVE | Noted: 2024-07-03

## 2024-07-03 PROBLEM — C44.612 BASAL CELL CARCINOMA (BCC) OF RIGHT SHOULDER: Status: ACTIVE | Noted: 2024-07-03

## 2024-07-05 ENCOUNTER — CONSULT (OUTPATIENT)
Age: 89
End: 2024-07-05
Payer: COMMERCIAL

## 2024-07-05 VITALS
DIASTOLIC BLOOD PRESSURE: 70 MMHG | HEART RATE: 68 BPM | OXYGEN SATURATION: 96 % | WEIGHT: 121.6 LBS | TEMPERATURE: 97.6 F | RESPIRATION RATE: 16 BRPM | BODY MASS INDEX: 23.87 KG/M2 | HEIGHT: 60 IN | SYSTOLIC BLOOD PRESSURE: 126 MMHG

## 2024-07-05 DIAGNOSIS — C44.612 BASAL CELL CARCINOMA (BCC) OF RIGHT SHOULDER: ICD-10-CM

## 2024-07-05 DIAGNOSIS — C44.519 BASAL CELL CARCINOMA (BCC) OF SKIN OF OTHER PART OF TORSO: ICD-10-CM

## 2024-07-05 DIAGNOSIS — C44.41 BASAL CELL CARCINOMA (BCC) OF SKIN OF NECK: Primary | ICD-10-CM

## 2024-07-05 PROCEDURE — 99205 OFFICE O/P NEW HI 60 MIN: CPT | Performed by: SURGERY

## 2024-07-05 NOTE — PROGRESS NOTES
Surgical Oncology Consult Note       [facility] [address] [place] [address]  [phone]    Saud RODRIGUEZ Reva  6/21/1934  [id number]      No chief complaint on file.       Assessment & Plan    1. Basal cell carcinoma (BCC) of skin of neck  2. Basal cell carcinoma (BCC) of right shoulder  3. Basal cell carcinoma (BCC) of skin of other part of torso  -     Ambulatory Referral to Surgical Oncology       Oncology History   Basal cell carcinoma (BCC) of skin of neck   6/25/2024 Initial Diagnosis    Basal cell carcinoma (BCC) of skin of neck     6/25/2024 Initial Diagnosis    Basal cell carcinoma (BCC) of right shoulder     6/25/2024 Biopsy    SKIN BIOPSIES    A. Skin, lateral posterior neck:   BASAL CELL CARCINOMA, INFILTRATIVE AND NODULAR     Note: This lesion is also ulcerated and crusted.     B. Skin, central posterior neck:   BASAL CELL CARCINOMA, MORPHEAFORM TYPE     C. Skin, medial posterior neck:   BASAL CELL CARCINOMA, INFILTRATIVE AND NODULAR     D. Skin, right posterior shoulder:   BASAL CELL CARCINOMA, NODULAR        Basal cell carcinoma (BCC) of right shoulder   6/25/2024 Initial Diagnosis    Basal cell carcinoma (BCC) of right shoulder          Is a very pleasant 89 old gentleman is here with his granddaughter with known ongoing large ulcerated skin lesion in his left posterior shoulder/neck at least for last 5 years.  This was noticed by his granddaughter recently who promptly seeked medical attention.  The area of skin ulceration is at least 20 x 20 cm. he also has small lesion posterior right shoulder region approximately the second lesion is less than 2 cm.  He states that he is healthy he has a history of heart attack several years back.  He is alert awake oriented x 3 comfortable and independent living.  He is here with his granddaughter to discuss further management           Review of Systems   Constitutional:  Negative for activity change, chills, diaphoresis, fatigue and fever.   HENT:  Negative for ear pain, hearing loss, sinus pressure and sore throat.    Eyes:  Negative for pain and visual disturbance.   Respiratory:  Negative for apnea, cough and shortness of breath.    Cardiovascular:  Negative for chest pain, palpitations and leg swelling.   Gastrointestinal:  Negative for abdominal pain, nausea and vomiting.   Genitourinary:  Negative for dysuria and hematuria.   Musculoskeletal:  Negative for arthralgias, back pain, gait problem, joint swelling, myalgias and neck pain.   Skin:  Positive for rash. Negative for color change.   Neurological:  Negative for dizziness, seizures and syncope.   Hematological:  Negative for adenopathy.   Psychiatric/Behavioral:  Negative for agitation, behavioral problems, confusion, decreased concentration, dysphoric mood and hallucinations. The patient is not nervous/anxious.    All other systems reviewed and are negative.       Past Medical History:      Patient Active Problem List   Diagnosis    Anxiety    Aortic valve stenosis    Chronic coronary artery disease    Carotid artery disease (HCC)    Coronary atherosclerosis    Osteoarthritis    Essential hypertension    Hepatitis A    Hyperlipidemia    Hydrocele    Hypertriglyceridemia    Hyperplasia of prostate without lower urinary tract symptoms (LUTS)    Insomnia    Other specified glaucoma    Vitamin D deficiency    Type 2 diabetes mellitus with stage 3a chronic kidney disease, without long-term current use of insulin (HCC)    Onychomycosis of toenail    Overweight (BMI 25.0-29.9)    DNR (do not resuscitate)    Bunion    Moderate major depression (HCC)    Callus of foot    Stage 3a chronic kidney disease (HCC)    Lesion of neck    Lyme disease    Basal cell carcinoma (BCC) of skin of neck    Basal cell carcinoma (BCC) of right shoulder        Past Medical History:   Diagnosis Date    Anxiety      CAD (coronary artery disease)     Capsular cataract     mature    Chronic ulcer of skin (HCC) 5/18/2015    DJD (degenerative joint disease)     Encounter for routine adult medical exam with abnormal findings 8/21/2019    Hepatitis A     Hydrocele     Hyperglycemia     Hyperlipidemia     Hypertension     Impaired fasting glucose 4/30/2014    Myocardial infarct ([facility]) 2007    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[facility])     Potassium (K) excess 12/27/2019    Prostatic hyperplasia     Stage 3a chronic kidney disease ([facility]) 5/4/2022    Type 2 diabetes mellitus without complication, without long-term current use of insulin ([facility]) 3/6/2019        Past Surgical History:   Procedure Laterality Date    ANGIOPLASTY  2008        Family History   Problem Relation Age of Onset    Coronary artery disease Father         Social History     Socioeconomic History    Marital status:      Spouse name: Not on file    Number of children: Not on file    Years of education: Not on file    Highest education level: Not on file   Occupational History    Not on file   Tobacco Use    Smoking status: Never     Passive exposure: Never    Smokeless tobacco: Never   Vaping Use    Vaping status: Never Used   Substance and Sexual Activity    Alcohol use: No    Drug use: No    Sexual activity: Not Currently     Partners: Female   Other Topics Concern    Not on file   Social History Narrative    No risk for falls    Activity level: sedentary    No sleep changes    No animals    No firearms    Always uses a seatbelt     Social Determinants of Health     Financial Resource Strain: Medium Risk (9/22/2023)    Overall Financial Resource Strain (CARDIA)     Difficulty of Paying Living Expenses: Somewhat hard   Food Insecurity: No Food Insecurity (9/21/2022)    Hunger Vital Sign     Worried About Running Out of Food in the Last Year: Never true     Ran Out of Food in the Last Year: Never true   Transportation Needs: No Transportation Needs (9/22/2023)    PRAPARE -  Transportation     Lack of Transportation (Medical): No     Lack of Transportation (Non-Medical): No   Physical Activity: Insufficiently Active (9/21/2022)    Exercise Vital Sign     Days of Exercise per Week: 2 days     Minutes of Exercise per Session: 30 min   Stress: Stress Concern Present (9/21/2022)    Citizen of [place] of Occupational Health - Occupational Stress Questionnaire     Feeling of Stress : To some extent   Social Connections: Moderately Isolated (9/21/2022)    Social Connection and Isolation Panel [NHANES]     Frequency of Communication with Friends and Family: More than three times a week     Frequency of Social Gatherings with Friends and Family: More than three times a week     Attends Gnosticist Services: More than 4 times per year     Active Member of Clubs or Organizations: No     Attends Club or Organization Meetings: Never     Marital Status:    Intimate Partner Violence: Not At Risk (9/21/2022)    Humiliation, Afraid, Rape, and Kick questionnaire     Fear of Current or Ex-Partner: No     Emotionally Abused: No     Physically Abused: No     Sexually Abused: No   Housing Stability: Unknown (9/21/2022)    Housing Stability Vital Sign     Unable to Pay for Housing in the Last Year: No     Number of Places Lived in the Last Year: Not on file     Unstable Housing in the Last Year: No        Current Outpatient Medications:     ALPRAZolam (XANAX) 0.5 mg tablet, Take 0.5 mg by mouth daily as needed for anxiety, Disp: , Rfl:     Aspirin 81 MG CAPS, Take 1 tablet by mouth in the morning, Disp: 90 capsule, Rfl: 1    dorzolamide-timolol (COSOPT) 22.3-6.8 MG/ML ophthalmic solution, Every 12 hours, Disp: , Rfl:     doxycycline hyclate (VIBRA-TABS) 100 mg tablet, Take 1 tablet (100 mg total) by mouth 2 (two) times a day for 14 days, Disp: 28 tablet, Rfl: 0    fenofibrate (TRICOR) 54 MG tablet, Take 1 tablet (54 mg total) by mouth daily, Disp: 30 tablet, Rfl: 0    latanoprost (XALATAN) 0.005 %  ophthalmic solution, INSTILL 1 DROP AT BEDTIME INTO BOTH EYES, Disp: , Rfl: 3    metoprolol succinate (TOPROL-XL) 100 mg 24 hr tablet, Take 1 tablet (100 mg total) by mouth 2 (two) times a day, Disp: 60 tablet, Rfl: 0    Multiple Vitamins-Minerals (MULTIVITAL) tablet, every 24 hours, Disp: , Rfl:     Multiple Vitamins-Minerals (PRESERVISION AREDS 2+MULTI VIT PO), take 2 tabs daily, Disp: , Rfl:     mupirocin (BACTROBAN) 2 % ointment, Apply topically daily During wound bandage changes, Disp: 100 g, Rfl: 0    nitroglycerin (NITROSTAT) 0.4 mg SL tablet, Take one under tongue for chest pain. Can repet in 5 minutes if necessary., Disp: 25 tablet, Rfl: 1    RHOPRESSA 0.02 % SOLN, INSTILL 1 DROP AT BEDTIME INTO BOTH EYES, Disp: , Rfl: 1    simvastatin (ZOCOR) 40 mg tablet, Take 1 tablet (40 mg total) by mouth daily at bedtime, Disp: 90 tablet, Rfl: 1   No Known Allergies    Physical Exam:   There were no vitals filed for this visit.  Physical Exam  Constitutional:       Appearance: Normal appearance. He is normal weight.   HENT:      Head: Normocephalic.      Nose: Nose normal.      Mouth/Throat:      Mouth: Mucous membranes are moist.      Pharynx: Oropharynx is clear.   Eyes:      Conjunctiva/sclera: Conjunctivae normal.      Pupils: Pupils are equal, round, and reactive to light.   Cardiovascular:      Rate and Rhythm: Normal rate and regular rhythm.      Pulses: Normal pulses.   Pulmonary:      Effort: Pulmonary effort is normal. No respiratory distress.      Breath sounds: Normal breath sounds. No wheezing or rales.   Abdominal:      General: Abdomen is flat. Bowel sounds are normal.      Palpations: Abdomen is soft.   Musculoskeletal:      Cervical back: No rigidity.      Right lower leg: No edema.      Left lower leg: No edema.   Lymphadenopathy:      Cervical: No cervical adenopathy.   Skin:     General: Skin is warm.      Coloration: Skin is not jaundiced.             Comments: Large posterior neck/shoulder lesion  area involved at least 25 x 20 cm.  Not attached to the deep structures tender to touch.  He also has 1.5 cm posterior shoulder lesion separate.  No regional adenopathy   Neurological:      General: No focal deficit present.      Mental Status: He is alert and oriented to person, place, and time.      Cranial Nerves: No cranial nerve deficit.   Psychiatric:         Mood and Affect: Mood normal.         Behavior: Behavior normal.         Thought Content: Thought content normal.         Judgment: Judgment normal.         Results:   A. Skin, lateral posterior neck:   BASAL CELL CARCINOMA, INFILTRATIVE AND NODULAR     Note: This lesion is also ulcerated and crusted.     B. Skin, central posterior neck:   BASAL CELL CARCINOMA, MORPHEAFORM TYPE     C. Skin, medial posterior neck:   BASAL CELL CARCINOMA, INFILTRATIVE AND NODULAR     D. Skin, right posterior shoulder:   BASAL CELL CARCINOMA, NODULAR     Media Information      Document Information  Left shoulder/posterior neck basal cell carcinoma    Discussion/Summary:   Here we have very pleasant 89-year-old gentleman fairly healthy with history of myocardial infarction in 2007 with ongoing nonhealing chronic large basal cell carcinoma of the back.  He is here with his granddaughter for further workup and management.  We did discuss in detail the nature of his skin cancers including basal cell squamous cell as well as melanomas.  Further options were discussed including do nothing as well as surgical options.  He wants to take care of this.  First we will obtain cardiology evaluation to make sure he is capable of undergoing any surgical intervention.  We will also obtain staging workup in the form of CT chest abdomen pelvis preferably with IV contrast his creatinine is lingering around 1.22-1.33.  We will also obtain plastic surgery consultation for wound closure possibly with a skin substitute versus autologous skin or tissue transfer hold patient's and.  His  granddaughter's questions were answered to their satisfaction.  We will follow within a month with all this above-mentioned workup.    Advance Care Planning/Advance Directives:  I Mary Carmen Early MD discussed the disease status, and treatment plans with Saud Ardon today 07/05/24 and will follow-up with the patient.

## 2024-07-06 ENCOUNTER — APPOINTMENT (OUTPATIENT)
Dept: LAB | Facility: CLINIC | Age: 89
End: 2024-07-06
Payer: COMMERCIAL

## 2024-07-06 DIAGNOSIS — C44.41 BASAL CELL CARCINOMA (BCC) OF SKIN OF NECK: ICD-10-CM

## 2024-07-06 LAB
ANION GAP SERPL CALCULATED.3IONS-SCNC: 8 MMOL/L (ref 4–13)
BUN SERPL-MCNC: 20 MG/DL (ref 5–25)
CALCIUM SERPL-MCNC: 8.9 MG/DL (ref 8.4–10.2)
CHLORIDE SERPL-SCNC: 106 MMOL/L (ref 96–108)
CO2 SERPL-SCNC: 27 MMOL/L (ref 21–32)
CREAT SERPL-MCNC: 0.95 MG/DL (ref 0.6–1.3)
GFR SERPL CREATININE-BSD FRML MDRD: 70 ML/MIN/1.73SQ M
GLUCOSE P FAST SERPL-MCNC: 96 MG/DL (ref 65–99)
POTASSIUM SERPL-SCNC: 4.5 MMOL/L (ref 3.5–5.3)
SODIUM SERPL-SCNC: 141 MMOL/L (ref 135–147)

## 2024-07-06 PROCEDURE — 36415 COLL VENOUS BLD VENIPUNCTURE: CPT

## 2024-07-06 PROCEDURE — 80048 BASIC METABOLIC PNL TOTAL CA: CPT

## 2024-07-09 ENCOUNTER — OFFICE VISIT (OUTPATIENT)
Dept: PLASTIC SURGERY | Facility: CLINIC | Age: 89
End: 2024-07-09
Payer: COMMERCIAL

## 2024-07-09 DIAGNOSIS — C44.41 BASAL CELL CARCINOMA (BCC) OF SKIN OF NECK: ICD-10-CM

## 2024-07-09 PROCEDURE — 99203 OFFICE O/P NEW LOW 30 MIN: CPT | Performed by: PHYSICIAN ASSISTANT

## 2024-07-09 NOTE — PROGRESS NOTES
H&P Exam - Saud Del Toror 89 y.o. male MRN: [id number]    Unit/Bed#:  Encounter: [id number]    Assessment/ Plan:    Patient is a 89-year-old male who is referred to our office by Dr. Early for evaluation of a biopsy-proven basal cell carcinoma of the posterior neck/shoulder.    I discussed with the patient and his granddaughter who are in the room.  I stated that with a basal cell carcinoma of the size, he would likely require skin grafting and potential muscle flap.  The patient is unsure that he wants to proceed with surgery due to his age.  We did discuss pros and cons of surgery extensively.  He will discuss this with his family and return to our office in approximately 1 week to discuss surgery if he would like to move forward.        History of Present Illness   The patient reports that he has had a skin lesion of his posterior neck/back for approximately 5 to 6 years.  He has been covering the area with Kleenex.  This was recently discovered by his granddaughter, who was alarmed and sought care for the patient.  This was biopsied by Dr. Early and found to be basal cell carcinoma.  There is a separate, noncontiguous additional area of basal cell carcinoma on the opposite shoulder.    Upon evaluation, the patient has a large, ulcerated area measuring at least 20 cm x 20 cm on the posterior neck/shoulder.  Please see photos in media.    Review of Systems  A 12 point review of systems was completed and is negative except as per HPI    Historical Information   Past Medical History:   Diagnosis Date    Anxiety     CAD (coronary artery disease)     Capsular cataract     mature    Chronic ulcer of skin (HCC) 5/18/2015    DJD (degenerative joint disease)     Encounter for routine adult medical exam with abnormal findings 8/21/2019    Hepatitis A     Hydrocele     Hyperglycemia     Hyperlipidemia     Hypertension     Impaired fasting glucose 4/30/2014    Myocardial infarct (HCC) 2007    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HCC)      Potassium (K) excess 12/27/2019    Prostatic hyperplasia     Stage 3a chronic kidney disease (HCC) 5/4/2022    Type 2 diabetes mellitus without complication, without long-term current use of insulin (HCC) 3/6/2019     Past Surgical History:   Procedure Laterality Date    ANGIOPLASTY  2008     Social History   Social History     Substance and Sexual Activity   Alcohol Use No     Social History     Substance and Sexual Activity   Drug Use No     Social History     Tobacco Use   Smoking Status Never    Passive exposure: Never   Smokeless Tobacco Never     E-Cigarette Use: Never User     E-Cigarette/Vaping Substances    Nicotine No     THC No     CBD No     Flavoring No        Family History: non-contributory    Meds/Allergies   all medications and allergies reviewed  No Known Allergies    Objective   First Vitals:       Current Vitals:            Invasive Devices       None                   Physical Exam  Vitals and nursing note reviewed.   Constitutional:       General: He is not in acute distress.     Appearance: Normal appearance. He is normal weight. He is not ill-appearing or toxic-appearing.   HENT:      Head: Normocephalic and atraumatic.      Nose: Nose normal.      Mouth/Throat:      Mouth: Mucous membranes are moist.   Eyes:      General:         Right eye: No discharge.         Left eye: No discharge.      Extraocular Movements: Extraocular movements intact.      Conjunctiva/sclera: Conjunctivae normal.      Pupils: Pupils are equal, round, and reactive to light.   Cardiovascular:      Rate and Rhythm: Normal rate and regular rhythm.      Pulses: Normal pulses.      Heart sounds: Normal heart sounds.   Pulmonary:      Effort: Pulmonary effort is normal. No respiratory distress.      Breath sounds: Normal breath sounds.   Abdominal:      General: Abdomen is flat.      Palpations: Abdomen is soft.   Musculoskeletal:         General: No swelling, tenderness, deformity or signs of injury. Normal range of  motion.      Right lower leg: No edema.      Left lower leg: No edema.   Skin:     General: Skin is warm and dry.      Comments: See HPI    Neurological:      General: No focal deficit present.      Mental Status: He is alert and oriented to person, place, and time.      Cranial Nerves: No cranial nerve deficit.      Sensory: No sensory deficit.      Motor: No weakness.   Psychiatric:         Mood and Affect: Mood normal.         Behavior: Behavior normal.

## 2024-07-11 ENCOUNTER — HOSPITAL ENCOUNTER (OUTPATIENT)
Dept: CT IMAGING | Facility: HOSPITAL | Age: 89
Discharge: HOME | End: 2024-07-11
Attending: SURGERY
Payer: COMMERCIAL

## 2024-07-11 DIAGNOSIS — C44.41 BASAL CELL CARCINOMA (BCC) OF SKIN OF NECK: ICD-10-CM

## 2024-07-11 PROCEDURE — 74177 CT ABD & PELVIS W/CONTRAST: CPT

## 2024-07-11 PROCEDURE — 70470 CT HEAD/BRAIN W/O & W/DYE: CPT

## 2024-07-11 PROCEDURE — 70491 CT SOFT TISSUE NECK W/DYE: CPT

## 2024-07-11 PROCEDURE — 71260 CT THORAX DX C+: CPT

## 2024-07-11 RX ADMIN — IOHEXOL 80 ML: 350 INJECTION, SOLUTION INTRAVENOUS at 08:04

## 2024-07-12 ENCOUNTER — PATIENT MESSAGE (OUTPATIENT)
Dept: FAMILY MEDICINE CLINIC | Facility: CLINIC | Age: 89
End: 2024-07-12

## 2024-07-12 DIAGNOSIS — A69.20 LYME DISEASE: Primary | ICD-10-CM

## 2024-07-12 RX ORDER — DOXYCYCLINE HYCLATE 100 MG
100 TABLET ORAL 2 TIMES DAILY
Qty: 56 TABLET | Refills: 0 | Status: SHIPPED | OUTPATIENT
Start: 2024-07-12 | End: 2024-08-09

## 2024-07-15 ENCOUNTER — DOCUMENTATION (OUTPATIENT)
Dept: HEMATOLOGY ONCOLOGY | Facility: CLINIC | Age: 89
End: 2024-07-15

## 2024-07-15 NOTE — PROGRESS NOTES
Call to radiology, spoke with Carmen, requested read of 7/11/24 CT head, soft tissue neck and CAP prior to 7/19 in preparation for patient's consult with Dr Rivera on 7/19/24.

## 2024-07-16 ENCOUNTER — DOCUMENTATION (OUTPATIENT)
Dept: HEMATOLOGY ONCOLOGY | Facility: CLINIC | Age: 89
End: 2024-07-16

## 2024-07-16 NOTE — PROGRESS NOTES
In-basket message received from Dr. Morse to [facility]. Chart reviewed and prep completed.

## 2024-07-18 ENCOUNTER — OFFICE VISIT (OUTPATIENT)
Dept: PLASTIC SURGERY | Facility: CLINIC | Age: 89
End: 2024-07-18
Payer: COMMERCIAL

## 2024-07-18 VITALS
DIASTOLIC BLOOD PRESSURE: 98 MMHG | WEIGHT: 122.13 LBS | SYSTOLIC BLOOD PRESSURE: 166 MMHG | BODY MASS INDEX: 23.98 KG/M2 | HEIGHT: 60 IN | TEMPERATURE: 98 F | HEART RATE: 82 BPM

## 2024-07-18 DIAGNOSIS — C44.41 BASAL CELL CARCINOMA (BCC) OF SKIN OF NECK: ICD-10-CM

## 2024-07-18 DIAGNOSIS — C44.612 BASAL CELL CARCINOMA (BCC) OF RIGHT SHOULDER: Primary | ICD-10-CM

## 2024-07-18 PROCEDURE — 99212 OFFICE O/P EST SF 10 MIN: CPT | Performed by: PHYSICIAN ASSISTANT

## 2024-07-18 NOTE — PROGRESS NOTES
H&P Exam - Saud Del Toror 89 y.o. male MRN: [id number]    Unit/Bed#:  Encounter: [id number]    Assessment/ Plan:    Patient is a 89-year-old male who is referred to our office by Dr. Early for evaluation of a biopsy-proven basal cell carcinoma of the posterior neck/shoulder as well as a smaller area on the right shoulder. Please see HPI.     I discussed extensively discussed risk vs benefits of surgery over 2 office visits. Patient elects to proceed with surgery.  He will return to the office for evaluation by Dr. Jha.     History of Present Illness   Patient reports no concerns since previous visit. Please see photos in media of large ulcerated skin lesions.     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[facility])     Potassium (K) excess 12/27/2019    Prostatic hyperplasia     Skin cancer     Stage 3a chronic kidney disease (HCC) 05/04/2022    Type 2 diabetes mellitus without complication, without long-term current use of insulin (HCC) 03/06/2019     Past Surgical History:   Procedure Laterality Date    ANGIOPLASTY  2008     Social History   Social History     Substance and Sexual Activity   Alcohol Use No     Social History     Substance and Sexual Activity   Drug Use No     Social History     Tobacco Use   Smoking Status Never    Passive exposure: Never   Smokeless Tobacco Never     E-Cigarette Use: Never User     E-Cigarette/Vaping Substances    Nicotine No     THC No     CBD No     Flavoring No   "      Family History: non-contributory    Meds/Allergies   all medications and allergies reviewed  No Known Allergies    Objective     Current Vitals:   Blood Pressure: 166/98 (07/18/24 1027)  Pulse: 82 (07/18/24 1027)  Temperature: 98 °F (36.7 °C) (07/18/24 1027)  Height: 4' 11\" (149.9 cm) (07/18/24 1027)  Weight - Scale: 55.4 kg (122 lb 2 oz) (07/18/24 1027)      Invasive Devices       Peripheral Intravenous Line  Duration             Peripheral IV 07/11/24 Distal;Right;Upper;Ventral (anterior) Arm 7 days                    Physical Exam  Vitals and nursing note reviewed.   Constitutional:       General: He is not in acute distress.     Appearance: Normal appearance. He is normal weight. He is not ill-appearing or toxic-appearing.   HENT:      Head: Normocephalic and atraumatic.      Nose: Nose normal.      Mouth/Throat:      Mouth: Mucous membranes are moist.   Eyes:      General:         Right eye: No discharge.         Left eye: No discharge.      Extraocular Movements: Extraocular movements intact.      Conjunctiva/sclera: Conjunctivae normal.      Pupils: Pupils are equal, round, and reactive to light.   Cardiovascular:      Rate and Rhythm: Normal rate and regular rhythm.      Pulses: Normal pulses.      Heart sounds: Normal heart sounds.   Pulmonary:      Effort: Pulmonary effort is normal. No respiratory distress.      Breath sounds: Normal breath sounds.   Abdominal:      General: Abdomen is flat.      Palpations: Abdomen is soft.   Musculoskeletal:         General: No swelling, tenderness, deformity or signs of injury. Normal range of motion.      Right lower leg: No edema.      Left lower leg: No edema.   Skin:     General: Skin is warm and dry.      Comments: See HPI    Neurological:      General: No focal deficit present.      Mental Status: He is alert and oriented to person, place, and time.      Cranial Nerves: No cranial nerve deficit.      Sensory: No sensory deficit.      Motor: No weakness. "   Psychiatric:         Mood and Affect: Mood normal.         Behavior: Behavior normal.

## 2024-07-19 ENCOUNTER — DOCUMENTATION (OUTPATIENT)
Dept: DERMATOLOGY | Facility: CLINIC | Age: 89
End: 2024-07-19

## 2024-07-19 ENCOUNTER — PATIENT OUTREACH (OUTPATIENT)
Dept: CASE MANAGEMENT | Facility: HOSPITAL | Age: 89
End: 2024-07-19

## 2024-07-19 ENCOUNTER — PATIENT OUTREACH (OUTPATIENT)
Dept: HEMATOLOGY ONCOLOGY | Facility: CLINIC | Age: 89
End: 2024-07-19

## 2024-07-19 DIAGNOSIS — C44.91 BASAL CELL CARCINOMA (BCC), UNSPECIFIED SITE: Primary | ICD-10-CM

## 2024-07-19 NOTE — PROGRESS NOTES
Returned call received from granddaughter-in-law, Leola. She declined earlier medical oncology appointment with Dr Wall on 7/24/24. She wants to keep 8/2/24 appointment with Dr Rivera because it's back to back appointment with Dr Early as well. Leola informed patient's case was presented at cutaneous tumor board by dermatology team this morning. One of the recommendations was for consideration of systemic therapy to help decreased the size of patient's skin cancer prior to surgery. She was informed that she will receive more information regarding this at his appointment with Dr Rivera. Multidisciplinary team recommended canceling his 7/22/24 radiation oncology consult at this time. Patient can be referred to radiation oncology if needed when he completes recommended medical/surgical therapies. Leola was agreeable and verbalized understanding. She was encouraged to call with any questions/concerns. She was appreciative of assistance.

## 2024-07-19 NOTE — PROGRESS NOTES
Melanoma & Cutaneous Oncology Multidisciplinary Case Review    DATE: 7/19/24    PRESENTING DOCTOR: Candace Marquez MD    DIAGNOSIS: infiltrative and morpheaform BCC    Saud Ardon is a 89 y.o. male who was presented at the Melanoma & Cutaneous Oncology Multidisciplinary Tumor Conference today for case review and treatment recommendations.    PHYSICIAN RECOMMENDED PLAN:  Will move-up med onc appointment with Dr. Wall for discussion of neoadjuvant therapy prior to surgery.  Plan for excision with delayed grafting by surgical oncology and closure by plastics with skin grafting and potential muscle graft.  No need for adjuvant radiation therapy.  Will make referral to social work.    Pathology reviewed:  yes    Radiology reveiwed: yes    Referrals: yes, social work    Procedures: excision with delayed grafting by surgical onc and plastics.    Team agreed to plan.    The final treatment plan will be left to the discretion of the patient and the treating physician.    DISCLAIMERS: none    TO THE TREATING PHYSICIAN:  This conference is a meeting of clinicians from various specialty areas who evaluate and discuss patients for whom a multidisciplinary treatment approach is being considered. Please note that the above opinion was a consensus of the conference attendees and is intended only to assist in quality care of the discussed patient.  The responsibility for follow up on the input given during the conference, along with any final decisions regarding plan of care, is that of the patient and the patient's provider. Accordingly, appointments have only been recommended based on this information and have NOT been scheduled unless otherwise noted.      TO THE PATIENT:  This summary is a brief record of major aspects of your cancer treatment. You may choose to share a copy with any of your doctors or nurses. However, this is not a detailed or comprehensive record of your care.      NCCN guidelines were available for  review during this discussion.    Candace Marquez MD  Dermatology, PGY-2

## 2024-07-19 NOTE — PROGRESS NOTES
LSW received referral for this pt from navigation.  Pt will be seen by Oncology on 8/2.  LSW will plan to make outreach after that date to introduce self, the role of the  and complete assessments.

## 2024-07-19 NOTE — PROGRESS NOTES
LM offering sooner medical oncology appointment with Dr Dinora Wall on 7/24/24 at [facility]. Also informed that per discussion in Cutaneous [facility] this morning, radiation oncology consult scheduled for 7/22/24 could be postponed until after surgery if they wish. Requested call back. My direct contact information provided.

## 2024-07-21 DIAGNOSIS — I10 ESSENTIAL HYPERTENSION: ICD-10-CM

## 2024-07-21 RX ORDER — METOPROLOL SUCCINATE 100 MG/1
100 TABLET, EXTENDED RELEASE ORAL 2 TIMES DAILY
Qty: 60 TABLET | Refills: 2 | Status: SHIPPED | OUTPATIENT
Start: 2024-07-21 | End: 2024-10-19

## 2024-07-22 NOTE — PROGRESS NOTES
Cardio-Oncology / Heart Failure Cardiology Clinic Note    Saud Ardon 89 y.o. male   MRN: [id number]  Encounter: [id number]        Assessment / Plan:    # Cardio-Oncology Pertinent History  Basal cell carcinoma  Dx 2024  Of neck / shoulder  Plan for excision with delayed grafting by surgical oncology and closure by plastics with skin grafting and potential muscle graft.   May get neoadjuvant therapy    # Preoperative risk assessment  Planned surgery:   extensive basal cell carcinoma resection  No signs of decompensated heart failure or concern for ACS at this time  Patient able to do > 4 METs activity without concerning sx's  EKG:   normal sinus rhythm  Exam:  euvolemic, systolic murmur  Cardiac testing recommended:   Check Echo to evaluate for aortic stenosis.   After echo can decide if stress testing indicated and if so, what modality  HOLD OFF on surgery until cardiac testing    #   Aortic stenosis  On problem list  Exam - does have systolic murmur suggesting some degree of AS  Check echo    # CAD  Reportedly had MI 2007 - minimal details, reports he got 1 stent  No Q waves on EKG  On aspirin 81  On statin  Check echo for EF assessment given hx of MI    # HTN  On toprol 100mg BID  High today, [facility] in law reports normally BP is not elevated.   Will need to keep an eye on this going forward.    # HLD  On simvastatin 40mg  On tricor   Last LDL 87    # Carotid artery disease  On problem list  On ASA 81  On statin  Had declined repeat duplex in the past      Today's Plan Summary:  See above assessment/plan for full details of today's plan.  Briefly,     Would not proceed with surgery until cardiac evaluation  Check echo as first step    CC Dr Early              Reason For Visit / Chief Complaint:  Referred by Dr. Early (surgical oncology) for a new patient visit for preoperative risk assessment.    HPI:   Saud Ardon is a 89 y.o.  male with history as noted in the problem list and further detailed in  the above assessment and plan.    Referred by Dr. Early (surgical oncology) for a new patient visit for preoperative risk assessment.    The patient has a enlarging neck/shoulder mass that was biopsy-proven basal cell carcinoma.  He may need an extensive surgery.  He has a history of coronary artery disease with MI in 2007.  He also has hypertension, hyperlipidemia, carotid artery disease.  He was not following with a cardiologist.  He was referred to cardiology for preoperative risk assessment.    Today, the patient reports -  feels well.  No chest pain.  No SOB.  No KRUEGER.   No edema.   No orthopnea or PND.   No palpitations.  No syncope.   Can walk up a hill without symptoms.     Retired.  Was an .  .   2 children.   Here with grandautonyater in law.    Nonsmoker.  Rare ETOH.   No drugs.          Cardiac Imaging personally reviewed:  EKG 6-17-24  NSR   Holter or event monitor    Echo    REINALDO    Cardiac MRI    Stress testing    Coronary CTA or [facility]    RHC    CPET              Patient Active Problem List    Diagnosis Date Noted   • Basal cell carcinoma (BCC) of skin of neck 07/03/2024   • Basal cell carcinoma (BCC) of right shoulder 07/03/2024   • Lesion of neck 06/21/2024   • Lyme disease 06/21/2024   • Stage 3a chronic kidney disease (HCC) 05/04/2022   • Callus of foot 02/04/2022   • Moderate major depression (HCC) 07/28/2021   • Bunion 03/23/2021   • DNR (do not resuscitate) 08/21/2020   • Overweight (BMI 25.0-29.9) 08/21/2019   • Type 2 diabetes mellitus with stage 3a chronic kidney disease, without long-term current use of insulin ([facility]) 03/06/2019   • Onychomycosis of toenail 03/06/2019   • Vitamin D deficiency 09/07/2018   • Other specified glaucoma 08/08/2018   • Aortic valve stenosis 05/15/2017   • Insomnia 03/14/2016   • Primary hypertension 04/30/2014   • Hypertriglyceridemia 04/30/2014   • Carotid artery disease (HCC) 01/14/2014   • Anxiety 01/28/2013   • CAD in native artery  01/28/2013   • Coronary atherosclerosis 01/28/2013   • Osteoarthritis 01/28/2013   • Hepatitis A 01/28/2013   • Hyperlipidemia 01/28/2013   • Hydrocele 01/28/2013   • Hyperplasia of prostate without lower urinary tract symptoms (LUTS) 01/28/2013       Past Medical History:   Diagnosis Date   • Anxiety    • CAD (coronary artery disease)    • Capsular cataract     mature   • Chronic ulcer of skin (HCC) 05/18/2015   • DJD (degenerative joint disease)    • Encounter for routine adult medical exam with abnormal findings 08/21/2019   • Hepatitis A    • Hydrocele    • Hyperglycemia    • Hyperlipidemia    • Hypertension    • Impaired fasting glucose 04/30/2014   • Myocardial infarct (HCC) 2007   • 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   HENT:  Negative for nosebleeds.    Gastrointestinal:  Negative for abdominal distention, abdominal pain and blood in stool.   Neurological:  Negative for dizziness and syncope.   Psychiatric/Behavioral:  Negative for confusion.    14-point ROS completed and negative except as stated above and/or in the HPI.    No Known Allergies    Current Outpatient Medications   Medication Instructions   • Aspirin 81 MG CAPS 1 tablet, Oral, Daily   • dorzolamide-timolol (COSOPT) 22.3-6.8 MG/ML ophthalmic solution Every 12 hours   • doxycycline hyclate (VIBRA-TABS) 100 mg, Oral, 2 times daily   • fenofibrate (TRICOR) 54 mg, Oral, Daily   • latanoprost (XALATAN) 0.005 % ophthalmic solution INSTILL 1 DROP AT BEDTIME INTO BOTH EYES   • metoprolol succinate (TOPROL-XL) 100 mg, Oral, 2 times daily   • Multiple Vitamins-Minerals (MULTIVITAL) tablet Every 24 hours   • Multiple Vitamins-Minerals (PRESERVISION AREDS  2+MULTI VIT PO) take 2 tabs daily   • mupirocin (BACTROBAN) 2 % ointment Topical, Daily, During wound bandage changes   • nitroglycerin (NITROSTAT) 0.4 mg SL tablet Take one under tongue for chest pain. Can repet in 5 minutes if necessary.   • RHOPRESSA 0.02 % SOLN INSTILL 1 DROP AT BEDTIME INTO BOTH EYES   • simvastatin (ZOCOR) 40 mg, Oral, Daily at bedtime       Social History     Socioeconomic History   • Marital status:      Spouse name: Not on file   • Number of children: Not on file   • Years of education: Not on file   • Highest education level: Not on file   Occupational History   • Not on file   Tobacco Use   • Smoking status: Never     Passive exposure: Never   • Smokeless tobacco: Never   Vaping Use   • Vaping status: Never Used   Substance and Sexual Activity   • Alcohol use: No   • Drug use: No   • Sexual activity: Not Currently     Partners: Female   Other Topics Concern   • Not on file   Social History Narrative    No risk for falls    Activity level: sedentary    No sleep changes    No animals    No firearms    Always uses a seatbelt     Social Determinants of Health     Financial Resource Strain: Medium Risk (9/22/2023)    Overall Financial Resource Strain (CARDIA)    • Difficulty of Paying Living Expenses: Somewhat hard   Food Insecurity: No Food Insecurity (9/21/2022)    Hunger Vital Sign    • Worried About Running Out of Food in the Last Year: Never true    • Ran Out of Food in the Last Year: Never true   Transportation Needs: No Transportation Needs (9/22/2023)    PRAPARE - Transportation    • Lack of Transportation (Medical): No    • Lack of Transportation (Non-Medical): No   Physical Activity: Insufficiently Active (9/21/2022)    Exercise Vital Sign    • Days of Exercise per Week: 2 days    • Minutes of Exercise per Session: 30 min   Stress: Stress Concern Present (9/21/2022)    [employer] [place] [employer] - Occupational Stress Questionnaire    • Feeling of Stress : To some  "extent   Social Connections: Moderately Isolated (9/21/2022)    Social Connection and Isolation Panel [NHANES]    • Frequency of Communication with Friends and Family: More than three times a week    • Frequency of Social Gatherings with Friends and Family: More than three times a week    • Attends Baptism Services: More than 4 times per year    • Active Member of Clubs or Organizations: No    • Attends Club or Organization Meetings: Never    • Marital Status:    Intimate Partner Violence: Not At Risk (9/21/2022)    Humiliation, Afraid, Rape, and Kick questionnaire    • Fear of Current or Ex-Partner: No    • Emotionally Abused: No    • Physically Abused: No    • Sexually Abused: No   Housing Stability: Unknown (9/21/2022)    Housing Stability Vital Sign    • Unable to Pay for Housing in the Last Year: No    • Number of Places Lived in the Last Year: Not on file    • Unstable Housing in the Last Year: No       Family History   Problem Relation Age of Onset   • Coronary artery disease Father        Physical Exam:  Blood pressure 150/76, pulse 65, height 4' 11\" (1.499 m), weight 54.7 kg (120 lb 8 oz), SpO2 100%.  Body mass index is 24.34 kg/m².  Wt Readings from Last 3 Encounters:   07/23/24 54.7 kg (120 lb 8 oz)   07/18/24 55.4 kg (122 lb 2 oz)   07/05/24 55.2 kg (121 lb 9.6 oz)     Physical Exam  Vitals reviewed.   Constitutional:       General: He is not in acute distress.     Appearance: He is not toxic-appearing.   HENT:      Head: Normocephalic and atraumatic.   Eyes:      General: No scleral icterus.     Conjunctiva/sclera: Conjunctivae normal.   Neck:      Vascular: No carotid bruit.      Comments: No JVD  Cardiovascular:      Rate and Rhythm: Normal rate and regular rhythm.      Heart sounds: Murmur (systolic murmur at base) heard.      No friction rub. No gallop.   Pulmonary:      Breath sounds: Normal breath sounds. No wheezing, rhonchi or rales.   Abdominal:      General: There is no distension.    " "  Palpations: Abdomen is soft.      Tenderness: There is no abdominal tenderness. There is no guarding.   Musculoskeletal:      Comments: Trace LE edema   Skin:     Coloration: Skin is not jaundiced or pale.      Findings: No erythema.   Neurological:      Mental Status: He is alert. Mental status is at baseline.   Psychiatric:         Mood and Affect: Mood normal.         Behavior: Behavior normal.         Labs & Results:  Lab Results   Component Value Date    SODIUM 141 07/06/2024    K 4.5 07/06/2024     07/06/2024    CO2 27 07/06/2024    BUN 20 07/06/2024    CREATININE 0.95 07/06/2024    GLUC 108 (H) 01/28/2022    CALCIUM 8.9 07/06/2024     No results found for: \"NTBNP\"     Time Statement:  I have spent a total time of 65 minutes in caring for this patient on the day of the visit/encounter including Diagnostic results, Prognosis, Risks and benefits of tx options, Instructions for management, Patient and family education, Impressions, Counseling / Coordination of care, Documenting in the medical record, Reviewing / ordering tests, medicine, procedures  , Obtaining or reviewing history  , and Communicating with other healthcare professionals .      Thank you for the opportunity to participate in the care of this patient.    Joshua Yao MD, [facility]  Staff Cardiologist  Director of Cardio-Oncology  [facility]  "

## 2024-07-23 ENCOUNTER — OFFICE VISIT (OUTPATIENT)
Dept: CARDIOLOGY CLINIC | Facility: CLINIC | Age: 89
End: 2024-07-23
Payer: COMMERCIAL

## 2024-07-23 ENCOUNTER — PATIENT MESSAGE (OUTPATIENT)
Age: 89
End: 2024-07-23

## 2024-07-23 VITALS
BODY MASS INDEX: 23.66 KG/M2 | HEIGHT: 60 IN | OXYGEN SATURATION: 100 % | WEIGHT: 120.5 LBS | DIASTOLIC BLOOD PRESSURE: 76 MMHG | HEART RATE: 65 BPM | SYSTOLIC BLOOD PRESSURE: 150 MMHG

## 2024-07-23 DIAGNOSIS — I10 PRIMARY HYPERTENSION: ICD-10-CM

## 2024-07-23 DIAGNOSIS — I25.10 CAD IN NATIVE ARTERY: ICD-10-CM

## 2024-07-23 DIAGNOSIS — C44.41 BASAL CELL CARCINOMA (BCC) OF SKIN OF NECK: ICD-10-CM

## 2024-07-23 DIAGNOSIS — R01.1 CARDIAC MURMUR: ICD-10-CM

## 2024-07-23 DIAGNOSIS — I35.0 NONRHEUMATIC AORTIC VALVE STENOSIS: ICD-10-CM

## 2024-07-23 DIAGNOSIS — Z01.810 PREOPERATIVE CARDIOVASCULAR EXAMINATION: Primary | ICD-10-CM

## 2024-07-23 DIAGNOSIS — E78.2 MIXED HYPERLIPIDEMIA: ICD-10-CM

## 2024-07-23 PROCEDURE — 99205 OFFICE O/P NEW HI 60 MIN: CPT | Performed by: INTERNAL MEDICINE

## 2024-07-23 NOTE — Clinical Note
Hi Dr Early,  I just saw your patient today in cardio-oncology clinic.  You are seeing him for the large basal cell carcinoma with possible large upcoming surgery.    Unfortunately he has not been following with a cardiologist and reports a distant history of myocardial infarction.  He also has a murmur suggesting some degree of aortic stenosis.  I recommend not proceeding with surgery at this time -- until we can get an echo.    I will get the echo done as soon as I can -- and see him back in clinic to discuss.  Thanks Joshua Yao

## 2024-07-23 NOTE — TELEPHONE ENCOUNTER
Called Leola and reviewed. All questions answered at this time. Per cardiology hold off surgery until further testing. Will await results of echo. Told patient to keep f/u with dr. Early scheduled for 8/2 where he will explain imaging in depth. Leola appreciative and all questions answered at this time.

## 2024-07-24 DIAGNOSIS — E78.2 MIXED HYPERLIPIDEMIA: ICD-10-CM

## 2024-07-24 DIAGNOSIS — N18.31 TYPE 2 DIABETES MELLITUS WITH STAGE 3A CHRONIC KIDNEY DISEASE, WITHOUT LONG-TERM CURRENT USE OF INSULIN (HCC): Primary | ICD-10-CM

## 2024-07-24 DIAGNOSIS — E11.22 TYPE 2 DIABETES MELLITUS WITH STAGE 3A CHRONIC KIDNEY DISEASE, WITHOUT LONG-TERM CURRENT USE OF INSULIN (HCC): Primary | ICD-10-CM

## 2024-07-24 DIAGNOSIS — I10 ESSENTIAL HYPERTENSION: ICD-10-CM

## 2024-07-24 RX ORDER — FENOFIBRATE 54 MG/1
54 TABLET ORAL DAILY
Qty: 30 TABLET | Refills: 0 | Status: SHIPPED | OUTPATIENT
Start: 2024-07-24 | End: 2024-08-23

## 2024-07-25 ENCOUNTER — TELEPHONE (OUTPATIENT)
Dept: FAMILY MEDICINE CLINIC | Facility: CLINIC | Age: 89
End: 2024-07-25

## 2024-07-25 ENCOUNTER — OFFICE VISIT (OUTPATIENT)
Dept: PLASTIC SURGERY | Facility: CLINIC | Age: 89
End: 2024-07-25
Payer: COMMERCIAL

## 2024-07-25 VITALS
BODY MASS INDEX: 23.56 KG/M2 | WEIGHT: 120 LBS | HEIGHT: 60 IN | DIASTOLIC BLOOD PRESSURE: 72 MMHG | SYSTOLIC BLOOD PRESSURE: 140 MMHG

## 2024-07-25 DIAGNOSIS — C44.612 BASAL CELL CARCINOMA (BCC) OF RIGHT SHOULDER: Primary | ICD-10-CM

## 2024-07-25 PROCEDURE — 99212 OFFICE O/P EST SF 10 MIN: CPT | Performed by: PHYSICIAN ASSISTANT

## 2024-07-25 NOTE — TELEPHONE ENCOUNTER
Received FMLA paperwork through the fax from patients daughter requesting intermittent FMLA to take patient to and from doctors appointments.I filled it out the best I could. I didn't know what to put for the frequency and duration of the treatments. I put form in your folder.

## 2024-07-25 NOTE — TELEPHONE ENCOUNTER
Spoke with Leola to let her know patient needs updated bloodwork. She will have him fast tonight and will take him in the morning.

## 2024-07-26 NOTE — PROGRESS NOTES
H&P Exam - Saud Del Toror 89 y.o. male MRN: [id number]    Unit/Bed#:  Encounter: [id number]    Assessment/ Plan:    Patient is a 89-year-old male who is referred to our office by Dr. Early for evaluation of a biopsy-proven basal cell carcinoma of the posterior neck/shoulder as well as a smaller area on the right shoulder. Please see HPI.      Dr. Jha was also present for the visit today. He discussed reconstruction of defect of the inferior neck/upper back with split-thickness skin graft and local tissue arrangement with VAC placement.  Patient understood and agreed.  This will be done under general anesthesia.  Discussed options, including forgoing surgery, as well as benefits and risks of surgery including but not limited to anesthesia, bleeding, infection, scarring and potential need for additional procedures.  Consent was obtained and all questions answered to their satisfaction.  We will plan for surgery at their earliest convenience.      History of Present Illness     Patient reports no issues or changes since previous visit.  Please see photo in media of areas of concern.    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[facility])     Potassium (K) excess 12/27/2019    Prostatic hyperplasia     Skin cancer     Stage 3a chronic kidney disease (HCC) 05/04/2022    Type 2 diabetes mellitus without complication, without long-term current use of insulin (HCC) 03/06/2019     Past Surgical History:  "  Procedure Laterality Date    ANGIOPLASTY  2008     Social History   Social History     Substance and Sexual Activity   Alcohol Use No     Social History     Substance and Sexual Activity   Drug Use No     Social History     Tobacco Use   Smoking Status Never    Passive exposure: Never   Smokeless Tobacco Never     E-Cigarette Use: Never User     E-Cigarette/Vaping Substances    Nicotine No     THC No     CBD No     Flavoring No        Family History: non-contributory    Meds/Allergies   all medications and allergies reviewed  No Known Allergies    Objective   First Vitals:   @VSFIRST2(5,8,6,7,9,11,14,10:FIRST)@    Current Vitals:   Blood Pressure: 140/72 (07/25/24 1529)  Height: 4' 11\" (149.9 cm) (07/25/24 1529)  Weight - Scale: 54.4 kg (120 lb) (07/25/24 1529)    [unfilled]    Invasive Devices       Peripheral Intravenous Line  Duration             Peripheral IV 07/11/24 Distal;Right;Upper;Ventral (anterior) Arm 15 days                    Physical Exam  Vitals and nursing note reviewed.   Constitutional:       General: He is not in acute distress.     Appearance: Normal appearance. He is normal weight. He is not ill-appearing or toxic-appearing.   HENT:      Head: Normocephalic and atraumatic.      Nose: Nose normal.      Mouth/Throat:      Mouth: Mucous membranes are moist.   Eyes:      General:         Right eye: No discharge.         Left eye: No discharge.      Extraocular Movements: Extraocular movements intact.      Conjunctiva/sclera: Conjunctivae normal.      Pupils: Pupils are equal, round, and reactive to light.   Cardiovascular:      Rate and Rhythm: Normal rate and regular rhythm.      Pulses: Normal pulses.      Heart sounds: Normal heart sounds.   Pulmonary:      Effort: Pulmonary effort is normal. No respiratory distress.      Breath sounds: Normal breath sounds.   Abdominal:      General: Abdomen is flat.      Palpations: Abdomen is soft.   Musculoskeletal:         General: No swelling, tenderness, " deformity or signs of injury. Normal range of motion.      Right lower leg: No edema.      Left lower leg: No edema.   Skin:     General: Skin is warm and dry.      Comments: See HPI    Neurological:      General: No focal deficit present.      Mental Status: He is alert and oriented to person, place, and time.      Cranial Nerves: No cranial nerve deficit.      Sensory: No sensory deficit.      Motor: No weakness.   Psychiatric:         Mood and Affect: Mood normal.         Behavior: Behavior normal.

## 2024-07-29 ENCOUNTER — HOSPITAL ENCOUNTER (OUTPATIENT)
Dept: NON INVASIVE DIAGNOSTICS | Facility: HOSPITAL | Age: 89
Discharge: HOME | End: 2024-07-29
Payer: COMMERCIAL

## 2024-07-29 VITALS
BODY MASS INDEX: 23.56 KG/M2 | HEART RATE: 70 BPM | SYSTOLIC BLOOD PRESSURE: 140 MMHG | WEIGHT: 120 LBS | HEIGHT: 60 IN | DIASTOLIC BLOOD PRESSURE: 72 MMHG

## 2024-07-29 DIAGNOSIS — I25.10 CAD IN NATIVE ARTERY: ICD-10-CM

## 2024-07-29 DIAGNOSIS — R01.1 CARDIAC MURMUR: ICD-10-CM

## 2024-07-29 LAB
AORTIC ROOT: 3 CM
AORTIC VALVE MEAN VELOCITY: 18.1 M/S
APICAL FOUR CHAMBER EJECTION FRACTION: 58 %
ASCENDING AORTA: 3.1 CM
AV AREA BY CONTINUOUS VTI: 0.8 CM2
AV AREA PEAK VELOCITY: 0.9 CM2
AV LVOT MEAN GRADIENT: 1 MMHG
AV LVOT PEAK GRADIENT: 2 MMHG
AV MEAN GRADIENT: 15 MMHG
AV PEAK GRADIENT: 27 MMHG
AV REGURGITATION PRESSURE HALF TIME: 445 MS
AV VALVE AREA: 0.85 CM2
AV VELOCITY RATIO: 0.28
BSA FOR ECHO PROCEDURE: 1.48 M2
DOP CALC AO PEAK VEL: 2.58 M/S
DOP CALC AO VTI: 64.62 CM
DOP CALC LVOT AREA: 3.14 CM2
DOP CALC LVOT CARDIAC INDEX: 2.54 L/MIN/M2
DOP CALC LVOT CARDIAC OUTPUT: 3.76 L/MIN
DOP CALC LVOT DIAMETER: 2 CM
DOP CALC LVOT PEAK VEL VTI: 17.47 CM
DOP CALC LVOT PEAK VEL: 0.71 M/S
DOP CALC LVOT STROKE INDEX: 39.2 ML/M2
DOP CALC LVOT STROKE VOLUME: 54.86
E WAVE DECELERATION TIME: 206 MS
E/A RATIO: 1.07
FRACTIONAL SHORTENING: 25 (ref 28–44)
INTERVENTRICULAR SEPTUM IN DIASTOLE (PARASTERNAL SHORT AXIS VIEW): 1.3 CM
INTERVENTRICULAR SEPTUM: 1 CM (ref 0.6–1.1)
LAAS-AP2: 14.9 CM2
LAAS-AP4: 15.9 CM2
LEFT ATRIUM SIZE: 3.5 CM
LEFT ATRIUM VOLUME (MOD BIPLANE): 45 ML
LEFT ATRIUM VOLUME INDEX (MOD BIPLANE): 30.4 ML/M2
LEFT INTERNAL DIMENSION IN SYSTOLE: 2.7 CM (ref 2.1–4)
LEFT VENTRICLE DIASTOLIC VOLUME (MOD BIPLANE): 51 ML
LEFT VENTRICLE DIASTOLIC VOLUME INDEX (MOD BIPLANE): 34.5 ML/M2
LEFT VENTRICLE SYSTOLIC VOLUME (MOD BIPLANE): 21 ML
LEFT VENTRICLE SYSTOLIC VOLUME INDEX (MOD BIPLANE): 14.2 ML/M2
LEFT VENTRICULAR INTERNAL DIMENSION IN DIASTOLE: 3.6 CM (ref 3.5–6)
LEFT VENTRICULAR POSTERIOR WALL IN END DIASTOLE: 1.1 CM
LEFT VENTRICULAR STROKE VOLUME: 26 ML
LV EF: 58 %
LVSV (TEICH): 26 ML
MV E'TISSUE VEL-LAT: 6 CM/S
MV E'TISSUE VEL-SEP: 4 CM/S
MV PEAK A VEL: 0.69 M/S
MV PEAK E VEL: 74 CM/S
MV STENOSIS PRESSURE HALF TIME: 60 MS
MV VALVE AREA P 1/2 METHOD: 3.67
RIGHT ATRIUM AREA SYSTOLE A4C: 7.2 CM2
RIGHT VENTRICLE ID DIMENSION: 3.6 CM
SL CV AV DECELERATION TIME RETROGRADE: 1533 MS
SL CV AV PEAK GRADIENT RETROGRADE: 91 MMHG
SL CV LEFT ATRIUM LENGTH A2C: 4.1 CM
SL CV PED ECHO LEFT VENTRICLE DIASTOLIC VOLUME (MOD BIPLANE) 2D: 54 ML
SL CV PED ECHO LEFT VENTRICLE SYSTOLIC VOLUME (MOD BIPLANE) 2D: 28 ML
TR MAX PG: 21 MMHG
TR PEAK VELOCITY: 2.3 M/S
TRICUSPID ANNULAR PLANE SYSTOLIC EXCURSION: 1.7 CM
TRICUSPID VALVE PEAK REGURGITATION VELOCITY: 2.29 M/S

## 2024-07-29 PROCEDURE — 93306 TTE W/DOPPLER COMPLETE: CPT | Performed by: INTERNAL MEDICINE

## 2024-07-29 PROCEDURE — 93306 TTE W/DOPPLER COMPLETE: CPT

## 2024-07-30 ENCOUNTER — APPOINTMENT (OUTPATIENT)
Dept: LAB | Facility: CLINIC | Age: 89
End: 2024-07-30
Payer: COMMERCIAL

## 2024-07-30 DIAGNOSIS — E11.9 TYPE 2 DIABETES MELLITUS WITHOUT COMPLICATION, WITHOUT LONG-TERM CURRENT USE OF INSULIN (HCC): ICD-10-CM

## 2024-07-30 DIAGNOSIS — I25.10 CHRONIC CORONARY ARTERY DISEASE: ICD-10-CM

## 2024-07-30 DIAGNOSIS — E11.22 TYPE 2 DIABETES MELLITUS WITH STAGE 3A CHRONIC KIDNEY DISEASE, WITHOUT LONG-TERM CURRENT USE OF INSULIN (HCC): ICD-10-CM

## 2024-07-30 DIAGNOSIS — N18.31 TYPE 2 DIABETES MELLITUS WITH STAGE 3A CHRONIC KIDNEY DISEASE, WITHOUT LONG-TERM CURRENT USE OF INSULIN (HCC): ICD-10-CM

## 2024-07-30 DIAGNOSIS — I10 ESSENTIAL HYPERTENSION: ICD-10-CM

## 2024-07-30 DIAGNOSIS — N18.31 STAGE 3A CHRONIC KIDNEY DISEASE (HCC): ICD-10-CM

## 2024-07-30 DIAGNOSIS — E78.2 MIXED HYPERLIPIDEMIA: ICD-10-CM

## 2024-07-30 DIAGNOSIS — N40.0 HYPERPLASIA OF PROSTATE WITHOUT LOWER URINARY TRACT SYMPTOMS (LUTS): ICD-10-CM

## 2024-07-30 DIAGNOSIS — E55.9 VITAMIN D DEFICIENCY: ICD-10-CM

## 2024-07-30 LAB
ANION GAP SERPL CALCULATED.3IONS-SCNC: 7 MMOL/L (ref 4–13)
BASOPHILS # BLD AUTO: 0.11 THOUSANDS/ÂΜL (ref 0–0.1)
BASOPHILS NFR BLD AUTO: 2 % (ref 0–1)
BUN SERPL-MCNC: 21 MG/DL (ref 5–25)
CALCIUM SERPL-MCNC: 9 MG/DL (ref 8.4–10.2)
CHLORIDE SERPL-SCNC: 107 MMOL/L (ref 96–108)
CHOLEST SERPL-MCNC: 137 MG/DL
CO2 SERPL-SCNC: 27 MMOL/L (ref 21–32)
CREAT SERPL-MCNC: 1.17 MG/DL (ref 0.6–1.3)
CREAT UR-MCNC: 155.2 MG/DL
EOSINOPHIL # BLD AUTO: 0.3 THOUSAND/ÂΜL (ref 0–0.61)
EOSINOPHIL NFR BLD AUTO: 4 % (ref 0–6)
ERYTHROCYTE [DISTWIDTH] IN BLOOD BY AUTOMATED COUNT: 15.4 % (ref 11.6–15.1)
EST. AVERAGE GLUCOSE BLD GHB EST-MCNC: 134 MG/DL
GFR SERPL CREATININE-BSD FRML MDRD: 54 ML/MIN/1.73SQ M
GLUCOSE P FAST SERPL-MCNC: 96 MG/DL (ref 65–99)
HBA1C MFR BLD: 6.3 %
HCT VFR BLD AUTO: 43.3 % (ref 36.5–49.3)
HDLC SERPL-MCNC: 54 MG/DL
HGB BLD-MCNC: 13.3 G/DL (ref 12–17)
IMM GRANULOCYTES # BLD AUTO: 0.03 THOUSAND/UL (ref 0–0.2)
IMM GRANULOCYTES NFR BLD AUTO: 0 % (ref 0–2)
LDLC SERPL CALC-MCNC: 63 MG/DL (ref 0–100)
LYMPHOCYTES # BLD AUTO: 1.51 THOUSANDS/ÂΜL (ref 0.6–4.47)
LYMPHOCYTES NFR BLD AUTO: 21 % (ref 14–44)
MCH RBC QN AUTO: 28.9 PG (ref 26.8–34.3)
MCHC RBC AUTO-ENTMCNC: 30.7 G/DL (ref 31.4–37.4)
MCV RBC AUTO: 94 FL (ref 82–98)
MICROALBUMIN UR-MCNC: 15.8 MG/L
MICROALBUMIN/CREAT 24H UR: 10 MG/G CREATININE (ref 0–30)
MONOCYTES # BLD AUTO: 0.67 THOUSAND/ÂΜL (ref 0.17–1.22)
MONOCYTES NFR BLD AUTO: 10 % (ref 4–12)
NEUTROPHILS # BLD AUTO: 4.46 THOUSANDS/ÂΜL (ref 1.85–7.62)
NEUTS SEG NFR BLD AUTO: 63 % (ref 43–75)
NRBC BLD AUTO-RTO: 0 /100 WBCS
PLATELET # BLD AUTO: 205 THOUSANDS/UL (ref 149–390)
PMV BLD AUTO: 10.4 FL (ref 8.9–12.7)
POTASSIUM SERPL-SCNC: 4.6 MMOL/L (ref 3.5–5.3)
RBC # BLD AUTO: 4.6 MILLION/UL (ref 3.88–5.62)
SODIUM SERPL-SCNC: 141 MMOL/L (ref 135–147)
TRIGL SERPL-MCNC: 100 MG/DL
TSH SERPL DL<=0.05 MIU/L-ACNC: 2.63 UIU/ML (ref 0.45–4.5)
WBC # BLD AUTO: 7.08 THOUSAND/UL (ref 4.31–10.16)

## 2024-07-30 PROCEDURE — 80061 LIPID PANEL: CPT

## 2024-07-30 PROCEDURE — 83036 HEMOGLOBIN GLYCOSYLATED A1C: CPT

## 2024-07-30 PROCEDURE — 84443 ASSAY THYROID STIM HORMONE: CPT

## 2024-07-30 PROCEDURE — 85025 COMPLETE CBC W/AUTO DIFF WBC: CPT

## 2024-07-30 PROCEDURE — 82570 ASSAY OF URINE CREATININE: CPT

## 2024-07-30 PROCEDURE — 36415 COLL VENOUS BLD VENIPUNCTURE: CPT

## 2024-07-30 PROCEDURE — 82043 UR ALBUMIN QUANTITATIVE: CPT

## 2024-07-30 PROCEDURE — 80048 BASIC METABOLIC PNL TOTAL CA: CPT

## 2024-07-31 ENCOUNTER — TELEPHONE (OUTPATIENT)
Dept: FAMILY MEDICINE CLINIC | Facility: CLINIC | Age: 89
End: 2024-07-31

## 2024-08-01 NOTE — PROGRESS NOTES
HEMATOLOGY / ONCOLOGY CLINIC FOLLOW UP NOTE    Patient Saud Arodn  MRN: [id number]  : 1934  Date of Encounter 2024      Referring Provider: Hoda Murry    Reason for Encounter: Consult        Oncology History Overview Note   With Basal Cell Carcinoma of back. He was referred by Dr. Murry. He is being seen today for Radiation Oncology Consult.    Patient saw his PCP on 24 for fatigue and also had MD assess area skin lesion on his back which had been present for awhile. Had been referred to Dermatology prior but patient did not go. He saw Derm on 24 for large, ulcerated are on left back/posterior neck. Reports that he had for 6 years and area was growing. Also has 1.5cm area on right posterior shoulder. Biopsies taken in office.       24 PCP Dr. Jerry MARQUEZ referral to Dermatology      24 Dr. Rodriguez-Dermatology  Biopsy in office  Specimens:   A) - Skin, Other, A:  lateral posterior neck;                                                       B) - Skin, Other, B: central posterior neck                                                         C) - Skin, Other, C: medial posterior neck                                                         D) - Skin, Other, D: Right posterior shoulder  Referrals to Medical Oncology, Surgical Oncology. Radiation Oncology  To be discussed at Tumor Board  Will get patient scheduled for excision of site with Dermatology      24 Dr. Early-Surgical Oncology  Large posterior neck/shoulder lesion at least 25 x 20cm. Not attached to deep structures  1.5cm posterior shoulder lesion separate. No regional adenopathy  CT C/A/P ordered  Consulted Plastics      24 Plastics  Discussed surgery-patient unsure if he wishes to proceed with surgery  Will discuss with family and return to office in 1 week      24 CT head  IMPRESSION:  No mass effect, acute intracranial hemorrhage or evidence of recent infarction. No discrete lytic or blastic osseous  lesions identified. No abnormal parenchymal or leptomeningeal enhancement is seen.    7/11/24 CT soft tissue neck  IMPRESSION:1. Soft tissue ulcerating mass at the left base of the neck/upper chest, which is also inseparable from the left trapezius musculature. Findings are in keeping with history of biopsy-proven basal cell carcinoma.  2. No pathologically enlarged cervical lymphadenopathy within the neck.       7/11/24 CT C/A/P  IMPRESSION:  No evidence for metastatic disease in the chest, abdomen, or pelvis.  Prostatomegaly indenting the urinary bladder base. 1.4 cm nodular, enhancing component at the superior most aspect of the prostate gland; neoplastic etiology is not excluded.    ADDENDUM:  Small bilateral adrenal gland nodules measuring up to 1.6 cm on the right and 1.1 cm on the left are indeterminate. Dedicated adrenal protocol MRI is advised for further evaluation.    7/18/24 Plastics  Patient elects to proceed with surgery      7/19/24 Cutaneous [facility]      Upcoming appointments:  7/23/24 Cardiology Oncology  8/2/24 Dr. Rivera  8/2/24 Dr. Early             Basal cell carcinoma (BCC) of skin of neck   6/25/2024 Initial Diagnosis    Basal cell carcinoma (BCC) of skin of neck     6/25/2024 Initial Diagnosis    Basal cell carcinoma (BCC) of right shoulder     6/25/2024 Biopsy    SKIN BIOPSIES    A. Skin, lateral posterior neck:   BASAL CELL CARCINOMA, INFILTRATIVE AND NODULAR     Note: This lesion is also ulcerated and crusted.     B. Skin, central posterior neck:   BASAL CELL CARCINOMA, MORPHEAFORM TYPE     C. Skin, medial posterior neck:   BASAL CELL CARCINOMA, INFILTRATIVE AND NODULAR     D. Skin, right posterior shoulder:   BASAL CELL CARCINOMA, NODULAR        Basal cell carcinoma (BCC) of right shoulder   6/25/2024 Initial Diagnosis    Basal cell carcinoma (BCC) of right shoulder       Discussion    Vismodegib, a hedgehog pathway inhibitor, has clinical activity in patients with locally advanced BCC  with objective response rates between approximately 40 to 70 percent in this patient population. For patients with locally advanced periocular BCC, vismodegib can also be used in the neoadjuvant setting to reduce tumor burden       Vismodegib is administered orally at 150 mg daily on a continuous dosing schedule. However, appropriate alternatives exist for those who are unable to tolerate continuous dosing, such as intermittent dosing or treatment discontinuation (with the option to reinitiate therapy at the time of relapse).    Vismodegib was evaluated in an open-label, phase II trial (the SafeTy Events in VIsmodEgib [OSEAS] study) of 1215 patients with BCC, including 1119 patients with locally advanced disease and 96 with metastatic disease. Patients were treated with vismodegib at 150 mg daily continuously until disease progression or unacceptable toxicity. At a median follow-up of 18 months, among the 1119 patients with locally advanced disease, the objective response rate for vismodegib was 69 percent (95% CI 66-71 percent), including complete and partial response rates of 33 and 35 percent, respectively. Median progression-free survival (PFS) was 23 months in those with locally advanced disease and 22 months in the entire study population. Further data on the efficacy of vismodegib in patients with metastatic BCC in this study are discussed below.     Treatment-associated adverse events lead to discontinuation of therapy in approximately one-third of patients. Grade ?3 treatment-related toxicities included muscle spasms (7 percent), elevated serum creatinine phosphokinase (CPK) levels (2 percent), dysgeusia (5 percent), weight loss (4 percent), alopecia, decreased appetite, and asthenia (3 percent each), and cutaneous SCC (1 percent). Severe, life-threatening cutaneous adverse reactions including Goldberg-Theo syndrome (SJS), toxic epidermal necrolysis (TEN) and drug reaction with eosinophilia and systemic  symptoms (DRESS), have also been reported with vismodegib.     Patients receiving vismodegib should undergo regular skin surveillance to evaluate for both response and, potentially, treatment resistance. Patients with advanced BCC can develop acquired resistance to vismodegib (ie, the tumor responds to treatment but then regrows). In one study, acquired resistance was observed in 21 percent of patients and occurred at a mean time of approximately 56 weeks [18]. Other studies have also reported primary resistance of BCC to vismodegib Patients receiving vismodegib should also be monitored for new skin lesions, including cutaneous SCC.    Alternatively IO therapy has a clear role as well    The efficacy of cemiplimab was evaluated in an open-label nonrandomized trial (Study 1620) of 84 patients with locally advanced, unresectable and 54 patients with metastatic BCC who experienced disease progression, were intolerant of, or lacked an objective response after nine months on hedgehog pathway inhibitor therapy. The study excluded patients who were candidates for further locoregional therapy (eg, surgery or RT); those with autoimmune disease requiring immunosuppressive agents within five years; those who had undergone solid organ transplantation; those previously treated with checkpoint inhibitor immunotherapy; and those with HIV, or hepatitis B or C infection.      Patients received cemiplimab for up to 93 weeks, or until disease progression or unacceptable toxicity. Objective response rates were assessed using radiographic imaging and digital photography evaluation of the target lesion (if visible). Results were as follows:     Among the 84 patients with locally advanced BCC, objective responses were seen in 26 patients (31 percent), including 5 complete (6 percent) and 21 partial (25 percent) responses. A majority (79 percent) of patients maintained durable responses lasting six months or longer.    Among the 54  patients with metastatic BCC, objective responses per investigator assessment were seen in 14 patients (26 percent) including 2 complete responses (4 percent) and 12 partial responses (22 percent). The median progression-free survival was 10 months and overall survival 50 months. The median duration of response was not reached.    Common grade ?3 toxicities in both cohorts include hypertension (5 percent), fatigue (4 percent), musculoskeletal pain, decreased appetite, headache, urinary tract infection (2 percent each), and rash, nausea, constipation, and anemia (less than 1 percent each    Assessment / Plan:    Unresectable cBCC     Large necrotizing lesion left back, clean, no infection noted, smaller 1.5 cm mid upper right back, another pigmented lesion lower back-may be congenial nevus with areas increased pigmentation that should be biopsied at some pt vs SK    Has significant cardiac disease with recent ECHO LVEF 50% but with mod to severe AS and 4/6 PRACHI     Was seen by surg onc earlier today; very overwhelmed by surgery potential-does not want surgery at this point and asked many questions about upfront targeted therapy with DE PAZ 70%    Vismodegib is 150 mg daily     At this point, appears unresectable and would have significant defect; neoadjuvant therapy would be warranted in this setting     Thus, recommended neoadjuvant therapy with vismodegib, hedgehog inhibitor specifically approved for cBCC as works through the SMO pathway    Has no cardiotoxicity issues with this agent  but will check EKG baseline    Discussed clinical and laboratory adverse events with patient     IO therapy was also discussed with either Cemiplimab or Pembrolizumab if vismodegib is not efficacious     Will see patient about one week after starts medication    Will see cardiology 13 August 2024 and make decision after that based on risk for surgery; again neoadjuvant may be more appropriate at this point and he may not be a surgical  candidate ultimately    Wound Care consult was placed    Wound care    Order placed    Follow up    2-3 weeks          HPI 8/2/2024     Mr Ardon is a aniya  89-year-old gentleman fairly healthy with history of myocardial infarction in 2007 with ongoing nonhealing chronic large basal cell carcinoma of the back.  He has had a skin lesion of his posterior neck/back for approximately 5 to 6 years.  He has been covering the area with Kleenex.  This was recently discovered by his granddaughter, who was alarmed and sought care for the patient.  This was biopsied by Dr. Early and found to be basal cell carcinoma.  There is a separate, noncontiguous additional area of basal cell carcinoma on the opposite shoulder.  He also has many other pigmented lesions on back, some AK, a congential nevus      Upon evaluation, the patient has a large, ulcerated area measuring at least 20 cm x 20 cm on the posterior neck/shoulder.  Please see photos in media    He has been seen by surg onc and options included do nothing as well as surgical options.  He wants to take care of this.     He was sent for a cardiac workup with ECHO 7/29/2024 with LVEF 50% but with moderate to severe aortic stenosis with mild MR    Patient today is doing well; he has had this defect for years; his granddaughter as been placing bactroban on this and doing wound care.  He has no overt pain.  He denies any weight loss, SOB/KRUEGER change in bowel/bladder, blood urine/stool.  He has had significant sun exposure over his lifetime.  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          REVIEW OF SYSTEMS:  As above   Please note that a 14-point review of systems was performed to include Constitutional, HEENT, Respiratory, CVS, GI, , Musculoskeletal, Integumentary, Neurologic, Rheumatologic, Endocrinologic, Psychiatric, Lymphatic, and Hematologic/Oncologic systems were reviewed  and are negative unless otherwise stated in HPI. Positive and negative findings pertinent to this evaluation are incorporated into the history of present illness.      ECOG PS: 0-1        Oncologic Workup      Pathology 6/25/2024    onent    Case Report   Surgical Pathology Report                         Case: [id number]                                   Authorizing Provider:  Hoda Murry MD           Collected:           06/25/2024 1006               Ordering Location:     [facility]      Received:            06/25/2024 [address] [place] [address]                                                                       Pathologist:           Fransisco Roberts MD                                                       Specimens:   A) - Skin, Other, A:  lateral posterior neck;                                                        B) - Skin, Other, B: central posterior neck                                                          C) - Skin, Other, C: medial posterior neck                                                          D) - Skin, Other, D: Right posterior shoulder                                              Final Diagnosis   A. Skin, lateral posterior neck:   BASAL CELL CARCINOMA, INFILTRATIVE AND NODULAR     Note: This lesion is also ulcerated and crusted.     B. Skin, central posterior neck:   BASAL CELL CARCINOMA, MORPHEAFORM TYPE     C. Skin, medial posterior neck:   BASAL CELL CARCINOMA, INFILTRATIVE AND NODULAR     D. Skin, right posterior shoulder:   BASAL CELL CARCINOMA, NODULAR           Presented derm conference as resection at this time not practical    neoadjuvant therapy prior to surgery.  Plan for excision with delayed grafting by surgical oncology and closure by plastics with skin grafting and potential muscle graft.  No need for adjuvant radiation therapy.    Imaging:    CT CAP 7/11/2024     No evidence for metastatic disease in the chest, abdomen, or  pelvis.     Prostatomegaly indenting the urinary bladder base. 1.4 cm nodular, enhancing component at the superior most aspect of the prostate gland; neoplastic etiology is not excluded.    CT neck 7/11/2024      1. Soft tissue ulcerating mass at the left base of the neck/upper chest, which is also inseparable from the left trapezius musculature. Findings are in keeping with history of biopsy-proven basal cell carcinoma.     2. No pathologically enlarged cervical lymphadenopathy within the neck.    CT brain 7/11/2024         No mass effect, acute intracranial hemorrhage or evidence of recent infarction. No discrete lytic or blastic osseous lesions identified. No abnormal parenchymal or leptomeningeal enhancement is seen.            PROBLEM LIST:  Patient Active Problem List   Diagnosis    Anxiety    Aortic valve stenosis    CAD in native artery    Carotid artery disease (HCC)    Coronary atherosclerosis    Osteoarthritis    Primary hypertension    Hepatitis A    Hyperlipidemia    Hydrocele    Hypertriglyceridemia    Hyperplasia of prostate without lower urinary tract symptoms (LUTS)    Insomnia    Other specified glaucoma    Vitamin D deficiency    Type 2 diabetes mellitus with stage 3a chronic kidney disease, without long-term current use of insulin (HCC)    Onychomycosis of toenail    Overweight (BMI 25.0-29.9)    DNR (do not resuscitate)    Bunion    Moderate major depression (HCC)    Callus of foot    Stage 3a chronic kidney disease (HCC)    Lesion of neck    Lyme disease    Basal cell carcinoma (BCC) of skin of neck    Basal cell carcinoma (BCC) of right shoulder       Past Medical History:   has a past medical history of Anxiety, CAD (coronary artery disease), Capsular cataract, Chronic ulcer of skin (HCC) (05/18/2015), DJD (degenerative joint disease), Encounter for routine adult medical exam with abnormal findings (08/21/2019), Hepatitis A, Hydrocele, Hyperglycemia, Hyperlipidemia, Hypertension, Impaired  fasting glucose (04/30/2014), Myocardial infarct ([facility]) (2007), 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    PAST SURGICAL HISTORY:   has a past surgical history that includes Angioplasty (2008).    CURRENT MEDICATIONS  Current Outpatient Medications   Medication Sig Dispense Refill    Aspirin 81 MG CAPS Take 1 tablet by mouth in the morning 90 capsule 1    dorzolamide-timolol (COSOPT) 22.3-6.8 MG/ML ophthalmic solution Every 12 hours      doxycycline hyclate (VIBRA-TABS) 100 mg tablet Take 1 tablet (100 mg total) by mouth 2 (two) times a day for 28 days 56 tablet 0    fenofibrate (TRICOR) 54 MG tablet Take 1 tablet (54 mg total) by mouth daily 30 tablet 0    latanoprost (XALATAN) 0.005 % ophthalmic solution INSTILL 1 DROP AT BEDTIME INTO BOTH EYES  3    metoprolol succinate (TOPROL-XL) 100 mg 24 hr tablet Take 1 tablet (100 mg total) by mouth 2 (two) times a day 60 tablet 2    Multiple Vitamins-Minerals (MULTIVITAL) tablet every 24 hours      Multiple Vitamins-Minerals (PRESERVISION AREDS 2+MULTI VIT PO) take 2 tabs daily      mupirocin (BACTROBAN) 2 % ointment Apply topically daily During wound bandage changes 100 g 0    nitroglycerin (NITROSTAT) 0.4 mg SL tablet Take one under tongue for chest pain. Can repet in 5 minutes if necessary. 25 tablet 1    RHOPRESSA 0.02 % SOLN INSTILL 1 DROP AT BEDTIME INTO BOTH EYES  1    simvastatin (ZOCOR) 40 mg tablet Take 1 tablet (40 mg total) by mouth daily at bedtime 90 tablet 1     No current facility-administered medications for this visit.     [unfilled]    SOCIAL HISTORY:   reports that he has never smoked. He has never been exposed to tobacco smoke. He has never used smokeless tobacco. He reports that he does not drink alcohol and does not use drugs.     FAMILY  HISTORY:  family history includes Coronary artery disease in his father.     ALLERGIES:  has No Known Allergies.      Physical Exam:  Vital Signs:   Visit Vitals  Smoking Status Never     There is no height or weight on file to calculate BMI.  There is no height or weight on file to calculate BSA.    GEN: Alert, awake oriented x3, in no acute distress  HEENT- No pallor, icterus, cyanosis, no oral mucosal lesions,   LAD -  shotty right level 2 palpable cervical; no pre/posterior auricular, parotid, submental, submandibular, left cervical, supra/infraclavicular, axillary nodes.   Heart- normal S1 S2, regular rate and rhythm, 4/6 PRACHI.   Lungs- clear breathing sound bilateral.   Abdomen- soft, Non tender, bowel sounds present  Extremities- No cyanosis, clubbing, edema  Neuro- No focal neurological deficit  Skin-limited exam, back only 20 cm open excoriated, oozing lesion left upper shoulder, irritated BCC right shoulder, scattered Aks, Sks, probable congenital nevus mid back with increased pigmentation lateral borders,       Labs:  Lab Results   Component Value Date    WBC 7.08 07/30/2024    HGB 13.3 07/30/2024    HCT 43.3 07/30/2024    MCV 94 07/30/2024     07/30/2024     Lab Results   Component Value Date     05/30/2018    SODIUM 141 07/30/2024    K 4.6 07/30/2024     07/30/2024    CO2 27 07/30/2024    ANIONGAP 7 05/30/2018    AGAP 7 07/30/2024    BUN 21 07/30/2024    CREATININE 1.17 07/30/2024    GLUC 108 (H) 01/28/2022    GLUF 96 07/30/2024    CALCIUM 9.0 07/30/2024    AST 16 07/07/2023    ALT 23 07/07/2023    ALKPHOS 62 07/07/2023    PROT 6.2 05/30/2018    TP 6.7 07/07/2023    BILITOT 0.4 05/30/2018    TBILI 0.51 07/07/2023    EGFR 54 07/30/2024           I spent 60 minutes on chart review, face to face counseling time, coordination of care and documentation.    Gaby Rivera MD PhD

## 2024-08-02 ENCOUNTER — OFFICE VISIT (OUTPATIENT)
Age: 89
End: 2024-08-02
Payer: COMMERCIAL

## 2024-08-02 VITALS
DIASTOLIC BLOOD PRESSURE: 70 MMHG | OXYGEN SATURATION: 99 % | RESPIRATION RATE: 18 BRPM | BODY MASS INDEX: 23.83 KG/M2 | HEIGHT: 60 IN | HEART RATE: 71 BPM | SYSTOLIC BLOOD PRESSURE: 122 MMHG | TEMPERATURE: 97.9 F

## 2024-08-02 VITALS
HEART RATE: 65 BPM | WEIGHT: 118 LBS | HEIGHT: 60 IN | DIASTOLIC BLOOD PRESSURE: 76 MMHG | TEMPERATURE: 98.6 F | RESPIRATION RATE: 16 BRPM | BODY MASS INDEX: 23.16 KG/M2 | OXYGEN SATURATION: 97 % | SYSTOLIC BLOOD PRESSURE: 148 MMHG

## 2024-08-02 DIAGNOSIS — S11.90XA OPEN WOUND OF NECK, INITIAL ENCOUNTER: ICD-10-CM

## 2024-08-02 DIAGNOSIS — C44.612 BASAL CELL CARCINOMA (BCC) OF RIGHT SHOULDER: ICD-10-CM

## 2024-08-02 DIAGNOSIS — C44.519 BASAL CELL CARCINOMA (BCC) OF SKIN OF OTHER PART OF TORSO: ICD-10-CM

## 2024-08-02 DIAGNOSIS — C44.41 BASAL CELL CARCINOMA (BCC) OF SKIN OF NECK: Primary | ICD-10-CM

## 2024-08-02 PROCEDURE — 1160F RVW MEDS BY RX/DR IN RCRD: CPT | Performed by: SURGERY

## 2024-08-02 PROCEDURE — 1159F MED LIST DOCD IN RCRD: CPT | Performed by: SURGERY

## 2024-08-02 PROCEDURE — 99205 OFFICE O/P NEW HI 60 MIN: CPT | Performed by: SURGERY

## 2024-08-02 PROCEDURE — 99205 OFFICE O/P NEW HI 60 MIN: CPT | Performed by: INTERNAL MEDICINE

## 2024-08-02 NOTE — PATIENT INSTRUCTIONS
Recommend vismodegib 150 mg daily    Will see cardiology 13 August 2024    Make decision about upfront treatment after that visit    Will see you one week after start medication    Will need labs prior

## 2024-08-02 NOTE — PROGRESS NOTES
Surgical Oncology Follow Up  [facility] [address] [place] [id number]  [phone]    Saud Ardon  6/21/1934  [id number]      Chief Complaint   Patient presents with    Follow-up        Assessment & Plan:   Send 89-year-old gentleman with large basal cell carcinoma of the back nonhealing.  He is here after staging workup and plastic surgery consultation.  He was seen by cardiologist and echocardiogram demonstrated moderate to severe aortic stenosis on 2D images.  Further workup is pending I did discuss these findings with him as well as his granddaughters in detail we will wait for his cardiology evaluation.  Prior to any surgical decision making.  He has a cardiology appointment within the next 2 weeks and he will come and see us after.  If he is not a candidate for surgery we may refer him to radiation oncology to discuss possible palliative radiation.  I will see him in 1 month time    Cancer History:     Oncology History Overview Note   With Basal Cell Carcinoma of back. He was referred by Dr. Murry. He is being seen today for Radiation Oncology Consult.    Patient saw his PCP on 6/21/24 for fatigue and also had MD assess area skin lesion on his back which had been present for awhile. Had been referred to Dermatology prior but patient did not go. He saw Derm on 6/25/24 for large, ulcerated are on left back/posterior neck. Reports that he had for 6 years and area was growing. Also has 1.5cm area on right posterior shoulder. Biopsies taken in office.       6/21/24 PCP Dr. Edwards  ASAP referral to Dermatology      6/25/24 Dr. Rodriguez-Dermatology  Biopsy in office  Specimens:   A) - Skin, Other, A:  lateral posterior neck;                                                       B) - Skin, Other, B: central posterior neck                                                         C) - Skin, Other, C: medial  posterior neck                                                         D) - Skin, Other, D: Right posterior shoulder  Referrals to Medical Oncology, Surgical Oncology. Radiation Oncology  To be discussed at Tumor Board  Will get patient scheduled for excision of site with Dermatology      7/5/24 Dr. Early-Surgical Oncology  Large posterior neck/shoulder lesion at least 25 x 20cm. Not attached to deep structures  1.5cm posterior shoulder lesion separate. No regional adenopathy  CT C/A/P ordered  Consulted Plastics      7/9/24 Plastics  Discussed surgery-patient unsure if he wishes to proceed with surgery  Will discuss with family and return to office in 1 week      7/11/24 CT head  IMPRESSION:  No mass effect, acute intracranial hemorrhage or evidence of recent infarction. No discrete lytic or blastic osseous lesions identified. No abnormal parenchymal or leptomeningeal enhancement is seen.    7/11/24 CT soft tissue neck  IMPRESSION:1. Soft tissue ulcerating mass at the left base of the neck/upper chest, which is also inseparable from the left trapezius musculature. Findings are in keeping with history of biopsy-proven basal cell carcinoma.  2. No pathologically enlarged cervical lymphadenopathy within the neck.       7/11/24 CT C/A/P  IMPRESSION:  No evidence for metastatic disease in the chest, abdomen, or pelvis.  Prostatomegaly indenting the urinary bladder base. 1.4 cm nodular, enhancing component at the superior most aspect of the prostate gland; neoplastic etiology is not excluded.    ADDENDUM:  Small bilateral adrenal gland nodules measuring up to 1.6 cm on the right and 1.1 cm on the left are indeterminate. Dedicated adrenal protocol MRI is advised for further evaluation.    7/18/24 Plastics  Patient elects to proceed with surgery      7/19/24 Cutaneous [facility]      Upcoming appointments:  7/23/24 Cardiology Oncology  8/2/24 Dr. Rivera  8/2/24 Dr. Early             Basal cell carcinoma (BCC) of skin of neck    6/25/2024 Initial Diagnosis    Basal cell carcinoma (BCC) of skin of neck     6/25/2024 Initial Diagnosis    Basal cell carcinoma (BCC) of right shoulder     6/25/2024 Biopsy    SKIN BIOPSIES    A. Skin, lateral posterior neck:   BASAL CELL CARCINOMA, INFILTRATIVE AND NODULAR     Note: This lesion is also ulcerated and crusted.     B. Skin, central posterior neck:   BASAL CELL CARCINOMA, MORPHEAFORM TYPE     C. Skin, medial posterior neck:   BASAL CELL CARCINOMA, INFILTRATIVE AND NODULAR     D. Skin, right posterior shoulder:   BASAL CELL CARCINOMA, NODULAR        Basal cell carcinoma (BCC) of right shoulder   6/25/2024 Initial Diagnosis    Basal cell carcinoma (BCC) of right shoulder           Interval History:   Follow-up after staging workup and echocardiogram    Review of Systems:   Review of Systems   Constitutional:  Negative for chills and fever.   HENT:  Negative for ear pain and sore throat.    Eyes:  Negative for pain and visual disturbance.   Respiratory:  Negative for cough and shortness of breath.    Cardiovascular:  Negative for chest pain and palpitations.   Gastrointestinal:  Negative for abdominal pain and vomiting.   Genitourinary:  Negative for dysuria and hematuria.   Musculoskeletal:  Negative for arthralgias and back pain.   Skin:  Negative for color change and rash.   Neurological:  Negative for seizures and syncope.   All other systems reviewed and are negative.      Past Medical History     Patient Active Problem List   Diagnosis    Anxiety    Aortic valve stenosis    CAD in native artery    Carotid artery disease (HCC)    Coronary atherosclerosis    Osteoarthritis    Primary hypertension    Hepatitis A    Hyperlipidemia    Hydrocele    Hypertriglyceridemia    Hyperplasia of prostate without lower urinary tract symptoms (LUTS)    Insomnia    Other specified glaucoma    Vitamin D deficiency    Type 2 diabetes mellitus with stage 3a chronic kidney disease, without long-term current use of  insulin (HCC)    Onychomycosis of toenail    Overweight (BMI 25.0-29.9)    DNR (do not resuscitate)    Bunion    Moderate major depression (HCC)    Callus of foot    Stage 3a chronic kidney disease (HCC)    Lesion of neck    Lyme disease    Basal cell carcinoma (BCC) of skin of neck    Basal cell carcinoma (BCC) of right shoulder     Past Medical History:   Diagnosis Date    Anxiety     CAD (coronary artery disease)     Capsular cataract     mature    Chronic ulcer of skin ([facility]) 05/18/2015    DJD (degenerative joint disease)     Encounter for routine adult medical exam with abnormal findings 08/21/2019    Hepatitis A     Hydrocele     Hyperglycemia     Hyperlipidemia     Hypertension     Impaired fasting glucose 04/30/2014    Myocardial infarct ([facility]) 2007    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[facility])     Potassium (K) excess 12/27/2019    Prostatic hyperplasia     Skin cancer     Stage 3a chronic kidney disease ([facility]) 05/04/2022    Type 2 diabetes mellitus without complication, without long-term current use of insulin ([facility]) 03/06/2019     Past Surgical History:   Procedure Laterality Date    ANGIOPLASTY  2008     Family History   Problem Relation Age of Onset    Coronary artery disease Father      Social History     Socioeconomic History    Marital status:      Spouse name: Not on file    Number of children: Not on file    Years of education: Not on file    Highest education level: Not on file   Occupational History    Not on file   Tobacco Use    Smoking status: Never     Passive exposure: Never    Smokeless tobacco: Never   Vaping Use    Vaping status: Never Used   Substance and Sexual Activity    Alcohol use: No    Drug use: No    Sexual activity: Not Currently     Partners: Female   Other Topics Concern    Not on file   Social History Narrative    No risk for falls    Activity level: sedentary    No sleep changes    No animals    No firearms    Always uses a seatbelt     Social Determinants of Health     Financial Resource  Strain: Medium Risk (9/22/2023)    Overall Financial Resource Strain (CARDIA)     Difficulty of Paying Living Expenses: Somewhat hard   Food Insecurity: No Food Insecurity (9/21/2022)    Hunger Vital Sign     Worried About Running Out of Food in the Last Year: Never true     Ran Out of Food in the Last Year: Never true   Transportation Needs: No Transportation Needs (9/22/2023)    PRAPARE - Transportation     Lack of Transportation (Medical): No     Lack of Transportation (Non-Medical): No   Physical Activity: Insufficiently Active (9/21/2022)    Exercise Vital Sign     Days of Exercise per Week: 2 days     Minutes of Exercise per Session: 30 min   Stress: Stress Concern Present (9/21/2022)    [employer] [place] [employer] - Occupational Stress Questionnaire     Feeling of Stress : To some extent   Social Connections: Moderately Isolated (9/21/2022)    Social Connection and Isolation Panel [NHANES]     Frequency of Communication with Friends and Family: More than three times a week     Frequency of Social Gatherings with Friends and Family: More than three times a week     Attends Congregation Services: More than 4 times per year     Active Member of Clubs or Organizations: No     Attends Club or Organization Meetings: Never     Marital Status:    Intimate Partner Violence: Not At Risk (9/21/2022)    Humiliation, Afraid, Rape, and Kick questionnaire     Fear of Current or Ex-Partner: No     Emotionally Abused: No     Physically Abused: No     Sexually Abused: No   Housing Stability: Unknown (9/21/2022)    Housing Stability Vital Sign     Unable to Pay for Housing in the Last Year: No     Number of Places Lived in the Last Year: Not on file     Unstable Housing in the Last Year: No       Current Outpatient Medications:     Aspirin 81 MG CAPS, Take 1 tablet by mouth in the morning, Disp: 90 capsule, Rfl: 1    dorzolamide-timolol (COSOPT) 22.3-6.8 MG/ML ophthalmic solution, Every 12 hours, Disp: , Rfl:      doxycycline hyclate (VIBRA-TABS) 100 mg tablet, Take 1 tablet (100 mg total) by mouth 2 (two) times a day for 28 days, Disp: 56 tablet, Rfl: 0    fenofibrate (TRICOR) 54 MG tablet, Take 1 tablet (54 mg total) by mouth daily, Disp: 30 tablet, Rfl: 0    latanoprost (XALATAN) 0.005 % ophthalmic solution, INSTILL 1 DROP AT BEDTIME INTO BOTH EYES, Disp: , Rfl: 3    metoprolol succinate (TOPROL-XL) 100 mg 24 hr tablet, Take 1 tablet (100 mg total) by mouth 2 (two) times a day, Disp: 60 tablet, Rfl: 2    Multiple Vitamins-Minerals (MULTIVITAL) tablet, every 24 hours, Disp: , Rfl:     Multiple Vitamins-Minerals (PRESERVISION AREDS 2+MULTI VIT PO), take 2 tabs daily, Disp: , Rfl:     mupirocin (BACTROBAN) 2 % ointment, Apply topically daily During wound bandage changes, Disp: 100 g, Rfl: 0    nitroglycerin (NITROSTAT) 0.4 mg SL tablet, Take one under tongue for chest pain. Can repet in 5 minutes if necessary., Disp: 25 tablet, Rfl: 1    RHOPRESSA 0.02 % SOLN, INSTILL 1 DROP AT BEDTIME INTO BOTH EYES, Disp: , Rfl: 1    simvastatin (ZOCOR) 40 mg tablet, Take 1 tablet (40 mg total) by mouth daily at bedtime, Disp: 90 tablet, Rfl: 1  No Known Allergies    Physical Exam:     Vitals:    08/02/24 1107   BP: 148/76   Pulse: 65   Resp: 16   Temp: 98.6 °F (37 °C)   SpO2: 97%     Physical Exam  Skin:            Comments:    Right posterior shoulder region ulcerated mass with biopsy-proven basal cell carcinoma.               Results & Discussion:     Narrative & Impression   CT CHEST, ABDOMEN AND PELVIS WITH IV CONTRAST     INDICATION: C44.41: Basal cell carcinoma of skin of scalp and neck. ';     COMPARISON: None.     TECHNIQUE: CT examination of the chest, abdomen and pelvis was performed. Multiplanar 2D reformatted images were created from the source data.     This examination, like all CT scans performed in the [facility], was performed utilizing techniques to minimize radiation dose exposure, including the use  of iterative reconstruction and automated exposure control. Radiation dose length   product (DLP) for this visit: 1324 mGy-cm     IV Contrast: 80 mL of iohexol (OMNIPAQUE)  Enteric Contrast: Not administered.     FINDINGS:     CHEST     LUNGS: No focal airspace consolidation. No worrisome pulmonary mass. No tracheal or endobronchial lesion.     PLEURA: Unremarkable.     HEART/GREAT VESSELS: Heart is unremarkable for patient's age. No thoracic aortic aneurysm.     MEDIASTINUM AND JENNIFER: Unremarkable.     CHEST WALL AND LOWER NECK: Unremarkable.     ABDOMEN     LIVER/BILIARY TREE: Unremarkable.     GALLBLADDER: No calcified gallstones. No pericholecystic inflammatory change.     SPLEEN: Multiple calcified granulomata.     PANCREAS: Unremarkable.     ADRENAL GLANDS: Unremarkable.     KIDNEYS/URETERS: Subcentimeter hypoattenuating renal lesion(s), too small to characterize but statistically likely benign, which do not warrant follow-up (Radiology June 2019). No hydronephrosis.     STOMACH AND BOWEL: Unremarkable.     APPENDIX: No findings to suggest appendicitis.     ABDOMINOPELVIC CAVITY: No ascites. No pneumoperitoneum. No lymphadenopathy.     VESSELS: Atherosclerotic disease. No abdominal aortic aneurysm.     PELVIS     REPRODUCTIVE ORGANS: The prostate gland is enlarged measuring 5.9 x 4.4 x 5.4 cm and indents the urinary bladder base. There is a 1.4 cm nodular enhancing component at the superior most aspect of the prostate gland (image 105, series 603); neoplastic   etiology is not excluded.     URINARY BLADDER: Unremarkable.     ABDOMINAL WALL/INGUINAL REGIONS: Unremarkable.     BONES: No acute fracture or suspicious osseous lesion.     IMPRESSION:     No evidence for metastatic disease in the chest, abdomen, or pelvis.     Prostatomegaly indenting the urinary bladder base. 1.4 cm nodular, enhancing component at the superior most aspect of the prostate gland; neoplastic etiology is not excluded.      CT NECK WITH  CONTRAST     INDICATION:   Ulcerated skin lesion in the left shoulder/neck region biopsy-proven to be basal cell carcinoma.     COMPARISON:  None.     TECHNIQUE:  Axial, sagittal, and coronal 2D reformatted images were created from the axial source data and submitted for interpretation.     Radiation dose length product (DLP) for this visit:  0 mGy-cm .  This examination, like all CT scans performed in the [facility], was performed utilizing techniques to minimize radiation dose exposure, including the use of iterative   reconstruction and automated exposure control.     IV Contrast:  80 mL of iohexol (OMNIPAQUE)     IMAGE QUALITY:  Diagnostic.     FINDINGS:     VISUALIZED BRAIN PARENCHYMA:  No acute intracranial pathology of the visualized brain parenchyma.     VISUALIZED ORBITS: Status post bilateral lens surgery.     PARANASAL SINUSES: Normal visualized paranasal sinuses.     NASAL CAVITY AND NASOPHARYNX:  Normal.     SUPRAHYOID NECK:  Normal oral cavity, tongue base, tonsillar fossa and epiglottis.     INFRAHYOID NECK:  Aryepiglottic folds and piriform sinuses are normal.  Normal glottis and subglottic airway.     THYROID GLAND:  Unremarkable.     PAROTID AND SUBMANDIBULAR GLANDS:  Normal.     LYMPH NODES:  No pathologic or enlarged adenopathy.     VASCULAR STRUCTURES: Atherosclerotic calcification of the common carotid arteries at the bifurcation bilaterally.     THORACIC INLET: Correlate with separate chest CT report.     SOFT TISSUE STRUCTURES: Soft tissue irregularity and ulceration along the left base of the neck with heaped up margins of tissue measuring 8.3 cm (medial to lateral) and approximately 6.0 cm (cephalad to caudad). There is effacement of the subcutaneous   fat with the ulceration and this is inseparable from the left trapezius musculature.     BONY STRUCTURES: No acute fracture or destructive osseous lesion. Multilevel degenerative changes of the cervical spine, most notably  involving the level of C4-5 where osteophytes result in moderate to severe foraminal narrowing bilaterally.   Additionally, there is severe foraminal narrowing on the right side at the level of C6-7. 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     IMPRESSION:     1. Soft tissue ulcerating mass at the left base of the neck/upper chest, which is also inseparable from the left trapezius musculature. Findings are in keeping with history of biopsy-proven basal cell carcinoma.     2. No pathologically enlarged cervical lymphadenopathy within the neck.  Narrative & Impression   CT BRAIN - WITH AND WITHOUT CONTRAST     INDICATION:   Skin lesion of the posterior neck/back for approximately 5 to 6 years. Tissue biopsy resulted consistent with basal cell carcinoma..     COMPARISON:  None.     TECHNIQUE:  CT examination of the brain was performed both prior to and after the administration of intravenous contrast.  Multiplanar 2D reformatted images were created from the source data.     Radiation dose length product (DLP) for this visit:  2280 mGy-cm .  This examination, like all CT scans performed in the [facility], was performed utilizing techniques to minimize radiation dose exposure, including the use of iterative   reconstruction and automated exposure control.     IV Contrast:  80 mL of iohexol (OMNIPAQUE)     IMAGE QUALITY:  Diagnostic.     FINDINGS:     PARENCHYMA: Decreased attenuation is noted in periventricular and subcortical white matter demonstrating an appearance that is statistically most likely to represent mild microangiopathic change.     No CT signs of acute infarction.  No intracranial mass, mass effect or midline shift.  No acute parenchymal hemorrhage.     Mild atherosclerotic calcification of intracranial carotid arteries.     Post contrast imaging of the brain is normal.     VENTRICLES AND EXTRA-AXIAL SPACES:  Normal for patient's age.     VISUALIZED  ORBITS: Status post bilateral lens surgery.     PARANASAL SINUSES: Normal visualized paranasal sinuses.     CALVARIUM:  Normal.     IMPRESSION:     No mass effect, acute intracranial hemorrhage or evidence of recent infarction. No discrete lytic or blastic osseous lesions identified. No abnormal parenchymal or leptomeningeal enhancement is seen.       I did review the films CT chest abdomen pelvis neck and CT brain in detail.  No evidence of distant disease.  However reviewing echocardiogram there is a concern for moderate to severe aortic stenosis.  This was also discussed with him as well as the family.  Will wait for cardiology evaluation to determine whether he can safely undergo general anesthesia.  This will be a challenging to do under local given the area involvement and further wound closure.  I did discussed in detail nature of  basal cell carcinoma and all the staging workup indicated no distant disease.  We also discussed basal cell carcinoma seldom spread.  he understands and  agrees . All patient and his family members questions were answered.  I will see him in 1      Advance Care Planning/Advance Directives:  I Mary Carmen Early MD discussed the disease status with Saud Ardon  today 08/02/24  treatment plans and follow-up with the patient.

## 2024-08-05 ENCOUNTER — PATIENT OUTREACH (OUTPATIENT)
Dept: CASE MANAGEMENT | Facility: HOSPITAL | Age: 89
End: 2024-08-05

## 2024-08-05 ENCOUNTER — DOCUMENTATION (OUTPATIENT)
Age: 89
End: 2024-08-05

## 2024-08-05 ENCOUNTER — DOCUMENTATION (OUTPATIENT)
Dept: HEMATOLOGY ONCOLOGY | Facility: CLINIC | Age: 89
End: 2024-08-05

## 2024-08-05 DIAGNOSIS — C44.519 BASAL CELL CARCINOMA (BCC) OF SKIN OF OTHER PART OF TORSO: Primary | ICD-10-CM

## 2024-08-05 NOTE — PROGRESS NOTES
Pt's caregiver returned the call this day.  Pt's caregiver reports that the pt has her services each day and he is alone in the evening.  The pt's caregiver reports that the pt does stay alone overnight and has no concerns.  There are family members who are also local and involved and can assist as needed.  Pt mendoza not have any financial concerns or issues with his insurance.  The pt's caregiver does not see any need for social work services at this time.  She is aware that she can call if their needs should change.  Support offered.

## 2024-08-05 NOTE — PROGRESS NOTES
RACHELW made first outreach attempt to pt's caregiver after his oncology appointment.  Caregivers vm was received.  LSW left a detailed message, along with a contact number and the pt was encouraged to return the call.

## 2024-08-05 NOTE — PROGRESS NOTES
Spoke with care giver, Leola. This writer presented all options: co-payment of $3,015.18 and free drug (explained income requirements). She did not know PT's cash availability or income to verify eligibility. She will discuss with him and then return call. This writer provided her contact information.       Brenda Downey MPH  Phone: [phone]  Email: [email or web address]

## 2024-08-05 NOTE — PROGRESS NOTES
Received request  from clinical for patient to start on Erivedge 150mg cap daily. Auth has been submitted via cover my meds and this is pending and marked urgent  (Key: [id number])    BIN:       [id number]  PCN:      [id number]  ID:          [id number]  GRP:      [id number]    UPDATE:  This has been approved.

## 2024-08-06 ENCOUNTER — TELEPHONE (OUTPATIENT)
Dept: FAMILY MEDICINE CLINIC | Facility: CLINIC | Age: 89
End: 2024-08-06

## 2024-08-06 NOTE — TELEPHONE ENCOUNTER
Received additional information needed for daughters FMLA to take care of patient. Completed and put in your folder for your signature.

## 2024-08-10 NOTE — PROGRESS NOTES
Cardio-Oncology / Heart Failure Cardiology Clinic Note    Saud Del Toror 89 y.o. male   MRN: [id number]  Encounter: [id number]        Assessment / Plan:    # Cardio-Oncology Pertinent History  Basal cell carcinoma  Dx 2024  Of neck / shoulder  Saw Dr Rivera -  celeste neoadjuvant vismodegib (hedgehog pathway inhibitor). If not effective, would consider immunotherapy.   Possible excision with delayed grafting by surgical oncology and closure by plastics with skin grafting and potential muscle graft.     # Preoperative risk assessment  Planned surgery:   extensive basal cell carcinoma resection  No signs of decompensated heart failure or concern for ACS at this time  Patient able to do > 4 METs activity without concerning sx's  EKG:   normal sinus rhythm  Exam:  euvolemic  Echo:  Mod-severe aortic stenosis.  PLAN:   It seems the plan has changed a bit.  When I first saw the patient he was planning to have upcoming surgery.  Since then he has seen medical oncology and started on neoadjuvant therapy.  Obviously the moderate to severe aortic stenosis raises some concern for increased risk of surgery.  I think the aortic stenosis is probably moderate, and since he is asymptomatic, would not need intervention prior to surgery.  If they plan to do surgery I would recommend REINALDO prior to surgery just to be sure we have more data on the aortic valve severity.  If they do not plan to do surgery I would just monitor the aortic stenosis.    #   Aortic stenosis  Echo 7-2024:  Moderate to severe aortic stenosis reported (mean gradient 16, DI 0.28, JOSE ARMANDO 0.9, SVI 37, LVOT diameter measurement may have been slightly low).    I think the aortic stenosis is probably moderate, and since he is asymptomatic, would not need intervention prior to surgery.  If they plan to do surgery I would recommend REINALDO prior to surgery just to be sure we have more data on the aortic valve severity.  If they do not plan to do surgery I would just monitor the  aortic stenosis.    # Possible mild cardiomyopathy  Echo 7-2024 - EF reported at 50% (measured 58% biplane).  Anterolateral wall motion abnormality.    ETIOLOGY:  likely CAD  VOLUME:  euvolemic, not requiring diuretic  GDMT:  toprol  DEVICE:  not indicated    # CAD  Reportedly had MI 2007 - minimal details, reports he got 1 stent  On aspirin 81  On statin  No angina    # HTN  On toprol 100mg BID  High today, grandaughter in law reports normally BP is not elevated.  Continue to monitor.    # HLD  On simvastatin 40mg  On tricor   Last LDL 63, at goal    # Carotid artery disease  On problem list  On ASA 81  On statin  Had declined repeat duplex in the past      Today's Plan Summary:  See above assessment/plan for full details of today's plan.  Briefly,     It seems the plan has changed a bit.  When I first saw the patient he was planning to have upcoming surgery.  Since then he has seen medical oncology and started on neoadjuvant therapy.  Obviously the moderate to severe aortic stenosis raises some concern for increased risk of surgery.  I think the aortic stenosis is probably moderate, and since he is asymptomatic, would not need intervention prior to surgery.  If they plan to do surgery I would recommend REINALDO prior to surgery just to be sure we have more data on the aortic valve severity.  If they do not plan to do surgery I would just monitor the aortic stenosis.    CC Dr Early and Dr Rivera              Reason For Visit / Chief Complaint:  F/u -  preoperative risk assessment.    HPI:   Saud Ardon is a 89 y.o.  male with history as noted in the problem list and further detailed in the above assessment and plan.    Initial:  July 2024  Referred by Dr. Early for a new patient visit for preoperative risk assessment.  The patient has a enlarging neck/shoulder mass that was biopsy-proven basal cell carcinoma.  He may need an extensive surgery.  He has a history of coronary artery disease with MI in 2007.  He also  "has hypertension, hyperlipidemia, carotid artery disease.  He was not following with a cardiologist.  He was referred to cardiology for preoperative risk assessment.  Today, the patient reports -  feels well.  No chest pain.  No SOB.  No KRUEGER.   No edema.   No orthopnea or PND.   No palpitations.  No syncope.   Can walk up a hill without symptoms.   Retired.  Was an .  .   2 children.   Here with grandaughter in law.    Interval:  Plan at initial visit -->   Systolic murmur, would not proceed with surgery until cardiac evaluation  Check echo as first step      Echo  -   07/29/24   EF reported at 50% (measured 58% biplane).  Anterolateral wall motion abnormality.    Moderate to severe aortic stenosis reported (mean gradient 16, DI 0.28, JOSE ARMANDO 0.9, SVI 37, LVOT diameter measurement may have been slightly low).    Mild AI.  Moderate MR.     Saw Dr Rivera -  note reviewed in detail.   Rec neoadjuvant vismodegib (hedgehog pathway inhibitor). If not effective, would consider immunotherapy.     Today -  \"I feel good\".   No CP or SOB.  No syncope.  No edema.          Cardiac Imaging personally reviewed:  EKG 6-17-24  NSR   Holter or event monitor    Echo Echo  -   07/29/24   EF reported at 50% (measured 58% biplane).  Anterolateral wall motion abnormality.    Moderate to severe aortic stenosis reported (mean gradient 16, DI 0.28, JOSE ARMANDO 0.9, SVI 37, LVOT diameter measurement may have been slightly low).    Mild AI.  Moderate MR.        REINALDO    Cardiac MRI    Stress testing    Coronary CTA or [facility]    RHC    CPET              Patient Active Problem List    Diagnosis Date Noted   • Ischemic cardiomyopathy 08/13/2024   • Basal cell carcinoma (BCC) of skin of neck 07/03/2024   • Basal cell carcinoma (BCC) of right shoulder 07/03/2024   • Lesion of neck 06/21/2024   • Lyme disease 06/21/2024   • Stage 3a chronic kidney disease (HCC) 05/04/2022   • Callus of foot 02/04/2022   • Moderate major depression (HCC) " 07/28/2021   • Bunion 03/23/2021   • DNR (do not resuscitate) 08/21/2020   • Overweight (BMI 25.0-29.9) 08/21/2019   • Type 2 diabetes mellitus with stage 3a chronic kidney disease, without long-term current use of insulin ([facility]) 03/06/2019   • Onychomycosis of toenail 03/06/2019   • Vitamin D deficiency 09/07/2018   • Other specified glaucoma 08/08/2018   • Aortic valve stenosis 05/15/2017   • Insomnia 03/14/2016   • Primary hypertension 04/30/2014   • Hypertriglyceridemia 04/30/2014   • Carotid artery disease ([facility]) 01/14/2014   • Anxiety 01/28/2013   • CAD in native artery 01/28/2013   • Coronary atherosclerosis 01/28/2013   • Osteoarthritis 01/28/2013   • Hepatitis A 01/28/2013   • Hyperlipidemia 01/28/2013   • Hydrocele 01/28/2013   • Hyperplasia of prostate without lower urinary tract symptoms (LUTS) 01/28/2013       Past Medical History:   Diagnosis Date   • Anxiety    • CAD (coronary artery disease)    • Capsular cataract     mature   • Chronic ulcer of skin ([facility]) 05/18/2015   • DJD (degenerative joint disease)    • Encounter for routine adult medical exam with abnormal findings 08/21/2019   • Hepatitis A    • Hydrocele    • Hyperglycemia    • Hyperlipidemia    • Hypertension    • Impaired fasting glucose 04/30/2014   • Myocardial infarct ([facility]) 2007   • 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[facility])    • Potassium (K) excess 12/27/2019   • Prostatic hyperplasia    • Skin cancer    • Stage 3a chronic kidney disease ([facility]) 05/04/2022   • Type 2 diabetes mellitus without complication, without long-term current use of insulin ([facility]) 03/06/2019       No Known Allergies    Current Outpatient Medications   Medication Instructions   • Aspirin 81 MG CAPS 1 tablet, Oral, Daily   • dorzolamide-timolol (COSOPT) 22.3-6.8 MG/ML ophthalmic solution Every 12 hours   • fenofibrate (TRICOR) 54 mg, Oral, Daily   • latanoprost (XALATAN) 0.005 % ophthalmic solution INSTILL 1 DROP AT BEDTIME INTO BOTH EYES   • metoprolol succinate (TOPROL-XL) 100 mg, Oral, 2  times daily   • Multiple Vitamins-Minerals (MULTIVITAL) tablet Every 24 hours   • Multiple Vitamins-Minerals (PRESERVISION AREDS 2+MULTI VIT PO) take 2 tabs daily   • mupirocin (BACTROBAN) 2 % ointment Topical, Daily, During wound bandage changes   • mupirocin (BACTROBAN) 2 % ointment Topical, 3 times daily   • nitroglycerin (NITROSTAT) 0.4 mg SL tablet Take one under tongue for chest pain. Can repet in 5 minutes if necessary.   • RHOPRESSA 0.02 % SOLN INSTILL 1 DROP AT BEDTIME INTO BOTH EYES   • simvastatin (ZOCOR) 40 mg, Oral, Daily at bedtime   • Vismodegib 150 mg, Oral, Daily       Social History     Socioeconomic History   • Marital status:      Spouse name: Not on file   • Number of children: Not on file   • Years of education: Not on file   • Highest education level: Not on file   Occupational History   • Not on file   Tobacco Use   • Smoking status: Never     Passive exposure: Never   • Smokeless tobacco: Never   Vaping Use   • Vaping status: Never Used   Substance and Sexual Activity   • Alcohol use: No   • Drug use: No   • Sexual activity: Not Currently     Partners: Female   Other Topics Concern   • Not on file   Social History Narrative    No risk for falls    Activity level: sedentary    No sleep changes    No animals    No firearms    Always uses a seatbelt     Social Determinants of Health     Financial Resource Strain: Medium Risk (9/22/2023)    Overall Financial Resource Strain (CARDIA)    • Difficulty of Paying Living Expenses: Somewhat hard   Food Insecurity: No Food Insecurity (9/21/2022)    Hunger Vital Sign    • Worried About Running Out of Food in the Last Year: Never true    • Ran Out of Food in the Last Year: Never true   Transportation Needs: No Transportation Needs (9/22/2023)    PRAPARE - Transportation    • Lack of Transportation (Medical): No    • Lack of Transportation (Non-Medical): No   Physical Activity: Insufficiently Active (9/21/2022)    Exercise Vital Sign    • Days of  "Exercise per Week: 2 days    • Minutes of Exercise per Session: 30 min   Stress: Stress Concern Present (9/21/2022)    [place] [employer] - Occupational Stress Questionnaire    • Feeling of Stress : To some extent   Social Connections: Moderately Isolated (9/21/2022)    Social Connection and Isolation Panel [NHANES]    • Frequency of Communication with Friends and Family: More than three times a week    • Frequency of Social Gatherings with Friends and Family: More than three times a week    • Attends Anabaptism Services: More than 4 times per year    • Active Member of Clubs or Organizations: No    • Attends Club or Organization Meetings: Never    • Marital Status:    Intimate Partner Violence: Not At Risk (9/21/2022)    Humiliation, Afraid, Rape, and Kick questionnaire    • Fear of Current or Ex-Partner: No    • Emotionally Abused: No    • Physically Abused: No    • Sexually Abused: No   Housing Stability: Unknown (9/21/2022)    Housing Stability Vital Sign    • Unable to Pay for Housing in the Last Year: No    • Number of Places Lived in the Last Year: Not on file    • Unstable Housing in the Last Year: No       Family History   Problem Relation Age of Onset   • Coronary artery disease Father        Physical Exam:  Blood pressure 156/80, pulse 61, height 4' 11\" (1.499 m), weight 54.7 kg (120 lb 8 oz), SpO2 98%.  Body mass index is 24.34 kg/m².  Wt Readings from Last 3 Encounters:   08/13/24 54.7 kg (120 lb 8 oz)   08/02/24 53.5 kg (118 lb)   07/29/24 54.4 kg (120 lb)     Physical Exam  Vitals reviewed.   Constitutional:       General: He is not in acute distress.     Appearance: He is not toxic-appearing.   Neck:      Comments: No JVP elevation  Cardiovascular:      Rate and Rhythm: Normal rate and regular rhythm.      Heart sounds: Murmur (systolic murmur at base) heard.      No friction rub. No gallop.   Pulmonary:      Breath sounds: Normal breath sounds. No wheezing, rhonchi or " "rales.   Musculoskeletal:      Comments: Trace LE edema   Neurological:      Mental Status: He is alert.         Labs & Results:  Lab Results   Component Value Date    SODIUM 141 07/30/2024    K 4.6 07/30/2024     07/30/2024    CO2 27 07/30/2024    BUN 21 07/30/2024    CREATININE 1.17 07/30/2024    GLUC 108 (H) 01/28/2022    CALCIUM 9.0 07/30/2024     No results found for: \"NTBNP\"     Time Statement:  I have spent a total time of 45 minutes in caring for this patient on the day of the visit/encounter including Diagnostic results, Prognosis, Risks and benefits of tx options, Instructions for management, Patient and family education, Importance of tx compliance, Impressions, Counseling / Coordination of care, Documenting in the medical record, Reviewing / ordering tests, medicine, procedures  , Obtaining or reviewing history  , and communicating with oncology and surgical oncology .      Thank you for the opportunity to participate in the care of this patient.    Joshua Yao MD, Located within [facility]  Staff Cardiologist  Director of Cardio-Oncology  [facility]  "

## 2024-08-13 ENCOUNTER — OFFICE VISIT (OUTPATIENT)
Dept: CARDIOLOGY CLINIC | Facility: CLINIC | Age: 89
End: 2024-08-13
Payer: COMMERCIAL

## 2024-08-13 VITALS
WEIGHT: 120.5 LBS | HEIGHT: 60 IN | BODY MASS INDEX: 23.66 KG/M2 | OXYGEN SATURATION: 98 % | HEART RATE: 61 BPM | SYSTOLIC BLOOD PRESSURE: 156 MMHG | DIASTOLIC BLOOD PRESSURE: 80 MMHG

## 2024-08-13 DIAGNOSIS — I10 PRIMARY HYPERTENSION: ICD-10-CM

## 2024-08-13 DIAGNOSIS — I25.10 CAD IN NATIVE ARTERY: ICD-10-CM

## 2024-08-13 DIAGNOSIS — E78.2 MIXED HYPERLIPIDEMIA: ICD-10-CM

## 2024-08-13 DIAGNOSIS — Z01.810 PREOPERATIVE CARDIOVASCULAR EXAMINATION: Primary | ICD-10-CM

## 2024-08-13 DIAGNOSIS — I25.5 ISCHEMIC CARDIOMYOPATHY: ICD-10-CM

## 2024-08-13 DIAGNOSIS — I35.0 NONRHEUMATIC AORTIC VALVE STENOSIS: ICD-10-CM

## 2024-08-13 PROCEDURE — G2211 COMPLEX E/M VISIT ADD ON: HCPCS | Performed by: INTERNAL MEDICINE

## 2024-08-13 PROCEDURE — 99215 OFFICE O/P EST HI 40 MIN: CPT | Performed by: INTERNAL MEDICINE

## 2024-08-13 NOTE — Clinical Note
Hi Dr Early and Dr Rivera,  I saw this patient back in clinic today to review his echo.   When I saw him a few weeks ago (initial visit) the plan was to have surgery for the basal cell carcinoma.  However, in reviewing oncology's notes, it looks like he may do neoadjuvant therapy for now and ? hold off on surgery.  His echo does show moderate to severe aortic stenosis.  I think it is actually probably moderate.  If he is not doing cancer surgery, I will just continue to monitor the aortic valve.  If he does need cancer surgery, I would get a REINALDO to further clarify severity of valve disease prior to cardiac clearance.  Thanks, Joshua Yao - cardiology and cardio-oncology

## 2024-08-14 ENCOUNTER — DOCUMENTATION (OUTPATIENT)
Dept: HEMATOLOGY ONCOLOGY | Facility: CLINIC | Age: 89
End: 2024-08-14

## 2024-08-19 ENCOUNTER — OFFICE VISIT (OUTPATIENT)
Dept: WOUND CARE | Facility: CLINIC | Age: 89
End: 2024-08-19
Payer: COMMERCIAL

## 2024-08-19 VITALS
SYSTOLIC BLOOD PRESSURE: 152 MMHG | DIASTOLIC BLOOD PRESSURE: 76 MMHG | TEMPERATURE: 98 F | HEART RATE: 56 BPM | RESPIRATION RATE: 16 BRPM

## 2024-08-19 DIAGNOSIS — S21.202A OPEN WOUND OF LEFT SIDE OF BACK, INITIAL ENCOUNTER: Primary | ICD-10-CM

## 2024-08-19 DIAGNOSIS — E11.22 TYPE 2 DIABETES MELLITUS WITH STAGE 3A CHRONIC KIDNEY DISEASE, WITHOUT LONG-TERM CURRENT USE OF INSULIN (HCC): ICD-10-CM

## 2024-08-19 DIAGNOSIS — C44.41 BASAL CELL CARCINOMA (BCC) OF SKIN OF NECK: ICD-10-CM

## 2024-08-19 DIAGNOSIS — N18.31 TYPE 2 DIABETES MELLITUS WITH STAGE 3A CHRONIC KIDNEY DISEASE, WITHOUT LONG-TERM CURRENT USE OF INSULIN (HCC): ICD-10-CM

## 2024-08-19 PROCEDURE — 99204 OFFICE O/P NEW MOD 45 MIN: CPT | Performed by: NURSE PRACTITIONER

## 2024-08-19 PROCEDURE — 99213 OFFICE O/P EST LOW 20 MIN: CPT | Performed by: NURSE PRACTITIONER

## 2024-08-19 NOTE — PROGRESS NOTES
Patient ID: Saud Ardon is a 89 y.o. male Date of Birth 6/21/1934     Chief Complaint  Chief Complaint   Patient presents with    New Patient Visit     The patient developed an open wound on his back about three years ago. He never followed up with his doctor and the opening grew larger. Three months ago he was diagnosed with basil cell CA. His granddaughter has been changing his dressing daily. Mupirocin and dry dressing being applied.        Allergies  Patient has no known allergies.    Assessment:     Diagnoses and all orders for this visit:    Open wound of left side of back, initial encounter  -     Wound cleansing and dressings Other (comment) Left;Upper Back; Future  -     Wound Procedure Treatment Other (comment) Left;Upper Back    Basal cell carcinoma (BCC) of skin of neck    Type 2 diabetes mellitus with stage 3a chronic kidney disease, without long-term current use of insulin (HCC)          Plan:  Initial visit. Wound cleansed with NSS and gauze. Patient is a full thickness malignant wound of his left upper back not showing any s/s of infection.  Will have Polymem Ag applied to wound covered with ABD and tape changed 3x per week. Patient may shower with wound uncovered on days dressing is due to be changed   A1C results reviewed with the patient today. Per patient's chart, patient's most A1c was 6.3 as of 7/30/24. Patient is to continue to follow recommendations to maintain tight glycemic control  Patient will follow up in 1 week       Wound 08/19/24 Other (comment) Back Left;Upper (Active)   Wound Image Images linked 08/19/24 1433   Wound Description Pink;Yellow;Bleeding 08/19/24 1431   Maria G-wound Assessment Intact 08/19/24 1431   Wound Length (cm) 7.3 cm 08/19/24 1431   Wound Width (cm) 8.8 cm 08/19/24 1431   Wound Depth (cm) 0.2 cm 08/19/24 1431   Wound Surface Area (cm^2) 64.24 cm^2 08/19/24 1431   Wound Volume (cm^3) 12.848 cm^3 08/19/24 1431   Calculated Wound Volume (cm^3) 12.85 cm^3 08/19/24  1431   Drainage Amount Moderate 08/19/24 1431   Drainage Description Friedman;Serosanguineous 08/19/24 1431   Non-staged Wound Description Full thickness 08/19/24 1431   Wound packed? No 08/19/24 1431       Wound 08/19/24 Other (comment) Back Left;Upper (Active)   Date First Assessed: 08/19/24   Present on Original Admission: Yes  Primary Wound Type: Other (comment)  Location: Back  Wound Location Orientation: Left;Upper  Wound Description (Comments): basil cell CA       Subjective:      .    Patient is a 89 year old male who presents to the  wound center as a new patient who presents for a malignant wound of his left upper back secondary to basal cell carcinoma. Patient reports his wound initially developed 3-4 years ago. He admits he let the area go and it became progressively larger. He then sought medical care approximately 3 months ago and was diagnosed with BCC. He was seen by Dr. Jha with Plastics and it was determined patient required excision with flap closure. However, patient is currently requiring medical clearance prior to undergoing surgery. Patient's granddaughter who is present with him today reports she has been managing his wound at home with mupirocin ointment and dry gauze changing his dressing daily which was recommended by Dermatology. She reports his wound drains a moderate-large amount of drainage. He denies any pain, fevers, or chills.         The following portions of the patient's history were reviewed and updated as appropriate: He  has a past medical history of Anxiety, CAD (coronary artery disease), Capsular cataract, Chronic ulcer of skin (HCC) (05/18/2015), DJD (degenerative joint disease), Encounter for routine adult medical exam with abnormal findings (08/21/2019), Hepatitis A, Hydrocele, Hyperglycemia, Hyperlipidemia, Hypertension, Impaired fasting glucose (04/30/2014), Myocardial infarct (HCC) (2007), 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HCC), Potassium (K) excess (12/27/2019), Prostatic hyperplasia,  Skin cancer, Stage 3a chronic kidney disease (HCC) (05/04/2022), and Type 2 diabetes mellitus without complication, without long-term current use of insulin (HCC) (03/06/2019).  He   Patient Active Problem List    Diagnosis Date Noted    Ischemic cardiomyopathy 08/13/2024    Basal cell carcinoma (BCC) of skin of neck 07/03/2024    Basal cell carcinoma (BCC) of right shoulder 07/03/2024    Lesion of neck 06/21/2024    Lyme disease 06/21/2024    Stage 3a chronic kidney disease (HCC) 05/04/2022    Callus of foot 02/04/2022    Moderate major depression (HCC) 07/28/2021    Bunion 03/23/2021    DNR (do not resuscitate) 08/21/2020    Overweight (BMI 25.0-29.9) 08/21/2019    Type 2 diabetes mellitus with stage 3a chronic kidney disease, without long-term current use of insulin ([facility]) 03/06/2019    Onychomycosis of toenail 03/06/2019    Vitamin D deficiency 09/07/2018    Other specified glaucoma 08/08/2018    Aortic valve stenosis 05/15/2017    Insomnia 03/14/2016    Primary hypertension 04/30/2014    Hypertriglyceridemia 04/30/2014    Carotid artery disease (HCC) 01/14/2014    Anxiety 01/28/2013    CAD in native artery 01/28/2013    Coronary atherosclerosis 01/28/2013    Osteoarthritis 01/28/2013    Hepatitis A 01/28/2013    Hyperlipidemia 01/28/2013    Hydrocele 01/28/2013    Hyperplasia of prostate without lower urinary tract symptoms (LUTS) 01/28/2013     He  has a past surgical history that includes Angioplasty (2008).  His family history includes Coronary artery disease in his father.  He  reports that he has never smoked. He has never been exposed to tobacco smoke. He has never used smokeless tobacco. He reports that he does not drink alcohol and does not use drugs.  Current Outpatient Medications   Medication Sig Dispense Refill    Aspirin 81 MG CAPS Take 1 tablet by mouth in the morning 90 capsule 1    dorzolamide-timolol (COSOPT) 22.3-6.8 MG/ML ophthalmic solution Every 12 hours      fenofibrate (TRICOR) 54 MG  tablet Take 1 tablet (54 mg total) by mouth daily 30 tablet 0    latanoprost (XALATAN) 0.005 % ophthalmic solution INSTILL 1 DROP AT BEDTIME INTO BOTH EYES  3    metoprolol succinate (TOPROL-XL) 100 mg 24 hr tablet Take 1 tablet (100 mg total) by mouth 2 (two) times a day 60 tablet 2    Multiple Vitamins-Minerals (MULTIVITAL) tablet every 24 hours      Multiple Vitamins-Minerals (PRESERVISION AREDS 2+MULTI VIT PO) take 2 tabs daily      mupirocin (BACTROBAN) 2 % ointment Apply topically daily During wound bandage changes 100 g 0    mupirocin (BACTROBAN) 2 % ointment Apply topically 3 (three) times a day 100 g 3    nitroglycerin (NITROSTAT) 0.4 mg SL tablet Take one under tongue for chest pain. Can repet in 5 minutes if necessary. 25 tablet 1    RHOPRESSA 0.02 % SOLN INSTILL 1 DROP AT BEDTIME INTO BOTH EYES  1    simvastatin (ZOCOR) 40 mg tablet Take 1 tablet (40 mg total) by mouth daily at bedtime 90 tablet 1    Vismodegib 150 MG Take 1 capsule (150 mg total) by mouth daily (Patient not taking: Reported on 8/13/2024) 30 capsule 5     No current facility-administered medications for this visit.     He has No Known Allergies..    Review of Systems   Constitutional: Negative.    HENT:  Negative for ear pain and hearing loss.    Eyes:  Negative for pain.   Respiratory:  Negative for chest tightness and shortness of breath.    Cardiovascular:  Negative for chest pain, palpitations and leg swelling.   Gastrointestinal:  Negative for diarrhea, nausea and vomiting.   Genitourinary:  Negative for dysuria.   Musculoskeletal:  Negative for gait problem.   Skin:  Positive for wound.   Neurological:  Negative for tremors and weakness.   Psychiatric/Behavioral:  Negative for behavioral problems, confusion and suicidal ideas.          Objective:       Wound 08/19/24 Other (comment) Back Left;Upper (Active)   Wound Image Images linked 08/19/24 1433   Wound Description Pink;Yellow;Bleeding 08/19/24 1431   Maria G-wound Assessment  Intact 08/19/24 1431   Wound Length (cm) 7.3 cm 08/19/24 1431   Wound Width (cm) 8.8 cm 08/19/24 1431   Wound Depth (cm) 0.2 cm 08/19/24 1431   Wound Surface Area (cm^2) 64.24 cm^2 08/19/24 1431   Wound Volume (cm^3) 12.848 cm^3 08/19/24 1431   Calculated Wound Volume (cm^3) 12.85 cm^3 08/19/24 1431   Drainage Amount Moderate 08/19/24 1431   Drainage Description Friedman;Serosanguineous 08/19/24 1431   Non-staged Wound Description Full thickness 08/19/24 1431   Wound packed? No 08/19/24 1431       /76   Pulse 56   Temp 98 °F (36.7 °C)   Resp 16     Physical Exam  Vitals and nursing note reviewed.   Constitutional:       General: He is not in acute distress.     Appearance: Normal appearance. He is normal weight.   HENT:      Head: Normocephalic and atraumatic.   Eyes:      General:         Right eye: No discharge.         Left eye: No discharge.   Pulmonary:      Effort: Pulmonary effort is normal. No respiratory distress.   Musculoskeletal:         General: Normal range of motion.      Cervical back: Normal range of motion and neck supple. No rigidity.      Right lower leg: No edema.      Left lower leg: No edema.   Skin:     General: Skin is warm and dry.      Findings: Wound present. No erythema.          Neurological:      General: No focal deficit present.      Mental Status: He is alert and oriented to person, place, and time. Mental status is at baseline.   Psychiatric:         Mood and Affect: Mood normal.         Behavior: Behavior normal.         Thought Content: Thought content normal.         Judgment: Judgment normal.               Wound Instructions:  Orders Placed This Encounter   Procedures    Wound cleansing and dressings Other (comment) Left;Upper Back     Wash your hands with soap and water.  Remove old dressing, discard into plastic bag and place in trash. Cleanse the wound with mild soap and water prior to applying a clean dressing. Do not use tissue or cotton balls. Do not scrub the wound.  Pat dry using gauze.  Shower yes, only on the days you change the dressing.           Left upper back wound:  Apply polymem Max AG to the wound.    Cover with ABD.  Secure with paper tape or medfix tape.   Change dressing three times a week.          Follow up at the wound center in one week.     Standing Status:   Future     Standing Expiration Date:   8/26/2024    Wound Procedure Treatment Other (comment) Left;Upper Back     This order was created via procedure documentation        Diagnosis ICD-10-CM Associated Orders   1. Open wound of left side of back, initial encounter  S21.202A Wound cleansing and dressings Other (comment) Left;Upper Back     Wound Procedure Treatment Other (comment) Left;Upper Back      2. Basal cell carcinoma (BCC) of skin of neck  C44.41       3. Type 2 diabetes mellitus with stage 3a chronic kidney disease, without long-term current use of insulin (HCC)  E11.22     N18.31

## 2024-08-19 NOTE — PROGRESS NOTES
Wound Procedure Treatment Other (comment) Left;Upper Back    Performed by: Enoch Diaz RN  Authorized by: AMITA Colon    Associated wounds:   Wound 08/19/24 Other (comment) Back Left;Upper  Wound cleansed with:  NSS  Applied primary dressing:  Polymem foam and Silver  Applied secondary dressing:  ABD  Dressing secured with:  Tape  Comments:  Polymem Max AG

## 2024-08-19 NOTE — PATIENT INSTRUCTIONS
Orders Placed This Encounter   Procedures    Wound cleansing and dressings Other (comment) Left;Upper Back     Wash your hands with soap and water.  Remove old dressing, discard into plastic bag and place in trash. Cleanse the wound with mild soap and water prior to applying a clean dressing. Do not use tissue or cotton balls. Do not scrub the wound. Pat dry using gauze.  Shower yes, only on the days you change the dressing.           Left upper back wound:  Apply polymem Max AG to the wound.    Cover with ABD.  Secure with paper tape or medfix tape.   Change dressing three times a week.          Follow up at the wound center in one week.     Standing Status:   Future     Standing Expiration Date:   8/26/2024

## 2024-08-24 DIAGNOSIS — E78.2 MIXED HYPERLIPIDEMIA: ICD-10-CM

## 2024-08-24 DIAGNOSIS — I10 ESSENTIAL HYPERTENSION: ICD-10-CM

## 2024-08-25 RX ORDER — METOPROLOL SUCCINATE 100 MG/1
100 TABLET, EXTENDED RELEASE ORAL 2 TIMES DAILY
Qty: 60 TABLET | Refills: 0 | Status: SHIPPED | OUTPATIENT
Start: 2024-08-25 | End: 2024-11-23

## 2024-08-25 RX ORDER — FENOFIBRATE 54 MG/1
54 TABLET ORAL DAILY
Qty: 30 TABLET | Refills: 0 | Status: SHIPPED | OUTPATIENT
Start: 2024-08-25 | End: 2024-09-24

## 2024-08-26 ENCOUNTER — OFFICE VISIT (OUTPATIENT)
Dept: WOUND CARE | Facility: CLINIC | Age: 89
End: 2024-08-26
Payer: COMMERCIAL

## 2024-08-26 VITALS
TEMPERATURE: 97.5 F | DIASTOLIC BLOOD PRESSURE: 68 MMHG | RESPIRATION RATE: 16 BRPM | HEART RATE: 64 BPM | SYSTOLIC BLOOD PRESSURE: 160 MMHG

## 2024-08-26 DIAGNOSIS — S21.202A OPEN WOUND OF LEFT SIDE OF BACK, INITIAL ENCOUNTER: Primary | ICD-10-CM

## 2024-08-26 DIAGNOSIS — E78.2 MIXED HYPERLIPIDEMIA: ICD-10-CM

## 2024-08-26 PROCEDURE — 97597 DBRDMT OPN WND 1ST 20 CM/<: CPT | Performed by: NURSE PRACTITIONER

## 2024-08-26 PROCEDURE — 97598 DBRDMT OPN WND ADDL 20CM/<: CPT | Performed by: NURSE PRACTITIONER

## 2024-08-26 RX ORDER — SIMVASTATIN 40 MG
40 TABLET ORAL
Qty: 90 TABLET | Refills: 0 | Status: SHIPPED | OUTPATIENT
Start: 2024-08-26

## 2024-08-26 RX ORDER — LIDOCAINE 40 MG/G
CREAM TOPICAL ONCE
Status: COMPLETED | OUTPATIENT
Start: 2024-08-26 | End: 2024-08-26

## 2024-08-26 RX ADMIN — LIDOCAINE: 40 CREAM TOPICAL at 15:02

## 2024-08-26 NOTE — PROGRESS NOTES
"Patient ID: Saud Ardon is a 89 y.o. male Date of Birth 6/21/1934     Chief Complaint  Chief Complaint   Patient presents with    Follow Up Wound Care Visit     Back wound       Allergies  Patient has no known allergies.    Assessment:       Diagnoses and all orders for this visit:    Open wound of left side of back, initial encounter  -     Wound cleansing and dressings Other (comment) Left;Upper Back; Future  -     lidocaine (LMX) 4 % cream    Other orders  -     Debridement              Debridement   Wound 08/19/24 Other (comment) Back Left;Upper    Universal Protocol:  procedure performed by consultantConsent: Written consent obtained.  Consent given by: patient  Time out: Immediately prior to procedure a \"time out\" was called to verify the correct patient, procedure, equipment, support staff and site/side marked as required.  Timeout called at: 8/26/2024 4:36 PM.  Patient identity confirmed: verbally with patient    Debridement Details  Performed by: NP  Debridement type: selective  Pain control: lidocaine 4%      Post-debridement measurements  Length (cm): 7  Width (cm): 8  Depth (cm): 0.2  Percent debrided: 100%  Surface Area (cm^2): 56  Area Debrided (cm^2): 56  Volume (cm^3): 11.2    Devitalized tissue debrided: biofilm, fibrin and slough  Instrument(s) utilized: curette  Bleeding: small  Hemostasis obtained with: pressure  Procedural pain (0-10): 0  Post-procedural pain: 0   Response to treatment: procedure was tolerated well        Plan:  1.  F/U visit.  Wound debrided.  Wound measuring slightly smaller and overall has a clean granular wound base.  Continue current plan of care.  2.  Did discuss with patient and granddaughter today about possible HBO therapy if his flap fails.  Patient and granddaughter verbalized agreement and understanding.  3.  Patient will follow-up in 3 weeks     Wound 08/19/24 Other (comment) Back Left;Upper (Active)   Wound Image Images linked 08/26/24 3665   Wound Description " Pink;Yellow;Bleeding;Hypergranulation 08/26/24 1438   Maria G-wound Assessment Purple;Induration 08/26/24 1438   Wound Length (cm) 7 cm 08/26/24 1438   Wound Width (cm) 8 cm 08/26/24 1438   Wound Depth (cm) 0.2 cm 08/26/24 1438   Wound Surface Area (cm^2) 56 cm^2 08/26/24 1438   Wound Volume (cm^3) 11.2 cm^3 08/26/24 1438   Calculated Wound Volume (cm^3) 11.2 cm^3 08/26/24 1438   Change in Wound Size % 12.84 08/26/24 1438   Drainage Amount Moderate 08/26/24 1438   Drainage Description Tan;Serosanguineous;Yellow;Green 08/26/24 1438   Non-staged Wound Description Full thickness 08/26/24 1438       Wound 08/19/24 Other (comment) Back Left;Upper (Active)   Date First Assessed: 08/19/24   Present on Original Admission: Yes  Primary Wound Type: Other (comment)  Location: Back  Wound Location Orientation: Left;Upper  Wound Description (Comments): basil cell CA       Subjective:      .    F/u visit for open wound of left upper back secondary to basal cell carcinoma.  No new complaints.  Patient's granddaughter reports she has been following wound care orders as recommended.  He is scheduled to undergo an REINALDO in the near future for cardiac clearance.  He denies any pain, fevers, or chills.        The following portions of the patient's history were reviewed and updated as appropriate: He  has a past medical history of Anxiety, CAD (coronary artery disease), Capsular cataract, Chronic ulcer of skin (HCC) (05/18/2015), DJD (degenerative joint disease), Encounter for routine adult medical exam with abnormal findings (08/21/2019), Hepatitis A, Hydrocele, Hyperglycemia, Hyperlipidemia, Hypertension, Impaired fasting glucose (04/30/2014), Myocardial infarct ([facility]) (2007), 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[facility]), Potassium (K) excess (12/27/2019), Prostatic hyperplasia, Skin cancer, Stage 3a chronic kidney disease (HCC) (05/04/2022), and Type 2 diabetes mellitus without complication, without long-term current use of insulin ([facility]) (03/06/2019).  He    Patient Active Problem List    Diagnosis Date Noted    Ischemic cardiomyopathy 08/13/2024    Basal cell carcinoma (BCC) of skin of neck 07/03/2024    Basal cell carcinoma (BCC) of right shoulder 07/03/2024    Lesion of neck 06/21/2024    Lyme disease 06/21/2024    Stage 3a chronic kidney disease (HCC) 05/04/2022    Callus of foot 02/04/2022    Moderate major depression (HCC) 07/28/2021    Bunion 03/23/2021    DNR (do not resuscitate) 08/21/2020    Overweight (BMI 25.0-29.9) 08/21/2019    Type 2 diabetes mellitus with stage 3a chronic kidney disease, without long-term current use of insulin (HCC) 03/06/2019    Onychomycosis of toenail 03/06/2019    Vitamin D deficiency 09/07/2018    Other specified glaucoma 08/08/2018    Aortic valve stenosis 05/15/2017    Insomnia 03/14/2016    Primary hypertension 04/30/2014    Hypertriglyceridemia 04/30/2014    Carotid artery disease (HCC) 01/14/2014    Anxiety 01/28/2013    CAD in native artery 01/28/2013    Coronary atherosclerosis 01/28/2013    Osteoarthritis 01/28/2013    Hepatitis A 01/28/2013    Hyperlipidemia 01/28/2013    Hydrocele 01/28/2013    Hyperplasia of prostate without lower urinary tract symptoms (LUTS) 01/28/2013     He  has a past surgical history that includes Angioplasty (2008).  His family history includes Coronary artery disease in his father.  He  reports that he has never smoked. He has never been exposed to tobacco smoke. He has never used smokeless tobacco. He reports that he does not drink alcohol and does not use drugs.  Current Outpatient Medications   Medication Sig Dispense Refill    Aspirin 81 MG CAPS Take 1 tablet by mouth in the morning 90 capsule 1    dorzolamide-timolol (COSOPT) 22.3-6.8 MG/ML ophthalmic solution Every 12 hours      fenofibrate (TRICOR) 54 MG tablet Take 1 tablet (54 mg total) by mouth daily 30 tablet 0    latanoprost (XALATAN) 0.005 % ophthalmic solution INSTILL 1 DROP AT BEDTIME INTO BOTH EYES  3    metoprolol succinate  (TOPROL-XL) 100 mg 24 hr tablet Take 1 tablet (100 mg total) by mouth 2 (two) times a day 60 tablet 0    Multiple Vitamins-Minerals (MULTIVITAL) tablet every 24 hours      Multiple Vitamins-Minerals (PRESERVISION AREDS 2+MULTI VIT PO) take 2 tabs daily      mupirocin (BACTROBAN) 2 % ointment Apply topically daily During wound bandage changes 100 g 0    mupirocin (BACTROBAN) 2 % ointment Apply topically 3 (three) times a day 100 g 3    nitroglycerin (NITROSTAT) 0.4 mg SL tablet Take one under tongue for chest pain. Can repet in 5 minutes if necessary. 25 tablet 1    RHOPRESSA 0.02 % SOLN INSTILL 1 DROP AT BEDTIME INTO BOTH EYES  1    simvastatin (ZOCOR) 40 mg tablet Take 1 tablet (40 mg total) by mouth daily at bedtime 90 tablet 1    Vismodegib 150 MG Take 1 capsule (150 mg total) by mouth daily (Patient not taking: Reported on 8/13/2024) 30 capsule 5     No current facility-administered medications for this visit.     He has No Known Allergies..    Review of Systems   Constitutional: Negative.    HENT:  Negative for ear pain and hearing loss.    Eyes:  Negative for pain.   Respiratory:  Negative for chest tightness and shortness of breath.    Cardiovascular:  Negative for chest pain, palpitations and leg swelling.   Gastrointestinal:  Negative for diarrhea, nausea and vomiting.   Genitourinary:  Negative for dysuria.   Musculoskeletal:  Negative for gait problem.   Skin:  Positive for wound.   Neurological:  Negative for tremors and weakness.   Psychiatric/Behavioral:  Negative for behavioral problems, confusion and suicidal ideas.          Objective:       Wound 08/19/24 Other (comment) Back Left;Upper (Active)   Wound Image Images linked 08/26/24 1444   Wound Description Pink;Yellow;Bleeding;Hypergranulation 08/26/24 1438   Maria G-wound Assessment Purple;Induration 08/26/24 1438   Wound Length (cm) 7 cm 08/26/24 1438   Wound Width (cm) 8 cm 08/26/24 1438   Wound Depth (cm) 0.2 cm 08/26/24 1438   Wound Surface Area  (cm^2) 56 cm^2 08/26/24 1438   Wound Volume (cm^3) 11.2 cm^3 08/26/24 1438   Calculated Wound Volume (cm^3) 11.2 cm^3 08/26/24 1438   Change in Wound Size % 12.84 08/26/24 1438   Drainage Amount Moderate 08/26/24 1438   Drainage Description Tan;Serosanguineous;Yellow;Green 08/26/24 1438   Non-staged Wound Description Full thickness 08/26/24 1438       /68   Pulse 64   Temp 97.5 °F (36.4 °C)   Resp 16         Wound Instructions:  Orders Placed This Encounter   Procedures    Wound cleansing and dressings Other (comment) Left;Upper Back     Standing Status:   Future     Standing Expiration Date:   9/2/2024     Scheduling Instructions:      Wound cleansing and dressings Other (comment) Left;Upper Back           Wash your hands with soap and water.  Remove old dressing, discard into plastic bag and place in trash. Cleanse the wound with mild soap and water prior to applying a clean dressing. Do not use tissue or cotton balls. Do not scrub the wound. Pat dry using gauze.      Shower yes, only on the days you change the dressing.                                   Left upper back wound:      Apply polymem Max AG to the wound.        Cover with ABD.      Secure with paper tape or medfix tape.       Change dressing three times a week.                                  Follow up at the wound center in three week.  If there is any changes please call the wound center ASAP    Debridement     This order was created via procedure documentation        Diagnosis ICD-10-CM Associated Orders   1. Open wound of left side of back, initial encounter  S21.202A Wound cleansing and dressings Other (comment) Left;Upper Back     lidocaine (LMX) 4 % cream

## 2024-08-27 ENCOUNTER — TELEPHONE (OUTPATIENT)
Dept: SURGICAL ONCOLOGY | Facility: CLINIC | Age: 89
End: 2024-08-27

## 2024-08-28 ENCOUNTER — DOCUMENTATION (OUTPATIENT)
Age: 89
End: 2024-08-28

## 2024-08-28 ENCOUNTER — TELEPHONE (OUTPATIENT)
Age: 89
End: 2024-08-28

## 2024-08-28 NOTE — TELEPHONE ENCOUNTER
LVOM for pt. Explained that Dr Yao would be happy to order the REINALDO to better view the valve but he would like to contacted by oncology or the surgeon performing the procedure first. TEEs are not without risk and he would like to make sure the patient truly needs it prior to ordering. Left my direct TEAMS number for a call back with any follow-up questions.

## 2024-08-28 NOTE — TELEPHONE ENCOUNTER
Spoke to Leola pt's caretaker who clarified that they are moving forward with the surgery and they would like to have the REINALDO done to assess the severity of the AS.    Please advise.  _____________________________________________________  Joshua Yao MD   to Me         Prashanth,    Can you let him know - I was not recommending a REINALDO at this time.    Thanks  [facility]       GP    8/26/24  5:04 PM  Lukasz Liang, WIL routed this conversation to MD Saud Bernard Reva   to [facility] (supporting Joshua Yao MD)         8/26/24  4:21 PM  Last time I was in office we talked about a camera going down to look at valve can we schedule that please?

## 2024-09-10 ENCOUNTER — TELEPHONE (OUTPATIENT)
Dept: SURGICAL ONCOLOGY | Facility: CLINIC | Age: 89
End: 2024-09-10

## 2024-09-10 NOTE — TELEPHONE ENCOUNTER
Called patient, spoke to Leola and scheduled an appointment for next available at [facility] on 10/4/24 with Dr. Early.

## 2024-09-16 ENCOUNTER — OFFICE VISIT (OUTPATIENT)
Dept: WOUND CARE | Facility: CLINIC | Age: 89
End: 2024-09-16
Payer: COMMERCIAL

## 2024-09-16 VITALS
RESPIRATION RATE: 16 BRPM | TEMPERATURE: 97.1 F | DIASTOLIC BLOOD PRESSURE: 74 MMHG | HEART RATE: 72 BPM | SYSTOLIC BLOOD PRESSURE: 156 MMHG

## 2024-09-16 DIAGNOSIS — S21.202A OPEN WOUND OF LEFT SIDE OF BACK, INITIAL ENCOUNTER: Primary | ICD-10-CM

## 2024-09-16 DIAGNOSIS — C44.41 BASAL CELL CARCINOMA (BCC) OF SKIN OF NECK: ICD-10-CM

## 2024-09-16 PROCEDURE — 99214 OFFICE O/P EST MOD 30 MIN: CPT | Performed by: FAMILY MEDICINE

## 2024-09-16 PROCEDURE — 99213 OFFICE O/P EST LOW 20 MIN: CPT | Performed by: FAMILY MEDICINE

## 2024-09-16 RX ORDER — LIDOCAINE 40 MG/G
CREAM TOPICAL ONCE
Status: COMPLETED | OUTPATIENT
Start: 2024-09-16 | End: 2024-09-16

## 2024-09-16 RX ADMIN — LIDOCAINE: 40 CREAM TOPICAL at 13:23

## 2024-09-16 NOTE — PATIENT INSTRUCTIONS
Orders Placed This Encounter   Procedures    Wound cleansing and dressings Other (comment) Left;Upper Back     Wash your hands with soap and water.  Remove old dressing, discard into plastic bag and place in trash.   Cleanse the wound with mild soap and water prior to applying a clean dressing.   Do not use tissue or cotton balls. Do not scrub the wound. Pat dry using gauze.    Shower yes, only on the days you change the dressing.      Left upper back wound:  Prior to dressing application please rinse wound with prophase.   Apply polymem Max AG to the wound.    Cover with ABD.  Secure with paper tape or medfix tape.   Change dressing three times a week.     Follow up at the wound center in two weeks.     Standing Status:   Future     Standing Expiration Date:   9/23/2024    Wound Procedure Treatment Other (comment) Left;Upper Back     This order was created via procedure documentation

## 2024-09-16 NOTE — PROGRESS NOTES
Wound Procedure Treatment Other (comment) Left;Upper Back    Performed by: Bette Nelson RN  Authorized by: Vanita Coburn MD    Associated wounds:   Wound 08/19/24 Other (comment) Back Left;Upper  Wound cleansed with:  Wound aggrssively cleansed with NSS and gauze  Applied primary dressing:  Silver and Silicone bordered foam  Applied secondary dressing:  ABD  Dressing secured with:  Tape

## 2024-09-16 NOTE — PROGRESS NOTES
Patient ID: Saud Ardon is a 89 y.o. male Date of Birth 6/21/1934       Chief Complaint   Patient presents with    Follow Up Wound Care Visit     Follow up visit for wound to left back.  Pt denies any issues or concerns since last visit.        Allergies:  Patient has no known allergies.    Diagnosis:      Diagnosis ICD-10-CM Associated Orders   1. Open wound of left side of back, initial encounter  S21.202A lidocaine (LMX) 4 % cream     Wound cleansing and dressings Other (comment) Left;Upper Back     Wound Procedure Treatment Other (comment) Left;Upper Back      2. Basal cell carcinoma (BCC) of skin of neck  C44.41 Wound cleansing and dressings Other (comment) Left;Upper Back     Wound Procedure Treatment Other (comment) Left;Upper Back              Assessment & Plan:  Malignant, open wound L side of back 2/2 to BCC: Large, very friable open wound with small amount of necrotic tissue centrally at cratered area mechanically debrided with gauze.  No malodor.  Moderate to large drainage. No induration, fluctuance, crepitus. Edges w/ slight epiboly and hyperpigmentation w/ few areas of telangiectasias extending from edge.   Cleanse with prophase followed by application of PolyMem Ag  Apt w/ surgicial oncology 10/4/24 to discuss options   Requires REINALDO prior to cardiac clearance (does not appear to be scheduled yet)   Reviewed role of wound care in setting of malignant wound.  Wound care is palliative at this time and overall healing potential primarily lies with treatment of underlying cause (BCC). The end goals of our palliative care plan are pain control, drainage management, prevention of infection, odor control, and optimization of quality of life insofar as the wound will allow. Therefore, invasive procedures related to the wound will be minimized.  Though debridement was not performed today, discussed that debridements may be performed periodically in order to prevent infection or control odor, etc. There was a  "clear discussion in the treatment room regarding the palliative (versus active) care plan and the goals. Healing was not discussed as a goal. Visits will be scheduled accordingly to minimize disruption of quality of life while allowing appropriate physician oversight of the wound and any dramatic changes that might occur. Patient and granddaughter at bedside express understanding.  Reviewed extreme importance of very close monitoring of wound.  Should patient experience any acute change or any of the following symptoms including but not limited to fever, chills, increased pain at the wound, swelling, purulent drainage, foul odor, etc... to immediately proceed to emergency department. Pt expresses understanding.   See below wound care orders for full details  F/u in 2 weeks or sooner if needed     Portions of the record may have been created with voice recognition software. Occasional wrong word or \"sound alike\" substitutions may have occurred due to the inherent limitations of voice recognition software. Read the chart carefully and recognize, using context, where substitutions have occurred.    Subjective:   Per chart review of initial visit to wound center on 8/19/2024:   \"Patient is a 89 year old male who presents to the  wound center as a new patient who presents for a malignant wound of his left upper back secondary to basal cell carcinoma. Patient reports his wound initially developed 3-4 years ago. He admits he let the area go and it became progressively larger. He then sought medical care approximately 3 months ago and was diagnosed with BCC. He was seen by Dr. Jha with Plastics and it was determined patient required excision with flap closure. However, patient is currently requiring medical clearance prior to undergoing surgery. Patient's granddaughter who is present with him today reports she has been managing his wound at home with mupirocin ointment and dry gauze changing his dressing daily which was " "recommended by Dermatology. She reports his wound drains a moderate-large amount of drainage. He denies any pain, fevers, or chills. \"    8/26/2024: Follow-up visit with [facility] please see visit documentation    9/16/2024: Mr. Ardon presents today along with his granddaughter for follow-up of malignant wound side of back 2/2 to basal cell carcinoma.  They report he has not received his wound care supplies.  Our office called wound care supply company and confirmed order, tracking number, and delivery.  However, both patient and granddaughter state he absolutely has not received his supplies. They have been using a telfa bandage for his wound dressings. Will reorder wound care supplies at visit today. They also report he does have appointment with surgical oncology on 10/4/2024 but they are concerned because they know he needs cardiac clearance and has to get a REINALDO. Since his last visit other than not receiving his supplies, he reports no new acute complaints.  He denies fever, chills.      The following portions of the patient's history were reviewed and updated as appropriate:   Patient Active Problem List   Diagnosis    Anxiety    Aortic valve stenosis    CAD in native artery    Carotid artery disease (HCC)    Coronary atherosclerosis    Osteoarthritis    Primary hypertension    Hepatitis A    Hyperlipidemia    Hydrocele    Hypertriglyceridemia    Hyperplasia of prostate without lower urinary tract symptoms (LUTS)    Insomnia    Other specified glaucoma    Vitamin D deficiency    Type 2 diabetes mellitus with stage 3a chronic kidney disease, without long-term current use of insulin (HCC)    Onychomycosis of toenail    Overweight (BMI 25.0-29.9)    DNR (do not resuscitate)    Bunion    Moderate major depression (HCC)    Callus of foot    Stage 3a chronic kidney disease (HCC)    Lesion of neck    Lyme disease    Basal cell carcinoma (BCC) of skin of neck    Basal cell carcinoma (BCC) of right shoulder    " Ischemic cardiomyopathy     Past Medical History:   Diagnosis Date    Anxiety     CAD (coronary artery disease)     Capsular cataract     mature    Chronic ulcer of skin ([facility]) 05/18/2015    DJD (degenerative joint disease)     Encounter for routine adult medical exam with abnormal findings 08/21/2019    Hepatitis A     Hydrocele     Hyperglycemia     Hyperlipidemia     Hypertension     Impaired fasting glucose 04/30/2014    Myocardial infarct ([facility]) 2007    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[facility])     Potassium (K) excess 12/27/2019    Prostatic hyperplasia     Skin cancer     Stage 3a chronic kidney disease ([facility]) 05/04/2022    Type 2 diabetes mellitus without complication, without long-term current use of insulin ([facility]) 03/06/2019     Past Surgical History:   Procedure Laterality Date    ANGIOPLASTY  2008     Family History   Problem Relation Age of Onset    Coronary artery disease Father       Social History     Socioeconomic History    Marital status:      Spouse name: Not on file    Number of children: Not on file    Years of education: Not on file    Highest education level: Not on file   Occupational History    Not on file   Tobacco Use    Smoking status: Never     Passive exposure: Never    Smokeless tobacco: Never   Vaping Use    Vaping status: Never Used   Substance and Sexual Activity    Alcohol use: No    Drug use: No    Sexual activity: Not Currently     Partners: Female   Other Topics Concern    Not on file   Social History Narrative    No risk for falls    Activity level: sedentary    No sleep changes    No animals    No firearms    Always uses a seatbelt     Social Determinants of Health     Financial Resource Strain: Medium Risk (9/22/2023)    Overall Financial Resource Strain (CARDIA)     Difficulty of Paying Living Expenses: Somewhat hard   Food Insecurity: No Food Insecurity (9/21/2022)    Hunger Vital Sign     Worried About Running Out of Food in the Last Year: Never true     Ran Out of Food in the Last Year:  Never true   Transportation Needs: No Transportation Needs (9/22/2023)    PRAPARE - Transportation     Lack of Transportation (Medical): No     Lack of Transportation (Non-Medical): No   Physical Activity: Insufficiently Active (9/21/2022)    Exercise Vital Sign     Days of Exercise per Week: 2 days     Minutes of Exercise per Session: 30 min   Stress: Stress Concern Present (9/21/2022)    [employer] - Occupational Stress Questionnaire     Feeling of Stress : To some extent   Social Connections: Moderately Isolated (9/21/2022)    Social Connection and Isolation Panel [NHANES]     Frequency of Communication with Friends and Family: More than three times a week     Frequency of Social Gatherings with Friends and Family: More than three times a week     Attends Je[employer] witness Services: More than 4 times per year     Active Member of Clubs or Organizations: No     Attends Club or Organization Meetings: Never     Marital Status:    Intimate Partner Violence: Not At Risk (9/21/2022)    Humiliation, Afraid, Rape, and Kick questionnaire     Fear of Current or Ex-Partner: No     Emotionally Abused: No     Physically Abused: No     Sexually Abused: No   Housing Stability: Unknown (9/21/2022)    Housing Stability Vital Sign     Unable to Pay for Housing in the Last Year: No     Number of Places Lived in the Last Year: Not on file     Unstable Housing in the Last Year: No        Current Outpatient Medications:     Aspirin 81 MG CAPS, Take 1 tablet by mouth in the morning, Disp: 90 capsule, Rfl: 1    dorzolamide-timolol (COSOPT) 22.3-6.8 MG/ML ophthalmic solution, Every 12 hours, Disp: , Rfl:     fenofibrate (TRICOR) 54 MG tablet, Take 1 tablet (54 mg total) by mouth daily, Disp: 30 tablet, Rfl: 0    latanoprost (XALATAN) 0.005 % ophthalmic solution, INSTILL 1 DROP AT BEDTIME INTO BOTH EYES, Disp: , Rfl: 3    metoprolol succinate (TOPROL-XL) 100 mg 24 hr tablet, Take 1 tablet (100 mg total) by  mouth 2 (two) times a day, Disp: 60 tablet, Rfl: 0    Multiple Vitamins-Minerals (MULTIVITAL) tablet, every 24 hours, Disp: , Rfl:     Multiple Vitamins-Minerals (PRESERVISION AREDS 2+MULTI VIT PO), take 2 tabs daily, Disp: , Rfl:     mupirocin (BACTROBAN) 2 % ointment, Apply topically daily During wound bandage changes, Disp: 100 g, Rfl: 0    mupirocin (BACTROBAN) 2 % ointment, Apply topically 3 (three) times a day, Disp: 100 g, Rfl: 3    nitroglycerin (NITROSTAT) 0.4 mg SL tablet, Take one under tongue for chest pain. Can repet in 5 minutes if necessary., Disp: 25 tablet, Rfl: 1    RHOPRESSA 0.02 % SOLN, INSTILL 1 DROP AT BEDTIME INTO BOTH EYES, Disp: , Rfl: 1    simvastatin (ZOCOR) 40 mg tablet, Take 1 tablet (40 mg total) by mouth daily at bedtime, Disp: 90 tablet, Rfl: 0    Vismodegib 150 MG, Take 1 capsule (150 mg total) by mouth daily (Patient not taking: Reported on 8/13/2024), Disp: 30 capsule, Rfl: 5  No current facility-administered medications for this visit.    Review of Systems   Constitutional:  Negative for chills and fever.   Respiratory:  Negative for shortness of breath.    Cardiovascular:  Negative for chest pain and palpitations.   Skin:  Positive for wound (L upper back).   Psychiatric/Behavioral:  The patient is not nervous/anxious.        Objective:  /74   Pulse 72   Temp (!) 97.1 °F (36.2 °C) (Tympanic)   Resp 16   Pain Score:   2     Physical Exam  Vitals reviewed.   Constitutional:       General: He is not in acute distress.     Appearance: He is not ill-appearing, toxic-appearing or diaphoretic.   HENT:      Ears:      Comments: [place]  Cardiovascular:      Rate and Rhythm: Normal rate.   Pulmonary:      Effort: Pulmonary effort is normal. No respiratory distress.   Skin:     General: Skin is warm.      Findings: Wound present.             Comments: 1-  Large, very friable open wound with small amount of necrotic tissue centrally at cratered area mechanically debrided with gauze.   No malodor.  Moderate to large drainage. No induration, fluctuance, crepitus. Edges w/ slight epiboly and hyperpigmentation w/ few areas of telangiectasias extending from edge.    Neurological:      Mental Status: He is alert and oriented to person, place, and time.   Psychiatric:         Mood and Affect: Mood normal.         Behavior: Behavior normal.       Wound 08/19/24 Other (comment) Back Left;Upper (Active)   Wound Image   09/16/24 1312   Wound Description Pink;Yellow 09/16/24 1312   Maria G-wound Assessment Purple;Induration 08/26/24 1438   Wound Length (cm) 7.5 cm 09/16/24 1312   Wound Width (cm) 9 cm 09/16/24 1312   Wound Depth (cm) 0.2 cm 09/16/24 1312   Wound Surface Area (cm^2) 67.5 cm^2 09/16/24 1312   Wound Volume (cm^3) 13.5 cm^3 09/16/24 1312   Calculated Wound Volume (cm^3) 13.5 cm^3 09/16/24 1312   Change in Wound Size % -5.06 09/16/24 1312   Drainage Amount Moderate 09/16/24 1312   Drainage Description Serosanguineous;Yellow;Green;Tan 09/16/24 1312   Non-staged Wound Description Full thickness 08/26/24 1438   Wound packed? No 08/19/24 1431   Dressing Status Removed 09/16/24 1312           Lab Results   Component Value Date    HGBA1C 6.3 (H) 07/30/2024       Wound Instructions:  Orders Placed This Encounter   Procedures    Wound cleansing and dressings Other (comment) Left;Upper Back     Wash your hands with soap and water.  Remove old dressing, discard into plastic bag and place in trash.   Cleanse the wound with mild soap and water prior to applying a clean dressing.   Do not use tissue or cotton balls. Do not scrub the wound. Pat dry using gauze.    Shower yes, only on the days you change the dressing.      Left upper back wound:  Prior to dressing application please rinse wound with prophase.   Apply polymem Max AG to the wound.    Cover with ABD.  Secure with paper tape or medfix tape.   Change dressing three times a week.     Follow up at the wound center in two weeks.     Standing Status:    Future     Standing Expiration Date:   9/23/2024    Wound Procedure Treatment Other (comment) Left;Upper Back     This order was created via procedure documentation       Vanita Coburn MD

## 2024-09-25 ENCOUNTER — OFFICE VISIT (OUTPATIENT)
Dept: FAMILY MEDICINE CLINIC | Facility: CLINIC | Age: 89
End: 2024-09-25
Payer: COMMERCIAL

## 2024-09-25 VITALS
HEART RATE: 56 BPM | OXYGEN SATURATION: 97 % | HEIGHT: 60 IN | BODY MASS INDEX: 24.54 KG/M2 | WEIGHT: 125 LBS | SYSTOLIC BLOOD PRESSURE: 130 MMHG | DIASTOLIC BLOOD PRESSURE: 78 MMHG | TEMPERATURE: 97.9 F

## 2024-09-25 DIAGNOSIS — I35.0 NONRHEUMATIC AORTIC VALVE STENOSIS: ICD-10-CM

## 2024-09-25 DIAGNOSIS — N18.31 TYPE 2 DIABETES MELLITUS WITH STAGE 3A CHRONIC KIDNEY DISEASE, WITHOUT LONG-TERM CURRENT USE OF INSULIN (HCC): ICD-10-CM

## 2024-09-25 DIAGNOSIS — E11.22 TYPE 2 DIABETES MELLITUS WITH STAGE 3A CHRONIC KIDNEY DISEASE, WITHOUT LONG-TERM CURRENT USE OF INSULIN (HCC): ICD-10-CM

## 2024-09-25 DIAGNOSIS — C44.612 BASAL CELL CARCINOMA (BCC) OF RIGHT SHOULDER: ICD-10-CM

## 2024-09-25 DIAGNOSIS — Z23 ENCOUNTER FOR IMMUNIZATION: ICD-10-CM

## 2024-09-25 DIAGNOSIS — Z00.00 MEDICARE ANNUAL WELLNESS VISIT, SUBSEQUENT: Primary | ICD-10-CM

## 2024-09-25 DIAGNOSIS — I10 PRIMARY HYPERTENSION: ICD-10-CM

## 2024-09-25 PROCEDURE — G0439 PPPS, SUBSEQ VISIT: HCPCS | Performed by: FAMILY MEDICINE

## 2024-09-25 PROCEDURE — 90662 IIV NO PRSV INCREASED AG IM: CPT

## 2024-09-25 PROCEDURE — G0008 ADMIN INFLUENZA VIRUS VAC: HCPCS

## 2024-09-25 PROCEDURE — 99214 OFFICE O/P EST MOD 30 MIN: CPT | Performed by: FAMILY MEDICINE

## 2024-09-25 NOTE — PROGRESS NOTES
Ambulatory Visit  Name: Saud Ardon      : 1934      MRN: [id number]  Encounter Provider: Annia Edwards DO  Encounter Date: 2024   Encounter department: [facility]    Assessment & Plan  Medicare annual wellness visit, subsequent  -reviewed preventative measures; updated flu vaccine; advised on living will       Primary hypertension  Controlled; c/w current tx         Nonrheumatic aortic valve stenosis  F/u with cardiology as recommended; continues to deny symptoms         Type 2 diabetes mellitus with stage 3a chronic kidney disease, without long-term current use of insulin (HCC)  Controlled; c/w lifestyle modifications; stable  Lab Results   Component Value Date    HGBA1C 6.3 (H) 2024            Basal cell carcinoma (BCC) of right shoulder  As per surg/onc and wound care         Encounter for immunization    Orders:  •  influenza vaccine, high-dose, PF 0.5 mL (Fluzone High Dose)      Depression Screening and Follow-up Plan: Patient was screened for depression during today's encounter. They screened negative with a PHQ-9 score of 2.  Preventive health issues were discussed with patient, and age appropriate screening tests were ordered as noted in patient's After Visit Summary. Personalized health advice and appropriate referrals for health education or preventive services given if needed, as noted in patient's After Visit Summary.    History of Present Illness     HPI   Patient Care Team:  Annia Edwards DO as PCP - General (Family Medicine)  Jayden Bose MD (Ophthalmology)  Max Weeks MD (Ophthalmology)  Karrie Kent RN as Nurse Navigator (Oncology)  Ria Galvin MA as Care Coordinator (Oncology)  Mary Carmen Early MD as Surgeon (Surgical Oncology)  Joshua Yao MD (Cardiology)  Brenda Downey as  (Oncology)    Review of Systems   Respiratory:  Negative for shortness of breath.    Cardiovascular:   Negative for chest pain.   Skin:  Positive for wound.   Neurological:  Negative for light-headedness.   Medical History Reviewed by provider this encounter:  Tobacco  Allergies  Meds  Problems  Med Hx  Surg Hx  Fam Hx       Annual Wellness Visit Questionnaire   Saud is here for his Subsequent Wellness visit.     Health Risk Assessment:   Patient rates overall health as good. Patient feels that their physical health rating is same. Patient is satisfied with their life. Eyesight was rated as same. Hearing was rated as same. Patient feels that their emotional and mental health rating is same. Patients states they are never, rarely angry. Patient states they are sometimes unusually tired/fatigued. Pain experienced in the last 7 days has been none. Patient states that he has experienced no weight loss or gain in last 6 months.     Depression Screening:   PHQ-9 Score: 2      Fall Risk Screening:   In the past year, patient has experienced: no history of falling in past year      Home Safety:  Patient does not have trouble with stairs inside or outside of their home. Patient has working smoke alarms and has working carbon monoxide detector. Home safety hazards include: none.     Nutrition:   Current diet is Regular.     Medications:   Patient is currently taking over-the-counter supplements. OTC medications include: see medication list. Patient is able to manage medications.     Activities of Daily Living (ADLs)/Instrumental Activities of Daily Living (IADLs):   Walk and transfer into and out of bed and chair?: Yes  Dress and groom yourself?: Yes    Bathe or shower yourself?: Yes    Feed yourself? Yes  Do your laundry/housekeeping?: Yes  Manage your money, pay your bills and track your expenses?: Yes  Make your own meals?: Yes    Do your own shopping?: Yes    Previous Hospitalizations:   Any hospitalizations or ED visits within the last 12 months?: No      Advance Care Planning:   Living will: No    Durable POA  for healthcare: No    Advanced directive: No      PREVENTIVE SCREENINGS      Cardiovascular Screening:    General: Screening Not Indicated and History Lipid Disorder      Diabetes Screening:     General: Screening Not Indicated and History Diabetes      Colorectal Cancer Screening:     General: Screening Not Indicated      Prostate Cancer Screening:    General: Screening Not Indicated      Osteoporosis Screening:    General: Screening Not Indicated      Abdominal Aortic Aneurysm (AAA) Screening:        General: Screening Not Indicated      Lung Cancer Screening:     General: Screening Not Indicated      Hepatitis C Screening:    General: Screening Not Indicated    Screening, Brief Intervention, and Referral to Treatment (SBIRT)    Screening  Typical number of drinks in a day: 0  Typical number of drinks in a week: 0  Interpretation: Low risk drinking behavior.    AUDIT-C Screenin) How often did you have a drink containing alcohol in the past year? never  2) How many drinks did you have on a typical day when you were drinking in the past year? 0  3) How often did you have 6 or more drinks on one occasion in the past year? never    AUDIT-C Score: 0  Interpretation: Score 0-3 (male): Negative screen for alcohol misuse    Single Item Drug Screening:  How often have you used an illegal drug (including marijuana) or a prescription medication for non-medical reasons in the past year? never    Single Item Drug Screen Score: 0  Interpretation: Negative screen for possible drug use disorder    Social Determinants of Health     Financial Resource Strain: Medium Risk (2023)    Overall Financial Resource Strain (CARDIA)    • Difficulty of Paying Living Expenses: Somewhat hard   Food Insecurity: No Food Insecurity (2024)    Hunger Vital Sign    • Worried About Running Out of Food in the Last Year: Never true    • Ran Out of Food in the Last Year: Never true   Transportation Needs: No Transportation Needs  "(9/23/2024)    PRAPARE - Transportation    • Lack of Transportation (Medical): No    • Lack of Transportation (Non-Medical): No   Housing Stability: Unknown (9/23/2024)    Housing Stability Vital Sign    • Unable to Pay for Housing in the Last Year: Patient declined    • Number of Times Moved in the Last Year: 0    • Homeless in the Last Year: No   Utilities: Not At Risk (9/23/2024)    [facility] Utilities    • Threatened with loss of utilities: No     No results found.    Objective     /78   Pulse 56   Temp 97.9 °F (36.6 °C)   Ht 4' 11.84\" (1.52 m)   Wt 56.7 kg (125 lb)   SpO2 97%   BMI 24.54 kg/m²     Physical Exam  Vitals reviewed.   Constitutional:       General: He is not in acute distress.     Appearance: Normal appearance. He is normal weight. He is not ill-appearing, toxic-appearing or diaphoretic.   HENT:      Head: Normocephalic and atraumatic.      Right Ear: Tympanic membrane, ear canal and external ear normal. There is no impacted cerumen.      Left Ear: Tympanic membrane, ear canal and external ear normal. There is no impacted cerumen.      Nose: Nose normal. No congestion or rhinorrhea.      Mouth/Throat:      Mouth: Mucous membranes are moist.      Pharynx: Oropharynx is clear. No oropharyngeal exudate or posterior oropharyngeal erythema.   Eyes:      General: No scleral icterus.        Right eye: No discharge.         Left eye: No discharge.      Conjunctiva/sclera: Conjunctivae normal.      Pupils: Pupils are equal, round, and reactive to light.   Cardiovascular:      Rate and Rhythm: Normal rate and regular rhythm.      Pulses: Normal pulses.      Heart sounds: Normal heart sounds.   Pulmonary:      Effort: Pulmonary effort is normal.      Breath sounds: Normal breath sounds.   Abdominal:      General: Abdomen is flat. There is no distension.      Tenderness: There is no abdominal tenderness. There is no guarding or rebound.   Musculoskeletal:      Cervical back: Neck supple. No rigidity.     "  Right lower leg: No edema.      Left lower leg: No edema.   Lymphadenopathy:      Cervical: No cervical adenopathy.   Skin:     Comments: No signs of discharge or extending erythema from basal cell wound on back   Neurological:      General: No focal deficit present.      Mental Status: He is alert and oriented to person, place, and time.   Psychiatric:         Mood and Affect: Mood normal.         Behavior: Behavior normal.         Thought Content: Thought content normal.         Judgment: Judgment normal.

## 2024-09-25 NOTE — PATIENT INSTRUCTIONS
Medicare Preventive Visit Patient Instructions  Thank you for completing your Welcome to Medicare Visit or Medicare Annual Wellness Visit today. Your next wellness visit will be due in one year (9/26/2025).  The screening/preventive services that you may require over the next 5-10 years are detailed below. Some tests may not apply to you based off risk factors and/or age. Screening tests ordered at today's visit but not completed yet may show as past due. Also, please note that scanned in results may not display below.  Preventive Screenings:  Service Recommendations Previous Testing/Comments   Colorectal Cancer Screening  Colonoscopy    Fecal Occult Blood Test (FOBT)/Fecal Immunochemical Test (FIT)  Fecal DNA/Cologuard Test  Flexible Sigmoidoscopy Age: 45-75 years old   Colonoscopy: every 10 years (May be performed more frequently if at higher risk)  OR  FOBT/FIT: every 1 year  OR  Cologuard: every 3 years  OR  Sigmoidoscopy: every 5 years  Screening may be recommended earlier than age 45 if at higher risk for colorectal cancer. Also, an individualized decision between you and your healthcare provider will decide whether screening between the ages of 76-85 would be appropriate. Colonoscopy: Not on file  FOBT/FIT: Not on file  Cologuard: Not on file  Sigmoidoscopy: Not on file    Screening Not Indicated     Prostate Cancer Screening Individualized decision between patient and health care provider in men between ages of 55-69   Medicare will cover every 12 months beginning on the day after your 50th birthday PSA: No results in last 5 years     Screening Not Indicated     Hepatitis C Screening Once for adults born between 1945 and 1965  More frequently in patients at high risk for Hepatitis C Hep C Antibody: Not on file        Diabetes Screening 1-2 times per year if you're at risk for diabetes or have pre-diabetes Fasting glucose: 96 mg/dL (7/30/2024)  A1C: 6.3 % (7/30/2024)  Screening Not Indicated  History Diabetes    Cholesterol Screening Once every 5 years if you don't have a lipid disorder. May order more often based on risk factors. Lipid panel: 07/30/2024  Screening Not Indicated  History Lipid Disorder      Other Preventive Screenings Covered by Medicare:  Abdominal Aortic Aneurysm (AAA) Screening: covered once if your at risk. You're considered to be at risk if you have a family history of AAA or a male between the age of 65-75 who smoking at least 100 cigarettes in your lifetime.  Lung Cancer Screening: covers low dose CT scan once per year if you meet all of the following conditions: (1) Age 55-77; (2) No signs or symptoms of lung cancer; (3) Current smoker or have quit smoking within the last 15 years; (4) You have a tobacco smoking history of at least 20 pack years (packs per day x number of years you smoked); (5) You get a written order from a healthcare provider.  Glaucoma Screening: covered annually if you're considered high risk: (1) You have diabetes OR (2) Family history of glaucoma OR (3)  aged 50 and older OR (4)  American aged 65 and older  Osteoporosis Screening: covered every 2 years if you meet one of the following conditions: (1) Have a vertebral abnormality; (2) On glucocorticoid therapy for more than 3 months; (3) Have primary hyperparathyroidism; (4) On osteoporosis medications and need to assess response to drug therapy.  HIV Screening: covered annually if you're between the age of 15-65. Also covered annually if you are younger than 15 and older than 65 with risk factors for HIV infection. For pregnant patients, it is covered up to 3 times per pregnancy.    Immunizations:  Immunization Recommendations   Influenza Vaccine Annual influenza vaccination during flu season is recommended for all persons aged >= 6 months who do not have contraindications   Pneumococcal Vaccine   * Pneumococcal conjugate vaccine = PCV13 (Prevnar 13), PCV15 (Vaxneuvance), PCV20 (Prevnar 20)  *  Pneumococcal polysaccharide vaccine = PPSV23 (Pneumovax) Adults 19-63 yo with certain risk factors or if 65+ yo  If never received any pneumonia vaccine: recommend Prevnar 20 (PCV20)  Give PCV20 if previously received 1 dose of PCV13 or PPSV23   Hepatitis B Vaccine 3 dose series if at intermediate or high risk (ex: diabetes, end stage renal disease, liver disease)   Respiratory syncytial virus (RSV) Vaccine - COVERED BY MEDICARE PART D  * RSVPreF3 (Arexvy) CDC recommends that adults 60 years of age and older may receive a single dose of RSV vaccine using shared clinical decision-making (SCDM)   Tetanus (Td) Vaccine - COST NOT COVERED BY MEDICARE PART B Following completion of primary series, a booster dose should be given every 10 years to maintain immunity against tetanus. Td may also be given as tetanus wound prophylaxis.   Tdap Vaccine - COST NOT COVERED BY MEDICARE PART B Recommended at least once for all adults. For pregnant patients, recommended with each pregnancy.   Shingles Vaccine (Shingrix) - COST NOT COVERED BY MEDICARE PART B  2 shot series recommended in those 19 years and older who have or will have weakened immune systems or those 50 years and older     Health Maintenance Due:  There are no preventive care reminders to display for this patient.  Immunizations Due:      Topic Date Due   • COVID-19 Vaccine (3 - 2023-24 season) 09/01/2024   • Influenza Vaccine (1) 09/01/2024     Advance Directives   What are advance directives?  Advance directives are legal documents that state your wishes and plans for medical care. These plans are made ahead of time in case you lose your ability to make decisions for yourself. Advance directives can apply to any medical decision, such as the treatments you want, and if you want to donate organs.   What are the types of advance directives?  There are many types of advance directives, and each state has rules about how to use them. You may choose a combination of any of  the following:  Living will:  This is a written record of the treatment you want. You can also choose which treatments you do not want, which to limit, and which to stop at a certain time. This includes surgery, medicine, IV fluid, and tube feedings.   Durable power of  for healthcare (DPAHC):  This is a written record that states who you want to make healthcare choices for you when you are unable to make them for yourself. This person, called a proxy, is usually a family member or a friend. You may choose more than 1 proxy.  Do not resuscitate (DNR) order:  A DNR order is used in case your heart stops beating or you stop breathing. It is a request not to have certain forms of treatment, such as CPR. A DNR order may be included in other types of advance directives.  Medical directive:  This covers the care that you want if you are in a coma, near death, or unable to make decisions for yourself. You can list the treatments you want for each condition. Treatment may include pain medicine, surgery, blood transfusions, dialysis, IV or tube feedings, and a ventilator (breathing machine).  Values history:  This document has questions about your views, beliefs, and how you feel and think about life. This information can help others choose the care that you would choose.  Why are advance directives important?  An advance directive helps you control your care. Although spoken wishes may be used, it is better to have your wishes written down. Spoken wishes can be misunderstood, or not followed. Treatments may be given even if you do not want them. An advance directive may make it easier for your family to make difficult choices about your care.       © Copyright [employer] 2018 Information is for End User's use only and may not be sold, redistributed or otherwise used for commercial purposes. All illustrations and images included in CareNotes® are the copyrighted property of [employer] or [employer]

## 2024-09-25 NOTE — ASSESSMENT & PLAN NOTE
Controlled; c/w lifestyle modifications; stable  Lab Results   Component Value Date    HGBA1C 6.3 (H) 07/30/2024

## 2024-09-30 ENCOUNTER — OFFICE VISIT (OUTPATIENT)
Dept: WOUND CARE | Facility: CLINIC | Age: 89
End: 2024-09-30
Payer: COMMERCIAL

## 2024-09-30 VITALS
DIASTOLIC BLOOD PRESSURE: 66 MMHG | RESPIRATION RATE: 18 BRPM | HEART RATE: 76 BPM | TEMPERATURE: 97 F | SYSTOLIC BLOOD PRESSURE: 144 MMHG

## 2024-09-30 DIAGNOSIS — C44.41 BASAL CELL CARCINOMA (BCC) OF SKIN OF NECK: ICD-10-CM

## 2024-09-30 DIAGNOSIS — N18.31 TYPE 2 DIABETES MELLITUS WITH STAGE 3A CHRONIC KIDNEY DISEASE, WITHOUT LONG-TERM CURRENT USE OF INSULIN (HCC): ICD-10-CM

## 2024-09-30 DIAGNOSIS — E11.22 TYPE 2 DIABETES MELLITUS WITH STAGE 3A CHRONIC KIDNEY DISEASE, WITHOUT LONG-TERM CURRENT USE OF INSULIN (HCC): ICD-10-CM

## 2024-09-30 DIAGNOSIS — S21.202A OPEN WOUND OF LEFT SIDE OF BACK, INITIAL ENCOUNTER: Primary | ICD-10-CM

## 2024-09-30 PROCEDURE — 99213 OFFICE O/P EST LOW 20 MIN: CPT

## 2024-09-30 RX ORDER — LIDOCAINE 40 MG/G
CREAM TOPICAL ONCE
Status: COMPLETED | OUTPATIENT
Start: 2024-09-30 | End: 2024-09-30

## 2024-09-30 RX ADMIN — LIDOCAINE: 40 CREAM TOPICAL at 13:55

## 2024-09-30 NOTE — PROGRESS NOTES
Wound Procedure Treatment Other (comment) Left;Upper Back    Performed by: Bette Nelson RN  Authorized by: AMITA Parra    Associated wounds:   Wound 08/19/24 Other (comment) Back Left;Upper  Wound cleansed with:  Wound aggrssively cleansed with NSS and gauze  Applied primary dressing:  Polymem foam and Silver  Applied secondary dressing:  ABD  Dressing secured with:  Tape

## 2024-09-30 NOTE — PATIENT INSTRUCTIONS
Orders Placed This Encounter   Procedures    Wound cleansing and dressings Other (comment) Left;Upper Back     Wash your hands with soap and water.  Remove old dressing, discard into plastic bag and place in trash.   Cleanse the wound with mild soap and water prior to applying a clean dressing.   Do not use tissue or cotton balls. Do not scrub the wound. Pat dry using gauze.     Shower yes, only on the days you change the dressing.      Left upper back wound:  Prior to dressing application please rinse wound with prophase.   Apply polymem Max AG to the wound.    Cover with ABD.  Secure with paper tape or medfix tape.   Change dressing three times a week.     Follow up at the wound center in two weeks.     Standing Status:   Future     Standing Expiration Date:   10/7/2024

## 2024-09-30 NOTE — PROGRESS NOTES
Patient ID: Saud Ardon is a 89 y.o. male Date of Birth 6/21/1934       Chief Complaint   Patient presents with    Follow Up Wound Care Visit     Follow up visit for wound to upper back  Pt denies any issues or concerns since last visit.        Allergies:  Patient has no known allergies.    Diagnosis:      Diagnosis ICD-10-CM Associated Orders   1. Open wound of left side of back, initial encounter  S21.202A lidocaine (LMX) 4 % cream     Wound cleansing and dressings Other (comment) Left;Upper Back     Wound Procedure Treatment Other (comment) Left;Upper Back      2. Basal cell carcinoma (BCC) of skin of neck  C44.41       3. Type 2 diabetes mellitus with stage 3a chronic kidney disease, without long-term current use of insulin (HCC)  E11.22     N18.31               Assessment & Plan:  Upper back with malignant lesion related to basal cell carcinoma. Overall stable appearance when compared to previous images. Recommend to continue with PolyMem Max Ag as patient has been tolerating this dressing without discomfort or increased bleeding.  Patient has surgical oncology follow-up scheduled for 10/4/2024.  See wounds orders below  No indication for debridement today.   Follow-up in 2 week(s). Call sooner with questions or concerns or any signs of infection such as redness, swelling, increased/purulent drainage, fever or chills, and increased pain.  Patient verbalized understanding and agrees with plan of care.        Subjective:   9/30/24: Patient presents to wound care center for follow-up visit of upper back malignant lesion.  Patient has history significant for basal cell carcinoma.  Patient is accompanied to his visit with his family member who helps with his wound care.  Patient has been tolerating PolyMem Max AG without issue, denies discomfort of increased bleeding with the dressing changes. Patient denies increased pain or drainage. No fever or chills. Patient has follow-up appointment with surgical oncology  later this week.         The following portions of the patient's history were reviewed and updated as appropriate:   Patient Active Problem List   Diagnosis    Anxiety    Aortic valve stenosis    CAD in native artery    Carotid artery disease (HCC)    Coronary atherosclerosis    Osteoarthritis    Primary hypertension    Hepatitis A    Hyperlipidemia    Hydrocele    Hypertriglyceridemia    Hyperplasia of prostate without lower urinary tract symptoms (LUTS)    Insomnia    Other specified glaucoma    Vitamin D deficiency    Type 2 diabetes mellitus with stage 3a chronic kidney disease, without long-term current use of insulin (HCC)    Onychomycosis of toenail    Overweight (BMI 25.0-29.9)    DNR (do not resuscitate)    Bunion    Moderate major depression (HCC)    Medicare annual wellness visit, subsequent    Callus of foot    Stage 3a chronic kidney disease (HCC)    Lesion of neck    Lyme disease    Basal cell carcinoma (BCC) of skin of neck    Basal cell carcinoma (BCC) of right shoulder    Ischemic cardiomyopathy     Past Medical History:   Diagnosis Date    Anxiety     CAD (coronary artery disease)     Cancer (HCC)     Capsular cataract     mature    Chronic ulcer of skin (HCC) 05/18/2015    DJD (degenerative joint disease)     Encounter for routine adult medical exam with abnormal findings 08/21/2019    Hepatitis A     Hydrocele     Hyperglycemia     Hyperlipidemia     Hypertension     Impaired fasting glucose 04/30/2014    Myocardial infarct (HCC) 2007    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HCC)     Potassium (K) excess 12/27/2019    Prostatic hyperplasia     Skin cancer      Past Surgical History:   Procedure Laterality Date    ANGIOPLASTY  2008     Family History   Problem Relation Age of Onset    Coronary artery disease Father       Social History     Socioeconomic History    Marital status:      Spouse name: Not on file    Number of children: Not on file    Years of education: Not on file    Highest education level: Not on  file   Occupational History    Not on file   Tobacco Use    Smoking status: Never     Passive exposure: Never    Smokeless tobacco: Never   Vaping Use    Vaping status: Never Used   Substance and Sexual Activity    Alcohol use: No    Drug use: No    Sexual activity: Not Currently     Partners: Female   Other Topics Concern    Not on file   Social History Narrative    No risk for falls    Activity level: sedentary    No sleep changes    No animals    No firearms    Always uses a seatbelt     Social Determinants of Health     Financial Resource Strain: Medium Risk (9/22/2023)    Overall Financial Resource Strain (CARDIA)     Difficulty of Paying Living Expenses: Somewhat hard   Food Insecurity: No Food Insecurity (9/23/2024)    Hunger Vital Sign     Worried About Running Out of Food in the Last Year: Never true     Ran Out of Food in the Last Year: Never true   Transportation Needs: No Transportation Needs (9/23/2024)    PRAPARE - Transportation     Lack of Transportation (Medical): No     Lack of Transportation (Non-Medical): No   Physical Activity: Insufficiently Active (9/21/2022)    Exercise Vital Sign     Days of Exercise per Week: 2 days     Minutes of Exercise per Session: 30 min   Stress: Stress Concern Present (9/21/2022)    [employer] - Occupational Stress Questionnaire     Feeling of Stress : To some extent   Social Connections: Moderately Isolated (9/21/2022)    Social Connection and Isolation Panel [NHANES]     Frequency of Communication with Friends and Family: More than three times a week     Frequency of Social Gatherings with Friends and Family: More than three times a week     Attends Confucianism Services: More than 4 times per year     Active Member of Clubs or Organizations: No     Attends Club or Organization Meetings: Never     Marital Status:    Intimate Partner Violence: Not At Risk (9/21/2022)    Humiliation, Afraid, Rape, and Kick questionnaire     Fear of  Current or Ex-Partner: No     Emotionally Abused: No     Physically Abused: No     Sexually Abused: No   Housing Stability: Unknown (9/23/2024)    Housing Stability Vital Sign     Unable to Pay for Housing in the Last Year: Patient declined     Number of Times Moved in the Last Year: 0     Homeless in the Last Year: No        Current Outpatient Medications:     Aspirin 81 MG CAPS, Take 1 tablet by mouth in the morning, Disp: 90 capsule, Rfl: 1    dorzolamide-timolol (COSOPT) 22.3-6.8 MG/ML ophthalmic solution, Every 12 hours, Disp: , Rfl:     fenofibrate (TRICOR) 54 MG tablet, Take 1 tablet (54 mg total) by mouth daily, Disp: 30 tablet, Rfl: 0    latanoprost (XALATAN) 0.005 % ophthalmic solution, INSTILL 1 DROP AT BEDTIME INTO BOTH EYES, Disp: , Rfl: 3    metoprolol succinate (TOPROL-XL) 100 mg 24 hr tablet, Take 1 tablet (100 mg total) by mouth 2 (two) times a day, Disp: 60 tablet, Rfl: 0    Multiple Vitamins-Minerals (MULTIVITAL) tablet, every 24 hours, Disp: , Rfl:     Multiple Vitamins-Minerals (PRESERVISION AREDS 2+MULTI VIT PO), take 2 tabs daily, Disp: , Rfl:     mupirocin (BACTROBAN) 2 % ointment, Apply topically daily During wound bandage changes, Disp: 100 g, Rfl: 0    mupirocin (BACTROBAN) 2 % ointment, Apply topically 3 (three) times a day, Disp: 100 g, Rfl: 3    nitroglycerin (NITROSTAT) 0.4 mg SL tablet, Take one under tongue for chest pain. Can repet in 5 minutes if necessary., Disp: 25 tablet, Rfl: 1    RHOPRESSA 0.02 % SOLN, INSTILL 1 DROP AT BEDTIME INTO BOTH EYES, Disp: , Rfl: 1    simvastatin (ZOCOR) 40 mg tablet, Take 1 tablet (40 mg total) by mouth daily at bedtime, Disp: 90 tablet, Rfl: 0    Vismodegib 150 MG, Take 1 capsule (150 mg total) by mouth daily (Patient not taking: Reported on 8/13/2024), Disp: 30 capsule, Rfl: 5  No current facility-administered medications for this visit.    Review of Systems   Constitutional:  Negative for chills and fever.   HENT:  Negative for congestion and  sneezing.    Respiratory:  Negative for cough and shortness of breath.    Skin:  Positive for wound.        Upper back   Psychiatric/Behavioral:  Negative for agitation.        Objective:  /66   Pulse 76   Temp (!) 97 °F (36.1 °C) (Tympanic)   Resp 18   Pain Score: 0-No pain     Physical Exam  Constitutional:       General: He is awake. He is not in acute distress.     Appearance: He is not ill-appearing, toxic-appearing or diaphoretic.   HENT:      Head: Normocephalic and atraumatic.      Right Ear: External ear normal.      Left Ear: External ear normal.   Eyes:      Conjunctiva/sclera: Conjunctivae normal.   Pulmonary:      Effort: Pulmonary effort is normal. No respiratory distress.   Skin:     General: Skin is warm and dry.      Findings: Wound present.      Comments: 1. Upper back with malignant wound. Wound bed is mostly granular, friable, with atypical wound bed appearance. No purulent drainage. No erythema, warmth or lymphangitic streaking to suggest cellulitis. Refer to wound assessment for additional details.   Neurological:      Mental Status: He is alert.   Psychiatric:         Mood and Affect: Mood normal.         Behavior: Behavior normal. Behavior is cooperative.         Wound 08/19/24 Other (comment) Back Left;Upper (Active)   Wound Image   09/30/24 1347   Wound Description Bleeding;Pink;Pale;Yellow 09/30/24 1347   Maria G-wound Assessment Intact;[place] 09/30/24 1347   Wound Length (cm) 7.3 cm 09/30/24 1347   Wound Width (cm) 9 cm 09/30/24 1347   Wound Depth (cm) 0.3 cm 09/30/24 1347   Wound Surface Area (cm^2) 65.7 cm^2 09/30/24 1347   Wound Volume (cm^3) 19.71 cm^3 09/30/24 1347   Calculated Wound Volume (cm^3) 19.71 cm^3 09/30/24 1347   Change in Wound Size % -53.39 09/30/24 1347   Drainage Amount Moderate 09/30/24 1347   Drainage Description Serosanguineous;Yellow;Tan 09/30/24 1347   Non-staged Wound Description Full thickness 09/30/24 1347   Wound packed? No 08/19/24 1431   Dressing Status  "Removed 09/30/24 1347               Lab Results   Component Value Date    HGBA1C 6.3 (H) 07/30/2024       Wound Instructions:  Orders Placed This Encounter   Procedures    Wound cleansing and dressings Other (comment) Left;Upper Back     Wash your hands with soap and water.  Remove old dressing, discard into plastic bag and place in trash.   Cleanse the wound with mild soap and water prior to applying a clean dressing.   Do not use tissue or cotton balls. Do not scrub the wound. Pat dry using gauze.     Shower yes, only on the days you change the dressing.      Left upper back wound:  Prior to dressing application please rinse wound with prophase.   Apply polymem Max AG to the wound.    Cover with ABD.  Secure with paper tape or medfix tape.   Change dressing three times a week.     Follow up at the wound center in two weeks.     Standing Status:   Future     Standing Expiration Date:   10/7/2024    Wound Procedure Treatment Other (comment) Left;Upper Back     This order was created via procedure documentation           AMITA Kebede, GARRICK, LAVINIA    Portions of the record may have been created with voice recognition software. Occasional wrong word or \"sound alike\" substitutions may have occurred due to the inherent limitations of voice recognition software. Read the chart carefully and recognize, using context, where substitutions have occurred.          Total time spent today:    Total time (face-to-face and non-face-to-face) spent on today's visit was 20 minutes. This includes preparation for the visits (i.e. reviewing test results from date recent hospitalizations, ER/Urgent Care/primary care visits and recent consultant office visits), performance of a medically appropriate history and examination, and orders for medications or testing.     "

## 2024-10-04 ENCOUNTER — OFFICE VISIT (OUTPATIENT)
Age: 89
End: 2024-10-04
Payer: COMMERCIAL

## 2024-10-04 ENCOUNTER — DOCUMENTATION (OUTPATIENT)
Dept: CARDIOLOGY CLINIC | Facility: CLINIC | Age: 89
End: 2024-10-04

## 2024-10-04 VITALS
RESPIRATION RATE: 14 BRPM | BODY MASS INDEX: 24.77 KG/M2 | OXYGEN SATURATION: 97 % | TEMPERATURE: 97.8 F | HEART RATE: 67 BPM | SYSTOLIC BLOOD PRESSURE: 130 MMHG | DIASTOLIC BLOOD PRESSURE: 78 MMHG | WEIGHT: 126.2 LBS | HEIGHT: 60 IN

## 2024-10-04 DIAGNOSIS — C44.41 BASAL CELL CARCINOMA (BCC) OF SKIN OF NECK: Primary | ICD-10-CM

## 2024-10-04 DIAGNOSIS — C44.612 BASAL CELL CARCINOMA (BCC) OF RIGHT SHOULDER: ICD-10-CM

## 2024-10-04 DIAGNOSIS — C44.519 BASAL CELL CARCINOMA (BCC) OF SKIN OF OTHER PART OF TORSO: ICD-10-CM

## 2024-10-04 PROCEDURE — 99215 OFFICE O/P EST HI 40 MIN: CPT | Performed by: SURGERY

## 2024-10-04 NOTE — PROGRESS NOTES
Surgical Oncology Follow Up  [facility]  [address] [place] [address]  [phone]    Saud RODRIGUEZ Reva  6/21/1934  [id number]      Chief Complaint   Patient presents with   • Follow-up        Assessment & Plan:   This is a 89-year-old very pleasant gentleman with large ulcerated neck/back basal cell carcinoma.  We have been planning for surgery unfortunately he has mild to moderate aortic valve stenosis.  In the interim we attempting to start him on vismodegib.  Unfortunately he cannot afford the copayment of $3000 a month.  Further reviewing cardiology note considered moderate risk.  Surgical consent was obtained after explaining benefit procedure alternative and possible complications for wide radical excision with plastic reconstruction and closure.  All patient's and his granddaughter's questions were answered to their satisfaction.  Surgery is pending REINALDO and final report from cardiology.  In the interim we will schedule his surgery    Cancer History:     Oncology History Overview Note   With Basal Cell Carcinoma of back. He was referred by Dr. Murry. He is being seen today for Radiation Oncology Consult.    Patient saw his PCP on 6/21/24 for fatigue and also had MD assess area skin lesion on his back which had been present for awhile. Had been referred to Dermatology prior but patient did not go. He saw Derm on 6/25/24 for large, ulcerated are on left back/posterior neck. Reports that he had for 6 years and area was growing. Also has 1.5cm area on right posterior shoulder. Biopsies taken in office.       6/21/24 PCP Dr. Jerry BENITES referral to Dermatology      6/25/24 Dr. Rodriguez-Dermatology  Biopsy in office  Specimens:   A) - Skin, Other, A:  lateral posterior neck;                                                       B) - Skin, Other, B: central posterior neck                                                          C) - Skin, Other, C: medial posterior neck                                                         D) - Skin, Other, D: Right posterior shoulder  Referrals to Medical Oncology, Surgical Oncology. Radiation Oncology  To be discussed at Tumor Board  Will get patient scheduled for excision of site with Dermatology      7/5/24 Dr. Early-Surgical Oncology  Large posterior neck/shoulder lesion at least 25 x 20cm. Not attached to deep structures  1.5cm posterior shoulder lesion separate. No regional adenopathy  CT C/A/P ordered  Consulted Plastics      7/9/24 Plastics  Discussed surgery-patient unsure if he wishes to proceed with surgery  Will discuss with family and return to office in 1 week      7/11/24 CT head  IMPRESSION:  No mass effect, acute intracranial hemorrhage or evidence of recent infarction. No discrete lytic or blastic osseous lesions identified. No abnormal parenchymal or leptomeningeal enhancement is seen.    7/11/24 CT soft tissue neck  IMPRESSION:1. Soft tissue ulcerating mass at the left base of the neck/upper chest, which is also inseparable from the left trapezius musculature. Findings are in keeping with history of biopsy-proven basal cell carcinoma.  2. No pathologically enlarged cervical lymphadenopathy within the neck.       7/11/24 CT C/A/P  IMPRESSION:  No evidence for metastatic disease in the chest, abdomen, or pelvis.  Prostatomegaly indenting the urinary bladder base. 1.4 cm nodular, enhancing component at the superior most aspect of the prostate gland; neoplastic etiology is not excluded.    ADDENDUM:  Small bilateral adrenal gland nodules measuring up to 1.6 cm on the right and 1.1 cm on the left are indeterminate. Dedicated adrenal protocol MRI is advised for further evaluation.    7/18/24 Plastics  Patient elects to proceed with surgery      7/19/24 Cutaneous [facility]      Upcoming appointments:  7/23/24 Cardiology Oncology  8/2/24 Dr. Rivera  8/2/24   [place]             Basal cell carcinoma (BCC) of skin of neck   6/25/2024 Initial Diagnosis    Basal cell carcinoma (BCC) of skin of neck     6/25/2024 Initial Diagnosis    Basal cell carcinoma (BCC) of right shoulder     6/25/2024 Biopsy    SKIN BIOPSIES    A. Skin, lateral posterior neck:   BASAL CELL CARCINOMA, INFILTRATIVE AND NODULAR     Note: This lesion is also ulcerated and crusted.     B. Skin, central posterior neck:   BASAL CELL CARCINOMA, MORPHEAFORM TYPE     C. Skin, medial posterior neck:   BASAL CELL CARCINOMA, INFILTRATIVE AND NODULAR     D. Skin, right posterior shoulder:   BASAL CELL CARCINOMA, NODULAR        Basal cell carcinoma (BCC) of right shoulder   6/25/2024 Initial Diagnosis    Basal cell carcinoma (BCC) of right shoulder           Interval History:   Follow-up with ulcerated large basal cell carcinoma of the back and neck    Review of Systems:   Review of Systems   Constitutional:  Negative for activity change, chills and fever.   HENT:  Negative for ear pain and sore throat.    Eyes:  Negative for pain and visual disturbance.   Respiratory:  Negative for cough and shortness of breath.    Cardiovascular:  Negative for chest pain and palpitations.   Gastrointestinal:  Negative for abdominal pain and vomiting.   Genitourinary:  Negative for dysuria and hematuria.   Musculoskeletal:  Negative for arthralgias and back pain.   Skin:  Negative for color change and rash.   Neurological:  Negative for seizures and syncope.   All other systems reviewed and are negative.    Past Medical History     Patient Active Problem List   Diagnosis   • Anxiety   • Aortic valve stenosis   • CAD in native artery   • Carotid artery disease (HCC)   • Coronary atherosclerosis   • Osteoarthritis   • Primary hypertension   • Hepatitis A   • Hyperlipidemia   • Hydrocele   • Hypertriglyceridemia   • Hyperplasia of prostate without lower urinary tract symptoms (LUTS)   • Insomnia   • Other specified glaucoma   • Vitamin  D deficiency   • Type 2 diabetes mellitus with stage 3a chronic kidney disease, without long-term current use of insulin (HCC)   • Onychomycosis of toenail   • Overweight (BMI 25.0-29.9)   • DNR (do not resuscitate)   • Bunion   • Moderate major depression (HCC)   • Medicare annual wellness visit, subsequent   • Callus of foot   • Stage 3a chronic kidney disease (HCC)   • Lesion of neck   • Lyme disease   • Basal cell carcinoma (BCC) of skin of neck   • Basal cell carcinoma (BCC) of right shoulder   • Ischemic cardiomyopathy     Past Medical History:   Diagnosis Date   • Anxiety    • CAD (coronary artery disease)    • Cancer (HCC)    • Capsular cataract     mature   • Chronic ulcer of skin (HCC) 05/18/2015   • DJD (degenerative joint disease)    • Encounter for routine adult medical exam with abnormal findings 08/21/2019   • Hepatitis A    • Hydrocele    • Hyperglycemia    • Hyperlipidemia    • Hypertension    • Impaired fasting glucose 04/30/2014   • Myocardial infarct (HCC) 2007   • 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)    • Potassium (K) excess 12/27/2019   • Prostatic hyperplasia    • Skin cancer      Past Surgical History:   Procedure Laterality Date   • ANGIOPLASTY  2008     Family History   Problem Relation Age of Onset   • Coronary artery disease Father      Social History     Socioeconomic History   • Marital status:      Spouse name: Not on file   • Number of children: Not on file   • Years of education: Not on file   • Highest education level: Not on file   Occupational History   • Not on file   Tobacco Use   • Smoking status: Never     Passive exposure: Never   • Smokeless tobacco: Never   Vaping Use   • Vaping status: Never Used   Substance and Sexual Activity   • Alcohol use: No   • Drug use: No   • Sexual activity: Not Currently     Partners: Female   Other Topics Concern   • Not on file   Social History Narrative    No risk for falls    Activity level: sedentary    No sleep changes    No animals    No firearms     Always uses a seatbelt     Social Determinants of Health     Financial Resource Strain: Medium Risk (9/22/2023)    Overall Financial Resource Strain (CARDIA)    • Difficulty of Paying Living Expenses: Somewhat hard   Food Insecurity: No Food Insecurity (9/23/2024)    Hunger Vital Sign    • Worried About Running Out of Food in the Last Year: Never true    • Ran Out of Food in the Last Year: Never true   Transportation Needs: No Transportation Needs (9/23/2024)    PRAPARE - Transportation    • Lack of Transportation (Medical): No    • Lack of Transportation (Non-Medical): No   Physical Activity: Insufficiently Active (9/21/2022)    Exercise Vital Sign    • Days of Exercise per Week: 2 days    • Minutes of Exercise per Session: 30 min   Stress: Stress Concern Present (9/21/2022)    [employer] - Occupational Stress Questionnaire    • Feeling of Stress : To some extent   Social Connections: Moderately Isolated (9/21/2022)    Social Connection and Isolation Panel [NHANES]    • Frequency of Communication with Friends and Family: More than three times a week    • Frequency of Social Gatherings with Friends and Family: More than three times a week    • Attends Evangelical Services: More than 4 times per year    • Active Member of Clubs or Organizations: No    • Attends Club or Organization Meetings: Never    • Marital Status:    Intimate Partner Violence: Not At Risk (9/21/2022)    Humiliation, Afraid, Rape, and Kick questionnaire    • Fear of Current or Ex-Partner: No    • Emotionally Abused: No    • Physically Abused: No    • Sexually Abused: No   Housing Stability: Unknown (9/23/2024)    Housing Stability Vital Sign    • Unable to Pay for Housing in the Last Year: Patient declined    • Number of Times Moved in the Last Year: 0    • Homeless in the Last Year: No       Current Outpatient Medications:   •  Aspirin 81 MG CAPS, Take 1 tablet by mouth in the morning, Disp: 90 capsule, Rfl:  1  •  dorzolamide-timolol (COSOPT) 22.3-6.8 MG/ML ophthalmic solution, Every 12 hours, Disp: , Rfl:   •  fenofibrate (TRICOR) 54 MG tablet, Take 1 tablet (54 mg total) by mouth daily, Disp: 30 tablet, Rfl: 0  •  latanoprost (XALATAN) 0.005 % ophthalmic solution, INSTILL 1 DROP AT BEDTIME INTO BOTH EYES, Disp: , Rfl: 3  •  metoprolol succinate (TOPROL-XL) 100 mg 24 hr tablet, Take 1 tablet (100 mg total) by mouth 2 (two) times a day, Disp: 60 tablet, Rfl: 0  •  Multiple Vitamins-Minerals (MULTIVITAL) tablet, every 24 hours, Disp: , Rfl:   •  Multiple Vitamins-Minerals (PRESERVISION AREDS 2+MULTI VIT PO), take 2 tabs daily, Disp: , Rfl:   •  mupirocin (BACTROBAN) 2 % ointment, Apply topically daily During wound bandage changes, Disp: 100 g, Rfl: 0  •  mupirocin (BACTROBAN) 2 % ointment, Apply topically 3 (three) times a day, Disp: 100 g, Rfl: 3  •  nitroglycerin (NITROSTAT) 0.4 mg SL tablet, Take one under tongue for chest pain. Can repet in 5 minutes if necessary., Disp: 25 tablet, Rfl: 1  •  RHOPRESSA 0.02 % SOLN, INSTILL 1 DROP AT BEDTIME INTO BOTH EYES, Disp: , Rfl: 1  •  simvastatin (ZOCOR) 40 mg tablet, Take 1 tablet (40 mg total) by mouth daily at bedtime, Disp: 90 tablet, Rfl: 0  •  Vismodegib 150 MG, Take 1 capsule (150 mg total) by mouth daily (Patient not taking: Reported on 8/13/2024), Disp: 30 capsule, Rfl: 5  No Known Allergies    Physical Exam:     Vitals:    10/04/24 1352   BP: 130/78   Pulse: 67   Resp: 14   Temp: 97.8 °F (36.6 °C)   SpO2: 97%     Physical Exam  Constitutional:       Appearance: He is normal weight.   Cardiovascular:      Rate and Rhythm: Normal rate and regular rhythm.      Pulses: Normal pulses.      Heart sounds: Normal heart sounds.   Pulmonary:      Effort: Pulmonary effort is normal.      Breath sounds: Normal breath sounds.   Abdominal:      General: Abdomen is flat.      Palpations: Abdomen is soft.   Musculoskeletal:      Cervical back: No rigidity.      Right lower leg: No  edema.      Left lower leg: No edema.   Lymphadenopathy:      Cervical: No cervical adenopathy.   Skin:     Coloration: Skin is not jaundiced.             Comments: Ulcerated mass with granulation tissue/malignancy in the back no regional adenopathy   Neurological:      General: No focal deficit present.      Mental Status: He is alert and oriented to person, place, and time.   Psychiatric:         Mood and Affect: Mood normal.         Behavior: Behavior normal.         Thought Content: Thought content normal.         Judgment: Judgment normal.       Results & Discussion:   I did review cardiology note extensively.  I also reviewed medical oncology notes.  Patient prefers surgically to be removed he he understand the risk of morbidity and mortality.  Surgical consent was obtained after explaining the benefit procedure alternative and possible complication including intraoperative and postoperative mortality and morbidity risks given the cardiac aortic stenosis.  I did discussed in detail nature of ulcerated large basal cell carcinoma along with nonhealing wound.  He is getting home care with the help of wound care center.  he understands and  agrees .  I did spend more than 60 minutes reviewing all the documents counseling this elderly gentleman with regard to his options.  He is willing even to write a letter indicating he wants this to be surgically removed.  I stated to him please wait but the cardiology's final opinion based on REINALDO before deciding any further.  We will make sure he has been seen by plastic surgery and we will arrange surgery as soon as possible.  All patient questions were answered.       Advance Care Planning/Advance Directives:  I Mary Carmen Early MD discussed the disease status with Saud Ardon  today 10/04/24  treatment plans and follow-up with the patient.

## 2024-10-04 NOTE — PROGRESS NOTES
Chart update:    Dr. Early - [facility]:  This is a 89-year-old very pleasant gentleman with large ulcerated neck/back basal cell carcinoma.  We have been planning for surgery unfortunately he has mild to moderate aortic valve stenosis.  In the interim we attempting to start him on vismodegib.  Unfortunately he cannot afford the copayment of $3000 a month.  Further reviewing cardiology note considered moderate risk.  Surgical consent was obtained after explaining benefit procedure alternative and possible complications for wide radical excision with plastic reconstruction and closure.  All patient's and his granddaughter's questions were answered to their satisfaction.  Surgery is pending REINALDO and final report from cardiology.  In the interim we will schedule his surgery          Previous Messages       ----- Message -----  From: Nhan Castro MA  Sent: 10/4/2024   3:17 PM EDT  To: Cardiology Oncology [place] [facility]    Routing to [facility].  ----- Message -----  From: Mini Gill RN  Sent: 10/4/2024   3:10 PM EDT  To: Violeta Aguilar RN; *    This mutual patient of Dr Yao was seen by Dr Early today and his granddaughter made us aware that he was not able to start his chemotherapy due to not being able to afford the copay of $3000. He would like to proceed with surgery, but we understand that a REINALDO needs to be completed prior to the surgery. If someone could please schedule this. His granddaughter will be away from 10/9-10/11 and is asking for a call before or after these dates.    His granddaughter is also asking if she should cancel the appointment with Dr. Woods on 10/9/24 and reschedule until after the REINALDO is completed or if she should keep this for him as well.    Thank you.      Plan:  Keep upcoming appointment.  Need to discuss risks / advantages / disadvantages of REINALDO and have shared decision making about this.

## 2024-10-07 ENCOUNTER — TELEPHONE (OUTPATIENT)
Dept: CARDIOLOGY CLINIC | Facility: CLINIC | Age: 89
End: 2024-10-07

## 2024-10-07 ENCOUNTER — TELEPHONE (OUTPATIENT)
Dept: HEMATOLOGY ONCOLOGY | Facility: CLINIC | Age: 89
End: 2024-10-07

## 2024-10-07 DIAGNOSIS — I10 ESSENTIAL HYPERTENSION: ICD-10-CM

## 2024-10-07 DIAGNOSIS — I77.9 CAROTID ARTERY DISEASE, UNSPECIFIED LATERALITY, UNSPECIFIED TYPE (HCC): ICD-10-CM

## 2024-10-07 DIAGNOSIS — E78.2 MIXED HYPERLIPIDEMIA: ICD-10-CM

## 2024-10-07 NOTE — TELEPHONE ENCOUNTER
Oncology Finance Advocacy Intake and Intervention  Oncology Finance Counselor/Advocate placed call to patient. This writer informed patient that this writer is here to assist patient with billing questions, financial assistance, payment/payment plans, quotes, copayment assistance, insurance optimization, and insurance navigation.    This writer conducted a thorough benefit review of copayment, deductible, and out of pocket cost. This information is documented below and has been reviewed with patient.     Self-Pay Balance: $200  Copayment:$35  Deductible:N/A  Out of Pocket Cost:$7,550/$6,890.91 Remaining  Insurance optimization (Limited benefit vs self-pay):  Patient assistance status: Free Drug Pursuable (Processing/Reviewed)  Free Drug Applications: Erevidge      Interventions:    Writer spoke with PT caregiver,Leola, for introductions and to discuss the out of pocket costs associated with the PT medication of Erevidge. Leola stated that the PT oral medication has a extremely high co-pay cost associated with it that is not affordable. Writer informed Leola that a free drug application does exist for this medication if the PT wished to pursue. Leola confirmed interest in pursuing this application and provided writer with all information to do so. Writer submitted application while on phone with Leola and stated he will reach back out to her when he receives a determination. Writer will email Leola with all of his contact information per her request. Writer encouraged Loela to reach out if PT needs any further assistance going forward.    Information above was review thoroughly with patient and patient was advise of possible assistance programs/interventions. If any question arise patient can contact this writer at below information. This information was given to patient at time of contact.      Rom Reed  Phone:[phone]  Email:[email or web address]

## 2024-10-07 NOTE — TELEPHONE ENCOUNTER
Spoke to Leola and advised keeping the upcoming appt with Dr MEDINA on 10/9 to discuss the REINALDO. She verbalized understanding and agreed.

## 2024-10-07 NOTE — TELEPHONE ENCOUNTER
----- Message from Joshua Yao MD sent at 10/4/2024  6:15 PM EDT -----  Regarding: RE: needs REINALDO prior to surgery    Prashanth,    Please tell them to keep the upcoming appointment.  I need to explain the nature of REINALDO which has risks , before proceeding with an order for REINALDO.      Thanks  MDM  ----- Message -----  From: Prashanth Olivares RN  Sent: 10/4/2024   4:58 PM EDT  To: Joshua Yao MD  Subject: needs REINALDO prior to surgery                       Looks like they are going to move forward with the surgery?    Dr. Early - [facility]:    This is a 89-year-old very pleasant gentleman with large ulcerated neck/back basal cell carcinoma.  We have been planning for surgery unfortunately he has mild to moderate aortic valve stenosis.  In the interim we attempting to start him on vismodegib.  Unfortunately he cannot afford the copayment of $3000 a month.  Further reviewing cardiology note considered moderate risk.  Surgical consent was obtained after explaining benefit procedure alternative and possible complications for wide radical excision with plastic reconstruction and closure.  All patient's and his granddaughter's questions were answered to their satisfaction.  Surgery is pending REINALDO and final report from cardiology.  In the interim we will schedule his surgery  ----- Message -----  From: Nhan Castro MA  Sent: 10/4/2024   3:17 PM EDT  To: Cardiology Oncology [place] [facility]    Routing to [facility].  ----- Message -----  From: Mini Gill RN  Sent: 10/4/2024   3:10 PM EDT  To: Violeta Aguilar RN; #    This mutual patient of Dr Yao was seen by Dr Early today and his granddaughter made us aware that he was not able to start his chemotherapy due to not being able to afford the copay of $3000. He would like to proceed with surgery, but we understand that a REINALDO needs to be completed prior to the surgery. If someone could please schedule this. His granddaughter will be away from 10/9-10/11 and  is asking for a call before or after these dates.     His granddaughter is also asking if she should cancel the appointment with Dr. Woods on 10/9/24 and reschedule until after the REINALDO is completed or if she should keep this for him as well.     Thank you.

## 2024-10-08 RX ORDER — METOPROLOL SUCCINATE 100 MG/1
100 TABLET, EXTENDED RELEASE ORAL 2 TIMES DAILY
Qty: 180 TABLET | Refills: 1 | Status: SHIPPED | OUTPATIENT
Start: 2024-10-08 | End: 2025-04-06

## 2024-10-08 RX ORDER — FENOFIBRATE 54 MG/1
54 TABLET ORAL DAILY
Qty: 30 TABLET | Refills: 0 | Status: SHIPPED | OUTPATIENT
Start: 2024-10-08 | End: 2024-11-07

## 2024-10-09 ENCOUNTER — OFFICE VISIT (OUTPATIENT)
Age: 89
End: 2024-10-09
Payer: COMMERCIAL

## 2024-10-09 VITALS
DIASTOLIC BLOOD PRESSURE: 62 MMHG | OXYGEN SATURATION: 98 % | WEIGHT: 127.7 LBS | HEIGHT: 60 IN | BODY MASS INDEX: 25.07 KG/M2 | SYSTOLIC BLOOD PRESSURE: 148 MMHG | HEART RATE: 66 BPM

## 2024-10-09 DIAGNOSIS — E78.2 MIXED HYPERLIPIDEMIA: ICD-10-CM

## 2024-10-09 DIAGNOSIS — C44.41 BASAL CELL CARCINOMA (BCC) OF SKIN OF NECK: ICD-10-CM

## 2024-10-09 DIAGNOSIS — I25.10 CAD IN NATIVE ARTERY: ICD-10-CM

## 2024-10-09 DIAGNOSIS — I35.0 NONRHEUMATIC AORTIC VALVE STENOSIS: ICD-10-CM

## 2024-10-09 DIAGNOSIS — I10 PRIMARY HYPERTENSION: ICD-10-CM

## 2024-10-09 DIAGNOSIS — I25.5 ISCHEMIC CARDIOMYOPATHY: ICD-10-CM

## 2024-10-09 DIAGNOSIS — Z01.818 PREOPERATIVE CLEARANCE: Primary | ICD-10-CM

## 2024-10-09 PROCEDURE — 99215 OFFICE O/P EST HI 40 MIN: CPT | Performed by: INTERNAL MEDICINE

## 2024-10-09 NOTE — PROGRESS NOTES
Cardio-Oncology / Heart Failure Cardiology Clinic Note    Saud Del Toror 89 y.o. male   MRN: [id number]  Encounter: [id number]        Assessment / Plan:    # Cardio-Oncology Pertinent History  Basal cell carcinoma  Dx 2024  Of neck / shoulder  Saw Dr Rivera -  rec neoadjuvant vismodegib (hedgehog pathway inhibitor). If not effective, would consider immunotherapy.   Could not afford the chemotherapy agent and family prefers surgery.  Now plan for excision with delayed grafting by surgical oncology and closure by plastics with skin grafting and potential muscle graft.     # Preoperative risk assessment  Planned surgery:   extensive basal cell carcinoma resection  No signs of decompensated heart failure or concern for ACS at this time  Patient able to do > 4 METs activity without concerning sx's  EKG:   normal sinus rhythm  Exam:  euvolemic  Echo:  Mod-severe aortic stenosis.  PLAN:  Obviously the moderate to severe aortic stenosis raises some concern for increased risk of surgery.  I think the aortic stenosis is probably moderate, and since he is asymptomatic, would not need intervention prior to basal cell cancer surgery.   However, I recommend REINALDO prior to surgery just to be sure we have more data on the aortic valve severity.      #   Aortic stenosis  Echo 7-2024:  Moderate to severe aortic stenosis reported (mean gradient 16, DI 0.28, JOSE ARMANDO 0.9, SVI 37, LVOT diameter measurement may have been slightly low).    I think the aortic stenosis is probably moderate, and since he is asymptomatic, would not need intervention prior to surgery.  If they plan to do surgery I would recommend REINALDO prior to surgery just to be sure we have more data on the aortic valve severity.    # Possible mild cardiomyopathy  Echo 7-2024 - EF reported at 50% (measured 58% biplane).  Anterolateral wall motion abnormality.    ETIOLOGY:  likely CAD  VOLUME:  euvolemic, not requiring diuretic  GDMT:  toprol  DEVICE:  not indicated    #  CAD  Reportedly had MI 2007 - minimal details, reports he got 1 stent  On aspirin 81  On statin  No angina    # HTN  On toprol 100mg BID  High today, quintin in law reports normally BP is not elevated.  Continue to monitor.    # HLD  On simvastatin 40mg  On tricor   Last LDL 63, at goal    # Carotid artery disease  On problem list  On ASA 81  On statin  Had declined repeat duplex in the past      Today's Plan Summary:  See above assessment/plan for full details of today's plan.  Briefly,     Check REINALDO.  Risk, benefit, rationale of doing this procedure discussed.    CC Dr Early and Dr Rivera              Reason For Visit / Chief Complaint:  F/u -  preoperative risk assessment, aortic stenosis    HPI:   Saud Ardon is a 89 y.o.  male with history as noted in the problem list and further detailed in the above assessment and plan.    Initial:  July 2024  Referred by Dr. Early for a new patient visit for preoperative risk assessment.  The patient has a enlarging neck/shoulder mass that was biopsy-proven basal cell carcinoma.  He may need an extensive surgery.  He has a history of coronary artery disease with MI in 2007.  He also has hypertension, hyperlipidemia, carotid artery disease.  He was not following with a cardiologist.  He was referred to cardiology for preoperative risk assessment.  Today, the patient reports -  feels well.  No chest pain.  No SOB.  No KRUEGER.   No edema.   No orthopnea or PND.   No palpitations.  No syncope.   Can walk up a hill without symptoms.   Retired.  Was an .  .   2 children.   Here with quintin in law.    Interval:  Plan at initial visit -->   Systolic murmur, would not proceed with surgery until cardiac evaluation  Check echo as first step    Last visit -->  reviewed echo.  Mod-severe AS.   Medical oncology recommended vismodegib (hedgehog pathway inhibitor).    Plan last visit -->  It seems the plan has changed a bit.  When I first saw the  patient he was planning to have upcoming surgery.  Since then he has seen medical oncology and started on neoadjuvant therapy.  Obviously the moderate to severe aortic stenosis raises some concern for increased risk of surgery.  I think the aortic stenosis is probably moderate, and since he is asymptomatic, would not need intervention prior to surgery.  If they plan to do surgery I would recommend REINALDO prior to surgery just to be sure we have more data on the aortic valve severity.  If they do not plan to do surgery I would just monitor the aortic stenosis.    Updates  -->   Oncology pill was too expensive.  And family wants to avoid chemotherapy and favors surgery.  Went back and saw surgery and they are offering surgery.  Now needs preop risk assessment from cardiac perspective.    Today - feeling well.  No chest pain.  No SOB.   No KRUEGER.  No edema.  No syncope.   Can do > 4 METs activity without any sx's or limitations.             Cardiac Imaging personally reviewed:  EKG 6-17-24  NSR   Holter or event monitor    Echo Echo  -   07/29/24   EF reported at 50% (measured 58% biplane).  Anterolateral wall motion abnormality.    Moderate to severe aortic stenosis reported (mean gradient 16, DI 0.28, JOSE ARMANDO 0.9, SVI 37, LVOT diameter measurement may have been slightly low).    Mild AI.  Moderate MR.        REINALDO    Cardiac MRI    Stress testing    Coronary CTA or [facility]    RHC    CPET              Patient Active Problem List    Diagnosis Date Noted    Ischemic cardiomyopathy 08/13/2024    Basal cell carcinoma (BCC) of skin of neck 07/03/2024    Basal cell carcinoma (BCC) of right shoulder 07/03/2024    Lesion of neck 06/21/2024    Lyme disease 06/21/2024    Stage 3a chronic kidney disease (HCC) 05/04/2022    Callus of foot 02/04/2022    Medicare annual wellness visit, subsequent 08/25/2021    Moderate major depression (HCC) 07/28/2021    Bunion 03/23/2021    DNR (do not resuscitate) 08/21/2020    Overweight (BMI 25.0-29.9)  08/21/2019    Type 2 diabetes mellitus with stage 3a chronic kidney disease, without long-term current use of insulin ([facility]) 03/06/2019    Onychomycosis of toenail 03/06/2019    Vitamin D deficiency 09/07/2018    Other specified glaucoma 08/08/2018    Aortic valve stenosis 05/15/2017    Insomnia 03/14/2016    Primary hypertension 04/30/2014    Hypertriglyceridemia 04/30/2014    Carotid artery disease (HCC) 01/14/2014    Anxiety 01/28/2013    CAD in native artery 01/28/2013    Coronary atherosclerosis 01/28/2013    Osteoarthritis 01/28/2013    Hepatitis A 01/28/2013    Hyperlipidemia 01/28/2013    Hydrocele 01/28/2013    Hyperplasia of prostate without lower urinary tract symptoms (LUTS) 01/28/2013       Past Medical History:   Diagnosis Date    Anxiety     CAD (coronary artery disease)     Cancer ([facility])     Capsular cataract     mature    Chronic ulcer of skin ([facility]) 05/18/2015    DJD (degenerative joint disease)     Encounter for routine adult medical exam with abnormal findings 08/21/2019    Hepatitis A     Hydrocele     Hyperglycemia     Hyperlipidemia     Hypertension     Impaired fasting glucose 04/30/2014    Myocardial infarct ([facility]) 2007    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[facility])     Potassium (K) excess 12/27/2019    Prostatic hyperplasia     Skin cancer        No Known Allergies    Current Outpatient Medications   Medication Instructions    Aspirin 81 MG CAPS 1 tablet, Oral, Daily    dorzolamide-timolol (COSOPT) 22.3-6.8 MG/ML ophthalmic solution Every 12 hours    fenofibrate (TRICOR) 54 mg, Oral, Daily    latanoprost (XALATAN) 0.005 % ophthalmic solution INSTILL 1 DROP AT BEDTIME INTO BOTH EYES    metoprolol succinate (TOPROL-XL) 100 mg, Oral, 2 times daily    Multiple Vitamins-Minerals (MULTIVITAL) tablet Every 24 hours    Multiple Vitamins-Minerals (PRESERVISION AREDS 2+MULTI VIT PO) take 2 tabs daily    mupirocin (BACTROBAN) 2 % ointment Topical, Daily, During wound bandage changes    mupirocin (BACTROBAN) 2 % ointment  Topical, 3 times daily    nitroglycerin (NITROSTAT) 0.4 mg SL tablet Take one under tongue for chest pain. Can repet in 5 minutes if necessary.    RHOPRESSA 0.02 % SOLN INSTILL 1 DROP AT BEDTIME INTO BOTH EYES    simvastatin (ZOCOR) 40 mg, Oral, Daily at bedtime    Vismodegib 150 mg, Oral, Daily       Social History     Socioeconomic History    Marital status:      Spouse name: Not on file    Number of children: Not on file    Years of education: Not on file    Highest education level: Not on file   Occupational History    Not on file   Tobacco Use    Smoking status: Never     Passive exposure: Never    Smokeless tobacco: Never   Vaping Use    Vaping status: Never Used   Substance and Sexual Activity    Alcohol use: No    Drug use: No    Sexual activity: Not Currently     Partners: Female   Other Topics Concern    Not on file   Social History Narrative    No risk for falls    Activity level: sedentary    No sleep changes    No animals    No firearms    Always uses a seatbelt     Social Determinants of Health     Financial Resource Strain: Medium Risk (9/22/2023)    Overall Financial Resource Strain (CARDIA)     Difficulty of Paying Living Expenses: Somewhat hard   Food Insecurity: No Food Insecurity (9/23/2024)    Hunger Vital Sign     Worried About Running Out of Food in the Last Year: Never true     Ran Out of Food in the Last Year: Never true   Transportation Needs: No Transportation Needs (9/23/2024)    PRAPARE - Transportation     Lack of Transportation (Medical): No     Lack of Transportation (Non-Medical): No   Physical Activity: Insufficiently Active (9/21/2022)    Exercise Vital Sign     Days of Exercise per Week: 2 days     Minutes of Exercise per Session: 30 min   Stress: Stress Concern Present (9/21/2022)    [employer] - Occupational Stress Questionnaire     Feeling of Stress : To some extent   Social Connections: Moderately Isolated (9/21/2022)    Social Connection  "and Isolation Panel [NHANES]     Frequency of Communication with Friends and Family: More than three times a week     Frequency of Social Gatherings with Friends and Family: More than three times a week     Attends Druze Services: More than 4 times per year     Active Member of Clubs or Organizations: No     Attends Club or Organization Meetings: Never     Marital Status:    Intimate Partner Violence: Not At Risk (9/21/2022)    Humiliation, Afraid, Rape, and Kick questionnaire     Fear of Current or Ex-Partner: No     Emotionally Abused: No     Physically Abused: No     Sexually Abused: No   Housing Stability: Unknown (9/23/2024)    Housing Stability Vital Sign     Unable to Pay for Housing in the Last Year: Patient declined     Number of Times Moved in the Last Year: 0     Homeless in the Last Year: No       Family History   Problem Relation Age of Onset    Coronary artery disease Father        Physical Exam:  Blood pressure 148/62, pulse 66, height 4' 11\" (1.499 m), weight 57.9 kg (127 lb 11.2 oz), SpO2 98%.  Body mass index is 25.79 kg/m².  Wt Readings from Last 3 Encounters:   10/09/24 57.9 kg (127 lb 11.2 oz)   10/04/24 57.2 kg (126 lb 3.2 oz)   09/25/24 56.7 kg (125 lb)     Physical Exam  Vitals reviewed.   Constitutional:       General: He is not in acute distress.     Appearance: He is not toxic-appearing.   Neck:      Comments: No JVD  Cardiovascular:      Rate and Rhythm: Normal rate and regular rhythm.      Heart sounds: Murmur (systolic murmur at base) heard.      No friction rub. No gallop.   Pulmonary:      Breath sounds: Normal breath sounds. No wheezing, rhonchi or rales.   Musculoskeletal:      Comments: Trace LE edema   Neurological:      Mental Status: He is alert.         Labs & Results:  Lab Results   Component Value Date    SODIUM 141 07/30/2024    K 4.6 07/30/2024     07/30/2024    CO2 27 07/30/2024    BUN 21 07/30/2024    CREATININE 1.17 07/30/2024    GLUC 108 (H) 01/28/2022 " "   CALCIUM 9.0 07/30/2024     No results found for: \"NTBNP\"     Time Statement:  I have spent a total time of 40 minutes in caring for this patient on the day of the visit/encounter including Prognosis, Risks and benefits of tx options, Instructions for management, Patient and family education, Importance of tx compliance, Risk factor reductions, Impressions, Counseling / Coordination of care, Documenting in the medical record, Reviewing / ordering tests, medicine, procedures  , Obtaining or reviewing history  , and Communicating with other healthcare professionals .        Thank you for the opportunity to participate in the care of this patient.    Joshua Yao MD, [facility]  Staff Cardiologist  Director of Cardio-Oncology  [facility]  "

## 2024-10-09 NOTE — Clinical Note
Dr Early,  The echo shows moderate to severe aortic stenosis.  I think we need to sort out whether it is moderate or severe.  I recommend a REINALDO prior to the basal cell carcinoma surgery.  If the aortic stenosis is moderate, he would be good to proceed with your basal cell carcinoma surgery.  If the aortic stenosis is severe, we may want to pause and discuss TAVR.  Joshua Yao

## 2024-10-10 ENCOUNTER — TELEPHONE (OUTPATIENT)
Dept: CARDIOLOGY CLINIC | Facility: CLINIC | Age: 89
End: 2024-10-10

## 2024-10-10 NOTE — TELEPHONE ENCOUNTER
You ordered him to have a REINALDO done. He is scheduled for 10/21/24 @ [facility]. Does he need to hold any medications prior to having his REINALDO done? Please advise. Thank you,   Nevin

## 2024-10-11 ENCOUNTER — DOCUMENTATION (OUTPATIENT)
Dept: HEMATOLOGY ONCOLOGY | Facility: CLINIC | Age: 89
End: 2024-10-11

## 2024-10-11 NOTE — PROGRESS NOTES
Mariaelenar received a voicemail from [employer] (Tamara) on 10/11/24 stating that the PT may of already hit his out of pocket threshold of $3300 for the year. Enoch also informed writer that due to the PT income, he was denied free drug. Mariaelenar informed PT caregiver (Leola) of this information via email on 10/11/24 @ 11:22 AM.  was informed during last encounter with Leola that the PT may not wish to proceed with the suggested treatment plan of the Erivedge medication. Mariaelenar informed Leola to please reconnect with him to provide any treatment plan updates and if a large co-pay does still exist if/when the PT picks up his Rx.  provided all contact information to Leola for future use.  will discontinue PT free drug application.            Rom Reed  Phone:[phone]  Email:[email or web address]

## 2024-10-14 ENCOUNTER — OFFICE VISIT (OUTPATIENT)
Dept: WOUND CARE | Facility: CLINIC | Age: 89
End: 2024-10-14
Payer: COMMERCIAL

## 2024-10-14 VITALS
SYSTOLIC BLOOD PRESSURE: 160 MMHG | DIASTOLIC BLOOD PRESSURE: 72 MMHG | RESPIRATION RATE: 16 BRPM | TEMPERATURE: 96.7 F | HEART RATE: 72 BPM

## 2024-10-14 DIAGNOSIS — S21.202A OPEN WOUND OF LEFT SIDE OF BACK, INITIAL ENCOUNTER: Primary | ICD-10-CM

## 2024-10-14 DIAGNOSIS — C44.41 BASAL CELL CARCINOMA (BCC) OF SKIN OF NECK: ICD-10-CM

## 2024-10-14 PROCEDURE — 99213 OFFICE O/P EST LOW 20 MIN: CPT | Performed by: FAMILY MEDICINE

## 2024-10-14 RX ORDER — LIDOCAINE 40 MG/G
CREAM TOPICAL ONCE
Status: COMPLETED | OUTPATIENT
Start: 2024-10-14 | End: 2024-10-14

## 2024-10-14 RX ADMIN — LIDOCAINE: 40 CREAM TOPICAL at 09:09

## 2024-10-14 NOTE — PROGRESS NOTES
"Patient ID: Saud Ardon is a 89 y.o. male Date of Birth 6/21/1934       Chief Complaint   Patient presents with    Follow Up Wound Care Visit     Upper back wound       Allergies:  Patient has no known allergies.    Diagnosis:      Diagnosis ICD-10-CM Associated Orders   1. Open wound of left side of back, initial encounter  S21.202A Wound cleansing and dressings Other (comment) Left;Upper Back     lidocaine (LMX) 4 % cream     Wound Procedure Treatment Other (comment) Left;Upper Back      2. Basal cell carcinoma (BCC) of skin of neck  C44.41               Assessment & Plan:  Malignant open wound left back 2/2 to basal cell carcinoma, palliative wound: Large open wound with friable granular and fibrogranular tissue present within wound bed.  Edges with epiboly and hyperpigmentation. No malodor, purulent drainage.  No surrounding induration, fluctuance, crepitus.  Continue current wound care PolyMem Max Ag  Continue follow-up with consultants surgeon/heme-onc/plastics/cardiology  ER precautions reviewed, patient and granddaughter expressed understanding  Follow-up in 3 to 4 weeks or sooner if needed    Portions of the record may have been created with voice recognition software. Occasional wrong word or \"sound alike\" substitutions may have occurred due to the inherent limitations of voice recognition software. Read the chart carefully and recognize, using context, where substitutions have occurred.    Subjective:   Per chart review of initial visit to wound center on 8/19/2024:   \"Patient is a 89 year old male who presents to the  wound center as a new patient who presents for a malignant wound of his left upper back secondary to basal cell carcinoma. Patient reports his wound initially developed 3-4 years ago. He admits he let the area go and it became progressively larger. He then sought medical care approximately 3 months ago and was diagnosed with BCC. He was seen by Dr. Jha with Plastics and it was " "determined patient required excision with flap closure. However, patient is currently requiring medical clearance prior to undergoing surgery. Patient's granddaughter who is present with him today reports she has been managing his wound at home with mupirocin ointment and dry gauze changing his dressing daily which was recommended by Dermatology. She reports his wound drains a moderate-large amount of drainage. He denies any pain, fevers, or chills. \"    8/26/2024: Follow-up visit with [facility] please see visit documentation    9/16/2024: Mr. Ardon presents today along with his granddaughter for follow-up of malignant wound side of back 2/2 to basal cell carcinoma.  They report he has not received his wound care supplies.  Our office called wound care supply company and confirmed order, tracking number, and delivery.  However, both patient and granddaughter state he absolutely has not received his supplies. They have been using a telfa bandage for his wound dressings. Will reorder wound care supplies at visit today. They also report he does have appointment with surgical oncology on 10/4/2024 but they are concerned because they know he needs cardiac clearance and has to get a REINALDO. Since his last visit other than not receiving his supplies, he reports no new acute complaints.  He denies fever, chills.    9/30/2024: Follow-up visit with my colleague AMITA [facility].  Please see visit documentation    10/14/2024: Mr. Ardon presents today for follow-up of malignant wound of back secondary to basal cell carcinoma.  Granddaughter has accompanied him to today's visit.  He reports that REINALDO is scheduled for 10/21 and are waiting to schedule with Dr. Jha/plastics for scheduling of his surgery.  He states he has no new acute complaints today and denies fever, chills.      The following portions of the patient's history were reviewed and updated as appropriate:   Patient Active Problem List   Diagnosis    " Anxiety    Aortic valve stenosis    CAD in native artery    Carotid artery disease (HCC)    Coronary atherosclerosis    Osteoarthritis    Primary hypertension    Hepatitis A    Hyperlipidemia    Hydrocele    Hypertriglyceridemia    Hyperplasia of prostate without lower urinary tract symptoms (LUTS)    Insomnia    Other specified glaucoma    Vitamin D deficiency    Type 2 diabetes mellitus with stage 3a chronic kidney disease, without long-term current use of insulin (HCC)    Onychomycosis of toenail    Overweight (BMI 25.0-29.9)    DNR (do not resuscitate)    Bunion    Moderate major depression (HCC)    Medicare annual wellness visit, subsequent    Callus of foot    Stage 3a chronic kidney disease (HCC)    Lesion of neck    Lyme disease    Basal cell carcinoma (BCC) of skin of neck    Basal cell carcinoma (BCC) of right shoulder    Ischemic cardiomyopathy     Past Medical History:   Diagnosis Date    Anxiety     CAD (coronary artery disease)     Cancer (HCC)     Capsular cataract     mature    Chronic ulcer of skin (HCC) 05/18/2015    DJD (degenerative joint disease)     Encounter for routine adult medical exam with abnormal findings 08/21/2019    Hepatitis A     Hydrocele     Hyperglycemia     Hyperlipidemia     Hypertension     Impaired fasting glucose 04/30/2014    Myocardial infarct ([facility]) 2007    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[facility])     Potassium (K) excess 12/27/2019    Prostatic hyperplasia     Skin cancer      Past Surgical History:   Procedure Laterality Date    ANGIOPLASTY  2008     Family History   Problem Relation Age of Onset    Coronary artery disease Father       Social History     Socioeconomic History    Marital status:      Spouse name: None    Number of children: None    Years of education: None    Highest education level: None   Occupational History    None   Tobacco Use    Smoking status: Never     Passive exposure: Never    Smokeless tobacco: Never   Vaping Use    Vaping status: Never Used   Substance  and Sexual Activity    Alcohol use: No    Drug use: No    Sexual activity: Not Currently     Partners: Female   Other Topics Concern    None   Social History Narrative    No risk for falls    Activity level: sedentary    No sleep changes    No animals    No firearms    Always uses a seatbelt     Social Determinants of Health     Financial Resource Strain: Medium Risk (9/22/2023)    Overall Financial Resource Strain (CARDIA)     Difficulty of Paying Living Expenses: Somewhat hard   Food Insecurity: No Food Insecurity (9/23/2024)    Hunger Vital Sign     Worried About Running Out of Food in the Last Year: Never true     Ran Out of Food in the Last Year: Never true   Transportation Needs: No Transportation Needs (9/23/2024)    PRAPARE - Transportation     Lack of Transportation (Medical): No     Lack of Transportation (Non-Medical): No   Physical Activity: Insufficiently Active (9/21/2022)    Exercise Vital Sign     Days of Exercise per Week: 2 days     Minutes of Exercise per Session: 30 min   Stress: Stress Concern Present (9/21/2022)    [employer] - Occupational Stress Questionnaire     Feeling of Stress : To some extent   Social Connections: Moderately Isolated (9/21/2022)    Social Connection and Isolation Panel [NHANES]     Frequency of Communication with Friends and Family: More than three times a week     Frequency of Social Gatherings with Friends and Family: More than three times a week     Attends Congregational Services: More than 4 times per year     Active Member of Clubs or Organizations: No     Attends Club or Organization Meetings: Never     Marital Status:    Intimate Partner Violence: Not At Risk (9/21/2022)    Humiliation, Afraid, Rape, and Kick questionnaire     Fear of Current or Ex-Partner: No     Emotionally Abused: No     Physically Abused: No     Sexually Abused: No   Housing Stability: Unknown (9/23/2024)    Housing Stability Vital Sign     Unable to Pay for  Housing in the Last Year: Patient declined     Number of Times Moved in the Last Year: 0     Homeless in the Last Year: No        Current Outpatient Medications:     Aspirin 81 MG CAPS, Take 1 tablet by mouth in the morning, Disp: 90 capsule, Rfl: 1    dorzolamide-timolol (COSOPT) 22.3-6.8 MG/ML ophthalmic solution, Every 12 hours, Disp: , Rfl:     fenofibrate (TRICOR) 54 MG tablet, Take 1 tablet (54 mg total) by mouth daily, Disp: 30 tablet, Rfl: 0    latanoprost (XALATAN) 0.005 % ophthalmic solution, INSTILL 1 DROP AT BEDTIME INTO BOTH EYES, Disp: , Rfl: 3    metoprolol succinate (TOPROL-XL) 100 mg 24 hr tablet, Take 1 tablet (100 mg total) by mouth 2 (two) times a day, Disp: 180 tablet, Rfl: 1    Multiple Vitamins-Minerals (MULTIVITAL) tablet, every 24 hours, Disp: , Rfl:     Multiple Vitamins-Minerals (PRESERVISION AREDS 2+MULTI VIT PO), take 2 tabs daily, Disp: , Rfl:     mupirocin (BACTROBAN) 2 % ointment, Apply topically daily During wound bandage changes, Disp: 100 g, Rfl: 0    mupirocin (BACTROBAN) 2 % ointment, Apply topically 3 (three) times a day, Disp: 100 g, Rfl: 3    nitroglycerin (NITROSTAT) 0.4 mg SL tablet, Take one under tongue for chest pain. Can repet in 5 minutes if necessary., Disp: 25 tablet, Rfl: 1    RHOPRESSA 0.02 % SOLN, INSTILL 1 DROP AT BEDTIME INTO BOTH EYES, Disp: , Rfl: 1    simvastatin (ZOCOR) 40 mg tablet, Take 1 tablet (40 mg total) by mouth daily at bedtime, Disp: 90 tablet, Rfl: 0    Vismodegib 150 MG, Take 1 capsule (150 mg total) by mouth daily (Patient not taking: Reported on 8/13/2024), Disp: 30 capsule, Rfl: 5  No current facility-administered medications for this visit.    Review of Systems   Constitutional:  Negative for chills and fever.   Respiratory:  Negative for shortness of breath.    Cardiovascular:  Negative for chest pain and palpitations.   Skin:  Positive for wound (L upper back).   Psychiatric/Behavioral:  The patient is not nervous/anxious.         Objective:  /72   Pulse 72   Temp (!) 96.7 °F (35.9 °C)   Resp 16   Pain Score:   2     Physical Exam  Vitals reviewed.   Constitutional:       General: He is not in acute distress.     Appearance: He is not ill-appearing, toxic-appearing or diaphoretic.      Comments: Very pleasant, no acute distress.  Granddaughter at bedside   HENT:      Ears:      Comments: [place]  Cardiovascular:      Rate and Rhythm: Normal rate.   Pulmonary:      Effort: Pulmonary effort is normal. No respiratory distress.   Skin:     General: Skin is warm.      Findings: Wound present.             Comments: 1-  Large open wound with friable granular and fibrogranular tissue present within wound bed.  Edges with epiboly and hyperpigmentation. No malodor, purulent drainage.  No surrounding induration, fluctuance, crepitus.   Neurological:      Mental Status: He is alert and oriented to person, place, and time.   Psychiatric:         Mood and Affect: Mood normal.         Behavior: Behavior normal.         Wound 08/19/24 Other (comment) Back Left;Upper (Active)   Wound Image   10/14/24 0849   Wound Description Hypergranulation 10/14/24 0857   Maria G-wound Assessment Intact;Laurie 09/30/24 1347   Wound Length (cm) 7 cm 10/14/24 0848   Wound Width (cm) 8.5 cm 10/14/24 0848   Wound Depth (cm) 0.3 cm 10/14/24 0848   Wound Surface Area (cm^2) 59.5 cm^2 10/14/24 0848   Wound Volume (cm^3) 17.85 cm^3 10/14/24 0848   Calculated Wound Volume (cm^3) 17.85 cm^3 10/14/24 0848   Change in Wound Size % -38.91 10/14/24 0848   Drainage Amount Large 10/14/24 0848   Drainage Description Serosanguineous;Tan 10/14/24 0848   Non-staged Wound Description Full thickness 10/14/24 0848   Wound packed? No 10/14/24 0848   Dressing Status Removed 10/14/24 0848             Lab Results   Component Value Date    HGBA1C 6.3 (H) 07/30/2024       Wound Instructions:  Orders Placed This Encounter   Procedures    Wound cleansing and dressings Other (comment) Left;Upper  Back     Wound cleansing and dressings Other (comment) Left;Upper Back       Wash your hands with soap and water.  Remove old dressing, discard into plastic bag and place in trash.   Cleanse the wound with mild soap and water prior to applying a clean dressing.   Do not use tissue or cotton balls. Do not scrub the wound. Pat dry using gauze.     Shower yes, only on the days you change the dressing.      Left upper back wound:  Prior to dressing application please rinse wound with prophase.   Apply polymem Max AG to the wound.    Cover with ABD.  Secure with paper tape or medfix tape.   Change dressing three times a week.     Follow up at the wound center in three weeks.     Standing Status:   Future     Standing Expiration Date:   10/21/2024    Wound Procedure Treatment Other (comment) Left;Upper Back     This order was created via procedure documentation     Vanita Coburn MD

## 2024-10-14 NOTE — PATIENT INSTRUCTIONS
Orders Placed This Encounter   Procedures    Wound cleansing and dressings Other (comment) Left;Upper Back     Wound cleansing and dressings Other (comment) Left;Upper Back       Wash your hands with soap and water.  Remove old dressing, discard into plastic bag and place in trash.   Cleanse the wound with mild soap and water prior to applying a clean dressing.   Do not use tissue or cotton balls. Do not scrub the wound. Pat dry using gauze.     Shower yes, only on the days you change the dressing.      Left upper back wound:  Prior to dressing application please rinse wound with prophase.   Apply polymem Max AG to the wound.    Cover with ABD.  Secure with paper tape or medfix tape.   Change dressing three times a week.     Follow up at the wound center in three weeks.     Standing Status:   Future     Standing Expiration Date:   10/21/2024    Wound Procedure Treatment Other (comment) Left;Upper Back     This order was created via procedure documentation

## 2024-10-14 NOTE — PROGRESS NOTES
Wound Procedure Treatment Other (comment) Left;Upper Back    Performed by: Vale Henriquez LPN  Authorized by: Vanita Coburn MD    Associated wounds:   Wound 08/19/24 Other (comment) Back Left;Upper  Wound cleansed with:  Wound cleanser  Applied primary dressing:  Polymem foam and Silver  Applied secondary dressing:  ABD  Dressing secured with:  Tape

## 2024-10-17 ENCOUNTER — TELEPHONE (OUTPATIENT)
Dept: HEMATOLOGY ONCOLOGY | Facility: CLINIC | Age: 89
End: 2024-10-17

## 2024-10-17 NOTE — TELEPHONE ENCOUNTER
Confirmed with pharmacy that cost for patients Erivedge still remains over $3,000.00    The out of pocket max for 2024 is $3300, doesn't appear that he's close to meeting that yet... copay for the Erivedge is basically coming back at the same amount it did in August    Granddaughter is aware and states they can not afford the medication.  They are planning on proceeding with surgery    I notified Violeta Aguilar RN

## 2024-10-18 DIAGNOSIS — C44.41 BASAL CELL CARCINOMA (BCC) OF SKIN OF NECK: Primary | ICD-10-CM

## 2024-10-18 DIAGNOSIS — C44.612 BASAL CELL CARCINOMA (BCC) OF RIGHT SHOULDER: ICD-10-CM

## 2024-10-18 RX ORDER — SODIUM CHLORIDE 9 MG/ML
20 INJECTION, SOLUTION INTRAVENOUS ONCE
OUTPATIENT
Start: 2024-10-18

## 2024-10-19 ENCOUNTER — ANESTHESIA EVENT (OUTPATIENT)
Dept: NON INVASIVE DIAGNOSTICS | Facility: HOSPITAL | Age: 89
End: 2024-10-19
Payer: COMMERCIAL

## 2024-10-21 ENCOUNTER — HOSPITAL ENCOUNTER (OUTPATIENT)
Dept: NON INVASIVE DIAGNOSTICS | Facility: HOSPITAL | Age: 89
Discharge: HOME | End: 2024-10-21
Payer: COMMERCIAL

## 2024-10-21 ENCOUNTER — DOCUMENTATION (OUTPATIENT)
Dept: CARDIOLOGY CLINIC | Facility: CLINIC | Age: 89
End: 2024-10-21

## 2024-10-21 ENCOUNTER — ANESTHESIA (OUTPATIENT)
Dept: NON INVASIVE DIAGNOSTICS | Facility: HOSPITAL | Age: 89
End: 2024-10-21
Payer: COMMERCIAL

## 2024-10-21 VITALS
HEIGHT: 60 IN | RESPIRATION RATE: 16 BRPM | SYSTOLIC BLOOD PRESSURE: 161 MMHG | BODY MASS INDEX: 24.94 KG/M2 | DIASTOLIC BLOOD PRESSURE: 75 MMHG | OXYGEN SATURATION: 98 % | HEART RATE: 69 BPM | WEIGHT: 127 LBS

## 2024-10-21 DIAGNOSIS — I35.0 NONRHEUMATIC AORTIC VALVE STENOSIS: Primary | ICD-10-CM

## 2024-10-21 DIAGNOSIS — I35.0 NONRHEUMATIC AORTIC VALVE STENOSIS: ICD-10-CM

## 2024-10-21 LAB
AORTIC ROOT: 3.1 CM
AORTIC VALVE MEAN VELOCITY: 15.4 M/S
AV AREA BY CONTINUOUS VTI: 1 CM2
AV LVOT MEAN GRADIENT: 1 MMHG
AV LVOT PEAK GRADIENT: 2 MMHG
AV MEAN GRADIENT: 11 MMHG
AV PEAK GRADIENT: 18 MMHG
AV VALVE AREA: 0.98 CM2
AV VELOCITY RATIO: 0.27
DOP CALC AO PEAK VEL: 3 M/S
DOP CALC AO VTI: 61 CM
DOP CALC LVOT AREA: 3.14 CM2
DOP CALC LVOT DIAMETER: 2 CM
DOP CALC LVOT PEAK VEL VTI: 19 CM
DOP CALC LVOT PEAK VEL: 0.8 M/S
DOP CALC LVOT STROKE INDEX: 37.5 ML/M2
DOP CALC LVOT STROKE VOLUME: 59.66 CM3
SL CV LV EF: 55

## 2024-10-21 PROCEDURE — 93320 DOPPLER ECHO COMPLETE: CPT | Performed by: INTERNAL MEDICINE

## 2024-10-21 PROCEDURE — 93312 ECHO TRANSESOPHAGEAL: CPT | Performed by: INTERNAL MEDICINE

## 2024-10-21 PROCEDURE — 93325 DOPPLER ECHO COLOR FLOW MAPG: CPT | Performed by: INTERNAL MEDICINE

## 2024-10-21 PROCEDURE — 93312 ECHO TRANSESOPHAGEAL: CPT

## 2024-10-21 RX ORDER — SODIUM CHLORIDE 9 MG/ML
INJECTION, SOLUTION INTRAVENOUS CONTINUOUS PRN
Status: DISCONTINUED | OUTPATIENT
Start: 2024-10-21 | End: 2024-10-21

## 2024-10-21 RX ORDER — LIDOCAINE HYDROCHLORIDE 10 MG/ML
INJECTION, SOLUTION EPIDURAL; INFILTRATION; INTRACAUDAL; PERINEURAL AS NEEDED
Status: DISCONTINUED | OUTPATIENT
Start: 2024-10-21 | End: 2024-10-21

## 2024-10-21 RX ORDER — PROPOFOL 10 MG/ML
INJECTION, EMULSION INTRAVENOUS AS NEEDED
Status: DISCONTINUED | OUTPATIENT
Start: 2024-10-21 | End: 2024-10-21

## 2024-10-21 RX ADMIN — LIDOCAINE HYDROCHLORIDE 60 MG: 10 INJECTION, SOLUTION EPIDURAL; INFILTRATION; INTRACAUDAL; PERINEURAL at 11:45

## 2024-10-21 RX ADMIN — PROPOFOL 100 MG: 10 INJECTION, EMULSION INTRAVENOUS at 11:45

## 2024-10-21 RX ADMIN — SODIUM CHLORIDE: 9 INJECTION, SOLUTION INTRAVENOUS at 11:40

## 2024-10-21 RX ADMIN — PHENYLEPHRINE HYDROCHLORIDE 20 MCG/MIN: 10 INJECTION INTRAVENOUS at 11:56

## 2024-10-21 RX ADMIN — PROPOFOL 100 MG: 10 INJECTION, EMULSION INTRAVENOUS at 11:55

## 2024-10-21 RX ADMIN — PROPOFOL 100 MCG/KG/MIN: 10 INJECTION, EMULSION INTRAVENOUS at 11:56

## 2024-10-21 NOTE — ANESTHESIA PREPROCEDURE EVALUATION
Procedure:  REINALDO    Relevant Problems   CARDIO   (+) Aortic valve stenosis   (+) CAD in native artery   (+) Coronary atherosclerosis   (+) Hyperlipidemia   (+) Hypertriglyceridemia   (+) Primary hypertension      ENDO   (+) Type 2 diabetes mellitus with stage 3a chronic kidney disease, without long-term current use of insulin (HCC)      GI/HEPATIC   (+) Hepatitis A      /RENAL   (+) Hyperplasia of prostate without lower urinary tract symptoms (LUTS)   (+) Stage 3a chronic kidney disease (HCC)      MUSCULOSKELETAL   (+) Osteoarthritis      NEURO/PSYCH   (+) Anxiety   (+) Moderate major depression (HCC)    CAD s/p stentx1 20 yrs ago    Physical Exam    Airway    Mallampati score: II  TM Distance: >3 FB  Neck ROM: full     Dental       Cardiovascular  Cardiovascular exam normal    Pulmonary  Pulmonary exam normal     Other Findings        Anesthesia Plan  ASA Score- 3     Anesthesia Type- IV sedation with anesthesia with ASA Monitors.         Additional Monitors:     Airway Plan:            Plan Factors-Exercise tolerance (METS): >4 METS.    Chart reviewed. EKG reviewed.  Existing labs reviewed. Patient summary reviewed.    Patient is not a current smoker.  Patient did not smoke on day of surgery.    Obstructive sleep apnea risk education given perioperatively.        Induction- intravenous.    Postoperative Plan-     Perioperative Resuscitation Plan - Level 1 - Full Code.       Informed Consent- Anesthetic plan and risks discussed with patient and daughter.  I personally reviewed this patient with the CRNA. Discussed and agreed on the Anesthesia Plan with the CRNA..

## 2024-10-21 NOTE — ANESTHESIA POSTPROCEDURE EVALUATION
Post-Op Assessment Note            No anethesia notable event occurred.    Staff: Anesthesiologist           Last Filed PACU Vitals:  Vitals Value Taken Time   Temp     Pulse     BP     Resp     SpO2         Modified Yohannes:  No data recorded

## 2024-10-21 NOTE — ANESTHESIA POSTPROCEDURE EVALUATION
Post-Op Assessment Note    CV Status:  Stable  Pain Score: 0    Pain management: adequate       Mental Status:  Alert and awake   Hydration Status:  Euvolemic   PONV Controlled:  Controlled   Airway Patency:  Patent  Two or more mitigation strategies used for obstructive sleep apnea   Post Op Vitals Reviewed: Yes    No anethesia notable event occurred.    Staff: CRNA           Last Filed PACU Vitals:  Vitals Value Taken Time   Temp     Pulse 78 10/21/24 1229   BP 98/50 10/21/24 1229   Resp 16 10/21/24 1229   SpO2 98 % 10/21/24 1229       Modified Yohannes:  No data recorded

## 2024-10-22 ENCOUNTER — TELEPHONE (OUTPATIENT)
Dept: NON INVASIVE DIAGNOSTICS | Facility: HOSPITAL | Age: 89
End: 2024-10-22

## 2024-10-22 ENCOUNTER — TELEPHONE (OUTPATIENT)
Age: 89
End: 2024-10-22

## 2024-10-22 NOTE — RESULT ENCOUNTER NOTE
REINALDO - 10/21/24   EF 55%  Moderate to severe aortic stenosis.  Low gradient with DI 0.27 and JOSE ARMANDO 0.98.    Prashanth - Can you let the patient know that the REINALDO raises concern about the severity of the aortic stenosis.  This poses a higher risk with anesthesia.  I would like him to see cardiac surgery to discuss their thoughts on the aortic valve and whether intervention would be appropriate in this setting.

## 2024-10-22 NOTE — TELEPHONE ENCOUNTER
LVOM for Leola (caregiver) regarding REINALDO results and Dr MEDINA's recommendation for CT surgery eval. Instructed Leola to call my direct TEAMS number [phone] with any questions or concerns.

## 2024-10-22 NOTE — TELEPHONE ENCOUNTER
----- Message from Joshua Yao MD sent at 10/21/2024  8:29 PM EDT -----    REINALDO - 10/21/24   EF 55%  Moderate to severe aortic stenosis.  Low gradient with DI 0.27 and JOSE ARMANDO 0.98.    Prashanth - Can you let the patient know that the REINALDO raises concern about the severity of the aortic stenosis.  This poses a higher risk with anesthesia.  I would like him to see cardiac surgery to discuss their thoughts on the aortic valve and whether intervention would be appropriate in this setting.

## 2024-10-25 PROBLEM — Z00.00 MEDICARE ANNUAL WELLNESS VISIT, SUBSEQUENT: Status: RESOLVED | Noted: 2021-08-25 | Resolved: 2024-10-25

## 2024-10-28 ENCOUNTER — PATIENT OUTREACH (OUTPATIENT)
Dept: HEMATOLOGY ONCOLOGY | Facility: CLINIC | Age: 89
End: 2024-10-28

## 2024-10-28 NOTE — PROGRESS NOTES
I reached out and spoke with Saud and Leola (caregiver) now that consults have been completed with the oncology teams to review for any barriers to care and offer supportive services as needed. Distress Thermometer completed at this time. Patient scored 3/10. Based on responses to DT, no indication for referral to SW needed at this time.  I reviewed and updated the members assigned to the care team in [facility].   He knows the members of the care team as well as how and when to contact them with any needs.   He verbalizes managing the schedules well.   He is currently unable to drive but is supported by family or friends and denies transportation needs.    He denies any uncontrolled symptoms. Discussed role of Palliative Care in symptom and side effect management. Declined referral at this time.  Patients states that he is eating and drinking as per usual with no unintentional weight loss.     Patient does not smoke.   Patient states he is well supported by family and friends.  Community support groups discussed including the Cancer Support Community of the [facility]. Patient declined information at this time.   Patient feels he has adequate insurance coverage and denies any financial concerns at this time.      Based on individual needs I will follow up in about 4 weeks.   I have provided my direct contact information and welcome them to contact me if their needs as discussed above change. They were appreciative for the call.

## 2024-11-06 ENCOUNTER — OFFICE VISIT (OUTPATIENT)
Dept: WOUND CARE | Facility: CLINIC | Age: 89
End: 2024-11-06
Payer: COMMERCIAL

## 2024-11-06 VITALS
HEART RATE: 72 BPM | TEMPERATURE: 96.8 F | SYSTOLIC BLOOD PRESSURE: 156 MMHG | RESPIRATION RATE: 14 BRPM | DIASTOLIC BLOOD PRESSURE: 68 MMHG

## 2024-11-06 DIAGNOSIS — E11.22 TYPE 2 DIABETES MELLITUS WITH STAGE 3A CHRONIC KIDNEY DISEASE, WITHOUT LONG-TERM CURRENT USE OF INSULIN (HCC): ICD-10-CM

## 2024-11-06 DIAGNOSIS — N18.31 TYPE 2 DIABETES MELLITUS WITH STAGE 3A CHRONIC KIDNEY DISEASE, WITHOUT LONG-TERM CURRENT USE OF INSULIN (HCC): ICD-10-CM

## 2024-11-06 DIAGNOSIS — C44.41 BASAL CELL CARCINOMA (BCC) OF SKIN OF NECK: ICD-10-CM

## 2024-11-06 DIAGNOSIS — S21.202A OPEN WOUND OF LEFT SIDE OF BACK, INITIAL ENCOUNTER: Primary | ICD-10-CM

## 2024-11-06 PROCEDURE — 99213 OFFICE O/P EST LOW 20 MIN: CPT | Performed by: FAMILY MEDICINE

## 2024-11-06 PROCEDURE — 99214 OFFICE O/P EST MOD 30 MIN: CPT | Performed by: FAMILY MEDICINE

## 2024-11-06 NOTE — PROGRESS NOTES
Wound Procedure Treatment Other (comment) Left;Upper Back    Performed by: Enoch Diaz RN  Authorized by: Joshua Cunningham MD    Associated wounds:   Wound 08/19/24 Malignant  Back Left;Upper  Wound cleansed with:  Wound aggrssively cleansed with NSS and gauze  Applied primary dressing:  Polymem foam and Silver  Applied secondary dressing:  ABD  Dressing secured with:  Tape  Comments:  Polymem Max AG

## 2024-11-06 NOTE — PROGRESS NOTES
Patient ID: Saud Ardon is a 89 y.o. male Date of Birth 6/21/1934       Chief Complaint   Patient presents with    Follow Up Wound Care Visit     Left upper back wound       Allergies:  Patient has no known allergies.    Diagnosis:      Diagnosis ICD-10-CM Associated Orders   1. Open wound of left side of back, initial encounter  S21.202A Wound cleansing and dressings Other (comment) Left;Upper Back     Wound Procedure Treatment Other (comment) Left;Upper Back      2. Basal cell carcinoma (BCC) of skin of neck  C44.41 Wound cleansing and dressings Other (comment) Left;Upper Back              Assessment & Plan:  Large basal cell carcinoma of the left upper back.  Still waiting for cardiac clearance before any plastic surgery can be performed.  Meeting with cardiovascular surgeon and also medical oncologist.  No signs of infection.  Continue with PolyMem Max AG.  The patient has chosen a palliative care plan. Based on this plan, there is no expectation of healing. The end goals of our palliative care plan are pain control, drainage management, prevention of infection, odor control, and optimization of quality of life insofar as the wound will allow. Therefore, invasive procedures related to the wound will be minimized. Dressings will be chosen to achieve the palliative care plan goals rather that to achieve healing of the wound.  Debridements may be performed periodically in order to prevent infection or control odor, etc.  There was a clear discussion in the treatment room regarding the palliative (versus active) care plan and the goals. Healing was not discussed as a goal.  I was clear that a non-palliative or active care plan can be instituted at any time based on the wishes of the patient.  Visits will be scheduled accordingly to minimize disruption of quality of life while allowing appropriate physician oversight of the wound and any dramatic changes that might occur.    A1C results reviewed with the patient  "today.            Subjective:   Per chart review of initial visit to wound center on 8/19/2024:   \"Patient is a 89 year old male who presents to the  wound center as a new patient who presents for a malignant wound of his left upper back secondary to basal cell carcinoma. Patient reports his wound initially developed 3-4 years ago. He admits he let the area go and it became progressively larger. He then sought medical care approximately 3 months ago and was diagnosed with BCC. He was seen by Dr. Jha with Plastics and it was determined patient required excision with flap closure. However, patient is currently requiring medical clearance prior to undergoing surgery. Patient's granddaughter who is present with him today reports she has been managing his wound at home with mupirocin ointment and dry gauze changing his dressing daily which was recommended by Dermatology. She reports his wound drains a moderate-large amount of drainage. He denies any pain, fevers, or chills. \"    8/26/2024: Follow-up visit with [facility] please see visit documentation    9/16/2024: Mr. Ardon presents today along with his granddaughter for follow-up of malignant wound side of back 2/2 to basal cell carcinoma.  They report he has not received his wound care supplies.  Our office called wound care supply company and confirmed order, tracking number, and delivery.  However, both patient and granddaughter state he absolutely has not received his supplies. They have been using a telfa bandage for his wound dressings. Will reorder wound care supplies at visit today. They also report he does have appointment with surgical oncology on 10/4/2024 but they are concerned because they know he needs cardiac clearance and has to get a REINALDO. Since his last visit other than not receiving his supplies, he reports no new acute complaints.  He denies fever, chills.    9/30/2024: Follow-up visit with my colleague AMITA [facility].  Please " see visit documentation    10/14/2024: Mr. Ardon presents today for follow-up of malignant wound of back secondary to basal cell carcinoma.  Granddaughter has accompanied him to today's visit.  He reports that REINALDO is scheduled for 10/21 and are waiting to schedule with Dr. Jha/plastics for scheduling of his surgery.  He states he has no new acute complaints today and denies fever, chills.    11/6/2024: Follow-up basal cell carcinoma of the left upper back.  Granddaughter accompanies him again today.  Awaiting meeting with cardiovascular surgeon and medical oncologist.  Cannot have anything done by plastics until cleared by cardiology.  Denies any pain fever or chills.        The following portions of the patient's history were reviewed and updated as appropriate:   Patient Active Problem List   Diagnosis    Anxiety    Aortic valve stenosis    CAD in native artery    Carotid artery disease (HCC)    Coronary atherosclerosis    Osteoarthritis    Primary hypertension    Hepatitis A    Hyperlipidemia    Hydrocele    Hypertriglyceridemia    Hyperplasia of prostate without lower urinary tract symptoms (LUTS)    Insomnia    Other specified glaucoma    Vitamin D deficiency    Type 2 diabetes mellitus with stage 3a chronic kidney disease, without long-term current use of insulin (HCC)    Onychomycosis of toenail    Overweight (BMI 25.0-29.9)    DNR (do not resuscitate)    Bunion    Moderate major depression (HCC)    Callus of foot    Stage 3a chronic kidney disease (HCC)    Lesion of neck    Lyme disease    Basal cell carcinoma (BCC) of skin of neck    Basal cell carcinoma (BCC) of right shoulder    Ischemic cardiomyopathy     Past Medical History:   Diagnosis Date    Anxiety     CAD (coronary artery disease)     Cancer (HCC)     Capsular cataract     mature    Chronic ulcer of skin (HCC) 05/18/2015    DJD (degenerative joint disease)     Encounter for routine adult medical exam with abnormal findings 08/21/2019    Hepatitis A      Hydrocele     Hyperglycemia     Hyperlipidemia     Hypertension     Impaired fasting glucose 04/30/2014    Myocardial infarct (HCC) 2007    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HCC)     Potassium (K) excess 12/27/2019    Prostatic hyperplasia     Skin cancer      Past Surgical History:   Procedure Laterality Date    ANGIOPLASTY  2008     Family History   Problem Relation Age of Onset    Coronary artery disease Father       Social History     Socioeconomic History    Marital status:      Spouse name: None    Number of children: None    Years of education: None    Highest education level: None   Occupational History    None   Tobacco Use    Smoking status: Never     Passive exposure: Never    Smokeless tobacco: Never   Vaping Use    Vaping status: Never Used   Substance and Sexual Activity    Alcohol use: No    Drug use: No    Sexual activity: Not Currently     Partners: Female   Other Topics Concern    None   Social History Narrative    No risk for falls    Activity level: sedentary    No sleep changes    No animals    No firearms    Always uses a seatbelt     Social Determinants of Health     Financial Resource Strain: Medium Risk (9/22/2023)    Overall Financial Resource Strain (CARDIA)     Difficulty of Paying Living Expenses: Somewhat hard   Food Insecurity: No Food Insecurity (9/23/2024)    Nursing - Inadequate Food Risk Classification     Worried About Running Out of Food in the Last Year: Never true     Ran Out of Food in the Last Year: Never true     Ran Out of Food in the Last Year: Not on file   Transportation Needs: No Transportation Needs (9/23/2024)    PRAPARE - Transportation     Lack of Transportation (Medical): No     Lack of Transportation (Non-Medical): No   Physical Activity: Insufficiently Active (9/21/2022)    Exercise Vital Sign     Days of Exercise per Week: 2 days     Minutes of Exercise per Session: 30 min   Stress: Stress Concern Present (9/21/2022)    [employer] [place] [employer] -  Occupational Stress Questionnaire     Feeling of Stress : To some extent   Social Connections: Moderately Isolated (9/21/2022)    Social Connection and Isolation Panel [NHANES]     Frequency of Communication with Friends and Family: More than three times a week     Frequency of Social Gatherings with Friends and Family: More than three times a week     Attends Restorationist Services: More than 4 times per year     Active Member of Clubs or Organizations: No     Attends Club or Organization Meetings: Never     Marital Status:    Intimate Partner Violence: Not At Risk (9/21/2022)    Humiliation, Afraid, Rape, and Kick questionnaire     Fear of Current or Ex-Partner: No     Emotionally Abused: No     Physically Abused: No     Sexually Abused: No   Housing Stability: Unknown (9/23/2024)    Housing Stability Vital Sign     Unable to Pay for Housing in the Last Year: Patient declined     Number of Times Moved in the Last Year: 0     Homeless in the Last Year: No        Current Outpatient Medications:     Aspirin 81 MG CAPS, Take 1 tablet by mouth in the morning, Disp: 90 capsule, Rfl: 1    dorzolamide-timolol (COSOPT) 22.3-6.8 MG/ML ophthalmic solution, Every 12 hours, Disp: , Rfl:     fenofibrate (TRICOR) 54 MG tablet, Take 1 tablet (54 mg total) by mouth daily, Disp: 30 tablet, Rfl: 0    latanoprost (XALATAN) 0.005 % ophthalmic solution, INSTILL 1 DROP AT BEDTIME INTO BOTH EYES, Disp: , Rfl: 3    metoprolol succinate (TOPROL-XL) 100 mg 24 hr tablet, Take 1 tablet (100 mg total) by mouth 2 (two) times a day, Disp: 180 tablet, Rfl: 1    Multiple Vitamins-Minerals (MULTIVITAL) tablet, every 24 hours, Disp: , Rfl:     Multiple Vitamins-Minerals (PRESERVISION AREDS 2+MULTI VIT PO), take 2 tabs daily, Disp: , Rfl:     mupirocin (BACTROBAN) 2 % ointment, Apply topically daily During wound bandage changes, Disp: 100 g, Rfl: 0    mupirocin (BACTROBAN) 2 % ointment, Apply topically 3 (three) times a day, Disp: 100 g, Rfl: 3     nitroglycerin (NITROSTAT) 0.4 mg SL tablet, Take one under tongue for chest pain. Can repet in 5 minutes if necessary., Disp: 25 tablet, Rfl: 1    RHOPRESSA 0.02 % SOLN, INSTILL 1 DROP AT BEDTIME INTO BOTH EYES, Disp: , Rfl: 1    simvastatin (ZOCOR) 40 mg tablet, Take 1 tablet (40 mg total) by mouth daily at bedtime, Disp: 90 tablet, Rfl: 0    Vismodegib 150 MG, Take 1 capsule (150 mg total) by mouth daily (Patient not taking: Reported on 8/13/2024), Disp: 30 capsule, Rfl: 5    Review of Systems   Constitutional:  Negative for chills and fever.   Respiratory:  Negative for shortness of breath.    Cardiovascular:  Negative for chest pain and palpitations.   Skin:  Positive for wound (L upper back).   Psychiatric/Behavioral:  Negative for dysphoric mood.        Objective:  /68   Pulse 72   Temp (!) 96.8 °F (36 °C)   Resp 14   Pain Score: 0-No pain     Physical Exam  Vitals reviewed.   Constitutional:       General: He is not in acute distress.     Appearance: Normal appearance. He is normal weight.      Comments: Very pleasant, no acute distress.  Granddaughter at bedside   HENT:      Ears:      Comments: [place]  Cardiovascular:      Rate and Rhythm: Normal rate.   Pulmonary:      Effort: Pulmonary effort is normal. No respiratory distress.   Skin:     General: Skin is warm.      Findings: Wound present.             Comments: 1-  Large open wound with friable granular and fibrogranular tissue present within wound bed.  Edges with epiboly and hyperpigmentation. No malodor, purulent drainage.  No surrounding induration, fluctuance, crepitus.  No changes.   Neurological:      Mental Status: He is alert and oriented to person, place, and time.   Psychiatric:         Mood and Affect: Mood normal.         Behavior: Behavior normal.         Wound 08/19/24 Malignant  Back Left;Upper (Active)   Wound Image Images linked 11/06/24 0940   Wound Description Hypergranulation;Yellow;Pink 11/06/24 0940   Maria G-wound Assessment  "Intact;Pink 11/06/24 0940   Wound Length (cm) 8.3 cm 11/06/24 0940   Wound Width (cm) 9.2 cm 11/06/24 0940   Wound Depth (cm) 0.3 cm 11/06/24 0940   Wound Surface Area (cm^2) 76.36 cm^2 11/06/24 0940   Wound Volume (cm^3) 22.908 cm^3 11/06/24 0940   Calculated Wound Volume (cm^3) 22.91 cm^3 11/06/24 0940   Change in Wound Size % -78.29 11/06/24 0940   Drainage Amount Moderate 11/06/24 0940   Drainage Description Serosanguineous 11/06/24 0940   Non-staged Wound Description Full thickness 11/06/24 0940   Wound packed? No 11/06/24 0940                  Lab Results   Component Value Date    HGBA1C 6.3 (H) 07/30/2024       Wound Instructions:  Orders Placed This Encounter   Procedures    Wound cleansing and dressings Other (comment) Left;Upper Back     Wash your hands with soap and water.  Remove old dressing, discard into plastic bag and place in trash.   Cleanse the wound with mild soap and water prior to applying a clean dressing.   Do not use tissue or cotton balls. Do not scrub the wound. Pat dry using gauze.     Shower yes, only on the days you change the dressing.        Left upper back wound:  Prior to dressing application please rinse wound with prophase.   Apply polymem Max AG to the wound.    Cover with ABD.  Secure with paper tape or medfix tape.   Change dressing three times a week.         Follow up at the wound center in four weeks.     Standing Status:   Future     Standing Expiration Date:   11/13/2024    Wound Procedure Treatment Other (comment) Left;Upper Back     This order was created via procedure documentation             Joshua Cunningham MD, CHT, CWS    Portions of the record may have been created with voice recognition software. Occasional wrong word or \"sound alike\" substitutions may have occurred due to the inherent limitations of voice recognition software. Read the chart carefully and recognize, using context, where substitutions have occurred.    "

## 2024-11-06 NOTE — PATIENT INSTRUCTIONS
Orders Placed This Encounter   Procedures    Wound cleansing and dressings Other (comment) Left;Upper Back     Wash your hands with soap and water.  Remove old dressing, discard into plastic bag and place in trash.   Cleanse the wound with mild soap and water prior to applying a clean dressing.   Do not use tissue or cotton balls. Do not scrub the wound. Pat dry using gauze.     Shower yes, only on the days you change the dressing.        Left upper back wound:  Prior to dressing application please rinse wound with prophase.   Apply polymem Max AG to the wound.    Cover with ABD.  Secure with paper tape or medfix tape.   Change dressing three times a week.         Follow up at the wound center in four weeks.     Standing Status:   Future     Standing Expiration Date:   11/13/2024

## 2024-11-09 ENCOUNTER — APPOINTMENT (OUTPATIENT)
Dept: LAB | Facility: CLINIC | Age: 89
End: 2024-11-09
Payer: COMMERCIAL

## 2024-11-09 DIAGNOSIS — C44.41 BASAL CELL CARCINOMA (BCC) OF SKIN OF NECK: ICD-10-CM

## 2024-11-09 LAB
ALBUMIN SERPL BCG-MCNC: 3.7 G/DL (ref 3.5–5)
ALP SERPL-CCNC: 68 U/L (ref 34–104)
ALT SERPL W P-5'-P-CCNC: 17 U/L (ref 7–52)
ANION GAP SERPL CALCULATED.3IONS-SCNC: 8 MMOL/L (ref 4–13)
AST SERPL W P-5'-P-CCNC: 19 U/L (ref 13–39)
BASOPHILS # BLD AUTO: 0.08 THOUSANDS/ÂΜL (ref 0–0.1)
BASOPHILS NFR BLD AUTO: 1 % (ref 0–1)
BILIRUB SERPL-MCNC: 0.51 MG/DL (ref 0.2–1)
BUN SERPL-MCNC: 18 MG/DL (ref 5–25)
CALCIUM SERPL-MCNC: 8.5 MG/DL (ref 8.4–10.2)
CHLORIDE SERPL-SCNC: 106 MMOL/L (ref 96–108)
CO2 SERPL-SCNC: 27 MMOL/L (ref 21–32)
CREAT SERPL-MCNC: 0.94 MG/DL (ref 0.6–1.3)
EOSINOPHIL # BLD AUTO: 0.36 THOUSAND/ÂΜL (ref 0–0.61)
EOSINOPHIL NFR BLD AUTO: 4 % (ref 0–6)
ERYTHROCYTE [DISTWIDTH] IN BLOOD BY AUTOMATED COUNT: 13.9 % (ref 11.6–15.1)
GFR SERPL CREATININE-BSD FRML MDRD: 71 ML/MIN/1.73SQ M
GLUCOSE P FAST SERPL-MCNC: 107 MG/DL (ref 65–99)
HCT VFR BLD AUTO: 43.1 % (ref 36.5–49.3)
HGB BLD-MCNC: 13.3 G/DL (ref 12–17)
IMM GRANULOCYTES # BLD AUTO: 0.02 THOUSAND/UL (ref 0–0.2)
IMM GRANULOCYTES NFR BLD AUTO: 0 % (ref 0–2)
LYMPHOCYTES # BLD AUTO: 1.38 THOUSANDS/ÂΜL (ref 0.6–4.47)
LYMPHOCYTES NFR BLD AUTO: 16 % (ref 14–44)
MCH RBC QN AUTO: 29.7 PG (ref 26.8–34.3)
MCHC RBC AUTO-ENTMCNC: 30.9 G/DL (ref 31.4–37.4)
MCV RBC AUTO: 96 FL (ref 82–98)
MONOCYTES # BLD AUTO: 0.82 THOUSAND/ÂΜL (ref 0.17–1.22)
MONOCYTES NFR BLD AUTO: 10 % (ref 4–12)
NEUTROPHILS # BLD AUTO: 5.87 THOUSANDS/ÂΜL (ref 1.85–7.62)
NEUTS SEG NFR BLD AUTO: 69 % (ref 43–75)
NRBC BLD AUTO-RTO: 0 /100 WBCS
PLATELET # BLD AUTO: 233 THOUSANDS/UL (ref 149–390)
PMV BLD AUTO: 10.5 FL (ref 8.9–12.7)
POTASSIUM SERPL-SCNC: 4.5 MMOL/L (ref 3.5–5.3)
PROT SERPL-MCNC: 6.2 G/DL (ref 6.4–8.4)
RBC # BLD AUTO: 4.48 MILLION/UL (ref 3.88–5.62)
SODIUM SERPL-SCNC: 141 MMOL/L (ref 135–147)
WBC # BLD AUTO: 8.53 THOUSAND/UL (ref 4.31–10.16)

## 2024-11-09 PROCEDURE — 85025 COMPLETE CBC W/AUTO DIFF WBC: CPT

## 2024-11-14 ENCOUNTER — OFFICE VISIT (OUTPATIENT)
Dept: CARDIAC SURGERY | Facility: CLINIC | Age: 89
End: 2024-11-14
Payer: COMMERCIAL

## 2024-11-14 VITALS
DIASTOLIC BLOOD PRESSURE: 78 MMHG | WEIGHT: 124.9 LBS | BODY MASS INDEX: 24.52 KG/M2 | HEART RATE: 73 BPM | SYSTOLIC BLOOD PRESSURE: 142 MMHG | HEIGHT: 60 IN | OXYGEN SATURATION: 100 %

## 2024-11-14 DIAGNOSIS — I35.0 NONRHEUMATIC AORTIC VALVE STENOSIS: ICD-10-CM

## 2024-11-14 PROCEDURE — 99205 OFFICE O/P NEW HI 60 MIN: CPT | Performed by: STUDENT IN AN ORGANIZED HEALTH CARE EDUCATION/TRAINING PROGRAM

## 2024-11-14 NOTE — PROGRESS NOTES
Consultation - Cardiac Surgery   Saud Ardon 89 y.o. male MRN: [id number]    Physician Requesting Consult: Joshua Yao MD    Primary Care Provider: Annia Edwards DO    Reason for Consult / Principal Problem: severe As, TAVR eval    History of Present Illness: Saud Ardon is a 89 year old male who presents for initial outpatient surgical consultation for moderate to severe AS, for consideration of transcatheter aortic valve replacement.     His PMH includes recently identified moderate-severe AS, low normal LVEF 50%, HTN, HLD, CAD/MI '07 s/p PCI/stent, Lyme disease treated this year, DJD, hepatitis A< pre DM2 A1c 6.3, and large ulcerated basal cell skin cancer wound on left neck and shoulder (currently being evaluated by several specialists).     He has very large ulcerated wound of neck and left shoulder, this has been present for 5 years and worsening, patient finally sought evaluation this year. He initially saw Dermatology and performed shave biopsy showing basal cell cancer. He then saw surgical/oncology Dr Early, an discussed surgical resection vs no surgery, and staging performed with CT head, CT neck, CT chest/abd/pelvis, which was negative for metastasis. He also saw Dr Jha of plastic surgery and discussed reconstructive surgery and skin grafting recommendations, patient initially unsure if he wanted to proceed with reconstruction, but then wanted to proceed. He was also referred to Heme/Onc Dr Rivera, who recommended neoadjuvant chemotherapy for treatment of his advanced basal cell carcinoma.     He was referred to cardiologist Joshua Yao for clearance prior to surgery, a systolic murmur was heard and an echo TTE was done 7/29/24, showed LVEF 50%, mod-severe AS (MG 15, PG 27, DVI 0.28, JOSE ARMANDO 0.8), mild AI, moderate MR.    He then underwent REINALDO at the direction of Dr. Yao for further eval the AS, which showed LVEF 55%, mod-severe AS with MG 11, DVI 0.27, JOSE ARMANDO 0.98,  mild-mod MR, mod TR.     He is referred to cardiac surgery for consideration of TAVR in this setting.  He denies any KRUEGER/SOB. Denies any lower extremity edema.  Denies chest pain.  Denies any unusual fatigue.  He lives alone and takes care of his own house and performs daily use and yard work without any issues.     Non-smoker, alcohol use no former or current drug use.     Past Medical History:  Past Medical History:   Diagnosis Date    Anxiety     CAD (coronary artery disease)     Cancer (HCC)     Capsular cataract     mature    Chronic ulcer of skin (HCC) 05/18/2015    DJD (degenerative joint disease)     Encounter for routine adult medical exam with abnormal findings 08/21/2019    Hepatitis A     Hydrocele     Hyperglycemia     Hyperlipidemia     Hypertension     Impaired fasting glucose 04/30/2014    Myocardial infarct (HCC) 2007    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HCC)     Potassium (K) excess 12/27/2019    Prostatic hyperplasia     Skin cancer        Past Surgical History:   Past Surgical History:   Procedure Laterality Date    ANGIOPLASTY  2008       Family History:  Family History   Problem Relation Age of Onset    Coronary artery disease Father        Social History:    Social History     Substance and Sexual Activity   Alcohol Use No     Social History     Substance and Sexual Activity   Drug Use No     Social History     Tobacco Use   Smoking Status Never    Passive exposure: Never   Smokeless Tobacco Never       Home Medications:   Prior to Admission medications    Medication Sig Start Date End Date Taking? Authorizing Provider   Aspirin 81 MG CAPS Take 1 tablet by mouth in the morning 10/8/24   Annia Edwards, DO   dorzolamide-timolol (COSOPT) 22.3-6.8 MG/ML ophthalmic solution Every 12 hours 9/26/16   Historical Provider, MD   fenofibrate (TRICOR) 54 MG tablet Take 1 tablet (54 mg total) by mouth daily 10/8/24 11/7/24  Annia Edwards DO   latanoprost (XALATAN) 0.005 % ophthalmic solution INSTILL 1 DROP AT  BEDTIME INTO BOTH EYES 7/19/18   Historical Provider, MD   metoprolol succinate (TOPROL-XL) 100 mg 24 hr tablet Take 1 tablet (100 mg total) by mouth 2 (two) times a day 10/8/24 4/6/25  Annia Edwards DO   Multiple Vitamins-Minerals (MULTIVITAL) tablet every 24 hours 12/31/13   Historical Provider, MD   Multiple Vitamins-Minerals (PRESERVISION AREDS 2+MULTI VIT PO) take 2 tabs daily 6/7/16   Historical Provider, MD   mupirocin (BACTROBAN) 2 % ointment Apply topically daily During wound bandage changes 6/25/24   Hoda Murry MD   mupirocin (BACTROBAN) 2 % ointment Apply topically 3 (three) times a day 8/2/24   Gaby Rivera MD   nitroglycerin (NITROSTAT) 0.4 mg SL tablet Take one under tongue for chest pain. Can repet in 5 minutes if necessary. 6/28/24   Vale Aguiar DO   RHOPRESSA 0.02 % SOLN INSTILL 1 DROP AT BEDTIME INTO BOTH EYES 11/12/18   Historical Provider, MD   simvastatin (ZOCOR) 40 mg tablet Take 1 tablet (40 mg total) by mouth daily at bedtime 8/26/24   Annia Edwards DO   Vismodegib 150 MG Take 1 capsule (150 mg total) by mouth daily  Patient not taking: Reported on 8/13/2024 8/6/24   Gaby Rivera MD       Allergies:  No Known Allergies    Review of Systems:     Review of Systems   Constitutional:  Negative for chills and fever.   HENT:  Negative for trouble swallowing.    Eyes:  Negative for visual disturbance.   Respiratory:  Negative for chest tightness and shortness of breath.    Cardiovascular:  Negative for chest pain, palpitations and leg swelling.   Gastrointestinal:  Negative for abdominal pain, blood in stool, nausea and vomiting.   Genitourinary:  Negative for hematuria.   Musculoskeletal:  Negative for arthralgias.   Skin:  Positive for wound.   Neurological:  Negative for dizziness, tremors, seizures, syncope, light-headedness and headaches.   Psychiatric/Behavioral:  Negative for agitation.        Vital Signs:     Vitals:    11/14/24 1403   BP: 142/78   BP Location: Right arm  "  Patient Position: Sitting   Cuff Size: Standard   Pulse: 73   SpO2: 100%   Weight: 56.7 kg (124 lb 14.4 oz)   Height: 4' 11\" (1.499 m)         Physical Exam  Constitutional:       Appearance: Normal appearance. He is normal weight.   HENT:      Head: Normocephalic and atraumatic.      Nose: Nose normal.      Mouth/Throat:      Mouth: Mucous membranes are moist.      Pharynx: Oropharynx is clear.   Eyes:      Extraocular Movements: Extraocular movements intact.   Neck:      Vascular: No carotid bruit.   Cardiovascular:      Rate and Rhythm: Normal rate and regular rhythm.      Pulses:           Radial pulses are 2+ on the right side and 2+ on the left side.      Heart sounds: Murmur heard.      Systolic murmur is present with a grade of 2/6.   Pulmonary:      Effort: Pulmonary effort is normal. No respiratory distress.      Breath sounds: No wheezing or rales.   Abdominal:      General: Abdomen is flat. Bowel sounds are normal. There is no distension.      Palpations: Abdomen is soft.      Tenderness: There is no abdominal tenderness. There is no guarding.   Musculoskeletal:      Right lower leg: No edema.      Left lower leg: No edema.   Skin:     General: Skin is warm and dry.      Findings: Lesion present.   Neurological:      General: No focal deficit present.      Mental Status: He is alert and oriented to person, place, and time.      Cranial Nerves: No cranial nerve deficit.      Motor: No weakness.   Psychiatric:         Mood and Affect: Mood normal.         Behavior: Behavior normal.         Lab Results:     Results from last 7 days   Lab Units 11/09/24  0842   WBC Thousand/uL 8.53   HEMOGLOBIN g/dL 13.3   HEMATOCRIT % 43.1   PLATELETS Thousands/uL 233     Results from last 7 days   Lab Units 11/09/24  0842   POTASSIUM mmol/L 4.5   CHLORIDE mmol/L 106   CO2 mmol/L 27   BUN mg/dL 18   CREATININE mg/dL 0.94   CALCIUM mg/dL 8.5         Lab Results   Component Value Date    HGBA1C 6.3 (H) 07/30/2024 "     Imaging Studies:     Transthoracic echocardiogram: 7/29/24    Left Ventricle: Left ventricle is normal in size with low normal systolic function.  Estimated LVEF is 50%.  Mild septal hypertrophy is noted.  Basal to mid anterolateral wall is akinetic and thin.    Aortic Valve: Moderate to severe aortic stenosis.  There is mild regurgitation.    Mitral Valve: There is moderate regurgitation with a centrally directed jet.    No comparison study available.    Transesophageal echocardiogram: 10/21/2024    Left Ventricle: Left ventricular cavity size is normal. Wall thickness is normal. The left ventricular ejection fraction is 55% by visual estimation. Systolic function is normal. Wall motion is normal.    Right Ventricle: Right ventricular cavity size is normal. Systolic function is normal.    Left Atrium: The atrium is dilated.    Atrial Septum: No patent foramen ovale detected, confirmed at rest using color doppler.    Left Atrial Appendage: Left atrial appendage is normal in size. There is normal function. There is no thrombus.    Aortic Valve: The aortic valve is trileaflet. The leaflets are severely thickened. The leaflets are moderately calcified. There is severely reduced mobility. There is no evidence of regurgitation. There is moderate to severe stenosis. The aortic valve mean gradient is 11 mmHg. The dimensionless velocity index is 0.27. The aortic valve area is 0.98 cm2.    Mitral Valve: There is mild to moderate regurgitation with a centrally directed jet.    Tricuspid Valve: There is moderate regurgitation.    Pulmonic Valve: There is mild regurgitation.    Aorta: The aortic root is normal in size. The aortic root is 3.10 cm.    CT head with and without contrast: 7/11/2024  No mass effect, acute intracranial hemorrhage or evidence of recent infarction. No discrete lytic or blastic osseous lesions identified. No abnormal parenchymal or leptomeningeal enhancement is seen.     CT soft tissue neck:  7/11/2024  1. Soft tissue ulcerating mass at the left base of the neck/upper chest, which is also inseparable from the left trapezius musculature. Findings are in keeping with history of biopsy-proven basal cell carcinoma.  2. No pathologically enlarged cervical lymphadenopathy within the neck.    CT chest, abdomen, pelvis: 7/11/24  No evidence for metastatic disease in the chest, abdomen, or pelvis.  Prostatomegaly indenting the urinary bladder base. 1.4 cm nodular, enhancing component at the superior most aspect of the prostate gland; neoplastic etiology is not excluded.  Heart is unremarkable for patient's age. No thoracic aortic aneurysm.     Results Review Statement: I personally reviewed the following image studies in PACS and associated radiology reports: CT chest, CT abdomen/pelvis, CT head, and Echocardiogram. My interpretation of the radiology images/reports is: same as above.      Assessment:  Patient Active Problem List    Diagnosis Date Noted    Ischemic cardiomyopathy 08/13/2024    Basal cell carcinoma (BCC) of skin of neck 07/03/2024    Basal cell carcinoma (BCC) of right shoulder 07/03/2024    Lesion of neck 06/21/2024    Lyme disease 06/21/2024    Stage 3a chronic kidney disease (HCC) 05/04/2022    Callus of foot 02/04/2022    Moderate major depression (HCC) 07/28/2021    Bunion 03/23/2021    DNR (do not resuscitate) 08/21/2020    Overweight (BMI 25.0-29.9) 08/21/2019    Type 2 diabetes mellitus with stage 3a chronic kidney disease, without long-term current use of insulin (HCC) 03/06/2019    Onychomycosis of toenail 03/06/2019    Vitamin D deficiency 09/07/2018    Other specified glaucoma 08/08/2018    Aortic valve stenosis 05/15/2017    Insomnia 03/14/2016    Primary hypertension 04/30/2014    Hypertriglyceridemia 04/30/2014    Carotid artery disease (HCC) 01/14/2014    Anxiety 01/28/2013    CAD in native artery 01/28/2013    Coronary atherosclerosis 01/28/2013    Osteoarthritis 01/28/2013     Hepatitis A 01/28/2013    Hyperlipidemia 01/28/2013    Hydrocele 01/28/2013    Hyperplasia of prostate without lower urinary tract symptoms (LUTS) 01/28/2013     Impression:  - moderate-severe AS, asymptomatic and low gradient  - large left neck/shoulder basal cell skin cancer wound, would require radical resection, reconstruction/skin grafting, possible adjuvant therapy per multiple specialists    Plan:  - patient has moderate-severe AS and is asymptomatic and has a large left neck/shoulder basal cell skin cancer wound, would require radical resection, reconstruction/skin grafting, possible adjuvant therapy per multiple specialists  - would not recommend proceeding TAVR in this scenario and not indicated as the AS is moderate-severe and patient is truly asymptomatic, especially in setting of large open wound that will require surgical reconstruction (infection risk).   - in future if wound on back is treated, and patients AS has progressed to severe and symptomatic, would entertain TAVR at that time.   - cardiac clearance for his left neck/shoulder wound reconstruction should come from his cardiologist, although anesthesia is generally tolerated with AS and the patient already had a REINALDO.      Saud Del Toror and daughter and granddaughter was comfortable with our recommendations, and their questions were answered to their satisfaction.  We will continue to evaluate the patient, with a final surgical recommendation pending the above work up.  Thank you for allowing us to participate in the care of this patient.     Routine referral to gastroenterology for colonoscopy screening was not indicated, as the patient is over 75 years old    SIGNATURE: Nic Jay PA-C  DATE: November 14, 2024  TIME: 2:34 PM    * This note was completed in part utilizing [employer] direct voice recognition software.   Grammatical errors, random word insertion, spelling mistakes, and incomplete sentences may be an occasional  consequence of the system secondary to software limitations, ambient noise and hardware issues. At the time of dictation, efforts were made to edit, clarify and /or correct errors. Please read the chart carefully and recognize, using context, where substitutions have occurred.  If you have any questions or concerns about the context, text or information contained within the body of this dictation, please contact myself, the provider, for further clarification.

## 2024-11-14 NOTE — LETTER
November 14, 2024     Joshua Yao MD  [address] [place] [id number]    Patient: Saud Ardon   YOB: 1934   Date of Visit: 11/14/2024       Dear Dr. Yao:    Thank you for referring Saud Ardon to me for evaluation. Below are my notes for this consultation.    If you have questions, please do not hesitate to call me. I look forward to following your patient along with you.         Sincerely,        Jameel Castellano MD        CC: DO Nic Jackson PA-C  11/14/2024  2:49 PM  Attested Addendum  Consultation - Cardiac Surgery   Saud Ardon 89 y.o. male MRN: [id number]    Physician Requesting Consult: Joshua Yao MD    Primary Care Provider: Annia Edwards DO    Reason for Consult / Principal Problem: severe As, TAVR eval    History of Present Illness: Saud Ardon is a 89 year old male who presents for initial outpatient surgical consultation for moderate to severe AS, for consideration of transcatheter aortic valve replacement.     His PMH includes recently identified moderate-severe AS, low normal LVEF 50%, HTN, HLD, CAD/MI '07 s/p PCI/stent, Lyme disease treated this year, DJD, hepatitis A< pre DM2 A1c 6.3, and large ulcerated basal cell skin cancer wound on left neck and shoulder (currently being evaluated by several specialists).     He has very large ulcerated wound of neck and left shoulder, this has been present for 5 years and worsening, patient finally sought evaluation this year. He initially saw Dermatology and performed shave biopsy showing basal cell cancer. He then saw surgical/oncology Dr Early, regine discussed surgical resection vs no surgery, and staging performed with CT head, CT neck, CT chest/abd/pelvis, which was negative for metastasis. He also saw Dr Jha of plastic surgery and discussed reconstructive surgery and skin grafting recommendations, patient initially unsure if he wanted to proceed with  reconstruction, but then wanted to proceed. He was also referred to Heme/Onc Dr Rivera, who recommended neoadjuvant chemotherapy for treatment of his advanced basal cell carcinoma.     He was referred to cardiologist Joshua Yao for clearance prior to surgery, a systolic murmur was heard and an echo TTE was done 7/29/24, showed LVEF 50%, mod-severe AS (MG 15, PG 27, DVI 0.28, JOSE ARMANDO 0.8), mild AI, moderate MR.    He then underwent REINALDO at the direction of Dr. Yao for further eval the AS, which showed LVEF 55%, mod-severe AS with MG 11, DVI 0.27, JOSE ARMANDO 0.98, mild-mod MR, mod TR.     He is referred to cardiac surgery for consideration of TAVR in this setting.  He denies any KRUEGER/SOB. Denies any lower extremity edema.  Denies chest pain.  Denies any unusual fatigue.  He lives alone and takes care of his own house and performs daily use and yard work without any issues.     Non-smoker, alcohol use no former or current drug use.     Past Medical History:  Past Medical History:   Diagnosis Date   • Anxiety    • CAD (coronary artery disease)    • Cancer (HCC)    • Capsular cataract     mature   • Chronic ulcer of skin (HCC) 05/18/2015   • DJD (degenerative joint disease)    • Encounter for routine adult medical exam with abnormal findings 08/21/2019   • Hepatitis A    • Hydrocele    • Hyperglycemia    • Hyperlipidemia    • Hypertension    • Impaired fasting glucose 04/30/2014   • Myocardial infarct (HCC) 2007   • 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HCC)    • Potassium (K) excess 12/27/2019   • Prostatic hyperplasia    • Skin cancer        Past Surgical History:   Past Surgical History:   Procedure Laterality Date   • ANGIOPLASTY  2008       Family History:  Family History   Problem Relation Age of Onset   • Coronary artery disease Father        Social History:    Social History     Substance and Sexual Activity   Alcohol Use No     Social History     Substance and Sexual Activity   Drug Use No     Social History     Tobacco Use    Smoking Status Never   • Passive exposure: Never   Smokeless Tobacco Never       Home Medications:   Prior to Admission medications    Medication Sig Start Date End Date Taking? Authorizing Provider   Aspirin 81 MG CAPS Take 1 tablet by mouth in the morning 10/8/24   Annia Edwards DO   dorzolamide-timolol (COSOPT) 22.3-6.8 MG/ML ophthalmic solution Every 12 hours 9/26/16   Historical Provider, MD   fenofibrate (TRICOR) 54 MG tablet Take 1 tablet (54 mg total) by mouth daily 10/8/24 11/7/24  Annia Edwards DO   latanoprost (XALATAN) 0.005 % ophthalmic solution INSTILL 1 DROP AT BEDTIME INTO BOTH EYES 7/19/18   Historical Provider, MD   metoprolol succinate (TOPROL-XL) 100 mg 24 hr tablet Take 1 tablet (100 mg total) by mouth 2 (two) times a day 10/8/24 4/6/25  Annia Edwards DO   Multiple Vitamins-Minerals (MULTIVITAL) tablet every 24 hours 12/31/13   Historical Provider, MD   Multiple Vitamins-Minerals (PRESERVISION AREDS 2+MULTI VIT PO) take 2 tabs daily 6/7/16   Historical Provider, MD   mupirocin (BACTROBAN) 2 % ointment Apply topically daily During wound bandage changes 6/25/24   Hoda Murry MD   mupirocin (BACTROBAN) 2 % ointment Apply topically 3 (three) times a day 8/2/24   Gaby Rivera MD   nitroglycerin (NITROSTAT) 0.4 mg SL tablet Take one under tongue for chest pain. Can repet in 5 minutes if necessary. 6/28/24   Vale Aguiar DO   RHOPRESSA 0.02 % SOLN INSTILL 1 DROP AT BEDTIME INTO BOTH EYES 11/12/18   Historical Provider, MD   simvastatin (ZOCOR) 40 mg tablet Take 1 tablet (40 mg total) by mouth daily at bedtime 8/26/24   Annia Edwards DO   Vismodegib 150 MG Take 1 capsule (150 mg total) by mouth daily  Patient not taking: Reported on 8/13/2024 8/6/24   Gaby Rivera MD       Allergies:  No Known Allergies    Review of Systems:     Review of Systems   Constitutional:  Negative for chills and fever.   HENT:  Negative for trouble swallowing.    Eyes:  Negative for visual  "disturbance.   Respiratory:  Negative for chest tightness and shortness of breath.    Cardiovascular:  Negative for chest pain, palpitations and leg swelling.   Gastrointestinal:  Negative for abdominal pain, blood in stool, nausea and vomiting.   Genitourinary:  Negative for hematuria.   Musculoskeletal:  Negative for arthralgias.   Skin:  Positive for wound.   Neurological:  Negative for dizziness, tremors, seizures, syncope, light-headedness and headaches.   Psychiatric/Behavioral:  Negative for agitation.        Vital Signs:     Vitals:    11/14/24 1403   BP: 142/78   BP Location: Right arm   Patient Position: Sitting   Cuff Size: Standard   Pulse: 73   SpO2: 100%   Weight: 56.7 kg (124 lb 14.4 oz)   Height: 4' 11\" (1.499 m)         Physical Exam  Constitutional:       Appearance: Normal appearance. He is normal weight.   HENT:      Head: Normocephalic and atraumatic.      Nose: Nose normal.      Mouth/Throat:      Mouth: Mucous membranes are moist.      Pharynx: Oropharynx is clear.   Eyes:      Extraocular Movements: Extraocular movements intact.   Neck:      Vascular: No carotid bruit.   Cardiovascular:      Rate and Rhythm: Normal rate and regular rhythm.      Pulses:           Radial pulses are 2+ on the right side and 2+ on the left side.      Heart sounds: Murmur heard.      Systolic murmur is present with a grade of 2/6.   Pulmonary:      Effort: Pulmonary effort is normal. No respiratory distress.      Breath sounds: No wheezing or rales.   Abdominal:      General: Abdomen is flat. Bowel sounds are normal. There is no distension.      Palpations: Abdomen is soft.      Tenderness: There is no abdominal tenderness. There is no guarding.   Musculoskeletal:      Right lower leg: No edema.      Left lower leg: No edema.   Skin:     General: Skin is warm and dry.      Findings: Lesion present.   Neurological:      General: No focal deficit present.      Mental Status: He is alert and oriented to person, " place, and time.      Cranial Nerves: No cranial nerve deficit.      Motor: No weakness.   Psychiatric:         Mood and Affect: Mood normal.         Behavior: Behavior normal.         Lab Results:     Results from last 7 days   Lab Units 11/09/24  0842   WBC Thousand/uL 8.53   HEMOGLOBIN g/dL 13.3   HEMATOCRIT % 43.1   PLATELETS Thousands/uL 233     Results from last 7 days   Lab Units 11/09/24  0842   POTASSIUM mmol/L 4.5   CHLORIDE mmol/L 106   CO2 mmol/L 27   BUN mg/dL 18   CREATININE mg/dL 0.94   CALCIUM mg/dL 8.5         Lab Results   Component Value Date    HGBA1C 6.3 (H) 07/30/2024     Imaging Studies:     Transthoracic echocardiogram: 7/29/24  •  Left Ventricle: Left ventricle is normal in size with low normal systolic function.  Estimated LVEF is 50%.  Mild septal hypertrophy is noted.  Basal to mid anterolateral wall is akinetic and thin.  •  Aortic Valve: Moderate to severe aortic stenosis.  There is mild regurgitation.  •  Mitral Valve: There is moderate regurgitation with a centrally directed jet.  •  No comparison study available.    Transesophageal echocardiogram: 10/21/2024  •  Left Ventricle: Left ventricular cavity size is normal. Wall thickness is normal. The left ventricular ejection fraction is 55% by visual estimation. Systolic function is normal. Wall motion is normal.  •  Right Ventricle: Right ventricular cavity size is normal. Systolic function is normal.  •  Left Atrium: The atrium is dilated.  •  Atrial Septum: No patent foramen ovale detected, confirmed at rest using color doppler.  •  Left Atrial Appendage: Left atrial appendage is normal in size. There is normal function. There is no thrombus.  •  Aortic Valve: The aortic valve is trileaflet. The leaflets are severely thickened. The leaflets are moderately calcified. There is severely reduced mobility. There is no evidence of regurgitation. There is moderate to severe stenosis. The aortic valve mean gradient is 11 mmHg. The  dimensionless velocity index is 0.27. The aortic valve area is 0.98 cm2.  •  Mitral Valve: There is mild to moderate regurgitation with a centrally directed jet.  •  Tricuspid Valve: There is moderate regurgitation.  •  Pulmonic Valve: There is mild regurgitation.  •  Aorta: The aortic root is normal in size. The aortic root is 3.10 cm.    CT head with and without contrast: 7/11/2024  No mass effect, acute intracranial hemorrhage or evidence of recent infarction. No discrete lytic or blastic osseous lesions identified. No abnormal parenchymal or leptomeningeal enhancement is seen.     CT soft tissue neck: 7/11/2024  1. Soft tissue ulcerating mass at the left base of the neck/upper chest, which is also inseparable from the left trapezius musculature. Findings are in keeping with history of biopsy-proven basal cell carcinoma.  2. No pathologically enlarged cervical lymphadenopathy within the neck.    CT chest, abdomen, pelvis: 7/11/24  No evidence for metastatic disease in the chest, abdomen, or pelvis.  Prostatomegaly indenting the urinary bladder base. 1.4 cm nodular, enhancing component at the superior most aspect of the prostate gland; neoplastic etiology is not excluded.  Heart is unremarkable for patient's age. No thoracic aortic aneurysm.     Results Review Statement: I personally reviewed the following image studies in PACS and associated radiology reports: CT chest, CT abdomen/pelvis, CT head, and Echocardiogram. My interpretation of the radiology images/reports is: same as above.      Assessment:  Patient Active Problem List    Diagnosis Date Noted   • Ischemic cardiomyopathy 08/13/2024   • Basal cell carcinoma (BCC) of skin of neck 07/03/2024   • Basal cell carcinoma (BCC) of right shoulder 07/03/2024   • Lesion of neck 06/21/2024   • Lyme disease 06/21/2024   • Stage 3a chronic kidney disease (HCC) 05/04/2022   • Callus of foot 02/04/2022   • Moderate major depression (HCC) 07/28/2021   • Bunion 03/23/2021    • DNR (do not resuscitate) 08/21/2020   • Overweight (BMI 25.0-29.9) 08/21/2019   • Type 2 diabetes mellitus with stage 3a chronic kidney disease, without long-term current use of insulin (HCC) 03/06/2019   • Onychomycosis of toenail 03/06/2019   • Vitamin D deficiency 09/07/2018   • Other specified glaucoma 08/08/2018   • Aortic valve stenosis 05/15/2017   • Insomnia 03/14/2016   • Primary hypertension 04/30/2014   • Hypertriglyceridemia 04/30/2014   • Carotid artery disease (HCC) 01/14/2014   • Anxiety 01/28/2013   • CAD in native artery 01/28/2013   • Coronary atherosclerosis 01/28/2013   • Osteoarthritis 01/28/2013   • Hepatitis A 01/28/2013   • Hyperlipidemia 01/28/2013   • Hydrocele 01/28/2013   • Hyperplasia of prostate without lower urinary tract symptoms (LUTS) 01/28/2013     Impression:  - moderate-severe AS, asymptomatic and low gradient  - large left neck/shoulder basal cell skin cancer wound, would require radical resection, reconstruction/skin grafting, possible adjuvant therapy per multiple specialists    Plan:  - patient has moderate-severe AS and is asymptomatic and has a large left neck/shoulder basal cell skin cancer wound, would require radical resection, reconstruction/skin grafting, possible adjuvant therapy per multiple specialists  - would not recommend proceeding TAVR in this scenario and not indicated as the AS is moderate-severe and patient is truly asymptomatic, especially in setting of large open wound that will require surgical reconstruction (infection risk).   - in future if wound on back is treated, and patients AS has progressed to severe and symptomatic, would entertain TAVR at that time.   - cardiac clearance for his left neck/shoulder wound reconstruction should come from his cardiologist, although anesthesia is generally tolerated with AS and the patient already had a REINALDO.      Saud RODRIGUEZ Reva and daughter and granddaughter was comfortable with our recommendations, and their  questions were answered to their satisfaction.  We will continue to evaluate the patient, with a final surgical recommendation pending the above work up.  Thank you for allowing us to participate in the care of this patient.     Routine referral to gastroenterology for colonoscopy screening was not indicated, as the patient is over 75 years old    SIGNATURE: Nic Jay PA-C  DATE: November 14, 2024  TIME: 2:34 PM    * This note was completed in part utilizing [employer] direct voice recognition software.   Grammatical errors, random word insertion, spelling mistakes, and incomplete sentences may be an occasional consequence of the system secondary to software limitations, ambient noise and hardware issues. At the time of dictation, efforts were made to edit, clarify and /or correct errors. Please read the chart carefully and recognize, using context, where substitutions have occurred.  If you have any questions or concerns about the context, text or information contained within the body of this dictation, please contact myself, the provider, for further clarification.   Attestation signed by Jameel Castellano MD at 11/14/2024  3:18 PM:  Attending Attestation:    I supervised the Advanced Practitioner.? In addition to personally performing portions of the history and physical exam, I performed, in its entirety, the assessment/plan/medical decision making/counseling/care coordination component of the visit on 11/14/24.  I reviewed the Advanced Practitioner's note,  medications prescribed and orders placed.    Medical decision making is detailed below:  Mr. Ardon is a 89yM with recently identified large ulcerated basal cell skin cancer on his back. During his workup for surgical resection and reconstruction, he was found to have moderate to severe AS and was referred to our office for evaluation prior to being given clearance for back surgery. I personally reviewed his REINALDO, which shows calcified valve  with no significant gradient and borderline severe area around 1cm. Mr. Ardon reports no symptoms related to AS. He denies any orthopnea, edema, lightheadedness, SOB, and chest pain.     I explained to Mr. Ardon and his family - that given his borderline valve measurements and lack of symptoms, I do not recommend TAVR at this time - especially with his large open wound basal cell cancer on his back. Over the coming years, should his echo findings worsen or symptoms develop, then I would be happy to re-evaluate him for TAVR. He will return to cardiology to discuss final surgical clearance for basal cell resection.    Jameel Castellano MD  11/14/24  3:13 PM

## 2024-11-22 DIAGNOSIS — E78.2 MIXED HYPERLIPIDEMIA: ICD-10-CM

## 2024-11-23 RX ORDER — SIMVASTATIN 40 MG
40 TABLET ORAL
Qty: 90 TABLET | Refills: 0 | Status: SHIPPED | OUTPATIENT
Start: 2024-11-23

## 2024-12-05 ENCOUNTER — OFFICE VISIT (OUTPATIENT)
Dept: WOUND CARE | Facility: CLINIC | Age: 89
End: 2024-12-05
Payer: COMMERCIAL

## 2024-12-05 VITALS
TEMPERATURE: 96.3 F | RESPIRATION RATE: 16 BRPM | DIASTOLIC BLOOD PRESSURE: 76 MMHG | SYSTOLIC BLOOD PRESSURE: 142 MMHG | HEART RATE: 56 BPM

## 2024-12-05 DIAGNOSIS — S21.202A OPEN WOUND OF LEFT SIDE OF BACK, INITIAL ENCOUNTER: Primary | ICD-10-CM

## 2024-12-05 DIAGNOSIS — C44.41 BASAL CELL CARCINOMA (BCC) OF SKIN OF NECK: ICD-10-CM

## 2024-12-05 PROCEDURE — 99213 OFFICE O/P EST LOW 20 MIN: CPT | Performed by: FAMILY MEDICINE

## 2024-12-05 RX ORDER — LIDOCAINE 40 MG/G
CREAM TOPICAL ONCE
Status: COMPLETED | OUTPATIENT
Start: 2024-12-05 | End: 2024-12-05

## 2024-12-05 RX ADMIN — LIDOCAINE: 40 CREAM TOPICAL at 10:32

## 2024-12-05 NOTE — PROGRESS NOTES
Wound Procedure Treatment Malignant  Left;Upper Back    Performed by: Bette Nelson RN  Authorized by: Vanita Coburn MD    Associated wounds:   Wound 08/19/24 Malignant  Back Left;Upper  Wound cleansed with:  Wound aggrssively cleansed with NSS and gauze  Applied primary dressing:  Polymem foam and Silver  Applied secondary dressing:  ABD  Dressing secured with:  Tape

## 2024-12-05 NOTE — PROGRESS NOTES
"Patient ID: Saud Ardon is a 89 y.o. male Date of Birth 6/21/1934       Chief Complaint   Patient presents with    Follow Up Wound Care Visit     Follow up visit for wound to upper back/neck.  Pt noted bleeding from his wound this morning while showering.        Allergies:  Patient has no known allergies.    Diagnosis:      Diagnosis ICD-10-CM Associated Orders   1. Open wound of left side of back, initial encounter  S21.202A lidocaine (LMX) 4 % cream     Wound cleansing and dressings Malignant  Left;Upper Back     Wound Procedure Treatment Malignant  Left;Upper Back      2. Basal cell carcinoma (BCC) of skin of neck  C44.41 lidocaine (LMX) 4 % cream     Wound cleansing and dressings Malignant  Left;Upper Back     Wound Procedure Treatment Malignant  Left;Upper Back              Assessment & Plan:  Malignant open wound left back 2/2 to basal cell carcinoma, palliative wound: Large open wound with friable granular and fibrogranular tissue present within wound bed.  Edges with epiboly and hyperpigmentation. No malodor, purulent drainage.  No surrounding induration, fluctuance, crepitus.  Continue current wound care [facility] [place] [facility]  Continue follow-up with consultants surgeon/heme-onc/plastics/cardiology  ER precautions reviewed, patient and granddaughter expressed understanding  Follow-up in 4 weeks or sooner if needed    Portions of the record may have been created with voice recognition software. Occasional wrong word or \"sound alike\" substitutions may have occurred due to the inherent limitations of voice recognition software. Read the chart carefully and recognize, using context, where substitutions have occurred.    Subjective:   Per chart review of initial visit to wound center on 8/19/2024:   \"Patient is a 89 year old male who presents to the  wound center as a new patient who presents for a malignant wound of his left upper back secondary to basal cell carcinoma. Patient reports his wound initially " "developed 3-4 years ago. He admits he let the area go and it became progressively larger. He then sought medical care approximately 3 months ago and was diagnosed with BCC. He was seen by Dr. Jha with Plastics and it was determined patient required excision with flap closure. However, patient is currently requiring medical clearance prior to undergoing surgery. Patient's granddaughter who is present with him today reports she has been managing his wound at home with mupirocin ointment and dry gauze changing his dressing daily which was recommended by Dermatology. She reports his wound drains a moderate-large amount of drainage. He denies any pain, fevers, or chills. \"    8/26/2024: Follow-up visit with [facility] please see visit documentation    9/16/2024: Mr. Ardon presents today along with his granddaughter for follow-up of malignant wound side of back 2/2 to basal cell carcinoma.  They report he has not received his wound care supplies.  Our office called wound care supply company and confirmed order, tracking number, and delivery.  However, both patient and granddaughter state he absolutely has not received his supplies. They have been using a telfa bandage for his wound dressings. Will reorder wound care supplies at visit today. They also report he does have appointment with surgical oncology on 10/4/2024 but they are concerned because they know he needs cardiac clearance and has to get a REINALDO. Since his last visit other than not receiving his supplies, he reports no new acute complaints.  He denies fever, chills.    9/30/2024: Follow-up visit with my colleague [facility].  Please see visit documentation    10/14/2024: Mr. Ardon presents today for follow-up of malignant wound of back secondary to basal cell carcinoma.  Granddaughter has accompanied him to today's visit.  He reports that REINALDO is scheduled for 10/21 and are waiting to schedule with Dr. Jha/plastics for scheduling of his " "surgery.  He states he has no new acute complaints today and denies fever, chills.    Follow-up visit with Dr. Cunningham  \"11/6/2024: Follow-up basal cell carcinoma of the left upper back.  Granddaughter accompanies him again today.  Awaiting meeting with cardiovascular surgeon and medical oncologist.  Cannot have anything done by plastics until cleared by cardiology.  Denies any pain fever or chills.\"    12/5/2024: Saud presents today for follow-up.  His granddaughter has again accompanied him to today's visit.  He reports that he had appointment with cardiothoracic surgeon who stated they are unable to do the TAVR at this time, he is still awaiting general cardiac clearance and is hopeful for future procedure for removal of his basal cell carcinoma.  He reports other than being frustrated by the process, he is doing very well without acute complaints.  Denies fever, chills.  Continues to do well with the [facility].        The following portions of the patient's history were reviewed and updated as appropriate:   Patient Active Problem List   Diagnosis    Anxiety    Aortic valve stenosis    CAD in native artery    Carotid artery disease (HCC)    Coronary atherosclerosis    Osteoarthritis    Primary hypertension    Hepatitis A    Hyperlipidemia    Hydrocele    Hypertriglyceridemia    Hyperplasia of prostate without lower urinary tract symptoms (LUTS)    Insomnia    Other specified glaucoma    Vitamin D deficiency    Type 2 diabetes mellitus with stage 3a chronic kidney disease, without long-term current use of insulin (HCC)    Onychomycosis of toenail    Overweight (BMI 25.0-29.9)    DNR (do not resuscitate)    Bunion    Moderate major depression (HCC)    Callus of foot    Stage 3a chronic kidney disease (HCC)    Lesion of neck    Lyme disease    Basal cell carcinoma (BCC) of skin of neck    Basal cell carcinoma (BCC) of right shoulder    Ischemic cardiomyopathy     Past Medical History:   Diagnosis Date    Anxiety "     CAD (coronary artery disease)     Cancer (HCC)     Capsular cataract     mature    Chronic ulcer of skin (HCC) 05/18/2015    DJD (degenerative joint disease)     Encounter for routine adult medical exam with abnormal findings 08/21/2019    Hepatitis A     Hydrocele     Hyperglycemia     Hyperlipidemia     Hypertension     Impaired fasting glucose 04/30/2014    Myocardial infarct (HCC) 2007    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HCC)     Potassium (K) excess 12/27/2019    Prostatic hyperplasia     Skin cancer      Past Surgical History:   Procedure Laterality Date    ANGIOPLASTY  2008     Family History   Problem Relation Age of Onset    Coronary artery disease Father       Social History     Socioeconomic History    Marital status:      Spouse name: Not on file    Number of children: Not on file    Years of education: Not on file    Highest education level: Not on file   Occupational History    Not on file   Tobacco Use    Smoking status: Never     Passive exposure: Never    Smokeless tobacco: Never   Vaping Use    Vaping status: Never Used   Substance and Sexual Activity    Alcohol use: No    Drug use: No    Sexual activity: Not Currently     Partners: Female   Other Topics Concern    Not on file   Social History Narrative    No risk for falls    Activity level: sedentary    No sleep changes    No animals    No firearms    Always uses a seatbelt     Social Drivers of Health     Financial Resource Strain: Medium Risk (9/22/2023)    Overall Financial Resource Strain (CARDIA)     Difficulty of Paying Living Expenses: Somewhat hard   Food Insecurity: No Food Insecurity (9/23/2024)    Nursing - Inadequate Food Risk Classification     Worried About Running Out of Food in the Last Year: Never true     Ran Out of Food in the Last Year: Never true     Ran Out of Food in the Last Year: Not on file   Transportation Needs: No Transportation Needs (9/23/2024)    PRAPARE - Transportation     Lack of Transportation (Medical): No     Lack  of Transportation (Non-Medical): No   Physical Activity: Insufficiently Active (9/21/2022)    Exercise Vital Sign     Days of Exercise per Week: 2 days     Minutes of Exercise per Session: 30 min   Stress: Stress Concern Present (9/21/2022)    [employer] [place] [employer] - Occupational Stress Questionnaire     Feeling of Stress : To some extent   Social Connections: Moderately Isolated (9/21/2022)    Social Connection and Isolation Panel [NHANES]     Frequency of Communication with Friends and Family: More than three times a week     Frequency of Social Gatherings with Friends and Family: More than three times a week     Attends L[employer] day Services: More than 4 times per year     Active Member of Clubs or Organizations: No     Attends Club or Organization Meetings: Never     Marital Status:    Intimate Partner Violence: Not At Risk (9/21/2022)    Humiliation, Afraid, Rape, and Kick questionnaire     Fear of Current or Ex-Partner: No     Emotionally Abused: No     Physically Abused: No     Sexually Abused: No   Housing Stability: Unknown (9/23/2024)    Housing Stability Vital Sign     Unable to Pay for Housing in the Last Year: Patient declined     Number of Times Moved in the Last Year: 0     Homeless in the Last Year: No        Current Outpatient Medications:     Aspirin 81 MG CAPS, Take 1 tablet by mouth in the morning, Disp: 90 capsule, Rfl: 1    dorzolamide-timolol (COSOPT) 22.3-6.8 MG/ML ophthalmic solution, Every 12 hours, Disp: , Rfl:     fenofibrate (TRICOR) 54 MG tablet, Take 1 tablet (54 mg total) by mouth daily, Disp: 30 tablet, Rfl: 0    latanoprost (XALATAN) 0.005 % ophthalmic solution, INSTILL 1 DROP AT BEDTIME INTO BOTH EYES, Disp: , Rfl: 3    metoprolol succinate (TOPROL-XL) 100 mg 24 hr tablet, Take 1 tablet (100 mg total) by mouth 2 (two) times a day, Disp: 180 tablet, Rfl: 1    Multiple Vitamins-Minerals (MULTIVITAL) tablet, every 24 hours, Disp: , Rfl:     Multiple  Vitamins-Minerals (PRESERVISION AREDS 2+MULTI VIT PO), take 2 tabs daily, Disp: , Rfl:     mupirocin (BACTROBAN) 2 % ointment, Apply topically daily During wound bandage changes, Disp: 100 g, Rfl: 0    mupirocin (BACTROBAN) 2 % ointment, Apply topically 3 (three) times a day, Disp: 100 g, Rfl: 3    nitroglycerin (NITROSTAT) 0.4 mg SL tablet, Take one under tongue for chest pain. Can repet in 5 minutes if necessary., Disp: 25 tablet, Rfl: 1    RHOPRESSA 0.02 % SOLN, INSTILL 1 DROP AT BEDTIME INTO BOTH EYES, Disp: , Rfl: 1    simvastatin (ZOCOR) 40 mg tablet, TAKE ONE TABLET BY MOUTH NIGHTLY AT BEDTIME, Disp: 90 tablet, Rfl: 0    Vismodegib 150 MG, Take 1 capsule (150 mg total) by mouth daily (Patient not taking: Reported on 11/14/2024), Disp: 30 capsule, Rfl: 5  No current facility-administered medications for this visit.    Review of Systems   Constitutional:  Negative for chills and fever.   Respiratory:  Negative for shortness of breath.    Cardiovascular:  Negative for chest pain and palpitations.   Skin:  Positive for wound (L upper back).   Psychiatric/Behavioral:  The patient is not nervous/anxious.        Objective:  /76   Pulse 56   Temp (!) 96.3 °F (35.7 °C) (Tympanic)   Resp 16   Pain Score: 0-No pain     Physical Exam  Vitals reviewed.   Constitutional:       General: He is not in acute distress.     Appearance: He is not ill-appearing, toxic-appearing or diaphoretic.      Comments: No acute distress, very pleasant.  Granddaughter at bedside   HENT:      Ears:      Comments: [place]  Cardiovascular:      Rate and Rhythm: Normal rate.   Pulmonary:      Effort: Pulmonary effort is normal. No respiratory distress.   Skin:     General: Skin is warm.      Findings: Wound present.             Comments: 1-  Large open wound with friable granular and fibrogranular tissue present within wound bed.  Edges with epiboly and hyperpigmentation. No malodor, purulent drainage.  No surrounding induration, fluctuance,  crepitus.   Neurological:      Mental Status: He is alert and oriented to person, place, and time.   Psychiatric:         Mood and Affect: Mood normal.         Behavior: Behavior normal.         Wound 08/19/24 Malignant  Back Left;Upper (Active)   Wound Image   12/05/24 0958   Wound Description Hypergranulation;Yellow;Pink 12/05/24 0958   Maria G-wound Assessment Intact;Pink 12/05/24 0958   Wound Length (cm) 7.7 cm 12/05/24 0958   Wound Width (cm) 9.5 cm 12/05/24 0958   Wound Depth (cm) 0.3 cm 12/05/24 0958   Wound Surface Area (cm^2) 73.15 cm^2 12/05/24 0958   Wound Volume (cm^3) 21.945 cm^3 12/05/24 0958   Calculated Wound Volume (cm^3) 21.95 cm^3 12/05/24 0958   Change in Wound Size % -70.82 12/05/24 0958   Drainage Amount Moderate 12/05/24 0958   Drainage Description Serosanguineous 12/05/24 0958   Non-staged Wound Description Full thickness 12/05/24 0958   Wound packed? No 11/06/24 0940   Dressing Status Removed 12/05/24 0958               Lab Results   Component Value Date    HGBA1C 6.3 (H) 07/30/2024       Wound Instructions:  Orders Placed This Encounter   Procedures    Wound cleansing and dressings Malignant  Left;Upper Back     Wash your hands with soap and water.  Remove old dressing, discard into plastic bag and place in trash.   Cleanse the wound with mild soap and water prior to applying a clean dressing.   Do not use tissue or cotton balls. Do not scrub the wound. Pat dry using gauze.     Shower yes, only on the days you change the dressing.         Left upper back wound:  Prior to dressing application please rinse wound with prophase.   Apply polymem Max AG to the wound.    Cover with ABD.  Secure with paper tape or medfix tape.   Change dressing three times a week.           Follow up at the wound center in four weeks.     Standing Status:   Future     Expiration Date:   12/12/2024    Wound Procedure Treatment Malignant  Left;Upper Back     This order was created via procedure documentation        Vanita Coburn MD

## 2024-12-05 NOTE — PATIENT INSTRUCTIONS
Orders Placed This Encounter   Procedures    Wound cleansing and dressings Malignant  Left;Upper Back     Wash your hands with soap and water.  Remove old dressing, discard into plastic bag and place in trash.   Cleanse the wound with mild soap and water prior to applying a clean dressing.   Do not use tissue or cotton balls. Do not scrub the wound. Pat dry using gauze.     Shower yes, only on the days you change the dressing.         Left upper back wound:  Prior to dressing application please rinse wound with prophase.   Apply polymem Max AG to the wound.    Cover with ABD.  Secure with paper tape or medfix tape.   Change dressing three times a week.           Follow up at the wound center in four weeks.     Standing Status:   Future     Expiration Date:   12/12/2024

## 2025-01-02 ENCOUNTER — OFFICE VISIT (OUTPATIENT)
Dept: WOUND CARE | Facility: CLINIC | Age: OVER 89
End: 2025-01-02
Payer: COMMERCIAL

## 2025-01-02 VITALS
TEMPERATURE: 97.3 F | SYSTOLIC BLOOD PRESSURE: 146 MMHG | RESPIRATION RATE: 18 BRPM | DIASTOLIC BLOOD PRESSURE: 74 MMHG | HEART RATE: 60 BPM

## 2025-01-02 DIAGNOSIS — E11.22 TYPE 2 DIABETES MELLITUS WITH STAGE 3A CHRONIC KIDNEY DISEASE, WITHOUT LONG-TERM CURRENT USE OF INSULIN (HCC): ICD-10-CM

## 2025-01-02 DIAGNOSIS — C44.41 BASAL CELL CARCINOMA (BCC) OF SKIN OF NECK: ICD-10-CM

## 2025-01-02 DIAGNOSIS — S21.202A OPEN WOUND OF LEFT SIDE OF BACK, INITIAL ENCOUNTER: Primary | ICD-10-CM

## 2025-01-02 DIAGNOSIS — N18.31 TYPE 2 DIABETES MELLITUS WITH STAGE 3A CHRONIC KIDNEY DISEASE, WITHOUT LONG-TERM CURRENT USE OF INSULIN (HCC): ICD-10-CM

## 2025-01-02 PROCEDURE — 97597 DBRDMT OPN WND 1ST 20 CM/<: CPT | Performed by: NURSE PRACTITIONER

## 2025-01-02 PROCEDURE — 97598 DBRDMT OPN WND ADDL 20CM/<: CPT | Performed by: NURSE PRACTITIONER

## 2025-01-02 RX ORDER — LIDOCAINE 40 MG/G
CREAM TOPICAL ONCE
Status: COMPLETED | OUTPATIENT
Start: 2025-01-02 | End: 2025-01-02

## 2025-01-02 RX ADMIN — LIDOCAINE: 40 CREAM TOPICAL at 10:45

## 2025-01-02 NOTE — PATIENT INSTRUCTIONS
Orders Placed This Encounter   Procedures    Wound cleansing and dressings Malignant  Left;Upper Back     Wound cleansing and dressings Malignant  Left;Upper Back   Wash your hands with soap and water.  Remove old dressing, discard into plastic bag and place in trash.   Cleanse the wound with mild soap (such as Dove) and water in shower and then with Prophase prior to applying a clean dressing.   Do not use tissue or cotton balls. Do not scrub the wound. Pat dry using gauze.     Shower yes, only on the days you change the dressing.      Left upper back wound:  Prior to dressing application please cleanse wound as above and rinse wound with Prophase.   Apply polymem Max AG to the wound.    Cover with ABD.  Secure with paper tape or medfix tape.   Change dressing three times a week.        Continue 3-4 servings protein in diet daily       Follow up at the wound center with [facility] in four weeks     Standing Status:   Future     Expiration Date:   1/9/2025

## 2025-01-02 NOTE — PROGRESS NOTES
Wound Procedure Treatment Malignant  Left;Upper Back    Performed by: Felicitas Ordaz RN  Authorized by: [facility] Colon    Associated wounds:   Wound 08/19/24 Malignant  Back Left;Upper  Wound cleansed with:  NSS and Wound cleanser  Applied primary dressing:  Polymem foam and Silver  Dressing secured with:  Tape

## 2025-01-02 NOTE — ASSESSMENT & PLAN NOTE
Orders:    Wound cleansing and dressings Malignant  Left;Upper Back; Future    lidocaine (LMX) 4 % cream    Wound Procedure Treatment Malignant  Left;Upper Back

## 2025-01-02 NOTE — PROGRESS NOTES
Name: Saud Ardon      : 1934      MRN: [id number]  Encounter Provider: [facility]  Encounter Date: 2025   Encounter department: [facility] WOUND CARE  :  Assessment & Plan  Open wound of left side of back, initial encounter    Orders:    Wound cleansing and dressings Malignant  Left;Upper Back; Future    lidocaine (LMX) 4 % cream    Wound Procedure Treatment Malignant  Left;Upper Back    Basal cell carcinoma (BCC) of skin of neck    Orders:    Wound cleansing and dressings Malignant  Left;Upper Back; Future    lidocaine (LMX) 4 % cream    Wound Procedure Treatment Malignant  Left;Upper Back    Type 2 diabetes mellitus with stage 3a chronic kidney disease, without long-term current use of insulin (HCC)    Lab Results   Component Value Date    HGBA1C 6.3 (H) 2024          Plan:  1.  F/U palliative visit.  Wound debrided.  Wound measuring slightly larger but appears stable.  Continue current plan of care.  2.  A1C results reviewed with the patient today.  Patient maintaining tight glycemic control  3.  Patient will follow-up in 4 weeks      History of Present Illness   Chief Complaint   Patient presents with    Follow Up Wound Care Visit     Left upper back wound. Arrived with dressing intact.   F/u palliative visit for malignant wound of left upper back secondary to basal cell carcinoma.  No new complaints.  He denies any pain, fevers, or chills.        Objective   /74   Pulse 60   Temp (!) 97.3 °F (36.3 °C)   Resp 18       Wound 24 Malignant  Back Left;Upper (Active)   Wound Image   25 0856   Wound Description Hypergranulation;Yellow;Pink;Rolled edges 25 09   Maria G-wound Assessment Intact;Pink 25 09   Wound Length (cm) 10.1 cm 25 09   Wound Width (cm) 10.2 cm 25 09   Wound Depth (cm) 0.3 cm 25 09   Wound Surface Area (cm^2) 103.02 cm^2 25 09   Wound Volume (cm^3) 30.906 cm^3 25 09  "  Calculated Wound Volume (cm^3) 30.91 cm^3 01/02/25 0906   Change in Wound Size % -140.54 01/02/25 0906   Drainage Amount Moderate 01/02/25 0906   Drainage Description Serosanguineous;Yellow 01/02/25 0906   Non-staged Wound Description Full thickness 01/02/25 0906   Wound packed? No 11/06/24 0940   Dressing Status Removed 12/05/24 0958       Debridement   Wound 08/19/24 Malignant  Back Left;Upper    Universal Protocol:  procedure performed by consultantConsent: Written consent obtained.  Consent given by: patient  Time out: Immediately prior to procedure a \"time out\" was called to verify the correct patient, procedure, equipment, support staff and site/side marked as required.  Timeout called at: 1/2/2025 10:44 AM.  Patient identity confirmed: verbally with patient    Debridement Details  Performed by: NP  Debridement type: selective  Pain control: lidocaine 4%      Post-debridement measurements  Length (cm): 10.1  Width (cm): 10.2  Depth (cm): 0.2  Percent debrided: 100%  Surface Area (cm^2): 103.02  Area Debrided (cm^2): 103.02  Volume (cm^3): 20.6    Devitalized tissue debrided: biofilm, fibrin and slough  Instrument(s) utilized: curette  Bleeding: small  Hemostasis obtained with: pressure  Procedural pain (0-10): 0  Post-procedural pain: 0   Response to treatment: procedure was tolerated well               "

## 2025-01-02 NOTE — ASSESSMENT & PLAN NOTE
Lab Results   Component Value Date    HGBA1C 6.3 (H) 07/30/2024          Plan:  1.  F/U palliative visit.  Wound debrided.  Wound measuring slightly larger but appears stable.  Continue current plan of care.  2.  A1C results reviewed with the patient today.  Patient maintaining tight glycemic control  3.  Patient will follow-up in 4 weeks

## 2025-01-04 ENCOUNTER — OFFICE VISIT (OUTPATIENT)
Dept: URGENT CARE | Facility: CLINIC | Age: OVER 89
End: 2025-01-04
Payer: COMMERCIAL

## 2025-01-04 VITALS
TEMPERATURE: 98.8 F | SYSTOLIC BLOOD PRESSURE: 122 MMHG | HEART RATE: 88 BPM | DIASTOLIC BLOOD PRESSURE: 64 MMHG | RESPIRATION RATE: 18 BRPM | OXYGEN SATURATION: 99 %

## 2025-01-04 DIAGNOSIS — Z20.822 CLOSE EXPOSURE TO COVID-19 VIRUS: Primary | ICD-10-CM

## 2025-01-04 LAB
SARS-COV-2 AG UPPER RESP QL IA: POSITIVE
VALID CONTROL: ABNORMAL

## 2025-01-04 PROCEDURE — 99213 OFFICE O/P EST LOW 20 MIN: CPT | Performed by: NURSE PRACTITIONER

## 2025-01-04 PROCEDURE — 87811 SARS-COV-2 COVID19 W/OPTIC: CPT | Performed by: NURSE PRACTITIONER

## 2025-01-04 PROCEDURE — S9083 URGENT CARE CENTER GLOBAL: HCPCS | Performed by: NURSE PRACTITIONER

## 2025-01-04 NOTE — PROGRESS NOTES
NAME: Saud Ardon is a 89 y.o. male  : 1934    MRN: [id number]    /64   Pulse 88   Temp 98.8 °F (37.1 °C) (Tympanic)   Resp 18   SpO2 99%     1:46 PM    Assessment and Plan   Close exposure to COVID-19 virus [Z20.822]  1. Close exposure to COVID-19 virus  Poct Covid 19 Rapid Antigen Test          Saud was seen today for fatigue.    Diagnoses and all orders for this visit:    Close exposure to COVID-19 virus  -     Poct Covid 19 Rapid Antigen Test        Patient Instructions   There are no Patient Instructions on file for this visit.    Proceed to the nearest ER if symptoms worsen, Follow up with your PCP  Continue to social distance, wash your hands, and wear your masks. Please continue to follow the CDC.gov guidelines daily for they are subject to change on COVID-19    Chief Complaint     Chief Complaint   Patient presents with    Fatigue     Patient with c/o fatigue, HA and being cold         History of Present Illness     89 yr old man here today with covid like symptoms, present with faitgue, HA and cold like symptoms, pts daughter positive for covid and pt didn't take covid test prior to arrival. Patient overall feels good but jsut fatigue, accompanied with his other daughter who was sick with URI for three weeks. Pt denies CP, SOB, n/v/d              Review of Systems   Review of Systems   Constitutional:  Positive for activity change, appetite change (decreased), fatigue and fever. Negative for chills.   HENT:  Positive for postnasal drip. Negative for congestion and sinus pain.    Respiratory:  Positive for cough.    Cardiovascular: Negative.    Gastrointestinal: Negative.    Skin: Negative.    Neurological:  Negative for headaches.         Current Medications       Current Outpatient Medications:     Aspirin 81 MG CAPS, Take 1 tablet by mouth in the morning, Disp: 90 capsule, Rfl: 1    dorzolamide-timolol (COSOPT) 22.3-6.8 MG/ML ophthalmic solution, Every 12 hours, Disp: , Rfl:      latanoprost (XALATAN) 0.005 % ophthalmic solution, INSTILL 1 DROP AT BEDTIME INTO BOTH EYES, Disp: , Rfl: 3    metoprolol succinate (TOPROL-XL) 100 mg 24 hr tablet, Take 1 tablet (100 mg total) by mouth 2 (two) times a day, Disp: 180 tablet, Rfl: 1    Multiple Vitamins-Minerals (MULTIVITAL) tablet, every 24 hours, Disp: , Rfl:     Multiple Vitamins-Minerals (PRESERVISION AREDS 2+MULTI VIT PO), take 2 tabs daily, Disp: , Rfl:     mupirocin (BACTROBAN) 2 % ointment, Apply topically daily During wound bandage changes, Disp: 100 g, Rfl: 0    mupirocin (BACTROBAN) 2 % ointment, Apply topically 3 (three) times a day, Disp: 100 g, Rfl: 3    nitroglycerin (NITROSTAT) 0.4 mg SL tablet, Take one under tongue for chest pain. Can repet in 5 minutes if necessary., Disp: 25 tablet, Rfl: 1    RHOPRESSA 0.02 % SOLN, INSTILL 1 DROP AT BEDTIME INTO BOTH EYES, Disp: , Rfl: 1    simvastatin (ZOCOR) 40 mg tablet, TAKE ONE TABLET BY MOUTH NIGHTLY AT BEDTIME, Disp: 90 tablet, Rfl: 0    fenofibrate (TRICOR) 54 MG tablet, Take 1 tablet (54 mg total) by mouth daily, Disp: 30 tablet, Rfl: 0    Vismodegib 150 MG, Take 1 capsule (150 mg total) by mouth daily (Patient not taking: Reported on 8/13/2024), Disp: 30 capsule, Rfl: 5    Current Allergies     Allergies as of 01/04/2025 - Reviewed 01/04/2025   Allergen Reaction Noted    Brimonidine Other (See Comments) 01/04/2025              Past Medical History:   Diagnosis Date    Anxiety     CAD (coronary artery disease)     Cancer (HCC)     Capsular cataract     mature    Chronic ulcer of skin (HCC) 05/18/2015    DJD (degenerative joint disease)     Encounter for routine adult medical exam with abnormal findings 08/21/2019    Hepatitis A     Hydrocele     Hyperglycemia     Hyperlipidemia     Hypertension     Impaired fasting glucose 04/30/2014    Myocardial infarct (HCC) 2007    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HCC)     Potassium (K) excess 12/27/2019    Prostatic hyperplasia     Skin cancer        Past Surgical  "History:   Procedure Laterality Date    ANGIOPLASTY  2008       Family History   Problem Relation Age of Onset    Coronary artery disease Father          Medications have been verified.    The following portions of the patient's history were reviewed and updated as appropriate: allergies, current medications, past family history, past medical history, past social history, past surgical history and problem list.    Objective   /64   Pulse 88   Temp 98.8 °F (37.1 °C) (Tympanic)   Resp 18   SpO2 99%      Physical Exam     Physical Exam  Constitutional:       General: He is not in acute distress.     Appearance: Normal appearance. He is normal weight. He is not ill-appearing.   HENT:      Head: Normocephalic.      Right Ear: Tympanic membrane, ear canal and external ear normal. There is no impacted cerumen.      Left Ear: Tympanic membrane, ear canal and external ear normal. There is no impacted cerumen.      Nose: Nose normal. No congestion or rhinorrhea.      Mouth/Throat:      Mouth: Mucous membranes are moist.      Pharynx: No oropharyngeal exudate or posterior oropharyngeal erythema.   Eyes:      Pupils: Pupils are equal, round, and reactive to light.   Cardiovascular:      Rate and Rhythm: Normal rate.      Pulses: Normal pulses.      Heart sounds: Murmur heard.   Pulmonary:      Effort: Pulmonary effort is normal.      Breath sounds: No stridor. No decreased breath sounds, wheezing or rhonchi.   Musculoskeletal:         General: Normal range of motion.      Cervical back: Normal range of motion.   Skin:     General: Skin is warm.      Capillary Refill: Capillary refill takes less than 2 seconds.   Neurological:      General: No focal deficit present.      Mental Status: He is alert.   Psychiatric:         Mood and Affect: Mood normal.               Note: Portions of this record may have been created with voice recognition software. Occasional wrong word or \"sound a like\" substitutions may have occurred " due to the inherent limitations of voice recognition software. Please read the chart carefully and recognize, using context, where substitutions have occurred.*    AMITA Vazquez

## 2025-01-09 NOTE — ASSESSMENT & PLAN NOTE
I discussed with the patient a DNR status and he did not make up his mind yet the the 5 wishes booklet has been given previously and he did not feel it yet 36.2

## 2025-01-13 DIAGNOSIS — E78.2 MIXED HYPERLIPIDEMIA: ICD-10-CM

## 2025-01-14 ENCOUNTER — TELEPHONE (OUTPATIENT)
Dept: ADMINISTRATIVE | Facility: OTHER | Age: OVER 89
End: 2025-01-14

## 2025-01-14 RX ORDER — FENOFIBRATE 54 MG/1
54 TABLET ORAL DAILY
Qty: 30 TABLET | Refills: 5 | Status: SHIPPED | OUTPATIENT
Start: 2025-01-14 | End: 2025-02-13

## 2025-01-14 NOTE — TELEPHONE ENCOUNTER
----- Message from Danielle FLEMING sent at 1/13/2025  1:48 PM EST -----  Regarding: Care Gap Request  01/13/25 1:48 PM    Hello, our patient Saud Ardon has had Diabetic Foot Exam completed/performed. Please assist in updating the patient chart by pulling the Care Everywhere (CE) document. The date of service is 4/30/24.     Thank you,  Danielle Kurtz  [facility]

## 2025-01-14 NOTE — TELEPHONE ENCOUNTER
Upon review of the In Basket request we have noted that dos 4/2024 exam from [facility] foot [facility] does not have mention of Monofilament testing,b/c of this does not meet  requirements last exam in  1-2024 is UTD.     because of this we are requesting that you forward this request/concern to the appropriate education email address. The Quality team members assigned to this email will be more than happy to assist you.    Any additional questions or concerns should be emailed to the Practice Liaisons via the appropriate education email address, please do not reply via In Basket.    Thank you  Ifeoma Mcgrath   [facility]

## 2025-01-21 ENCOUNTER — TRANSITIONAL CARE MANAGEMENT (OUTPATIENT)
Dept: FAMILY MEDICINE CLINIC | Facility: CLINIC | Age: OVER 89
End: 2025-01-21

## 2025-01-21 RX ORDER — QUETIAPINE FUMARATE 25 MG/1
12.5 TABLET, FILM COATED ORAL DAILY PRN
COMMUNITY
Start: 2025-01-20 | End: 2026-01-20

## 2025-01-21 RX ORDER — METOPROLOL TARTRATE 75 MG/1
75 TABLET ORAL
COMMUNITY
Start: 2025-01-20

## 2025-01-24 ENCOUNTER — OFFICE VISIT (OUTPATIENT)
Dept: FAMILY MEDICINE CLINIC | Facility: CLINIC | Age: OVER 89
End: 2025-01-24
Payer: COMMERCIAL

## 2025-01-24 VITALS
SYSTOLIC BLOOD PRESSURE: 100 MMHG | OXYGEN SATURATION: 98 % | BODY MASS INDEX: 24.84 KG/M2 | DIASTOLIC BLOOD PRESSURE: 52 MMHG | WEIGHT: 123 LBS | HEART RATE: 66 BPM | TEMPERATURE: 98.1 F

## 2025-01-24 DIAGNOSIS — C44.612 BASAL CELL CARCINOMA (BCC) OF RIGHT SHOULDER: ICD-10-CM

## 2025-01-24 DIAGNOSIS — I35.0 NONRHEUMATIC AORTIC VALVE STENOSIS: ICD-10-CM

## 2025-01-24 DIAGNOSIS — G93.49 ENCEPHALOPATHY DUE TO SEVERE ACUTE RESPIRATORY SYNDROME CORONAVIRUS 2 (SARS-COV-2): Primary | ICD-10-CM

## 2025-01-24 DIAGNOSIS — U07.1 ENCEPHALOPATHY DUE TO SEVERE ACUTE RESPIRATORY SYNDROME CORONAVIRUS 2 (SARS-COV-2): Primary | ICD-10-CM

## 2025-01-24 DIAGNOSIS — I10 PRIMARY HYPERTENSION: ICD-10-CM

## 2025-01-24 DIAGNOSIS — Z86.16 HISTORY OF COVID-19: ICD-10-CM

## 2025-01-24 DIAGNOSIS — K62.89 RECTAL MASS: ICD-10-CM

## 2025-01-24 PROCEDURE — 99495 TRANSJ CARE MGMT MOD F2F 14D: CPT | Performed by: FAMILY MEDICINE

## 2025-01-24 NOTE — PROGRESS NOTES
"Assessment/Plan:     1. Encephalopathy due to severe acute respiratory syndrome coronavirus 2 (SARS-CoV-2)  -resolved; mental status back to baseline; feeling much better; has good support at home    2. Rectal mass  -pt unsure he wants to pursue rectal mass eval/tx at this time; understands it may be cancer; has colorectal follow up; advised if decides to not pursue further evaluation and treatment has options for palliative care which he will consider in future as needed; currently asymptomatic  3. Primary hypertension  -stable  4. Nonrheumatic aortic valve stenosis  5. Basal cell carcinoma (BCC) of right shoulder  -as per surg/onc  6. History of COVID-19        Subjective:      Patient ID: Saud Ardon is a 89 y.o. male.    Patient is here with granddaughter. Doing well since discharge. Appears to be back to baseline mentally. Has good support. Unsure what he wants to do with rectal mass as he is asymptomatic and does not think he wants to treat. Thinking about needing radiation for his basal cell CA. Sx of covid appear to have resolved.               Hospital Course  \"This is a brief description of the patient's hospital stay; please refer to medical chart for further details.     Saud Ardon is a 89 y.o. male with past medical history significant for basal cell carcinoma with wounds, aortic stenosis, hypertension, hyperlipidemia who presented with complaint of altered mental status.    Saud was diagnosed with COVID 2 weeks prior to presentation. He initially presented to [facility] initially tachypneic initially thought to be due to panic attack which improved with Ativan. Elevated lactate at 3.7. Basic labs, UA negative.    CT chest abdomen pelvis showed rectal mass concerning for malignancy. CT head was normal. MRI brain without contrast had no acute abnormalities to explain his presentation. LP was attempted for concern for meningitis there but could not be completed due to " "agitation.    He was transferred to [facility] and evaluated by neurology. Suspecting possible aseptic meningitis at this point vs other paraneoplastic conditions vs toxic metabolic encephalopathy. CSF showed high glucose, protein. Negative for cytology or other findings. Neurology was not concerned for underlying CNS infection. MRI with contrast without any abnormalities. EEG was limited by artifact.     Patient became agitated throughout hospitalization, and required restraints, 1:1, adjustments in Seroquel and Zyprexa. Geriatrics was brought on board in order to help with management of delirium who adjusted Seroquel regimen.    For incidental rectal mass, colorectal surgery recommended outpatient colonoscopy.    He was discharged home with home health with family supervision 24/7. \"            The following portions of the patient's history were reviewed and updated as appropriate: allergies, current medications, past family history, past medical history, past social history, past surgical history, and problem list.    Review of Systems      Objective:      /52 (BP Location: Left arm, Patient Position: Sitting, Cuff Size: Standard)   Pulse 66   Temp 98.1 °F (36.7 °C) (Temporal)   Wt 55.8 kg (123 lb)   SpO2 98%   BMI 24.84 kg/m²          Physical Exam  Vitals reviewed.   Constitutional:       General: He is not in acute distress.     Appearance: Normal appearance. He is not ill-appearing, toxic-appearing or diaphoretic.   HENT:      Head: Normocephalic and atraumatic.   Eyes:      General: No scleral icterus.        Right eye: No discharge.         Left eye: No discharge.      Conjunctiva/sclera: Conjunctivae normal.   Cardiovascular:      Rate and Rhythm: Normal rate and regular rhythm.      Pulses: Normal pulses.      Heart sounds: Normal heart sounds. No murmur heard.     No gallop.   Pulmonary:      Effort: Pulmonary effort is normal. No respiratory distress.      Breath sounds: Normal breath " sounds. No stridor. No wheezing, rhonchi or rales.   Musculoskeletal:      Right lower leg: No edema.      Left lower leg: No edema.   Neurological:      General: No focal deficit present.      Mental Status: He is alert and oriented to person, place, and time.   Psychiatric:         Mood and Affect: Mood normal.         Behavior: Behavior normal.         Thought Content: Thought content normal.         Judgment: Judgment normal.

## 2025-01-29 ENCOUNTER — TELEPHONE (OUTPATIENT)
Dept: SURGICAL ONCOLOGY | Facility: CLINIC | Age: OVER 89
End: 2025-01-29

## 2025-01-29 NOTE — TELEPHONE ENCOUNTER
Called patient and spoke to caregiver Leola Bhagat stated Saud is not going to go through with surgery and does not want to reschedule cancelled appointment with Dr. Tubbs.

## 2025-01-30 ENCOUNTER — OFFICE VISIT (OUTPATIENT)
Dept: WOUND CARE | Facility: CLINIC | Age: OVER 89
End: 2025-01-30
Payer: COMMERCIAL

## 2025-01-30 VITALS
HEART RATE: 60 BPM | SYSTOLIC BLOOD PRESSURE: 140 MMHG | DIASTOLIC BLOOD PRESSURE: 58 MMHG | RESPIRATION RATE: 16 BRPM | TEMPERATURE: 95.1 F

## 2025-01-30 DIAGNOSIS — C44.41 BASAL CELL CARCINOMA (BCC) OF SKIN OF NECK: ICD-10-CM

## 2025-01-30 DIAGNOSIS — E11.22 TYPE 2 DIABETES MELLITUS WITH STAGE 3A CHRONIC KIDNEY DISEASE, WITHOUT LONG-TERM CURRENT USE OF INSULIN (HCC): ICD-10-CM

## 2025-01-30 DIAGNOSIS — S21.202A OPEN WOUND OF LEFT SIDE OF BACK, INITIAL ENCOUNTER: Primary | ICD-10-CM

## 2025-01-30 DIAGNOSIS — N18.31 TYPE 2 DIABETES MELLITUS WITH STAGE 3A CHRONIC KIDNEY DISEASE, WITHOUT LONG-TERM CURRENT USE OF INSULIN (HCC): ICD-10-CM

## 2025-01-30 PROCEDURE — 99212 OFFICE O/P EST SF 10 MIN: CPT | Performed by: NURSE PRACTITIONER

## 2025-01-30 PROCEDURE — 99214 OFFICE O/P EST MOD 30 MIN: CPT | Performed by: NURSE PRACTITIONER

## 2025-01-30 NOTE — PROGRESS NOTES
Name: Saud Ardon      : 1934      MRN: [id number]  Encounter Provider: AMITA Colon  Encounter Date: 2025   Encounter department: [facility]  :  Assessment & Plan  Open wound of left side of back, initial encounter    Orders:    Wound cleansing and dressings Malignant  Left;Upper Back; Future    Wound Procedure Treatment Malignant  Left;Upper Back    Basal cell carcinoma (BCC) of skin of neck    Orders:    Wound cleansing and dressings Malignant  Left;Upper Back; Future    Type 2 diabetes mellitus with stage 3a chronic kidney disease, without long-term current use of insulin (HCC)    Lab Results   Component Value Date    HGBA1C 6.4 (H) 2025          Plan:  1.  F/U palliative visit.  Wound cleansed normal saline and gauze.  Wound measuring the same and is stable.  Continue current plan of care.   2.  A1C results reviewed with the patient today.  Patient maintaining tight glycemic control  3.  Patient will follow-up in 4 weeks      History of Present Illness   Chief Complaint   Patient presents with    Follow Up Wound Care Visit     Left upper back wound   F/U palliative visit for malignant wound of left upper back.  No new complaints.  Patient's granddaughter who is present with patient today reports patient was recently hospitalized for encephalopathy secondary to COVID.  As a result, it was determined that patient is not a surgical candidate due to his recent encephalopathy.  He will be unable to undergo any kind of excision and flap coverage to heal his wound.  He denies any pain, fevers, or chills.      Objective   /58   Pulse 60   Temp (!) 95.1 °F (35.1 °C)   Resp 16     Physical Exam  Vitals and nursing note reviewed.   Constitutional:       General: He is not in acute distress.     Appearance: Normal appearance. He is normal weight.   HENT:      Head: Normocephalic and atraumatic.   Eyes:      General:         Right eye: No  discharge.         Left eye: No discharge.   Pulmonary:      Effort: Pulmonary effort is normal. No respiratory distress.   Musculoskeletal:         General: Normal range of motion.      Cervical back: Normal range of motion and neck supple. No rigidity.      Right lower leg: No edema.      Left lower leg: No edema.   Skin:     General: Skin is warm and dry.      Findings: Wound present. No erythema.          Neurological:      General: No focal deficit present.      Mental Status: He is alert and oriented to person, place, and time. Mental status is at baseline.   Psychiatric:         Mood and Affect: Mood normal.         Behavior: Behavior normal.         Thought Content: Thought content normal.         Judgment: Judgment normal.       Wound 08/19/24 Malignant  Back Left;Upper (Active)   Wound Image   01/30/25 0839   Wound Description Pink;Yellow;Rolled edges;Bleeding 01/30/25 0842   Maria G-wound Assessment Intact;Purple 01/30/25 0842   Wound Length (cm) 9 cm 01/30/25 0842   Wound Width (cm) 9.6 cm 01/30/25 0842   Wound Depth (cm) 0.3 cm 01/30/25 0842   Wound Surface Area (cm^2) 86.4 cm^2 01/30/25 0842   Wound Volume (cm^3) 25.92 cm^3 01/30/25 0842   Calculated Wound Volume (cm^3) 25.92 cm^3 01/30/25 0842   Change in Wound Size % -101.71 01/30/25 0842   Drainage Amount Moderate 01/30/25 0842   Drainage Description Serosanguineous 01/30/25 0842   Non-staged Wound Description Full thickness 01/30/25 0842   Wound packed? No 01/30/25 0842   Dressing Status Removed 12/05/24 0958

## 2025-01-30 NOTE — PATIENT INSTRUCTIONS
Orders Placed This Encounter   Procedures    Wound cleansing and dressings Malignant  Left;Upper Back     Wound cleansing and dressings Malignant  Left;Upper Back             Wash your hands with soap and water.  Remove old dressing, discard into plastic bag and place in trash.   Cleanse the wound with mild soap (such as Dove) and water in shower and then with Prophase prior to applying a clean dressing.   Do not use tissue or cotton balls. Do not scrub the wound. Pat dry using gauze.     Shower yes, only on the days you change the dressing.        Left upper back wound:  Prior to dressing application please cleanse wound as above and rinse wound with Prophase.   Apply polymem Max AG to the wound.    Cover with ABD.  Secure with paper tape or medfix tape.   Change dressing three times a week.          Continue 3-4 servings protein in diet daily        Follow up at the wound center with [facility]ITA [facility] in four weeks.     Standing Status:   Future     Expiration Date:   2/6/2025

## 2025-01-30 NOTE — PROGRESS NOTES
Wound Procedure Treatment Malignant  Left;Upper Back    Performed by: Enoch Diaz RN  Authorized by: AMITA Colon    Associated wounds:   Wound 08/19/24 Malignant  Back Left;Upper  Wound cleansed with:  Wound cleanser  Applied primary dressing:  Polymem foam and Silver  Applied secondary dressing:  ABD  Dressing secured with:  Tape  Comments:  [place], polymem Max AG

## 2025-01-31 NOTE — ASSESSMENT & PLAN NOTE
Lab Results   Component Value Date    HGBA1C 6.4 (H) 01/14/2025          Plan:  1.  F/U palliative visit.  Wound cleansed normal saline and gauze.  Wound measuring the same and is stable.  Continue current plan of care.   2.  A1C results reviewed with the patient today.  Patient maintaining tight glycemic control  3.  Patient will follow-up in 4 weeks

## 2025-02-03 ENCOUNTER — TELEPHONE (OUTPATIENT)
Dept: FAMILY MEDICINE CLINIC | Facility: CLINIC | Age: OVER 89
End: 2025-02-03

## 2025-02-03 NOTE — TELEPHONE ENCOUNTER
Dr. Kimberli Quiroz from [facility] called in regards to the pt's lab results and follow up with his PCP's office. When asked if she would like to speak to someone from clinical. She declined and stated that she would speak to the pt's granddaughter regarding follow up. She did not wish to leave a call back number.

## 2025-02-27 ENCOUNTER — OFFICE VISIT (OUTPATIENT)
Dept: WOUND CARE | Facility: CLINIC | Age: OVER 89
End: 2025-02-27
Payer: COMMERCIAL

## 2025-02-27 VITALS
HEART RATE: 80 BPM | RESPIRATION RATE: 18 BRPM | SYSTOLIC BLOOD PRESSURE: 144 MMHG | DIASTOLIC BLOOD PRESSURE: 68 MMHG | TEMPERATURE: 97 F

## 2025-02-27 DIAGNOSIS — S21.202A OPEN WOUND OF LEFT SIDE OF BACK, INITIAL ENCOUNTER: Primary | ICD-10-CM

## 2025-02-27 DIAGNOSIS — C44.41 BASAL CELL CARCINOMA (BCC) OF SKIN OF NECK: ICD-10-CM

## 2025-02-27 DIAGNOSIS — E11.22 TYPE 2 DIABETES MELLITUS WITH STAGE 3A CHRONIC KIDNEY DISEASE, WITHOUT LONG-TERM CURRENT USE OF INSULIN (HCC): ICD-10-CM

## 2025-02-27 DIAGNOSIS — N18.31 TYPE 2 DIABETES MELLITUS WITH STAGE 3A CHRONIC KIDNEY DISEASE, WITHOUT LONG-TERM CURRENT USE OF INSULIN (HCC): ICD-10-CM

## 2025-02-27 PROCEDURE — 97597 DBRDMT OPN WND 1ST 20 CM/<: CPT | Performed by: NURSE PRACTITIONER

## 2025-02-27 NOTE — PATIENT INSTRUCTIONS
Orders Placed This Encounter   Procedures    Wound cleansing and dressings Malignant  Left;Upper Back     Wound cleansing and dressings Malignant  Left;Upper Back             Wash your hands with soap and water.  Remove old dressing, discard into plastic bag and place in trash.   Cleanse the wound with mild soap (such as Dove) and water in shower and then with Prophase prior to applying a clean dressing.   Do not use tissue or cotton balls. Do not scrub the wound. Pat dry using gauze.     Shower yes, only on the days you change the dressing.         Left upper back wound:  Prior to dressing application please cleanse wound as above and rinse wound with Prophase.   Apply polymem Max AG to the wound.    Cover with ABD.  Secure with paper tape or medfix tape.   Change dressing three times a week.        Continue 3-4 servings protein in diet daily        Follow up at the wound center with [facility] in four weeks.     Standing Status:   Future     Expiration Date:   3/6/2025

## 2025-02-27 NOTE — PROGRESS NOTES
Name: Saud Ardon      : 1934      MRN: [id number]  Encounter Provider: AMITA Colon  Encounter Date: 2025   Encounter department: [facility]  :  Assessment & Plan  Open wound of left side of back, initial encounter    Orders:    Wound cleansing and dressings Malignant  Left;Upper Back; Future    Wound Procedure Treatment Malignant  Left;Upper Back    Basal cell carcinoma (BCC) of skin of neck    Orders:    Wound cleansing and dressings Malignant  Left;Upper Back; Future    Wound Procedure Treatment Malignant  Left;Upper Back    Type 2 diabetes mellitus with stage 3a chronic kidney disease, without long-term current use of insulin (HCC)    Lab Results   Component Value Date    HGBA1C 6.4 (H) 2025            Plan:  1.  F/U palliative visit.  Wound debrided.  Wound measuring the same and is stable.  Continue current plan of care  2.  A1C results reviewed with the patient today.  Patient maintaining tight glycemic control  3.  Patient will follow-up in 4 weeks    History of Present Illness   Chief Complaint   Patient presents with    Follow Up Wound Care Visit   Here for wound follow up.  F/u palliative visit for malignant wound of left upper back.  No new complaints.  Patient's granddaughter who is present with patient today reports patient may be reconsidered for surgery due to clearance from neurology.  She reports they are still waiting for clearance from cardiology.  They have been following wound care orders as recommended.  He denies any pain, fevers, or chills.      Objective   /68   Pulse 80   Temp (!) 97 °F (36.1 °C)   Resp 18       Wound 24 Malignant  Back Left;Upper (Active)   Wound Image   25 0835   Wound Description Pink;Yellow;Rolled edges;Bleeding 25 0836   Maria G-wound Assessment Intact;Purple 25 0836   Wound Length (cm) 9.9 cm 25 0836   Wound Width (cm) 9 cm 25 0836   Wound Depth (cm) 0.3 cm  "02/27/25 0836   Wound Surface Area (cm^2) 89.1 cm^2 02/27/25 0836   Wound Volume (cm^3) 26.73 cm^3 02/27/25 0836   Calculated Wound Volume (cm^3) 26.73 cm^3 02/27/25 0836   Change in Wound Size % -108.02 02/27/25 0836   Drainage Amount Moderate 02/27/25 0836   Drainage Description Serosanguineous 02/27/25 0836   Non-staged Wound Description Full thickness 02/27/25 0836   Wound packed? No 01/30/25 0842   Dressing Status Removed 12/05/24 0958       Debridement   Wound 08/19/24 Malignant  Back Left;Upper    Universal Protocol:  procedure performed by consultantConsent: Written consent obtained.  Consent given by: patient  Time out: Immediately prior to procedure a \"time out\" was called to verify the correct patient, procedure, equipment, support staff and site/side marked as required.  Timeout called at: 2/27/2025 10:28 AM.  Patient identity confirmed: verbally with patient    Debridement Details  Performed by: NP  Debridement type: selective  Pain control: lidocaine 4%      Post-debridement measurements  Length (cm): 9.9  Width (cm): 9  Depth (cm): 0.3  Percent debrided: 15%  Surface Area (cm^2): 89.1  Area Debrided (cm^2): 13.36  Volume (cm^3): 26.73    Devitalized tissue debrided: biofilm, fibrin and slough  Instrument(s) utilized: curette  Bleeding: small  Hemostasis obtained with: pressure  Procedural pain (0-10): 0  Post-procedural pain: 0   Response to treatment: procedure was tolerated well               "

## 2025-02-27 NOTE — PROGRESS NOTES
Wound Procedure Treatment Malignant  Left;Upper Back    Performed by: Radha Blue RN  Authorized by: AMITA Colon  Associated wounds:   Wound 08/19/24 Malignant  Back Left;Upper    Wound cleansed with:  NSS   Applied primary dressing:  Silver and Polymem foam   Applied secondary dressing:  ABD   Dressing secured with:  Tape

## 2025-03-05 ENCOUNTER — OFFICE VISIT (OUTPATIENT)
Dept: PODIATRY | Facility: CLINIC | Age: OVER 89
End: 2025-03-05
Payer: COMMERCIAL

## 2025-03-05 VITALS — WEIGHT: 116 LBS | BODY MASS INDEX: 23.43 KG/M2

## 2025-03-05 DIAGNOSIS — B35.1 ONYCHOMYCOSIS: Primary | ICD-10-CM

## 2025-03-05 DIAGNOSIS — I73.9 PERIPHERAL VASCULAR DISEASE, UNSPECIFIED (HCC): ICD-10-CM

## 2025-03-05 PROCEDURE — 99202 OFFICE O/P NEW SF 15 MIN: CPT | Performed by: PODIATRIST

## 2025-03-05 PROCEDURE — 11721 DEBRIDE NAIL 6 OR MORE: CPT | Performed by: PODIATRIST

## 2025-03-05 NOTE — PROGRESS NOTES
Name: Saud Ardon      : 1934      MRN: [id number]  Encounter Provider: Usama Mclean DPM  Encounter Date: 3/5/2025   Encounter department: [facility]OD[facility]    Explained to patient that he is dealing with a fungal infection of all toenails.  He is not a candidate for oral Lamisil due to health and age.  Periodic debridement recommended and performed this date.  Each toenail was debrided reducing nails in thickness and removing devitalized tissue and debris.  Reappoint 3 months.  :  Assessment & Plan  Onychomycosis         Peripheral vascular disease, unspecified (HCC)             History of Present Illness   HPI  Saud Ardon is a 89 y.o. male who presents for toenail care.  Each nail is thick and yellow with subungual debris.  Patient states that he has vision issues and cannot safely cut these nails.  Patient denies being diabetic.      Review of Systems   Eyes:  Positive for visual disturbance.   Cardiovascular:         Coronary artery disease   Genitourinary:         Stage III renal disease              Objective   Wt 52.6 kg (116 lb)   BMI 23.43 kg/m²      Physical Exam  Constitutional:       Appearance: Normal appearance.   Cardiovascular:      Comments: No palpable pedal pulses bilateral  Musculoskeletal:         General: Normal range of motion.   Skin:     Comments: All toenails are thick and yellow with subungual debris.

## 2025-03-26 ENCOUNTER — OFFICE VISIT (OUTPATIENT)
Dept: FAMILY MEDICINE CLINIC | Facility: CLINIC | Age: OVER 89
End: 2025-03-26
Payer: COMMERCIAL

## 2025-03-26 VITALS
BODY MASS INDEX: 23.6 KG/M2 | HEIGHT: 60 IN | WEIGHT: 120.2 LBS | HEART RATE: 94 BPM | TEMPERATURE: 97.5 F | SYSTOLIC BLOOD PRESSURE: 120 MMHG | OXYGEN SATURATION: 98 % | DIASTOLIC BLOOD PRESSURE: 60 MMHG

## 2025-03-26 DIAGNOSIS — Z13.0 SCREENING FOR DEFICIENCY ANEMIA: ICD-10-CM

## 2025-03-26 DIAGNOSIS — E78.1 HYPERTRIGLYCERIDEMIA: ICD-10-CM

## 2025-03-26 DIAGNOSIS — E11.22 TYPE 2 DIABETES MELLITUS WITH STAGE 3A CHRONIC KIDNEY DISEASE, WITHOUT LONG-TERM CURRENT USE OF INSULIN (HCC): Primary | ICD-10-CM

## 2025-03-26 DIAGNOSIS — E78.2 MIXED HYPERLIPIDEMIA: ICD-10-CM

## 2025-03-26 DIAGNOSIS — N18.31 TYPE 2 DIABETES MELLITUS WITH STAGE 3A CHRONIC KIDNEY DISEASE, WITHOUT LONG-TERM CURRENT USE OF INSULIN (HCC): Primary | ICD-10-CM

## 2025-03-26 DIAGNOSIS — F32.1 MODERATE MAJOR DEPRESSION (HCC): ICD-10-CM

## 2025-03-26 DIAGNOSIS — Z13.29 SCREENING FOR THYROID DISORDER: ICD-10-CM

## 2025-03-26 DIAGNOSIS — C44.41 BASAL CELL CARCINOMA (BCC) OF SKIN OF NECK: ICD-10-CM

## 2025-03-26 DIAGNOSIS — I10 PRIMARY HYPERTENSION: ICD-10-CM

## 2025-03-26 PROCEDURE — 99214 OFFICE O/P EST MOD 30 MIN: CPT

## 2025-03-26 PROCEDURE — G2211 COMPLEX E/M VISIT ADD ON: HCPCS

## 2025-03-26 NOTE — ASSESSMENT & PLAN NOTE
We will recheck lipid panel, patient currently taking fenofibrate 54 mg and simvastatin 40 mg for cholesterol control.  Orders:  •  Lipid Panel with Direct LDL reflex; Future

## 2025-03-26 NOTE — ASSESSMENT & PLAN NOTE
Lab Results   Component Value Date    HGBA1C 6.4 (H) 01/14/2025   Patient with history of prediabetes, A1c has not been in diabetic range for several years.  He is not on any diabetic medications.  Will recheck A1c and metabolic panel, if it returns in normal range, discussed with patient we may be able to remove this diagnosis from his list.  Continue healthy diet and exercise.    Plan for 4-month follow-up for continued chronic care management, sooner if needed.  Orders:  •  Comprehensive metabolic panel; Future  •  Hemoglobin A1C; Future

## 2025-03-26 NOTE — ASSESSMENT & PLAN NOTE
History of basal cell carcinoma, currently being followed by wound care and dermatology.  Family would like to hold off on surgery and will be continuing regular wound care appointments and wound care at home.

## 2025-03-26 NOTE — ASSESSMENT & PLAN NOTE
History of.  Not currently on daily antianxiety or depression medications.  Feels mental health is stable.  Has Seroquel to use as needed for hallucinations, this has not occurred in several months and originally developed secondary to encephalopathy which was found at hospital stay in January.

## 2025-03-26 NOTE — PROGRESS NOTES
Name: Sadu Ardon      : 1934      MRN: [id number]  Encounter Provider: Ca Vargas PA-C  Encounter Date: 3/26/2025   Encounter department: [facility]  :  Assessment & Plan  Type 2 diabetes mellitus with stage 3a chronic kidney disease, without long-term current use of insulin (HCC)    Lab Results   Component Value Date    HGBA1C 6.4 (H) 2025   Patient with history of prediabetes, A1c has not been in diabetic range for several years.  He is not on any diabetic medications.  Will recheck A1c and metabolic panel, if it returns in normal range, discussed with patient we may be able to remove this diagnosis from his list.  Continue healthy diet and exercise.    Plan for 4-month follow-up for continued chronic care management, sooner if needed.  Orders:  •  Comprehensive metabolic panel; Future  •  Hemoglobin A1C; Future    Mixed hyperlipidemia  We will recheck lipid panel, patient currently taking fenofibrate 54 mg and simvastatin 40 mg for cholesterol control.  Orders:  •  Lipid Panel with Direct LDL reflex; Future    Hypertriglyceridemia    Orders:  •  Lipid Panel with Direct LDL reflex; Future    Screening for deficiency anemia    Orders:  •  CBC and differential; Future    Screening for thyroid disorder    Orders:  •  TSH, 3rd generation with Free T4 reflex; Future    Moderate major depression (HCC)  History of.  Not currently on daily antianxiety or depression medications.  Feels mental health is stable.  Has Seroquel to use as needed for hallucinations, this has not occurred in several months and originally developed secondary to encephalopathy which was found at hospital stay in January.         Basal cell carcinoma (BCC) of skin of neck  History of basal cell carcinoma, currently being followed by wound care and dermatology.  Family would like to hold off on surgery and will be continuing regular wound care appointments and wound care at home.       Primary  "hypertension  Blood pressure well-controlled with metoprolol 75 mg, continue current therapy.              History of Present Illness   Patient seen today to establish care with a new provider, patient previously was being seen by Dr. Edwards.  Patient has no concerns today.  He is accompanied by his granddaughter.  His granddaughter primarily cares for him.  He sees a cardiology specialist, wound care, dermatology and neurology.  They see wound care monthly for regular wound checkups on basal cell carcinoma of his neck.  They have decided to hold off on Mohs procedure as family feels it may expose him to too much risk.  They perform regular wound care on him at home which wound care is happy with.      Review of Systems   Constitutional:  Negative for chills, fatigue and fever.   Respiratory:  Negative for cough, chest tightness and shortness of breath.    Cardiovascular:  Negative for chest pain, palpitations and leg swelling.   Neurological:  Negative for dizziness, light-headedness and headaches.       Objective   /60 (BP Location: Left arm, Patient Position: Sitting, Cuff Size: Adult)   Pulse 94   Temp 97.5 °F (36.4 °C) (Temporal)   Ht 4' 11\" (1.499 m)   Wt 54.5 kg (120 lb 3.2 oz)   SpO2 98%   BMI 24.28 kg/m²      Physical Exam  Vitals and nursing note reviewed.   Constitutional:       General: He is not in acute distress.     Appearance: Normal appearance. He is normal weight. He is not ill-appearing.   HENT:      Head: Normocephalic and atraumatic.   Cardiovascular:      Rate and Rhythm: Normal rate and regular rhythm.      Pulses: Normal pulses.      Heart sounds: No murmur heard.  Pulmonary:      Effort: Pulmonary effort is normal. No respiratory distress.      Breath sounds: Normal breath sounds.   Neurological:      General: No focal deficit present.      Mental Status: He is alert and oriented to person, place, and time. Mental status is at baseline.   Psychiatric:         Mood and Affect: " Mood normal.         Behavior: Behavior normal.         Judgment: Judgment normal.

## 2025-03-27 ENCOUNTER — OFFICE VISIT (OUTPATIENT)
Dept: WOUND CARE | Facility: CLINIC | Age: OVER 89
End: 2025-03-27
Payer: COMMERCIAL

## 2025-03-27 VITALS
TEMPERATURE: 96.2 F | HEART RATE: 84 BPM | RESPIRATION RATE: 16 BRPM | SYSTOLIC BLOOD PRESSURE: 146 MMHG | DIASTOLIC BLOOD PRESSURE: 80 MMHG

## 2025-03-27 DIAGNOSIS — S21.202A OPEN WOUND OF LEFT SIDE OF BACK, INITIAL ENCOUNTER: Primary | ICD-10-CM

## 2025-03-27 DIAGNOSIS — C44.41 BASAL CELL CARCINOMA (BCC) OF SKIN OF NECK: ICD-10-CM

## 2025-03-27 PROCEDURE — 99214 OFFICE O/P EST MOD 30 MIN: CPT | Performed by: NURSE PRACTITIONER

## 2025-03-27 PROCEDURE — 99213 OFFICE O/P EST LOW 20 MIN: CPT | Performed by: NURSE PRACTITIONER

## 2025-03-27 NOTE — PROGRESS NOTES
Wound Procedure Treatment Malignant  Left;Upper Back    Performed by: Silva Gao RN  Authorized by: [facility]  Associated wounds:   Wound 08/19/24 Malignant  Back Left;Upper    Wound cleansed with:  Wound aggrssively cleansed with NSS and gauze   Applied primary dressing:  Silver and Polymem foam   Applied secondary dressing:  ABD   Dressing secured with:  Tape

## 2025-03-27 NOTE — PATIENT INSTRUCTIONS
Orders Placed This Encounter   Procedures    Wound cleansing and dressings Malignant  Left;Upper Back     Wound cleansing and dressings Malignant  Left;Upper Back             Wash your hands with soap and water.  Remove old dressing, discard into plastic bag and place in trash.   Cleanse the wound with mild soap (such as Dove) and water in shower and then with Prophase prior to applying a clean dressing.   Do not use tissue or cotton balls. Do not scrub the wound. Pat dry using gauze.     Shower yes, only on the days you change the dressing.         Left upper back wound:  Prior to dressing application please cleanse wound as above and rinse wound with Prophase.   Apply polymem Max AG to the wound.    Cover with ABD.  Secure with paper tape or medfix tape.   Change dressing three times a week.        Continue 3-4 servings protein in diet daily        Follow up at the wound center with [facility] in four weeks.     Standing Status:   Future     Expiration Date:   4/3/2025

## 2025-03-27 NOTE — PROGRESS NOTES
Name: Saud Ardon      : 1934      MRN: [id number]  Encounter Provider: AMITA Colon  Encounter Date: 3/27/2025   Encounter department: [facility]  :  Assessment & Plan  Open wound of left side of back, initial encounter    Orders:    Wound cleansing and dressings Malignant  Left;Upper Back; Future    Wound Procedure Treatment Malignant  Left;Upper Back    Basal cell carcinoma (BCC) of skin of neck    Orders:    Wound cleansing and dressings Malignant  Left;Upper Back; Future    Wound Procedure Treatment Malignant  Left;Upper Back    Plan:  1.  F/U palliative visit.  Wound cleansed normal saline and gauze.  Wound measuring relatively the same and has a clean granular wound base.  Continue current plan of care.  2.  Patient will follow-up in 4 weeks    History of Present Illness   Chief Complaint   Patient presents with    Follow Up Wound Care Visit     Left upper back wound   Here for wound follow up.  F/u palliative visit for malignant wound of left upper back.  No new complaints.  Patient's granddaughter who is present with patient today reports they have ultimately decided they do not want to pursue any surgical intervention for wound closure.  They want to continue with palliative wound management for his wound.  They have been following wound care orders as recommended.  He denies any pain, fevers, or chills.      Objective   /80   Pulse 84   Temp (!) 96.2 °F (35.7 °C)   Resp 16     Physical Exam  Vitals and nursing note reviewed.   Constitutional:       General: He is not in acute distress.     Appearance: Normal appearance. He is normal weight.   HENT:      Head: Normocephalic and atraumatic.   Eyes:      General:         Right eye: No discharge.         Left eye: No discharge.   Pulmonary:      Effort: Pulmonary effort is normal. No respiratory distress.   Musculoskeletal:         General: Normal range of motion.      Cervical back: Normal  range of motion and neck supple. No rigidity.      Right lower leg: No edema.      Left lower leg: No edema.   Skin:     General: Skin is warm and dry.      Findings: Wound present. No erythema.          Neurological:      General: No focal deficit present.      Mental Status: He is alert and oriented to person, place, and time. Mental status is at baseline.   Psychiatric:         Mood and Affect: Mood normal.         Behavior: Behavior normal.         Thought Content: Thought content normal.         Judgment: Judgment normal.       Wound 08/19/24 Malignant  Back Left;Upper (Active)   Wound Image   03/27/25 0912   Wound Description Beefy red;Bleeding;Epithelialization;Fragile;Granulation tissue;Rolled edges;Slough;Yellow 03/27/25 0912   Non-staged Wound Description Full thickness 03/27/25 0912   Wound Length (cm) 10.5 cm 03/27/25 0912   Wound Width (cm) 9.4 cm 03/27/25 0912   Wound Depth (cm) 0.3 cm 03/27/25 0912   Wound Surface Area (cm^2) 98.7 cm^2 03/27/25 0912   Wound Volume (cm^3) 29.61 cm^3 03/27/25 0912   Calculated Wound Volume (cm^3) 29.61 cm^3 03/27/25 0912   Change in Wound Size % -130.43 03/27/25 0912   Drainage Amount Moderate 02/27/25 0836   Drainage Description Serosanguineous 03/27/25 0912   Maria G-wound Assessment Pink 03/27/25 0912   Wound packed? No 01/30/25 0842   Dressing Status Intact;Old drainage 03/27/25 0912

## 2025-03-27 NOTE — ASSESSMENT & PLAN NOTE
Orders:    Wound cleansing and dressings Malignant  Left;Upper Back; Future    Wound Procedure Treatment Malignant  Left;Upper Back

## 2025-04-03 ENCOUNTER — TELEPHONE (OUTPATIENT)
Dept: CARDIOLOGY CLINIC | Facility: CLINIC | Age: OVER 89
End: 2025-04-03

## 2025-04-03 NOTE — TELEPHONE ENCOUNTER
Left a message with a family member re: patient scheduling a followup appointment with Dr. Yao as his 4/2/25 was cancelled.

## 2025-04-07 ENCOUNTER — APPOINTMENT (OUTPATIENT)
Dept: LAB | Facility: CLINIC | Age: OVER 89
End: 2025-04-07
Payer: COMMERCIAL

## 2025-04-07 DIAGNOSIS — E11.22 TYPE 2 DIABETES MELLITUS WITH STAGE 3A CHRONIC KIDNEY DISEASE, WITHOUT LONG-TERM CURRENT USE OF INSULIN (HCC): ICD-10-CM

## 2025-04-07 DIAGNOSIS — E78.2 MIXED HYPERLIPIDEMIA: ICD-10-CM

## 2025-04-07 DIAGNOSIS — Z13.0 SCREENING FOR DEFICIENCY ANEMIA: ICD-10-CM

## 2025-04-07 DIAGNOSIS — N18.31 TYPE 2 DIABETES MELLITUS WITH STAGE 3A CHRONIC KIDNEY DISEASE, WITHOUT LONG-TERM CURRENT USE OF INSULIN (HCC): ICD-10-CM

## 2025-04-07 DIAGNOSIS — Z13.29 SCREENING FOR THYROID DISORDER: ICD-10-CM

## 2025-04-07 DIAGNOSIS — E78.1 HYPERTRIGLYCERIDEMIA: ICD-10-CM

## 2025-04-07 LAB
ALBUMIN SERPL BCG-MCNC: 4 G/DL (ref 3.5–5)
ALP SERPL-CCNC: 77 U/L (ref 34–104)
ALT SERPL W P-5'-P-CCNC: 14 U/L (ref 7–52)
ANION GAP SERPL CALCULATED.3IONS-SCNC: 11 MMOL/L (ref 4–13)
AST SERPL W P-5'-P-CCNC: 16 U/L (ref 13–39)
BASOPHILS # BLD AUTO: 0.08 THOUSANDS/ÂΜL (ref 0–0.1)
BASOPHILS NFR BLD AUTO: 1 % (ref 0–1)
BILIRUB SERPL-MCNC: 0.54 MG/DL (ref 0.2–1)
BUN SERPL-MCNC: 18 MG/DL (ref 5–25)
CALCIUM SERPL-MCNC: 9.4 MG/DL (ref 8.4–10.2)
CHLORIDE SERPL-SCNC: 102 MMOL/L (ref 96–108)
CHOLEST SERPL-MCNC: 160 MG/DL (ref ?–200)
CO2 SERPL-SCNC: 29 MMOL/L (ref 21–32)
CREAT SERPL-MCNC: 1 MG/DL (ref 0.6–1.3)
EOSINOPHIL # BLD AUTO: 0.34 THOUSAND/ÂΜL (ref 0–0.61)
EOSINOPHIL NFR BLD AUTO: 4 % (ref 0–6)
ERYTHROCYTE [DISTWIDTH] IN BLOOD BY AUTOMATED COUNT: 14.6 % (ref 11.6–15.1)
GFR SERPL CREATININE-BSD FRML MDRD: 65 ML/MIN/1.73SQ M
GLUCOSE P FAST SERPL-MCNC: 94 MG/DL (ref 65–99)
HCT VFR BLD AUTO: 44.5 % (ref 36.5–49.3)
HDLC SERPL-MCNC: 60 MG/DL
HGB BLD-MCNC: 13.4 G/DL (ref 12–17)
IMM GRANULOCYTES # BLD AUTO: 0.04 THOUSAND/UL (ref 0–0.2)
IMM GRANULOCYTES NFR BLD AUTO: 0 % (ref 0–2)
LDLC SERPL CALC-MCNC: 74 MG/DL (ref 0–100)
LYMPHOCYTES # BLD AUTO: 1.42 THOUSANDS/ÂΜL (ref 0.6–4.47)
LYMPHOCYTES NFR BLD AUTO: 15 % (ref 14–44)
MCH RBC QN AUTO: 28.2 PG (ref 26.8–34.3)
MCHC RBC AUTO-ENTMCNC: 30.1 G/DL (ref 31.4–37.4)
MCV RBC AUTO: 94 FL (ref 82–98)
MONOCYTES # BLD AUTO: 0.91 THOUSAND/ÂΜL (ref 0.17–1.22)
MONOCYTES NFR BLD AUTO: 10 % (ref 4–12)
NEUTROPHILS # BLD AUTO: 6.69 THOUSANDS/ÂΜL (ref 1.85–7.62)
NEUTS SEG NFR BLD AUTO: 70 % (ref 43–75)
NRBC BLD AUTO-RTO: 0 /100 WBCS
PLATELET # BLD AUTO: 292 THOUSANDS/UL (ref 149–390)
PMV BLD AUTO: 10.6 FL (ref 8.9–12.7)
POTASSIUM SERPL-SCNC: 4.6 MMOL/L (ref 3.5–5.3)
PROT SERPL-MCNC: 6.8 G/DL (ref 6.4–8.4)
RBC # BLD AUTO: 4.75 MILLION/UL (ref 3.88–5.62)
SODIUM SERPL-SCNC: 142 MMOL/L (ref 135–147)
TRIGL SERPL-MCNC: 130 MG/DL (ref ?–150)
TSH SERPL DL<=0.05 MIU/L-ACNC: 2.72 UIU/ML (ref 0.45–4.5)
WBC # BLD AUTO: 9.48 THOUSAND/UL (ref 4.31–10.16)

## 2025-04-07 PROCEDURE — 85025 COMPLETE CBC W/AUTO DIFF WBC: CPT

## 2025-04-07 PROCEDURE — 80053 COMPREHEN METABOLIC PANEL: CPT

## 2025-04-07 PROCEDURE — 83036 HEMOGLOBIN GLYCOSYLATED A1C: CPT

## 2025-04-07 PROCEDURE — 36415 COLL VENOUS BLD VENIPUNCTURE: CPT

## 2025-04-07 PROCEDURE — 84443 ASSAY THYROID STIM HORMONE: CPT

## 2025-04-07 PROCEDURE — 80061 LIPID PANEL: CPT

## 2025-04-08 ENCOUNTER — RESULTS FOLLOW-UP (OUTPATIENT)
Dept: FAMILY MEDICINE CLINIC | Facility: CLINIC | Age: OVER 89
End: 2025-04-08

## 2025-04-08 LAB
EST. AVERAGE GLUCOSE BLD GHB EST-MCNC: 143 MG/DL
HBA1C MFR BLD: 6.6 %

## 2025-04-08 RX ORDER — SIMVASTATIN 40 MG
40 TABLET ORAL
Qty: 90 TABLET | Refills: 1 | Status: SHIPPED | OUTPATIENT
Start: 2025-04-08

## 2025-04-16 ENCOUNTER — OFFICE VISIT (OUTPATIENT)
Dept: FAMILY MEDICINE CLINIC | Facility: CLINIC | Age: OVER 89
End: 2025-04-16
Payer: COMMERCIAL

## 2025-04-16 VITALS
WEIGHT: 119.4 LBS | OXYGEN SATURATION: 98 % | HEART RATE: 67 BPM | HEIGHT: 60 IN | SYSTOLIC BLOOD PRESSURE: 134 MMHG | DIASTOLIC BLOOD PRESSURE: 68 MMHG | TEMPERATURE: 98.7 F | BODY MASS INDEX: 23.44 KG/M2

## 2025-04-16 DIAGNOSIS — H60.332 ACUTE SWIMMER'S EAR OF LEFT SIDE: Primary | ICD-10-CM

## 2025-04-16 PROCEDURE — G2211 COMPLEX E/M VISIT ADD ON: HCPCS

## 2025-04-16 PROCEDURE — 99214 OFFICE O/P EST MOD 30 MIN: CPT

## 2025-04-16 RX ORDER — AMOXICILLIN 875 MG/1
875 TABLET, COATED ORAL 2 TIMES DAILY
Qty: 14 TABLET | Refills: 0 | Status: SHIPPED | OUTPATIENT
Start: 2025-04-16 | End: 2025-04-23

## 2025-04-16 RX ORDER — OFLOXACIN 3 MG/ML
10 SOLUTION AURICULAR (OTIC) 2 TIMES DAILY
Qty: 10 ML | Refills: 0 | Status: SHIPPED | OUTPATIENT
Start: 2025-04-16 | End: 2025-04-24 | Stop reason: ALTCHOICE

## 2025-04-16 NOTE — PROGRESS NOTES
Name: Saud Ardon      : 1934      MRN: [id number]  Encounter Provider: Ca Vargas PA-C  Encounter Date: 2025   Encounter department: [facility]  :  Assessment & Plan  Acute swimmer's ear of left side  Suspect patient's pain is coming from otitis externa infection with the amount of drainage present on today's examination.  Was able to remove a scant amount of drainage with a Q-tip however did not want to explore further due to visualization issues.  Was unable to visualize TM with the amount of drainage within the ear canal.  Will start treatment with amoxicillin twice a day for 7 days in the event he does have an AOM. Will also add in ofloxacin drops twice a day to the left ear to help with externa infection.  Will have granddaughter monitor her ear closely and let me know if symptoms do not improve within a week or if they become worse.  Orders:  •  amoxicillin (AMOXIL) 875 mg tablet; Take 1 tablet (875 mg total) by mouth 2 (two) times a day for 7 days  •  ofloxacin (FLOXIN) 0.3 % otic solution; Administer 10 drops into the left ear 2 (two) times a day           History of Present Illness   Patient presents today for evaluation of left-sided ear pain which has been present for the past few weeks.  Reports on and off he has had right and left ear pain however in the past 2 weeks it has been more prominent on the left side.  He believes he may have had some drainage from the left ear as well.  He presents with his granddaughter today.  She reports he has not had any fevers or chills.  He does report muffled hearing, reports his hearing overall is a bit worse than typical.      Review of Systems   Constitutional:  Negative for chills, fatigue and fever.   HENT:  Positive for ear discharge, ear pain and hearing loss.        Objective   /68 (BP Location: Left arm, Patient Position: Sitting, Cuff Size: Standard)   Pulse 67   Temp 98.7 °F (37.1 °C)   Ht 5' (1.524  m)   Wt 54.2 kg (119 lb 6.4 oz)   SpO2 98%   BMI 23.32 kg/m²      Physical Exam  Vitals and nursing note reviewed.   Constitutional:       General: He is not in acute distress.     Appearance: Normal appearance. He is not ill-appearing.   HENT:      Head: Normocephalic and atraumatic.      Right Ear: Tympanic membrane, ear canal and external ear normal. There is no impacted cerumen.      Left Ear: Decreased hearing noted. Drainage and tenderness present.      Ears:      Comments: Yellow to white drainage present within left ear canal.  Unable to visualize TM due to drainage being present.  No blood visualized.  No swelling visualized.  Cardiovascular:      Pulses: no weak pulses.           Dorsalis pedis pulses are 2+ on the right side and 2+ on the left side.        Posterior tibial pulses are 2+ on the right side and 2+ on the left side.   Pulmonary:      Effort: Pulmonary effort is normal. No respiratory distress.   Musculoskeletal:        Feet:    Feet:      Right foot:      Skin integrity: No ulcer, skin breakdown, erythema, warmth, callus or dry skin.      Left foot:      Skin integrity: No ulcer, skin breakdown, erythema, warmth, callus or dry skin.   Skin:     Coloration: Skin is not cyanotic or pale.   Neurological:      General: No focal deficit present.      Mental Status: He is alert and oriented to person, place, and time.   Psychiatric:         Mood and Affect: Mood normal.         Behavior: Behavior normal.         Judgment: Judgment normal.         Diabetic Foot Exam    Patient's shoes and socks removed.    Right Foot/Ankle   Right Foot Inspection  Skin Exam: skin normal and skin intact. No dry skin, no warmth, no callus, no erythema, no maceration, no abnormal color, no pre-ulcer, no ulcer and no callus.     Toe Exam: ROM and strength within normal limits. No swelling, no tenderness, erythema and  no right toe deformity    Sensory   Vibration: intact  Proprioception: intact  Monofilament testing:  intact    Vascular  Capillary refills: < 3 seconds  The right DP pulse is 2+. The right PT pulse is 2+.     Left Foot/Ankle  Left Foot Inspection  Skin Exam: skin normal and skin intact. No dry skin, no warmth, no erythema, no maceration, normal color, no pre-ulcer, no ulcer and no callus.     Toe Exam: ROM and strength within normal limits. No swelling, no tenderness, no erythema and no left toe deformity.     Sensory   Vibration: diminished  Proprioception: diminished  Monofilament testing: diminished    Vascular  Capillary refills: < 3 seconds  The left DP pulse is 2+. The left PT pulse is 2+.     Assign Risk Category  No deformity present  No loss of protective sensation  No weak pulses  Risk: 0

## 2025-04-24 DIAGNOSIS — H60.332 ACUTE SWIMMER'S EAR OF LEFT SIDE: Primary | ICD-10-CM

## 2025-04-24 RX ORDER — CIPROFLOXACIN AND DEXAMETHASONE 3; 1 MG/ML; MG/ML
4 SUSPENSION/ DROPS AURICULAR (OTIC) 2 TIMES DAILY
Qty: 7.5 ML | Refills: 0 | Status: SHIPPED | OUTPATIENT
Start: 2025-04-24 | End: 2025-05-02

## 2025-04-29 ENCOUNTER — APPOINTMENT (EMERGENCY)
Dept: CT IMAGING | Facility: HOSPITAL | Age: OVER 89
DRG: 155 | End: 2025-04-29
Payer: COMMERCIAL

## 2025-04-29 ENCOUNTER — HOSPITAL ENCOUNTER (INPATIENT)
Facility: HOSPITAL | Age: OVER 89
LOS: 3 days | Discharge: HOME | DRG: 155 | End: 2025-05-02
Attending: EMERGENCY MEDICINE | Admitting: STUDENT IN AN ORGANIZED HEALTH CARE EDUCATION/TRAINING PROGRAM
Payer: COMMERCIAL

## 2025-04-29 DIAGNOSIS — H60.92 LEFT OTITIS EXTERNA: Primary | ICD-10-CM

## 2025-04-29 DIAGNOSIS — H70.012: ICD-10-CM

## 2025-04-29 PROBLEM — H60.90 OTITIS EXTERNA: Status: ACTIVE | Noted: 2025-04-29

## 2025-04-29 LAB
ALBUMIN SERPL BCG-MCNC: 4 G/DL (ref 3.5–5)
ALP SERPL-CCNC: 123 U/L (ref 34–104)
ALT SERPL W P-5'-P-CCNC: 28 U/L (ref 7–52)
ANION GAP SERPL CALCULATED.3IONS-SCNC: 12 MMOL/L (ref 4–13)
AST SERPL W P-5'-P-CCNC: 36 U/L (ref 13–39)
BASOPHILS # BLD AUTO: 0.08 THOUSANDS/ÂΜL (ref 0–0.1)
BASOPHILS NFR BLD AUTO: 1 % (ref 0–1)
BILIRUB SERPL-MCNC: 0.58 MG/DL (ref 0.2–1)
BUN SERPL-MCNC: 25 MG/DL (ref 5–25)
CALCIUM SERPL-MCNC: 9.9 MG/DL (ref 8.4–10.2)
CHLORIDE SERPL-SCNC: 100 MMOL/L (ref 96–108)
CO2 SERPL-SCNC: 25 MMOL/L (ref 21–32)
CREAT SERPL-MCNC: 1.02 MG/DL (ref 0.6–1.3)
EOSINOPHIL # BLD AUTO: 0.05 THOUSAND/ÂΜL (ref 0–0.61)
EOSINOPHIL NFR BLD AUTO: 0 % (ref 0–6)
ERYTHROCYTE [DISTWIDTH] IN BLOOD BY AUTOMATED COUNT: 14.3 % (ref 11.6–15.1)
GFR SERPL CREATININE-BSD FRML MDRD: 64 ML/MIN/1.73SQ M
GLUCOSE SERPL-MCNC: 123 MG/DL (ref 65–140)
GLUCOSE SERPL-MCNC: 219 MG/DL (ref 65–140)
HCT VFR BLD AUTO: 39.7 % (ref 36.5–49.3)
HGB BLD-MCNC: 12.8 G/DL (ref 12–17)
IMM GRANULOCYTES # BLD AUTO: 0.04 THOUSAND/UL (ref 0–0.2)
IMM GRANULOCYTES NFR BLD AUTO: 0 % (ref 0–2)
LYMPHOCYTES # BLD AUTO: 1.73 THOUSANDS/ÂΜL (ref 0.6–4.47)
LYMPHOCYTES NFR BLD AUTO: 13 % (ref 14–44)
MCH RBC QN AUTO: 28.2 PG (ref 26.8–34.3)
MCHC RBC AUTO-ENTMCNC: 32.2 G/DL (ref 31.4–37.4)
MCV RBC AUTO: 87 FL (ref 82–98)
MONOCYTES # BLD AUTO: 1.11 THOUSAND/ÂΜL (ref 0.17–1.22)
MONOCYTES NFR BLD AUTO: 8 % (ref 4–12)
NEUTROPHILS # BLD AUTO: 10.3 THOUSANDS/ÂΜL (ref 1.85–7.62)
NEUTS SEG NFR BLD AUTO: 78 % (ref 43–75)
NRBC BLD AUTO-RTO: 0 /100 WBCS
PLATELET # BLD AUTO: 374 THOUSANDS/UL (ref 149–390)
PMV BLD AUTO: 9 FL (ref 8.9–12.7)
POTASSIUM SERPL-SCNC: 4.6 MMOL/L (ref 3.5–5.3)
PROT SERPL-MCNC: 7.5 G/DL (ref 6.4–8.4)
RBC # BLD AUTO: 4.54 MILLION/UL (ref 3.88–5.62)
SODIUM SERPL-SCNC: 137 MMOL/L (ref 135–147)
WBC # BLD AUTO: 13.31 THOUSAND/UL (ref 4.31–10.16)

## 2025-04-29 PROCEDURE — 82948 REAGENT STRIP/BLOOD GLUCOSE: CPT

## 2025-04-29 PROCEDURE — 87205 SMEAR GRAM STAIN: CPT

## 2025-04-29 PROCEDURE — 70481 CT ORBIT/EAR/FOSSA W/DYE: CPT

## 2025-04-29 PROCEDURE — 87186 SC STD MICRODIL/AGAR DIL: CPT

## 2025-04-29 PROCEDURE — 99223 1ST HOSP IP/OBS HIGH 75: CPT | Performed by: HOSPITALIST

## 2025-04-29 PROCEDURE — 80053 COMPREHEN METABOLIC PANEL: CPT

## 2025-04-29 PROCEDURE — 36415 COLL VENOUS BLD VENIPUNCTURE: CPT

## 2025-04-29 PROCEDURE — 85025 COMPLETE CBC W/AUTO DIFF WBC: CPT

## 2025-04-29 PROCEDURE — 99285 EMERGENCY DEPT VISIT HI MDM: CPT | Performed by: EMERGENCY MEDICINE

## 2025-04-29 PROCEDURE — 87070 CULTURE OTHR SPECIMN AEROBIC: CPT

## 2025-04-29 PROCEDURE — 87077 CULTURE AEROBIC IDENTIFY: CPT

## 2025-04-29 PROCEDURE — 99284 EMERGENCY DEPT VISIT MOD MDM: CPT

## 2025-04-29 RX ORDER — FENOFIBRATE 145 MG/1
145 TABLET, FILM COATED ORAL DAILY
Status: DISCONTINUED | OUTPATIENT
Start: 2025-04-30 | End: 2025-05-02 | Stop reason: HOSPADM

## 2025-04-29 RX ORDER — ACETAMINOPHEN 325 MG/1
650 TABLET ORAL ONCE
Status: COMPLETED | OUTPATIENT
Start: 2025-04-29 | End: 2025-04-29

## 2025-04-29 RX ORDER — INSULIN LISPRO 100 [IU]/ML
1-5 INJECTION, SOLUTION INTRAVENOUS; SUBCUTANEOUS
Status: DISCONTINUED | OUTPATIENT
Start: 2025-04-30 | End: 2025-05-02 | Stop reason: HOSPADM

## 2025-04-29 RX ORDER — ASPIRIN 81 MG/1
81 TABLET, CHEWABLE ORAL DAILY
Status: DISCONTINUED | OUTPATIENT
Start: 2025-04-30 | End: 2025-05-02 | Stop reason: HOSPADM

## 2025-04-29 RX ORDER — INSULIN LISPRO 100 [IU]/ML
1-5 INJECTION, SOLUTION INTRAVENOUS; SUBCUTANEOUS
Status: DISCONTINUED | OUTPATIENT
Start: 2025-04-29 | End: 2025-05-02 | Stop reason: HOSPADM

## 2025-04-29 RX ORDER — LATANOPROST 50 UG/ML
1 SOLUTION/ DROPS OPHTHALMIC
Status: DISCONTINUED | OUTPATIENT
Start: 2025-04-29 | End: 2025-05-02 | Stop reason: HOSPADM

## 2025-04-29 RX ORDER — OFLOXACIN 3 MG/ML
1 SOLUTION/ DROPS OPHTHALMIC 4 TIMES DAILY
Status: DISCONTINUED | OUTPATIENT
Start: 2025-04-29 | End: 2025-05-02 | Stop reason: HOSPADM

## 2025-04-29 RX ORDER — ENOXAPARIN SODIUM 100 MG/ML
40 INJECTION SUBCUTANEOUS DAILY
Status: DISCONTINUED | OUTPATIENT
Start: 2025-04-30 | End: 2025-05-02

## 2025-04-29 RX ORDER — DORZOLAMIDE HYDROCHLORIDE AND TIMOLOL MALEATE 20; 5 MG/ML; MG/ML
1 SOLUTION/ DROPS OPHTHALMIC 2 TIMES DAILY
Status: DISCONTINUED | OUTPATIENT
Start: 2025-04-29 | End: 2025-05-02 | Stop reason: HOSPADM

## 2025-04-29 RX ORDER — OFLOXACIN 3 MG/ML
2 SOLUTION/ DROPS OPHTHALMIC ONCE
Status: COMPLETED | OUTPATIENT
Start: 2025-04-29 | End: 2025-04-29

## 2025-04-29 RX ORDER — LIDOCAINE HYDROCHLORIDE 20 MG/ML
15 SOLUTION OROPHARYNGEAL ONCE
Status: COMPLETED | OUTPATIENT
Start: 2025-04-29 | End: 2025-04-29

## 2025-04-29 RX ADMIN — LIDOCAINE HYDROCHLORIDE 15 ML: 20 SOLUTION ORAL at 16:39

## 2025-04-29 RX ADMIN — OFLOXACIN 2 DROP: 3 SOLUTION/ DROPS OPHTHALMIC at 18:21

## 2025-04-29 RX ADMIN — ACETAMINOPHEN 650 MG: 325 TABLET, FILM COATED ORAL at 14:39

## 2025-04-29 RX ADMIN — IOHEXOL 85 ML: 350 INJECTION, SOLUTION INTRAVENOUS at 16:05

## 2025-04-29 RX ADMIN — LATANOPROST 1 DROP: 50 SOLUTION OPHTHALMIC at 21:49

## 2025-04-29 RX ADMIN — CEFEPIME 1000 MG: 1 INJECTION, POWDER, FOR SOLUTION INTRAMUSCULAR; INTRAVENOUS at 21:48

## 2025-04-29 RX ADMIN — DORZOLAMIDE HYDROCHLORIDE AND TIMOLOL MALEATE 1 DROP: 20; 5 SOLUTION/ DROPS OPHTHALMIC at 21:49

## 2025-04-29 RX ADMIN — OFLOXACIN 1 DROP: 3 SOLUTION/ DROPS OPHTHALMIC at 21:49

## 2025-04-29 NOTE — ASSESSMENT & PLAN NOTE
Patient has left external otitis.  His PCP treated him with Augmentin and Cipro Dex eardrops for 7 days.  He has not improved.  He went to the dentist today to see if it was a tooth that was actually causing the pain.  The dentist said no and sent him to the ER that it was likely his ear was still infected.      They checked with ENT.  Suggested fluoroquinolone IV antibiotic and ofloxacin eardrops    Will start the patient on cefepime    Risk factor is diet-controlled diabetes

## 2025-04-29 NOTE — ASSESSMENT & PLAN NOTE
"Lab Results   Component Value Date    HGBA1C 6.6 (H) 04/07/2025       No results for input(s): \"POCGLU\" in the last 72 hours.    Blood Sugar Average: Last 72 hrs:    Blood sugars are controlled with diet  "

## 2025-04-29 NOTE — ED PROVIDER NOTES
"Time reflects when diagnosis was documented in both MDM as applicable and the Disposition within this note       Time User Action Codes Description Comment    4/29/2025  6:02 PM Sb Cottrell [H60.92] Left otitis externa     4/29/2025  6:03 PM Sb Cottrell [H70.012] Subperiosteal abscess of left mastoid           ED Disposition       ED Disposition   Admit    Condition   Stable    Date/Time   Tue Apr 29, 2025  6:02 PM    Comment   Case was discussed with Dr. Nguyen and the patient's admission status was agreed to be Admission Status: inpatient status to the service of Dr. Nguyen .               Assessment & Plan       Medical Decision Making  Patient is a 89-year-old male with past med history of type 2 diabetes, hyperlipidemia, basal cell carcinoma of the left shoulder presenting to the emergency department for evaluation of a 1 month history of left ear pain.  He has associated decreased hearing and copious purulent discharge from the left ear.  He also has tenderness to palpation over the left postauricular region with overlying erythema.  Has taken amoxicillin, Augmentin, and Ciprodex drops with no relief of his symptoms.    With 1 month history of symptoms and tenderness of the postauricular region, concern for mastoiditis.  Will get CT images of the mastoid and basic labs.     Blood work significant for white count of 13.31. Attempted to visualize TM with application of viscous lidocaine into the ear canal and removal of drainage and debris with curette. Was able to remove some debris, but discontinued due to patient discomfort, and still unable to visualize the TM. CT imaging remarkable for \"acute left otitis externa with surrounding acute cellulitis in left ear and left periauricular region. Suspected 0.6 cm subperiosteal abscess adjacent to the left mastoid temporal bone just inferolateral to opening of bony external auditory canal\". Case discussed with ENT specialist, Dr. Posey, and he " recommended fluoroquinolone antibiotics with ofloxacin otic drops. No immediate surgical intervention is indicated at this time per Dr. Posey. Discussed findings with the patient and his granddaughter at bedside and offered admission for failure of outpatient treatment. Patient and his granddaughter were agreeable to this plan. Message sent to Dr. Nguyen of [facility] and admission was agreed upon under inpatient status.     Applied ear wick to the left ear canal and administered ofloxacin otic drops with attending physician Dr. Chin.         Amount and/or Complexity of Data Reviewed  Labs: ordered. Decision-making details documented in ED Course.  Radiology: ordered.    Risk  OTC drugs.  Prescription drug management.  Decision regarding hospitalization.        ED Course as of 04/29/25 1907   Tue Apr 29, 2025   1450 WBC(!): 13.31   1600 Comprehensive metabolic panel(!)  unremarkable   1752 Ear wick applied to the left external canal and administered ofloxacin drops       Medications   acetaminophen (TYLENOL) tablet 650 mg (650 mg Oral Given 4/29/25 1439)   iohexol (OMNIPAQUE) 350 MG/ML injection (SINGLE-DOSE) 85 mL (85 mL Intravenous Given 4/29/25 1605)   Lidocaine Viscous HCl (XYLOCAINE) 2 % mucosal solution 15 mL (15 mL Swish & Spit Given 4/29/25 1639)   ofloxacin (OCUFLOX) 0.3 % ophthalmic solution 2 drop (2 drops Otic Given 4/29/25 1821)       ED Risk Strat Scores                    No data recorded        SBIRT 22yo+      Flowsheet Row Most Recent Value   Initial Alcohol Screen: US AUDIT-C     1. How often do you have a drink containing alcohol? 0 Filed at: 04/29/2025 1443   2. How many drinks containing alcohol do you have on a typical day you are drinking?  0 Filed at: 04/29/2025 1443   3a. Male UNDER 65: How often do you have five or more drinks on one occasion? 0 Filed at: 04/29/2025 1443   3b. FEMALE Any Age, or MALE 65+: How often do you have 4 or more drinks on one occassion? 0 Filed at: 04/29/2025  1443   Audit-C Score 0 Filed at: 04/29/2025 1443   NATALIO: How many times in the past year have you...    Used an illegal drug or used a prescription medication for non-medical reasons? Never Filed at: 04/29/2025 1443                            History of Present Illness       Chief Complaint   Patient presents with    Earache     Ear pain x2 weeks, was seen previously and given antibiotics, pain is worsening, discharge/pus-like drainage, pain radiates to jaw area.       Past Medical History:   Diagnosis Date    Anxiety     CAD (coronary artery disease)     Cancer (HCC)     Capsular cataract     mature    Chronic ulcer of skin (HCC) 05/18/2015    DJD (degenerative joint disease)     Encounter for routine adult medical exam with abnormal findings 08/21/2019    Hepatitis A     Hydrocele     Hyperglycemia     Hyperlipidemia     Hypertension     Impaired fasting glucose 04/30/2014    Myocardial infarct (HCC) 2007    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HCC)     Potassium (K) excess 12/27/2019    Prostatic hyperplasia     Skin cancer       Past Surgical History:   Procedure Laterality Date    ANGIOPLASTY  2008    FL LUMBAR PUNCTURE DIAGNOSTIC  1/14/2025      Family History   Problem Relation Age of Onset    Coronary artery disease Father       Social History     Tobacco Use    Smoking status: Never     Passive exposure: Never    Smokeless tobacco: Never   Vaping Use    Vaping status: Never Used   Substance Use Topics    Alcohol use: No    Drug use: No      E-Cigarette/Vaping    E-Cigarette Use Never User       E-Cigarette/Vaping Substances    Nicotine No     THC No     CBD No     Flavoring No       I have reviewed and agree with the history as documented.     Saud is a 89-year-old male with past medical history of type 2 diabetes, hyperlipidemia, basal cell carcinoma of the left shoulder presenting the emergency department for a 1 month history of left ear pain.  He also has purulent ear drainage and decreased hearing in the left side.  He  was seen by his primary care provider on 4/16/2025 and was diagnosed with otitis externa.  He was prescribed Ciprodex eardrops and amoxicillin to cover for possible otitis media as the tympanic membrane cannot be visualized during that exam.  He completed the course of amoxicillin and eardrops with no resolution of his symptoms.  He was prescribed Augmentin for a second round of antibiotics.  He complains of continued symptoms and radiation of his pain into his left jaw and left postauricular region.  He denies any fevers, chills, headaches, neck pain, lightheadedness, dizziness, chest pain, shortness of breath, nausea or vomiting.  He has been taking Tylenol for pain and using warm compresses with some relief.          Review of Systems   Constitutional:  Negative for chills, fatigue and fever.   HENT:  Positive for ear discharge, ear pain and hearing loss. Negative for congestion, dental problem, sinus pain and sore throat.    Eyes:  Negative for pain, discharge and visual disturbance.   Respiratory:  Negative for shortness of breath.    Cardiovascular:  Negative for chest pain.   Gastrointestinal:  Negative for abdominal pain, nausea and vomiting.   Genitourinary: Negative.    Musculoskeletal:  Negative for arthralgias, back pain and neck pain.   Neurological:  Negative for dizziness, light-headedness and headaches.   All other systems reviewed and are negative.          Objective       ED Triage Vitals   Temperature Pulse Blood Pressure Respirations SpO2 Patient Position - Orthostatic VS   04/29/25 1449 04/29/25 1343 04/29/25 1343 04/29/25 1343 04/29/25 1343 04/29/25 1343   98 °F (36.7 °C) 80 139/66 18 100 % Sitting      Temp Source Heart Rate Source BP Location FiO2 (%) Pain Score    04/29/25 1449 04/29/25 1343 04/29/25 1343 -- 04/29/25 1439    Axillary Monitor Right arm  5      Vitals      Date and Time Temp Pulse SpO2 Resp BP Pain Score FACES Pain Rating User   04/29/25 1850 -- -- -- -- -- No Pain --     04/29/25 1641 -- 83 100 % 18 136/61 6 --    04/29/25 1449 98 °F (36.7 °C) -- -- -- -- -- --    04/29/25 1439 -- -- -- -- -- 5 --    04/29/25 1343 -- 80 100 % 18 139/66 -- -- NZ            Physical Exam  Vitals and nursing note reviewed.   Constitutional:       General: He is not in acute distress.     Appearance: Normal appearance. He is not ill-appearing.   HENT:      Head: Normocephalic and atraumatic.      Right Ear: Tympanic membrane, ear canal and external ear normal. No decreased hearing noted. No drainage, swelling or tenderness. No mastoid tenderness.      Left Ear: Decreased hearing noted. Drainage, swelling and tenderness present. There is mastoid tenderness.      Ears:      Comments: Unable to visualize left tympanic membrane due to copious purulent discharge in the auditory canal     Nose: Nose normal.      Mouth/Throat:      Mouth: Mucous membranes are moist.      Pharynx: Oropharynx is clear.   Eyes:      General:         Right eye: No discharge.         Left eye: No discharge.      Conjunctiva/sclera: Conjunctivae normal.   Cardiovascular:      Rate and Rhythm: Normal rate and regular rhythm.      Pulses: Normal pulses.   Pulmonary:      Effort: Pulmonary effort is normal.      Breath sounds: Normal breath sounds. No wheezing, rhonchi or rales.   Abdominal:      General: Abdomen is flat.      Palpations: Abdomen is soft.      Tenderness: There is no abdominal tenderness. There is no guarding or rebound.   Musculoskeletal:      Cervical back: No tenderness.      Right lower leg: No edema.      Left lower leg: No edema.   Skin:     Capillary Refill: Capillary refill takes less than 2 seconds.      Findings: Erythema (Localized to the postauricular region and the external auditory canal) present.   Neurological:      General: No focal deficit present.      Mental Status: He is alert and oriented to person, place, and time.   Psychiatric:         Mood and Affect: Mood normal.         Behavior:  Behavior normal.         Results Reviewed       Procedure Component Value Units Date/Time    Body fluid culture and Gram stain [[id number]] Collected: 04/29/25 1706    Lab Status: In process Specimen: Body Fluid from Other Updated: 04/29/25 1709    Comprehensive metabolic panel [[id number]]  (Abnormal) Collected: 04/29/25 1438    Lab Status: Final result Specimen: Blood from Arm, Right Updated: 04/29/25 1551     Sodium 137 mmol/L      Potassium 4.6 mmol/L      Chloride 100 mmol/L      CO2 25 mmol/L      ANION GAP 12 mmol/L      BUN 25 mg/dL      Creatinine 1.02 mg/dL      Glucose 123 mg/dL      Calcium 9.9 mg/dL      AST 36 U/L      ALT 28 U/L      Alkaline Phosphatase 123 U/L      Total Protein 7.5 g/dL      Albumin 4.0 g/dL      Total Bilirubin 0.58 mg/dL      eGFR 64 ml/min/1.73sq m     Narrative:      National Kidney Disease Foundation guidelines for Chronic Kidney Disease (CKD):     Stage 1 with normal or high GFR (GFR > 90 mL/min/1.73 square meters)    Stage 2 Mild CKD (GFR = 60-89 mL/min/1.73 square meters)    Stage 3A Moderate CKD (GFR = 45-59 mL/min/1.73 square meters)    Stage 3B Moderate CKD (GFR = 30-44 mL/min/1.73 square meters)    Stage 4 Severe CKD (GFR = 15-29 mL/min/1.73 square meters)    Stage 5 End Stage CKD (GFR <15 mL/min/1.73 square meters)  Note: GFR calculation is accurate only with a steady state creatinine    CBC and differential [[id number]]  (Abnormal) Collected: 04/29/25 1438    Lab Status: Final result Specimen: Blood from Arm, Right Updated: 04/29/25 1449     WBC 13.31 Thousand/uL      RBC 4.54 Million/uL      Hemoglobin 12.8 g/dL      Hematocrit 39.7 %      MCV 87 fL      MCH 28.2 pg      MCHC 32.2 g/dL      RDW 14.3 %      MPV 9.0 fL      Platelets 374 Thousands/uL      nRBC 0 /100 WBCs      Segmented % 78 %      Immature Grans % 0 %      Lymphocytes % 13 %      Monocytes % 8 %      Eosinophils Relative 0 %      Basophils Relative 1 %      Absolute Neutrophils 10.30 Thousands/µL       Absolute Immature Grans 0.04 Thousand/uL      Absolute Lymphocytes 1.73 Thousands/µL      Absolute Monocytes 1.11 Thousand/µL      Eosinophils Absolute 0.05 Thousand/µL      Basophils Absolute 0.08 Thousands/µL             CT Temporal Bones/IACs/Mastoids w contrast   Final Interpretation by Dago Orta MD (04/29 1643)      LEFT TEMPORAL BONE   - Findings suggestive of acute left otitis externa with surrounding acute cellulitis in left ear and left periauricular region. Suspected 0.6 cm subperiosteal abscess adjacent to the left mastoid temporal bone just inferolateral to opening of bony    external auditory canal. Recommend evaluation by ENT.   - Small left mastoid effusion. No osseous destruction.      RIGHT TEMPORAL BONE   - Normal.      The study was marked in EPIC for immediate notification.         Workstation performed: [id number]             Procedures    ED Medication and Procedure Management   Prior to Admission Medications   Prescriptions Last Dose Informant Patient Reported? Taking?   Aspirin 81 MG CAPS   No No   Sig: Take 1 tablet by mouth in the morning   Metoprolol Tartrate 75 MG TABS   Yes No   Sig: Take 75 mg by mouth   Multiple Vitamins-Minerals (MULTIVITAL) tablet  Self, Family Member Yes No   Sig: every 24 hours   Multiple Vitamins-Minerals (PRESERVISION AREDS 2+MULTI VIT PO)  Self, Family Member Yes No   Sig: take 2 tabs daily   QUEtiapine (SEROquel) 25 mg tablet   Yes No   Sig: Take 12.5 mg by mouth daily as needed   RHOPRESSA 0.02 % SOLN  Self, Family Member Yes No   Sig: INSTILL 1 DROP AT BEDTIME INTO BOTH EYES   amoxicillin-clavulanate (AUGMENTIN) 875-125 mg per tablet   No No   Sig: Take 1 tablet by mouth every 12 (twelve) hours for 7 days   ciprofloxacin-dexamethasone (CIPRODEX) otic suspension   No No   Sig: Administer 4 drops into the left ear 2 (two) times a day   dorzolamide-timolol (COSOPT) 22.3-6.8 MG/ML ophthalmic solution  Self, Family Member Yes No   Sig: Every 12 hours    fenofibrate (TRICOR) 54 MG tablet   No No   Sig: Take 1 tablet (54 mg total) by mouth daily   latanoprost (XALATAN) 0.005 % ophthalmic solution  Self, Family Member Yes No   Sig: INSTILL 1 DROP AT BEDTIME INTO BOTH EYES   mupirocin (BACTROBAN) 2 % ointment  Self, Family Member No No   Sig: Apply topically daily During wound bandage changes   mupirocin (BACTROBAN) 2 % ointment   No No   Sig: Apply topically 3 (three) times a day   nitroglycerin (NITROSTAT) 0.4 mg SL tablet  Self, Family Member No No   Sig: Take one under tongue for chest pain. Can repet in 5 minutes if necessary.   simvastatin (ZOCOR) 40 mg tablet   No No   Sig: Take 1 tablet (40 mg total) by mouth daily at bedtime      Facility-Administered Medications: None     Current Discharge Medication List        CONTINUE these medications which have NOT CHANGED    Details   amoxicillin-clavulanate (AUGMENTIN) 875-125 mg per tablet Take 1 tablet by mouth every 12 (twelve) hours for 7 days  Qty: 14 tablet, Refills: 0    Associated Diagnoses: Acute swimmer's ear of left side      Aspirin 81 MG CAPS Take 1 tablet by mouth in the morning  Qty: 90 capsule, Refills: 1    Associated Diagnoses: Carotid artery disease, unspecified laterality, unspecified type (HCC)      ciprofloxacin-dexamethasone (CIPRODEX) otic suspension Administer 4 drops into the left ear 2 (two) times a day  Qty: 7.5 mL, Refills: 0    Associated Diagnoses: Acute swimmer's ear of left side      dorzolamide-timolol (COSOPT) 22.3-6.8 MG/ML ophthalmic solution Every 12 hours      fenofibrate (TRICOR) 54 MG tablet Take 1 tablet (54 mg total) by mouth daily  Qty: 30 tablet, Refills: 5    Associated Diagnoses: Mixed hyperlipidemia      latanoprost (XALATAN) 0.005 % ophthalmic solution INSTILL 1 DROP AT BEDTIME INTO BOTH EYES  Refills: 3      Metoprolol Tartrate 75 MG TABS Take 75 mg by mouth      Multiple Vitamins-Minerals (MULTIVITAL) tablet every 24 hours      Multiple Vitamins-Minerals  (PRESERVISION AREDS 2+MULTI VIT PO) take 2 tabs daily      !! mupirocin (BACTROBAN) 2 % ointment Apply topically daily During wound bandage changes  Qty: 100 g, Refills: 0    Associated Diagnoses: Open wound of neck, initial encounter      !! mupirocin (BACTROBAN) 2 % ointment Apply topically 3 (three) times a day  Qty: 100 g, Refills: 3    Associated Diagnoses: Basal cell carcinoma (BCC) of skin of other part of torso      nitroglycerin (NITROSTAT) 0.4 mg SL tablet Take one under tongue for chest pain. Can repet in 5 minutes if necessary.  Qty: 25 tablet, Refills: 1    Associated Diagnoses: Chronic coronary artery disease      QUEtiapine (SEROquel) 25 mg tablet Take 12.5 mg by mouth daily as needed      RHOPRESSA 0.02 % SOLN INSTILL 1 DROP AT BEDTIME INTO BOTH EYES  Refills: 1      simvastatin (ZOCOR) 40 mg tablet Take 1 tablet (40 mg total) by mouth daily at bedtime  Qty: 90 tablet, Refills: 1    Associated Diagnoses: Mixed hyperlipidemia       !! - Potential duplicate medications found. Please discuss with provider.        No discharge procedures on file.  ED SEPSIS DOCUMENTATION   Time reflects when diagnosis was documented in both MDM as applicable and the Disposition within this note       Time User Action Codes Description Comment    4/29/2025  6:02 PM Sb Cottrell [H60.92] Left otitis externa     4/29/2025  6:03 PM Sb Cottrell [H70.012] Subperiosteal abscess of left mastoid                  Sb Cottrell PA-C  04/29/25 1907

## 2025-04-29 NOTE — ED ATTENDING ATTESTATION
4/29/2025  IDaisy DO, saw and evaluated the patient. I have discussed the patient with the resident/non-physician practitioner and agree with the resident's/non-physician practitioner's findings, Plan of Care, and MDM as documented in the resident's/non-physician practitioner's note, except where noted. All available labs and Radiology studies were reviewed.  I was present for key portions of any procedure(s) performed by the resident/non-physician practitioner and I was immediately available to provide assistance.       At this point I agree with the current assessment done in the Emergency Department.  I have conducted an independent evaluation of this patient a history and physical is as follows: 89y M here for evaluation of left ear pain for the last few weeks.  Seen by PCP and dxd w/ AOE, put on oral and topical abx, then placed on 2nd course of abx - now on oral abx and gtts for additional 4-5d w/o improvement. Purulent drainage worsening also. No f/c/s, now w/ pain in jaw and behind the ear. No f/c/s, +malaise.  C/o sig pain to the ear and surrounding area.  Exam [facility] nad, L external ear +erythema, sig pain w/ any manipulation of pinna, +purulent drainage from EAC w/ sig amt of debris in the canal when drainage removed.  Unable to visualize TM 2/2 drainage / debris.  +tenderness to the pre/post auricular area.  +some tenderness over the mastoid region w/ no notable swelling appreciated.  mmm, neck supple, resp non-labored, cv regular rate, abd nd, ext no cce, skin dry, neuro non-focal, normal mood.      ED Course     Labs Reviewed   CBC AND DIFFERENTIAL - Abnormal       Result Value Ref Range Status    WBC 13.31 (*) 4.31 - 10.16 Thousand/uL Final    RBC 4.54  3.88 - 5.62 Million/uL Final    Hemoglobin 12.8  12.0 - 17.0 g/dL Final    Hematocrit 39.7  36.5 - 49.3 % Final    MCV 87  82 - 98 fL Final    MCH 28.2  26.8 - 34.3 pg Final    MCHC 32.2  31.4 - 37.4 g/dL Final    RDW 14.3  11.6 - 15.1 %  Final    MPV 9.0  8.9 - 12.7 fL Final    Platelets 374  149 - 390 Thousands/uL Final    nRBC 0  /100 WBCs Final    Segmented % 78 (*) 43 - 75 % Final    Immature Grans % 0  0 - 2 % Final    Lymphocytes % 13 (*) 14 - 44 % Final    Monocytes % 8  4 - 12 % Final    Eosinophils Relative 0  0 - 6 % Final    Basophils Relative 1  0 - 1 % Final    Absolute Neutrophils 10.30 (*) 1.85 - 7.62 Thousands/µL Final    Absolute Immature Grans 0.04  0.00 - 0.20 Thousand/uL Final    Absolute Lymphocytes 1.73  0.60 - 4.47 Thousands/µL Final    Absolute Monocytes 1.11  0.17 - 1.22 Thousand/µL Final    Eosinophils Absolute 0.05  0.00 - 0.61 Thousand/µL Final    Basophils Absolute 0.08  0.00 - 0.10 Thousands/µL Final   COMPREHENSIVE METABOLIC PANEL - Abnormal    Sodium 137  135 - 147 mmol/L Final    Potassium 4.6  3.5 - 5.3 mmol/L Final    Comment: Slightly Hemolyzed:Results may be affected.    Chloride 100  96 - 108 mmol/L Final    CO2 25  21 - 32 mmol/L Final    ANION GAP 12  4 - 13 mmol/L Final    BUN 25  5 - 25 mg/dL Final    Creatinine 1.02  0.60 - 1.30 mg/dL Final    Comment: Standardized to IDMS reference method    Glucose 123  65 - 140 mg/dL Final    Comment: If the patient is fasting, the ADA then defines impaired fasting glucose as > 100 mg/dL and diabetes as > or equal to 123 mg/dL.    Calcium 9.9  8.4 - 10.2 mg/dL Final    AST 36  13 - 39 U/L Final    Comment: Slightly Hemolyzed:Results may be affected.    ALT 28  7 - 52 U/L Final    Comment: Specimen collection should occur prior to Sulfasalazine administration due to the potential for falsely depressed results.     Alkaline Phosphatase 123 (*) 34 - 104 U/L Final    Total Protein 7.5  6.4 - 8.4 g/dL Final    Albumin 4.0  3.5 - 5.0 g/dL Final    Total Bilirubin 0.58  0.20 - 1.00 mg/dL Final    Comment: Use of this assay is not recommended for patients undergoing treatment with eltrombopag due to the potential for falsely elevated results.  N-acetyl-p-benzoquinone imine  (metabolite of Acetaminophen) will generate erroneously low results in samples for patients that have taken an overdose of Acetaminophen.    eGFR 64  ml/min/1.73sq m Final    Narrative:     National Kidney Disease Foundation guidelines for Chronic Kidney Disease (CKD):     Stage 1 with normal or high GFR (GFR > 90 mL/min/1.73 square meters)    Stage 2 Mild CKD (GFR = 60-89 mL/min/1.73 square meters)    Stage 3A Moderate CKD (GFR = 45-59 mL/min/1.73 square meters)    Stage 3B Moderate CKD (GFR = 30-44 mL/min/1.73 square meters)    Stage 4 Severe CKD (GFR = 15-29 mL/min/1.73 square meters)    Stage 5 End Stage CKD (GFR <15 mL/min/1.73 square meters)  Note: GFR calculation is accurate only with a steady state creatinine     CT Temporal Bones/IACs/Mastoids w contrast   Final Result      LEFT TEMPORAL BONE   - Findings suggestive of acute left otitis externa with surrounding acute cellulitis in left ear and left periauricular region. Suspected 0.6 cm subperiosteal abscess adjacent to the left mastoid temporal bone just inferolateral to opening of bony    external auditory canal. Recommend evaluation by ENT.   - Small left mastoid effusion. No osseous destruction.      RIGHT TEMPORAL BONE   - Normal.      The study was marked in EPIC for immediate notification.         Workstation performed: [id number]               Critical Care Time  Procedures    1. Left otitis externa  Inpatient consult to ENT    Inpatient consult to ENT      2. Subperiosteal abscess of left mastoid              Time reflects when diagnosis was documented in both MDM as applicable and the Disposition within this note       Time User Action Codes Description Comment    4/29/2025  6:02 PM Sb Cottrell [H60.92] Left otitis externa     4/29/2025  6:03 PM Sb Cottrell [H70.012] Subperiosteal abscess of left mastoid           ED Disposition       ED Disposition   Admit    Condition   Stable    Date/Time   Tue Apr 29, 2025  6:02 PM    Comment   Case  was discussed with Dr. Nguyen and the patient's admission status was agreed to be Admission Status: inpatient status to the service of Dr. Nguyen .               Follow-up Information       Follow up With Specialties Details Why Contact Info    Gilbert Posey, DO Otolaryngology Follow up Monday 5/5/25 for wick removal [address]  [facility] [id number] [phone]

## 2025-04-30 LAB
ANION GAP SERPL CALCULATED.3IONS-SCNC: 10 MMOL/L (ref 4–13)
BASOPHILS # BLD AUTO: 0.09 THOUSANDS/ÂΜL (ref 0–0.1)
BASOPHILS NFR BLD AUTO: 1 % (ref 0–1)
BUN SERPL-MCNC: 22 MG/DL (ref 5–25)
CALCIUM SERPL-MCNC: 9 MG/DL (ref 8.4–10.2)
CHLORIDE SERPL-SCNC: 104 MMOL/L (ref 96–108)
CO2 SERPL-SCNC: 23 MMOL/L (ref 21–32)
CREAT SERPL-MCNC: 0.97 MG/DL (ref 0.6–1.3)
EOSINOPHIL # BLD AUTO: 0.2 THOUSAND/ÂΜL (ref 0–0.61)
EOSINOPHIL NFR BLD AUTO: 1 % (ref 0–6)
ERYTHROCYTE [DISTWIDTH] IN BLOOD BY AUTOMATED COUNT: 14.6 % (ref 11.6–15.1)
GFR SERPL CREATININE-BSD FRML MDRD: 68 ML/MIN/1.73SQ M
GLUCOSE SERPL-MCNC: 113 MG/DL (ref 65–140)
GLUCOSE SERPL-MCNC: 118 MG/DL (ref 65–140)
GLUCOSE SERPL-MCNC: 121 MG/DL (ref 65–140)
GLUCOSE SERPL-MCNC: 126 MG/DL (ref 65–140)
GLUCOSE SERPL-MCNC: 198 MG/DL (ref 65–140)
HCT VFR BLD AUTO: 37.6 % (ref 36.5–49.3)
HGB BLD-MCNC: 11.8 G/DL (ref 12–17)
IMM GRANULOCYTES # BLD AUTO: 0.06 THOUSAND/UL (ref 0–0.2)
IMM GRANULOCYTES NFR BLD AUTO: 0 % (ref 0–2)
LYMPHOCYTES # BLD AUTO: 1.09 THOUSANDS/ÂΜL (ref 0.6–4.47)
LYMPHOCYTES NFR BLD AUTO: 8 % (ref 14–44)
MAGNESIUM SERPL-MCNC: 2.3 MG/DL (ref 1.9–2.7)
MCH RBC QN AUTO: 28.4 PG (ref 26.8–34.3)
MCHC RBC AUTO-ENTMCNC: 31.4 G/DL (ref 31.4–37.4)
MCV RBC AUTO: 90 FL (ref 82–98)
MONOCYTES # BLD AUTO: 1.27 THOUSAND/ÂΜL (ref 0.17–1.22)
MONOCYTES NFR BLD AUTO: 9 % (ref 4–12)
NEUTROPHILS # BLD AUTO: 11.68 THOUSANDS/ÂΜL (ref 1.85–7.62)
NEUTS SEG NFR BLD AUTO: 81 % (ref 43–75)
NRBC BLD AUTO-RTO: 0 /100 WBCS
PLATELET # BLD AUTO: 345 THOUSANDS/UL (ref 149–390)
PMV BLD AUTO: 9.1 FL (ref 8.9–12.7)
POTASSIUM SERPL-SCNC: 3.4 MMOL/L (ref 3.5–5.3)
RBC # BLD AUTO: 4.16 MILLION/UL (ref 3.88–5.62)
SODIUM SERPL-SCNC: 137 MMOL/L (ref 135–147)
WBC # BLD AUTO: 14.39 THOUSAND/UL (ref 4.31–10.16)

## 2025-04-30 PROCEDURE — 85025 COMPLETE CBC W/AUTO DIFF WBC: CPT | Performed by: HOSPITALIST

## 2025-04-30 PROCEDURE — 82948 REAGENT STRIP/BLOOD GLUCOSE: CPT

## 2025-04-30 PROCEDURE — 80048 BASIC METABOLIC PNL TOTAL CA: CPT | Performed by: HOSPITALIST

## 2025-04-30 PROCEDURE — 83735 ASSAY OF MAGNESIUM: CPT | Performed by: HOSPITALIST

## 2025-04-30 PROCEDURE — 99232 SBSQ HOSP IP/OBS MODERATE 35: CPT | Performed by: STUDENT IN AN ORGANIZED HEALTH CARE EDUCATION/TRAINING PROGRAM

## 2025-04-30 RX ADMIN — CEFEPIME 1000 MG: 1 INJECTION, POWDER, FOR SOLUTION INTRAMUSCULAR; INTRAVENOUS at 08:58

## 2025-04-30 RX ADMIN — OFLOXACIN 1 DROP: 3 SOLUTION/ DROPS OPHTHALMIC at 12:24

## 2025-04-30 RX ADMIN — FENOFIBRATE 145 MG: 145 TABLET ORAL at 09:02

## 2025-04-30 RX ADMIN — ASPIRIN 81 MG: 81 TABLET, CHEWABLE ORAL at 09:02

## 2025-04-30 RX ADMIN — LATANOPROST 1 DROP: 50 SOLUTION OPHTHALMIC at 22:01

## 2025-04-30 RX ADMIN — OFLOXACIN 1 DROP: 3 SOLUTION/ DROPS OPHTHALMIC at 21:59

## 2025-04-30 RX ADMIN — DORZOLAMIDE HYDROCHLORIDE AND TIMOLOL MALEATE 1 DROP: 20; 5 SOLUTION/ DROPS OPHTHALMIC at 17:54

## 2025-04-30 RX ADMIN — OFLOXACIN 1 DROP: 3 SOLUTION/ DROPS OPHTHALMIC at 09:06

## 2025-04-30 RX ADMIN — DORZOLAMIDE HYDROCHLORIDE AND TIMOLOL MALEATE 1 DROP: 20; 5 SOLUTION/ DROPS OPHTHALMIC at 09:06

## 2025-04-30 RX ADMIN — CEFEPIME 1000 MG: 1 INJECTION, POWDER, FOR SOLUTION INTRAMUSCULAR; INTRAVENOUS at 19:32

## 2025-04-30 RX ADMIN — ENOXAPARIN SODIUM 40 MG: 40 INJECTION SUBCUTANEOUS at 09:02

## 2025-04-30 RX ADMIN — INSULIN LISPRO 1 UNITS: 100 INJECTION, SOLUTION INTRAVENOUS; SUBCUTANEOUS at 22:02

## 2025-04-30 RX ADMIN — OFLOXACIN 1 DROP: 3 SOLUTION/ DROPS OPHTHALMIC at 17:54

## 2025-04-30 NOTE — QUICK NOTE
Unable to see patient today.  Did review imaging and discussed with housestaff on management.  Will see patient in AM.  Continue with current recommendations.

## 2025-04-30 NOTE — ASSESSMENT & PLAN NOTE
89 year old male presented with left ear pain, hearing loss, and drainage possibly secondary to otitis externa, cellulitis, and subperiosteal abscess in imaging. Unfortunately, it appears that he failed outpatient treatment hence his presentation to our ED.   Continue cefepime for now  Appreciate ENT recommendations. Continue treatment plan for now. They will assess him tomorrow.

## 2025-04-30 NOTE — ASSESSMENT & PLAN NOTE
Lab Results   Component Value Date    HGBA1C 6.6 (H) 04/07/2025     Recent Labs     04/29/25 2054 04/30/25  0733 04/30/25  1053 04/30/25  1618   POCGLU 219* 118 126 113     Blood Sugar Average: Last 72 hrs:  (P) 144    Sliding scale

## 2025-04-30 NOTE — PLAN OF CARE
Problem: Potential for Falls  Goal: Patient will remain free of falls  Description: INTERVENTIONS:- Educate patient/family on patient safety including physical limitations- Instruct patient to call for assistance with activity - Consult OT/PT to assist with strengthening/mobility - Keep Call bell within reach- Keep bed low and locked with side rails adjusted as appropriate- Keep care items and personal belongings within reach- Initiate and maintain comfort rounds- Make Fall Risk Sign visible to staff- Offer Toileting every 2 Hours, in advance of need- Initiate/Maintain BED alarm- Obtain necessary fall risk management equipment: - Apply yellow socks and bracelet for high fall risk patients- Consider moving patient to room near nurses station  Outcome: Progressing     Problem: PAIN - ADULT  Goal: Verbalizes/displays adequate comfort level or baseline comfort level  Description: Interventions:- Encourage patient to monitor pain and request assistance- Assess pain using appropriate pain scale- Administer analgesics based on type and severity of pain and evaluate response- Implement non-pharmacological measures as appropriate and evaluate response- Consider cultural and social influences on pain and pain management- Notify physician/advanced practitioner if interventions unsuccessful or patient reports new pain  Outcome: Progressing     Problem: INFECTION - ADULT  Goal: Absence or prevention of progression during hospitalization  Description: INTERVENTIONS:- Assess and monitor for signs and symptoms of infection- Monitor lab/diagnostic results- Monitor all insertion sites, i.e. indwelling lines, tubes, and drains- Monitor endotracheal if appropriate and nasal secretions for changes in amount and color- Coleman appropriate cooling/warming therapies per order- Administer medications as ordered- Instruct and encourage patient and family to use good hand hygiene technique- Identify and instruct in appropriate isolation  precautions for identified infection/condition  Outcome: Progressing     Problem: DISCHARGE PLANNING  Goal: Discharge to home or other facility with appropriate resources  Description: INTERVENTIONS:- Identify barriers to discharge w/patient and caregiver- Arrange for needed discharge resources and transportation as appropriate- Identify discharge learning needs (meds, wound care, etc.)- Arrange for interpretive services to assist at discharge as needed- Refer to Case Management Department for coordinating discharge planning if the patient needs post-hospital services based on physician/advanced practitioner order or complex needs related to functional status, cognitive ability, or social support system  Outcome: Progressing

## 2025-04-30 NOTE — PROGRESS NOTES
Progress Note - Hospitalist   Name: Saud Ardon 89 y.o. male I MRN: [id number]  Unit/Bed#: E5 -01 I Date of Admission: 4/29/2025   Date of Service: 4/30/2025 I Hospital Day: 1    Assessment & Plan  Otitis externa  89 year old male presented with left ear pain, hearing loss, and drainage possibly secondary to otitis externa, cellulitis, and subperiosteal abscess in imaging. Unfortunately, it appears that he failed outpatient treatment hence his presentation to our ED.   Continue cefepime for now  Appreciate ENT recommendations. Continue treatment plan for now. They will assess him tomorrow.  Type 2 diabetes mellitus with stage 3a chronic kidney disease, without long-term current use of insulin (HCC)  Lab Results   Component Value Date    HGBA1C 6.6 (H) 04/07/2025     Recent Labs     04/29/25  2054 04/30/25  0733 04/30/25  1053 04/30/25  1618   POCGLU 219* 118 126 113     Blood Sugar Average: Last 72 hrs:  (P) 144    Sliding scale  Basal cell carcinoma (BCC) of skin of neck  History of basal cell carcinoma. Continue outpatient follow-up with dermatology / wound care  Hyperlipidemia  Continue fenofibrate  Stage 3a chronic kidney disease (HCC)  Lab Results   Component Value Date    EGFR 68 04/30/2025    EGFR 64 04/29/2025    EGFR 65 04/07/2025    CREATININE 0.97 04/30/2025    CREATININE 1.02 04/29/2025    CREATININE 1.00 04/07/2025     Stable, does not meet CHELSEA criteria    VTE Pharmacologic Prophylaxis: VTE Score: 5 Moderate Risk (Score 3-4) - Pharmacological DVT Prophylaxis Ordered: enoxaparin (Lovenox).    Mobility:   Basic Mobility Inpatient Raw Score: 24  JH-HLM Goal: 8: Walk 250 feet or more  JH-HLM Achieved: 7: Walk 25 feet or more    Discussions with Specialists or Other Care Team Provider: daughter    Education and Discussions with Family / Patient: patient, called Lissy and Cassy both did not answer    Current Length of Stay: 1 day(s)  Current Patient Status: Inpatient   Certification Statement: The  patient will continue to require additional inpatient hospital stay due to iv antibiotics, ent evaluation  Discharge Plan: 48 hours    Code Status: Level 1 - Full Code    Subjective   Patient seen and examined. Still complaining of ear pain and drainage    Objective   Vitals:   Temp (24hrs), Av.8 °F (36.6 °C), Min:97.5 °F (36.4 °C), Max:98 °F (36.7 °C)    Temp:  [97.5 °F (36.4 °C)-98 °F (36.7 °C)] 97.5 °F (36.4 °C)  HR:  [] 84  Resp:  [16] 16  BP: (111-145)/(78-86) 145/86  SpO2:  [99 %] 99 %  Body mass index is 22.46 kg/m².     Input and Output Summary (last 24 hours):     Intake/Output Summary (Last 24 hours) at 2025 1711  Last data filed at 2025 1304  Gross per 24 hour   Intake 120 ml   Output --   Net 120 ml       Physical Exam  Vitals reviewed.   Constitutional:       General: He is not in acute distress.  HENT:      Head: Normocephalic.      Ears:      Comments: Left ear dressing in place     Nose: Nose normal.      Mouth/Throat:      Mouth: Mucous membranes are moist.   Eyes:      General: No scleral icterus.  Cardiovascular:      Rate and Rhythm: Normal rate.   Pulmonary:      Effort: Pulmonary effort is normal. No respiratory distress.      Breath sounds: No wheezing.   Abdominal:      General: There is no distension.      Palpations: Abdomen is soft.      Tenderness: There is no abdominal tenderness.   Skin:     General: Skin is warm.   Neurological:      Mental Status: He is alert.   Psychiatric:         Mood and Affect: Mood normal.         Behavior: Behavior normal.       Lines/Drains:              Lab Results: I have reviewed the following results:   Results from last 7 days   Lab Units 25  0544 25  1438   WBC Thousand/uL 14.39* 13.31*   HEMOGLOBIN g/dL 11.8* 12.8   PLATELETS Thousands/uL 345 374   MCV fL 90 87     Results from last 7 days   Lab Units 25  0544 25  1438   SODIUM mmol/L 137 137   POTASSIUM mmol/L 3.4* 4.6   CHLORIDE mmol/L 104 100   CO2 mmol/L  23 25   ANION GAP mmol/L 10 12   BUN mg/dL 22 25   CREATININE mg/dL 0.97 1.02   CALCIUM mg/dL 9.0 9.9   ALBUMIN g/dL  --  4.0   TOTAL BILIRUBIN mg/dL  --  0.58   ALK PHOS U/L  --  123*   ALT U/L  --  28   AST U/L  --  36   EGFR ml/min/1.73sq m 68 64   GLUCOSE RANDOM mg/dL 121 123     Results from last 7 days   Lab Units 04/30/25  0544   MAGNESIUM mg/dL 2.3                      Results from last 7 days   Lab Units 04/30/25  1618 04/30/25  1053 04/30/25  0733 04/29/25  2054   POC GLUCOSE mg/dl 113 126 118 219*               Recent Cultures (last 7 days):   Results from last 7 days   Lab Units 04/29/25  1706   GRAM STAIN RESULT  No Polys or Bacteria seen   BODY FLUID CULTURE, STERILE  No growth       Imaging:  Reviewed radiology reports from this admission: ct temporal bones    Last 24 Hours Medication List:     Current Facility-Administered Medications:     aspirin chewable tablet 81 mg, Daily    cefepime (MAXIPIME) 1,000 mg in dextrose 5 % 50 mL IVPB, Q12H, Last Rate: 1,000 mg (04/30/25 0858)    dorzolamide-timolol (COSOPT) 2-0.5 % ophthalmic solution 1 drop, BID    enoxaparin (LOVENOX) subcutaneous injection 40 mg, Daily    fenofibrate (TRICOR) tablet 145 mg, Daily    insulin lispro (HumALOG/ADMELOG) 100 units/mL subcutaneous injection 1-5 Units, TID AC **AND** Fingerstick Glucose (POCT), TID AC    insulin lispro (HumALOG/ADMELOG) 100 units/mL subcutaneous injection 1-5 Units, HS    latanoprost (XALATAN) 0.005 % ophthalmic solution 1 drop, HS    ofloxacin (OCUFLOX) 0.3 % ophthalmic solution 1 drop, 4x Daily      **Please Note: This note may have been constructed using a voice recognition system.**

## 2025-04-30 NOTE — ASSESSMENT & PLAN NOTE
Lab Results   Component Value Date    EGFR 68 04/30/2025    EGFR 64 04/29/2025    EGFR 65 04/07/2025    CREATININE 0.97 04/30/2025    CREATININE 1.02 04/29/2025    CREATININE 1.00 04/07/2025     Stable, does not meet CHELSEA criteria

## 2025-04-30 NOTE — UTILIZATION REVIEW
Initial Clinical Review    Admission: Date/Time/Statement:   Admission Orders (From admission, onward)       Ordered        04/29/25 1804  INPATIENT ADMISSION  Once                          Orders Placed This Encounter   Procedures    INPATIENT ADMISSION     Standing Status:   Standing     Number of Occurrences:   1     Level of Care:   Med Surg [16]     Estimated length of stay:   More than 2 Midnights     Certification:   I certify that inpatient services are medically necessary for this patient for a duration of greater than two midnights. See H&P and MD Progress Notes for additional information about the patient's course of treatment.     ED Arrival Information       Expected   -    Arrival   4/29/2025 13:39    Acuity   Urgent              Means of arrival   Walk-In    Escorted by   Family Member    Service   Hospitalist    Admission type   Emergency              Arrival complaint   ear infection             Chief Complaint   Patient presents with    Earache     Ear pain x2 weeks, was seen previously and given antibiotics, pain is worsening, discharge/pus-like drainage, pain radiates to jaw area.       Initial Presentation: 89 y.o. male  presents to ED from home  with ear pain  for past  2 weeks.   Previously seen and  on po antibiotics.  Pain worsening, + discharge/pus like drainage. PAin radiates to jaw.  PMH  is   DM, diet controlled. Saw dentist  the day of admission, did not feel  a tooth was causing pain.    Dentist recommended  ED.    Admit  Ip with  Otitis Exerna  and plan is  ENT  consult, CLAUDIO and ear drops.       Date:    4/30   Day 2:   Remains on  CLAUDIO.  Still complains of  ear pain and drainage.  Wait  ENT  eval.    Date:   5/1  Day 3: Has surpassed a 2nd midnight with active treatments and services.  ENT  consult  L otitis  externa, no evidence of middle  ear pathology.   + purulent drainage  L external  auditory  canal with edema.   Wick placed left ear and new culture obtained.  Continue  CLAUDIO  and   ear drops.          ED Treatment-Medication Administration from 04/29/2025 1339 to 04/29/2025 1841         Date/Time Order Dose Route Action     04/29/2025 1439 acetaminophen (TYLENOL) tablet 650 mg 650 mg Oral Given     04/29/2025 1605 iohexol (OMNIPAQUE) 350 MG/ML injection (SINGLE-DOSE) 85 mL 85 mL Intravenous Given     04/29/2025 1639 Lidocaine Viscous HCl (XYLOCAINE) 2 % mucosal solution 15 mL 15 mL Swish & Spit Given     04/29/2025 1821 ofloxacin (OCUFLOX) 0.3 % ophthalmic solution 2 drop 2 drop Otic Given            Scheduled Medications:  aspirin, 81 mg, Oral, Daily  cefepime, 1,000 mg, Intravenous, Q12H  dorzolamide-timolol, 1 drop, Both Eyes, BID  enoxaparin, 40 mg, Subcutaneous, Daily  fenofibrate, 145 mg, Oral, Daily  insulin lispro, 1-5 Units, Subcutaneous, TID AC  insulin lispro, 1-5 Units, Subcutaneous, HS  latanoprost, 1 drop, Both Eyes, HS  ofloxacin, 1 drop, Otic, 4x Daily      Continuous IV Infusions:     PRN Meds:     ED Triage Vitals   Temperature Pulse Respirations Blood Pressure SpO2 Pain Score   04/29/25 1449 04/29/25 1343 04/29/25 1343 04/29/25 1343 04/29/25 1343 04/29/25 1439   98 °F (36.7 °C) 80 18 139/66 100 % 5     Weight (last 2 days)       Date/Time Weight    04/29/25 1343 52.3 (115.3)            Vital Signs (last 3 days)       Date/Time Temp Pulse Resp BP SpO2 O2 Device Patient Position - Orthostatic VS Pain    04/30/25 07:59:59 98 °F (36.7 °C) 101 16 111/78 99 % None (Room air) Sitting --    04/30/25 0100 -- -- -- -- -- -- -- No Pain    04/29/25 1850 -- -- -- -- -- -- -- No Pain    04/29/25 1641 -- 83 18 136/61 100 % None (Room air) Lying 6    04/29/25 1449 98 °F (36.7 °C) -- -- -- -- -- -- --    04/29/25 1439 -- -- -- -- -- -- -- 5    04/29/25 1343 -- 80 18 139/66 100 % None (Room air) Sitting --              Pertinent Labs/Diagnostic Test Results:   Radiology:  CT Temporal Bones/IACs/Mastoids w contrast   Final Interpretation by Dago Orta MD (04/29 1723)      LEFT  TEMPORAL BONE   - Findings suggestive of acute left otitis externa with surrounding acute cellulitis in left ear and left periauricular region. Suspected 0.6 cm subperiosteal abscess adjacent to the left mastoid temporal bone just inferolateral to opening of bony    external auditory canal. Recommend evaluation by ENT.   - Small left mastoid effusion. No osseous destruction.      RIGHT TEMPORAL BONE   - Normal.      The study was marked in EPIC for immediate notification.         Workstation performed: [id number]           Cardiology:        Results from last 7 days   Lab Units 04/30/25  0544 04/29/25  1438   WBC Thousand/uL 14.39* 13.31*   HEMOGLOBIN g/dL 11.8* 12.8   HEMATOCRIT % 37.6 39.7   PLATELETS Thousands/uL 345 374   TOTAL NEUT ABS Thousands/µL 11.68* 10.30*         Results from last 7 days   Lab Units 04/30/25  0544 04/29/25  1438   SODIUM mmol/L 137 137   POTASSIUM mmol/L 3.4* 4.6   CHLORIDE mmol/L 104 100   CO2 mmol/L 23 25   ANION GAP mmol/L 10 12   BUN mg/dL 22 25   CREATININE mg/dL 0.97 1.02   EGFR ml/min/1.73sq m 68 64   CALCIUM mg/dL 9.0 9.9   MAGNESIUM mg/dL 2.3  --      Results from last 7 days   Lab Units 04/29/25  1438   AST U/L 36   ALT U/L 28   ALK PHOS U/L 123*   TOTAL PROTEIN g/dL 7.5   ALBUMIN g/dL 4.0   TOTAL BILIRUBIN mg/dL 0.58     Results from last 7 days   Lab Units 04/30/25  0733 04/29/25  2054   POC GLUCOSE mg/dl 118 219*     Results from last 7 days   Lab Units 04/30/25  0544 04/29/25  1438   GLUCOSE RANDOM mg/dL 121 123                               Results from last 7 days   Lab Units 04/29/25  1706   GRAM STAIN RESULT  No Polys or Bacteria seen   BODY FLUID CULTURE, STERILE  No growth               Present on Admission:   Basal cell carcinoma (BCC) of skin of neck   Type 2 diabetes mellitus with stage 3a chronic kidney disease, without long-term current use of insulin (HCC)      Admitting Diagnosis: Ear pain [H92.09]  Left otitis externa [H60.92]  Subperiosteal abscess of left  mastoid [H70.012]  Age/Sex: 89 y.o. male    Network Utilization Review Department  ATTENTION: Please call with any questions or concerns to [phone] and carefully listen to the prompts so that you are directed to the right person. All voicemails are confidential.   For Discharge needs, contact Care Management DC Support Team at [phone] opt. 2  Send all requests for admission clinical reviews, approved or denied determinations and any other requests to dedicated fax number below belonging to the campus where the patient is receiving treatment. List of dedicated fax numbers for the Facilities:  FACILITY NAME UR FAX NUMBER   ADMISSION DENIALS (Administrative/Medical Necessity) [phone]   DISCHARGE SUPPORT TEAM (NETWORK) [phone]   PARENT CHILD HEALTH (Maternity/NICU/Pediatrics) [phone]   [facility] [phone]   [facility] [phone]   [facility] [phone]   [facility] [phone]   [facility] [phone]   [facility] [phone]   [facility] [phone]   [facility] [phone]   [facility] [phone]   [facility] [phone]   [facility] [phone]   [facility] [phone]

## 2025-04-30 NOTE — CASE MANAGEMENT
Case Management Assessment & Discharge Planning Note    Patient name Saud Ardon  Location [facility] /E5 -* MRN [id number]  : 1934 Date 2025       Current Admission Date: 2025  Current Admission Diagnosis:Otitis externa   Patient Active Problem List    Diagnosis Date Noted Date Diagnosed    Otitis externa 2025     Ischemic cardiomyopathy 2024     Basal cell carcinoma (BCC) of skin of neck 2024     Basal cell carcinoma (BCC) of right shoulder 2024     Lesion of neck 2024     Lyme disease 2024     Stage 3a chronic kidney disease (HCC) 2022     Callus of foot 2022     Moderate major depression (HCC) 2021     Bunion 2021     DNR (do not resuscitate) 2020     Overweight (BMI 25.0-29.9) 2019     Type 2 diabetes mellitus with stage 3a chronic kidney disease, without long-term current use of insulin (HCC) 2019     Onychomycosis of toenail 2019     Vitamin D deficiency 2018     Other specified glaucoma 2018     Aortic valve stenosis 05/15/2017     Insomnia 2016     Primary hypertension 2014     Hypertriglyceridemia 2014     Carotid artery disease (HCC) 2014     Anxiety 2013     CAD in native artery 2013     Coronary atherosclerosis 2013     Osteoarthritis 2013     Hepatitis A 2013     Hyperlipidemia 2013     Hydrocele 2013     Hyperplasia of prostate without lower urinary tract symptoms (LUTS) 2013       LOS (days): 1  Geometric Mean LOS (GMLOS) (days): 2.1  Days to GMLOS:1.3     OBJECTIVE:    Risk of Unplanned Readmission Score: 8.6         Current admission status: Inpatient       Preferred Pharmacy:   [id number] - [place] - [address] [place] [address]  Phone: [phone] Fax: [phone]    Primary Care Provider: Ca Vargas PA-C    Primary Insurance: [facility]  REP  Secondary Insurance:     ASSESSMENT:  Active Health Care Proxies    There are no active Health Care Proxies on file.       Advance Directives  Primary Contact: grand daughter/care giver Leola   [phone]    Readmission Root Cause  30 Day Readmission: No    Patient Information  Admitted from:: Home  Mental Status: Alert  During Assessment patient was accompanied by: Not accompanied during assessment  Assessment information provided by:: Other - please comment (grand jorge luis Bhagat)  Primary Caregiver: Self  Support Systems: Self, Family members  County of Residence: [place]  What city do you live in?: [place]  Home entry access options. Select all that apply.: Stairs  Number of steps to enter home.: 1  Type of Current Residence: [facility]  Living Arrangements: Lives Alone  Is patient a ?: No    Activities of Daily Living Prior to Admission  Functional Status: Independent  Completes ADLs independently?: Yes  Ambulates independently?: Yes  Does patient use assisted devices?: No  Does patient currently own DME?: Yes  What DME does the patient currently own?: Walker  Does patient have a history of Outpatient Therapy (PT/OT)?: No  Does the patient have a history of Short-Term Rehab?: No  Does patient have a history of HHC?: No  Does patient currently have HHC?: No    Patient Information Continued  Income Source: SSI/SSD  Does patient have prescription coverage?: Yes  Can the patient afford their medications and any related supplies (such as glucometers or test strips)?: Yes  Does patient receive dialysis treatments?: No  Does patient have a history of substance abuse?: No  Does patient have a history of Mental Health Diagnosis?: Yes (anxiety, major depression)  Has patient received inpatient treatment related to mental health in the last 2 years?: No    Means of Transportation  [facility]          DISCHARGE DETAILS:    Discharge planning discussed with:: patient's grand  child Leola    Contacts  Patient Contacts: Leola, grandchild  Relationship to Patient:: Family  Contact Method: Phone  Phone Number: [phone]  Reason/Outcome: Emergency Contact, Continuity of Care    Additional Comments: Patient lives alone 1 [address], IPTA, does not use DME but owns a RW. Patient drives and has a ride home at discharge. Information provided by grand daughter/caregiver Leola   [phone]

## 2025-05-01 LAB
ANION GAP SERPL CALCULATED.3IONS-SCNC: 11 MMOL/L (ref 4–13)
BASOPHILS # BLD AUTO: 0.09 THOUSANDS/ÂΜL (ref 0–0.1)
BASOPHILS NFR BLD AUTO: 1 % (ref 0–1)
BUN SERPL-MCNC: 33 MG/DL (ref 5–25)
CALCIUM SERPL-MCNC: 9.4 MG/DL (ref 8.4–10.2)
CHLORIDE SERPL-SCNC: 99 MMOL/L (ref 96–108)
CO2 SERPL-SCNC: 27 MMOL/L (ref 21–32)
CREAT SERPL-MCNC: 1.1 MG/DL (ref 0.6–1.3)
EOSINOPHIL # BLD AUTO: 0.37 THOUSAND/ÂΜL (ref 0–0.61)
EOSINOPHIL NFR BLD AUTO: 3 % (ref 0–6)
ERYTHROCYTE [DISTWIDTH] IN BLOOD BY AUTOMATED COUNT: 14.5 % (ref 11.6–15.1)
GFR SERPL CREATININE-BSD FRML MDRD: 58 ML/MIN/1.73SQ M
GLUCOSE SERPL-MCNC: 102 MG/DL (ref 65–140)
GLUCOSE SERPL-MCNC: 105 MG/DL (ref 65–140)
GLUCOSE SERPL-MCNC: 119 MG/DL (ref 65–140)
GLUCOSE SERPL-MCNC: 122 MG/DL (ref 65–140)
GLUCOSE SERPL-MCNC: 217 MG/DL (ref 65–140)
HCT VFR BLD AUTO: 39.6 % (ref 36.5–49.3)
HGB BLD-MCNC: 12.6 G/DL (ref 12–17)
IMM GRANULOCYTES # BLD AUTO: 0.06 THOUSAND/UL (ref 0–0.2)
IMM GRANULOCYTES NFR BLD AUTO: 0 % (ref 0–2)
LYMPHOCYTES # BLD AUTO: 1.48 THOUSANDS/ÂΜL (ref 0.6–4.47)
LYMPHOCYTES NFR BLD AUTO: 11 % (ref 14–44)
MAGNESIUM SERPL-MCNC: 2.3 MG/DL (ref 1.9–2.7)
MCH RBC QN AUTO: 27.8 PG (ref 26.8–34.3)
MCHC RBC AUTO-ENTMCNC: 31.8 G/DL (ref 31.4–37.4)
MCV RBC AUTO: 87 FL (ref 82–98)
MONOCYTES # BLD AUTO: 0.96 THOUSAND/ÂΜL (ref 0.17–1.22)
MONOCYTES NFR BLD AUTO: 7 % (ref 4–12)
NEUTROPHILS # BLD AUTO: 10.88 THOUSANDS/ÂΜL (ref 1.85–7.62)
NEUTS SEG NFR BLD AUTO: 78 % (ref 43–75)
NRBC BLD AUTO-RTO: 0 /100 WBCS
PLATELET # BLD AUTO: 375 THOUSANDS/UL (ref 149–390)
PMV BLD AUTO: 9.4 FL (ref 8.9–12.7)
POTASSIUM SERPL-SCNC: 3.2 MMOL/L (ref 3.5–5.3)
RBC # BLD AUTO: 4.54 MILLION/UL (ref 3.88–5.62)
SODIUM SERPL-SCNC: 137 MMOL/L (ref 135–147)
WBC # BLD AUTO: 13.84 THOUSAND/UL (ref 4.31–10.16)

## 2025-05-01 PROCEDURE — 85025 COMPLETE CBC W/AUTO DIFF WBC: CPT | Performed by: STUDENT IN AN ORGANIZED HEALTH CARE EDUCATION/TRAINING PROGRAM

## 2025-05-01 PROCEDURE — 99232 SBSQ HOSP IP/OBS MODERATE 35: CPT | Performed by: STUDENT IN AN ORGANIZED HEALTH CARE EDUCATION/TRAINING PROGRAM

## 2025-05-01 PROCEDURE — 87077 CULTURE AEROBIC IDENTIFY: CPT | Performed by: PHYSICIAN ASSISTANT

## 2025-05-01 PROCEDURE — 83735 ASSAY OF MAGNESIUM: CPT | Performed by: STUDENT IN AN ORGANIZED HEALTH CARE EDUCATION/TRAINING PROGRAM

## 2025-05-01 PROCEDURE — 80048 BASIC METABOLIC PNL TOTAL CA: CPT | Performed by: STUDENT IN AN ORGANIZED HEALTH CARE EDUCATION/TRAINING PROGRAM

## 2025-05-01 PROCEDURE — 87186 SC STD MICRODIL/AGAR DIL: CPT | Performed by: PHYSICIAN ASSISTANT

## 2025-05-01 PROCEDURE — 87205 SMEAR GRAM STAIN: CPT | Performed by: PHYSICIAN ASSISTANT

## 2025-05-01 PROCEDURE — 87070 CULTURE OTHR SPECIMN AEROBIC: CPT | Performed by: PHYSICIAN ASSISTANT

## 2025-05-01 PROCEDURE — 82948 REAGENT STRIP/BLOOD GLUCOSE: CPT

## 2025-05-01 RX ORDER — POTASSIUM CHLORIDE 1500 MG/1
40 TABLET, EXTENDED RELEASE ORAL ONCE
Status: COMPLETED | OUTPATIENT
Start: 2025-05-01 | End: 2025-05-01

## 2025-05-01 RX ADMIN — CEFEPIME 1000 MG: 1 INJECTION, POWDER, FOR SOLUTION INTRAMUSCULAR; INTRAVENOUS at 08:55

## 2025-05-01 RX ADMIN — OFLOXACIN 1 DROP: 3 SOLUTION/ DROPS OPHTHALMIC at 08:58

## 2025-05-01 RX ADMIN — CEFEPIME 1000 MG: 1 INJECTION, POWDER, FOR SOLUTION INTRAMUSCULAR; INTRAVENOUS at 19:09

## 2025-05-01 RX ADMIN — ENOXAPARIN SODIUM 40 MG: 40 INJECTION SUBCUTANEOUS at 08:58

## 2025-05-01 RX ADMIN — OFLOXACIN 1 DROP: 3 SOLUTION/ DROPS OPHTHALMIC at 12:56

## 2025-05-01 RX ADMIN — POTASSIUM CHLORIDE 40 MEQ: 1500 TABLET, EXTENDED RELEASE ORAL at 17:01

## 2025-05-01 RX ADMIN — OFLOXACIN 1 DROP: 3 SOLUTION/ DROPS OPHTHALMIC at 17:22

## 2025-05-01 RX ADMIN — DORZOLAMIDE HYDROCHLORIDE AND TIMOLOL MALEATE 1 DROP: 20; 5 SOLUTION/ DROPS OPHTHALMIC at 08:59

## 2025-05-01 RX ADMIN — ASPIRIN 81 MG: 81 TABLET, CHEWABLE ORAL at 08:58

## 2025-05-01 RX ADMIN — LATANOPROST 1 DROP: 50 SOLUTION OPHTHALMIC at 22:05

## 2025-05-01 RX ADMIN — OFLOXACIN 1 DROP: 3 SOLUTION/ DROPS OPHTHALMIC at 22:05

## 2025-05-01 RX ADMIN — FENOFIBRATE 145 MG: 145 TABLET ORAL at 09:02

## 2025-05-01 RX ADMIN — DORZOLAMIDE HYDROCHLORIDE AND TIMOLOL MALEATE 1 DROP: 20; 5 SOLUTION/ DROPS OPHTHALMIC at 17:22

## 2025-05-01 RX ADMIN — INSULIN LISPRO 2 UNITS: 100 INJECTION, SOLUTION INTRAVENOUS; SUBCUTANEOUS at 16:58

## 2025-05-01 NOTE — CONSULTS
Assessment and Plan   Saud was seen for left otitis externa. There is no evidence of middle ear pathology on imaging and no evidence of malignant OE. He has purulent drainage of left external auditory canal with edema. I can not visualize his left TM due to swelling. I placed a wick in his left ear after obtaining a new culture. Follow original culture and new culture to guide transition to po antibiotics. He should also continue on otic drops with Ofloxacin bid on the left. I will see him in the office on Monday for wick removal. He asked for us to call his granddaughter, Hallie for follow up and I did relay this to my staff. Her number is [phone].   AVS updated.     Of note, he has a large BCC on his left back/neck area that is being followed outpatient.     I reviewed and discussed the case with Dr. Posey   I discussed with primary team, Dr. Guaman   I updated his nurse     Diagnoses   Left otitis externa   Left otorrhea   Left otalgia   Abnormal auditory perception, left   BCC on left neck/shoulder     History of Present Illness   Saud Ardon is a 89 y.o. male who presents with left ear drainage and pain. He states this has been ongoing for over a month. He reports decreased hearing on that side with ongoing drainage. No tinnitus. He denies fevers or chills. No dizziness. He was seen at his PCP on 4/16 and placed on amoxicillin for 7 days and Ofloxacin drops. He was admitted and started on IV cefepime and ofloxacin drops.       Review of Systems   Constitutional:  Negative for fever.   HENT:  Positive for ear discharge, ear pain and hearing loss. Negative for congestion, drooling, facial swelling, mouth sores, nosebleeds, postnasal drip, rhinorrhea, sinus pressure, sinus pain, sneezing, sore throat, tinnitus, trouble swallowing and voice change.    Eyes:  Negative for visual disturbance.   Respiratory:  Negative for cough, choking and shortness of breath.    Musculoskeletal:  Negative for gait problem.    Neurological:  Negative for dizziness, light-headedness and headaches.   All other systems reviewed and are negative.    Historical Information   Past Medical History:   Diagnosis Date    Anxiety     CAD (coronary artery disease)     Cancer (HCC)     Capsular cataract     mature    Chronic ulcer of skin (HCC) 05/18/2015    DJD (degenerative joint disease)     Encounter for routine adult medical exam with abnormal findings 08/21/2019    Hepatitis A     Hydrocele     Hyperglycemia     Hyperlipidemia     Hypertension     Impaired fasting glucose 04/30/2014    Ingrown toenail     Myocardial infarct (HCC) 2007    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HCC)     Potassium (K) excess 12/27/2019    Prostatic hyperplasia     Skin cancer      Past Surgical History:   Procedure Laterality Date    ANGIOPLASTY  2008    FL LUMBAR PUNCTURE DIAGNOSTIC  1/14/2025     Social History     Tobacco Use    Smoking status: Never     Passive exposure: Never    Smokeless tobacco: Never   Vaping Use    Vaping status: Never Used   Substance and Sexual Activity    Alcohol use: Never    Drug use: Never    Sexual activity: Not Currently     Partners: Female     E-Cigarette/Vaping    E-Cigarette Use Never User      E-Cigarette/Vaping Substances    Nicotine No     THC No     CBD No     Flavoring No      Family History   Problem Relation Age of Onset    Coronary artery disease Father      Social History     Tobacco Use    Smoking status: Never     Passive exposure: Never    Smokeless tobacco: Never   Vaping Use    Vaping status: Never Used   Substance and Sexual Activity    Alcohol use: Never    Drug use: Never    Sexual activity: Not Currently     Partners: Female       Current Facility-Administered Medications:     aspirin chewable tablet 81 mg, Daily    cefepime (MAXIPIME) 1,000 mg in dextrose 5 % 50 mL IVPB, Q12H, Last Rate: 1,000 mg (04/30/25 1932)    dorzolamide-timolol (COSOPT) 2-0.5 % ophthalmic solution 1 drop, BID    enoxaparin (LOVENOX) subcutaneous injection  40 mg, Daily    fenofibrate (TRICOR) tablet 145 mg, Daily    insulin lispro (HumALOG/ADMELOG) 100 units/mL subcutaneous injection 1-5 Units, TID AC **AND** Fingerstick Glucose (POCT), TID AC    insulin lispro (HumALOG/ADMELOG) 100 units/mL subcutaneous injection 1-5 Units, HS    latanoprost (XALATAN) 0.005 % ophthalmic solution 1 drop, HS    ofloxacin (OCUFLOX) 0.3 % ophthalmic solution 1 drop, 4x Daily  Prior to Admission Medications   Prescriptions Last Dose Informant Patient Reported? Taking?   Aspirin 81 MG CAPS   No No   Sig: Take 1 tablet by mouth in the morning   Metoprolol Tartrate 75 MG TABS   Yes No   Sig: Take 75 mg by mouth   Multiple Vitamins-Minerals (MULTIVITAL) tablet  Self, Family Member Yes No   Sig: every 24 hours   Multiple Vitamins-Minerals (PRESERVISION AREDS 2+MULTI VIT PO)  Self, Family Member Yes No   Sig: take 2 tabs daily   QUEtiapine (SEROquel) 25 mg tablet   Yes No   Sig: Take 12.5 mg by mouth daily as needed   RHOPRESSA 0.02 % SOLN  Self, Family Member Yes No   Sig: INSTILL 1 DROP AT BEDTIME INTO BOTH EYES   amoxicillin-clavulanate (AUGMENTIN) 875-125 mg per tablet   No No   Sig: Take 1 tablet by mouth every 12 (twelve) hours for 7 days   ciprofloxacin-dexamethasone (CIPRODEX) otic suspension   No No   Sig: Administer 4 drops into the left ear 2 (two) times a day   dorzolamide-timolol (COSOPT) 22.3-6.8 MG/ML ophthalmic solution  Self, Family Member Yes No   Sig: Every 12 hours   fenofibrate (TRICOR) 54 MG tablet   No No   Sig: Take 1 tablet (54 mg total) by mouth daily   latanoprost (XALATAN) 0.005 % ophthalmic solution  Self, Family Member Yes No   Sig: INSTILL 1 DROP AT BEDTIME INTO BOTH EYES   mupirocin (BACTROBAN) 2 % ointment  Self, Family Member No No   Sig: Apply topically daily During wound bandage changes   mupirocin (BACTROBAN) 2 % ointment   No No   Sig: Apply topically 3 (three) times a day   nitroglycerin (NITROSTAT) 0.4 mg SL tablet  Self, Family Member No No   Sig: Take  one under tongue for chest pain. Can repet in 5 minutes if necessary.   simvastatin (ZOCOR) 40 mg tablet   No No   Sig: Take 1 tablet (40 mg total) by mouth daily at bedtime      Facility-Administered Medications: None     Brimonidine    Objective :  Temp:  [97.5 °F (36.4 °C)] 97.5 °F (36.4 °C)  HR:  [82-84] 82  BP: (140-145)/(76-86) 140/76  Resp:  [16-20] 20  SpO2:  [99 %] 99 %  O2 Device: None (Room air)    Physical Exam   PHYSICAL  EXAMINATION    CONSTITUTION:    appears appropriate for age.    No evidence of any acute distress.    Communicates normally.    Voice quality is clear.    Alert.     HEAD/FACE:    atraumatic, normocephalic on inspection.    No scars present.    Salivary glands are normal in texture and size without any asymmetry.    Facial nerve function is symmetric and normal.    EYES:    Extraocular muscles intact in both eyes, normal gaze bilaterally and no evidence of nystagmus.    Pupils equal, round, and accommodate to light bilaterally.    EARS:    External ears normal.    External canal on right clear and dry.  On the left with evidence of edema narrowing canal with thick purulent drainage out the canal   Tympanic membrane on right intact with normal mobility, no effusion, no retraction, no perforation.  On the left unable to visualize due to significant swelling and otorrhea   Post auricular area is normal    NOSE:    External nose without deformity.    Internal mucosa pink and moist.    Septum midline.    Nasal turbinates normal in color and size.    ORAL CAVITY:    lips normal and healthy in appearance.    Dentition - edentulous upper and missing teeth lower     Gums healthy, pink and moist.    Tongue appears pink and moist with no lesions.    Floor of mouth pink, moist, and smooth.    Submandibular ducts patent with clear saliva.    Parotid ducts patent with clear saliva.    Oral mucosa pink and moist.    Hard palate normal in appearance without any lesions.    OROPHARYNX:    soft palate  pink and moist without any lesions.    Uvula midline without any lesions.    Tonsils atrophied     Posterior pharynx pink and moist without any lesions    NECK:    supple and symmetric.    No masses noted.    Trachea midline.    No thyromegaly or nodules noted.    LYMPH:    No palpable adenopathy in left or right neck    SKIN:    No rashes. No lesions noted.      Lab Results: I have reviewed the following results:  Results from last 7 days   Lab Units 05/01/25 0455 04/30/25 0544 04/29/25  1438   WBC Thousand/uL 13.84* 14.39* 13.31*   HEMOGLOBIN g/dL 12.6 11.8* 12.8   HEMATOCRIT % 39.6 37.6 39.7   PLATELETS Thousands/uL 375 345 374     Results from last 7 days   Lab Units 05/01/25 0455 04/30/25  0544 04/29/25  1438   POTASSIUM mmol/L 3.2* 3.4* 4.6   CHLORIDE mmol/L 99 104 100   CO2 mmol/L 27 23 25   BUN mg/dL 33* 22 25   CREATININE mg/dL 1.10 0.97 1.02   CALCIUM mg/dL 9.4 9.0 9.9           Imaging Results Review: I personally reviewed the following image studies in PACS and associated radiology reports: CT temporal bones. My interpretation of the radiology images/reports is: same as radiologist, no evidence of malignant OE, reviewed with Dr. Posey .

## 2025-05-01 NOTE — ASSESSMENT & PLAN NOTE
Lab Results   Component Value Date    EGFR 58 05/01/2025    EGFR 68 04/30/2025    EGFR 64 04/29/2025    CREATININE 1.10 05/01/2025    CREATININE 0.97 04/30/2025    CREATININE 1.02 04/29/2025     Stable, does not meet CHELSEA criteria

## 2025-05-01 NOTE — PROGRESS NOTES
Progress Note - Hospitalist   Name: Saud Ardon 89 y.o. male I MRN: [id number]  Unit/Bed#: E5 -01 I Date of Admission: 4/29/2025   Date of Service: 5/1/2025 I Hospital Day: 2    Assessment & Plan  Otitis externa  89 year old male presented with left ear pain, hearing loss, and drainage possibly secondary to otitis externa, cellulitis, and subperiosteal abscess in imaging. Unfortunately, it appears that he failed outpatient treatment hence his presentation to our ED.   Continue cefepime, cultures growing pseudomonas so far. Continue Ofloxacin BID left ear.  Appreciate ENT recommendations. They will assist in arranging his follow-up for this Monday.  Type 2 diabetes mellitus with stage 3a chronic kidney disease, without long-term current use of insulin (HCC)  Lab Results   Component Value Date    HGBA1C 6.6 (H) 04/07/2025     Recent Labs     04/30/25  1618 04/30/25  2051 05/01/25  0737 05/01/25  1141   POCGLU 113 198* 122 102     Blood Sugar Average: Last 72 hrs:  (P) [id number]    Sliding scale  Basal cell carcinoma (BCC) of skin of neck  History of basal cell carcinoma. Continue outpatient follow-up with dermatology / wound care  Hyperlipidemia  Continue fenofibrate  Stage 3a chronic kidney disease (HCC)  Lab Results   Component Value Date    EGFR 58 05/01/2025    EGFR 68 04/30/2025    EGFR 64 04/29/2025    CREATININE 1.10 05/01/2025    CREATININE 0.97 04/30/2025    CREATININE 1.02 04/29/2025     Stable, does not meet CHELSEA criteria    VTE Pharmacologic Prophylaxis: VTE Score: 5 Moderate Risk (Score 3-4) - Pharmacological DVT Prophylaxis Ordered: enoxaparin (Lovenox).    Mobility:   Basic Mobility Inpatient Raw Score: 20  JH-HLM Goal: 6: Walk 10 steps or more  JH-HLM Achieved: 8: Walk 250 feet ot more    Discussions with Specialists or Other Care Team Provider: ENT    Education and Discussions with Family / Patient: patient, called darline mijares no answer, called Leola    Current Length of Stay: 2  day(s)  Current Patient Status: Inpatient   Certification Statement: The patient will continue to require additional inpatient hospital stay due to awaiting sensitivities  Discharge Plan: Anticipate discharge tomorrow to home.    Code Status: Level 1 - Full Code    Subjective   Patient seen and examined. No new issues or complaints overnight except for his hear.    Objective   Vitals:   Temp (24hrs), Av.8 °F (36.6 °C), Min:97.5 °F (36.4 °C), Max:98.5 °F (36.9 °C)    Temp:  [97.5 °F (36.4 °C)-98.5 °F (36.9 °C)] 97.5 °F (36.4 °C)  HR:  [] 95  Resp:  [16-20] 19  BP: (110-145)/(75-86) 126/75  SpO2:  [98 %-100 %] 99 %  Body mass index is 22.46 kg/m².     Input and Output Summary (last 24 hours):     Intake/Output Summary (Last 24 hours) at 2025 1545  Last data filed at 2025 0847  Gross per 24 hour   Intake 600 ml   Output --   Net 600 ml       Physical Exam  Vitals reviewed.   Constitutional:       General: He is not in acute distress.  HENT:      Head: Normocephalic.      Ears:      Comments: Left ear with crusted greenish drainage     Nose: Nose normal.      Mouth/Throat:      Mouth: Mucous membranes are moist.   Eyes:      General: No scleral icterus.  Cardiovascular:      Rate and Rhythm: Normal rate.   Pulmonary:      Effort: Pulmonary effort is normal. No respiratory distress.   Abdominal:      General: There is no distension.      Palpations: Abdomen is soft.      Tenderness: There is no abdominal tenderness.   Skin:     General: Skin is warm.   Neurological:      Mental Status: He is alert.   Psychiatric:         Mood and Affect: Mood normal.         Behavior: Behavior normal.       Lines/Drains:              Lab Results: I have reviewed the following results:   Results from last 7 days   Lab Units 25  0455 25  0544 25  1438   WBC Thousand/uL 13.84* 14.39* 13.31*   HEMOGLOBIN g/dL 12.6 11.8* 12.8   PLATELETS Thousands/uL 375 345 374   MCV fL 87 90 87     Results from last 7  days   Lab Units 05/01/25  0455 04/30/25  0544 04/29/25  1438   SODIUM mmol/L 137 137 137   POTASSIUM mmol/L 3.2* 3.4* 4.6   CHLORIDE mmol/L 99 104 100   CO2 mmol/L 27 23 25   ANION GAP mmol/L 11 10 12   BUN mg/dL 33* 22 25   CREATININE mg/dL 1.10 0.97 1.02   CALCIUM mg/dL 9.4 9.0 9.9   ALBUMIN g/dL  --   --  4.0   TOTAL BILIRUBIN mg/dL  --   --  0.58   ALK PHOS U/L  --   --  123*   ALT U/L  --   --  28   AST U/L  --   --  36   EGFR ml/min/1.73sq m 58 68 64   GLUCOSE RANDOM mg/dL 119 121 123     Results from last 7 days   Lab Units 05/01/25  0455 04/30/25  0544   MAGNESIUM mg/dL 2.3 2.3                      Results from last 7 days   Lab Units 05/01/25  1141 05/01/25  0737 04/30/25  2051 04/30/25  1618 04/30/25  1053 04/30/25  0733 04/29/25  2054   POC GLUCOSE mg/dl 102 122 198* 113 126 118 219*               Recent Cultures (last 7 days):   Results from last 7 days   Lab Units 04/29/25  1706   GRAM STAIN RESULT  No Polys or Bacteria seen   BODY FLUID CULTURE, STERILE  Few Colonies of Pseudomonas aeruginosa*       Imaging:  Reviewed radiology reports from this admission: ct temporal bones    Last 24 Hours Medication List:     Current Facility-Administered Medications:     aspirin chewable tablet 81 mg, Daily    cefepime (MAXIPIME) 1,000 mg in dextrose 5 % 50 mL IVPB, Q12H, Last Rate: 1,000 mg (05/01/25 0855)    dorzolamide-timolol (COSOPT) 2-0.5 % ophthalmic solution 1 drop, BID    enoxaparin (LOVENOX) subcutaneous injection 40 mg, Daily    fenofibrate (TRICOR) tablet 145 mg, Daily    insulin lispro (HumALOG/ADMELOG) 100 units/mL subcutaneous injection 1-5 Units, TID AC **AND** Fingerstick Glucose (POCT), TID AC    insulin lispro (HumALOG/ADMELOG) 100 units/mL subcutaneous injection 1-5 Units, HS    latanoprost (XALATAN) 0.005 % ophthalmic solution 1 drop, HS    ofloxacin (OCUFLOX) 0.3 % ophthalmic solution 1 drop, 4x Daily    **Please Note: This note may have been constructed using a voice recognition system.**

## 2025-05-01 NOTE — PLAN OF CARE
Problem: Potential for Falls  Goal: Patient will remain free of falls  Description: INTERVENTIONS:- Educate patient/family on patient safety including physical limitations- Instruct patient to call for assistance with activity - Consult OT/PT to assist with strengthening/mobility - Keep Call bell within reach- Keep bed low and locked with side rails adjusted as appropriate- Keep care items and personal belongings within reach- Initiate and maintain comfort rounds- Make Fall Risk Sign visible to staff- Offer Toileting every 2 Hours, in advance of need- Initiate/Maintain BED alarm- Obtain necessary fall risk management equipment: - Apply yellow socks and bracelet for high fall risk patients- Consider moving patient to room near nurses station  Outcome: Progressing     Problem: PAIN - ADULT  Goal: Verbalizes/displays adequate comfort level or baseline comfort level  Description: Interventions:- Encourage patient to monitor pain and request assistance- Assess pain using appropriate pain scale- Administer analgesics based on type and severity of pain and evaluate response- Implement non-pharmacological measures as appropriate and evaluate response- Consider cultural and social influences on pain and pain management- Notify physician/advanced practitioner if interventions unsuccessful or patient reports new pain  Outcome: Progressing     Problem: INFECTION - ADULT  Goal: Absence or prevention of progression during hospitalization  Description: INTERVENTIONS:- Assess and monitor for signs and symptoms of infection- Monitor lab/diagnostic results- Monitor all insertion sites, i.e. indwelling lines, tubes, and drains- Monitor endotracheal if appropriate and nasal secretions for changes in amount and color- [place] appropriate cooling/warming therapies per order- Administer medications as ordered- Instruct and encourage patient and family to use good hand hygiene technique- Identify and instruct in appropriate isolation  precautions for identified infection/condition  Outcome: Progressing     Problem: DISCHARGE PLANNING  Goal: Discharge to home or other facility with appropriate resources  Description: INTERVENTIONS:- Identify barriers to discharge w/patient and caregiver- Arrange for needed discharge resources and transportation as appropriate- Identify discharge learning needs (meds, wound care, etc.)- Arrange for interpretive services to assist at discharge as needed- Refer to Case Management Department for coordinating discharge planning if the patient needs post-hospital services based on physician/advanced practitioner order or complex needs related to functional status, cognitive ability, or social support system  Outcome: Progressing

## 2025-05-01 NOTE — ASSESSMENT & PLAN NOTE
Lab Results   Component Value Date    HGBA1C 6.6 (H) 04/07/2025     Recent Labs     04/30/25  1618 04/30/25 2051 05/01/25  0737 05/01/25  1141   POCGLU 113 198* 122 102     Blood Sugar Average: Last 72 hrs:  (P) [id number]    Sliding scale

## 2025-05-01 NOTE — PLAN OF CARE
Problem: Potential for Falls  Goal: Patient will remain free of falls  Description: INTERVENTIONS:- Educate patient/family on patient safety including physical limitations- Instruct patient to call for assistance with activity - Consult OT/PT to assist with strengthening/mobility - Keep Call bell within reach- Keep bed low and locked with side rails adjusted as appropriate- Keep care items and personal belongings within reach- Initiate and maintain comfort rounds- Make Fall Risk Sign visible to staff- Offer Toileting every 2 Hours, in advance of need- Initiate/Maintain BED alarm- Obtain necessary fall risk management equipment: - Apply yellow socks and bracelet for high fall risk patients- Consider moving patient to room near nurses station  Outcome: Progressing     Problem: PAIN - ADULT  Goal: Verbalizes/displays adequate comfort level or baseline comfort level  Description: Interventions:- Encourage patient to monitor pain and request assistance- Assess pain using appropriate pain scale- Administer analgesics based on type and severity of pain and evaluate response- Implement non-pharmacological measures as appropriate and evaluate response- Consider cultural and social influences on pain and pain management- Notify physician/advanced practitioner if interventions unsuccessful or patient reports new pain  Outcome: Progressing     Problem: INFECTION - ADULT  Goal: Absence or prevention of progression during hospitalization  Description: INTERVENTIONS:- Assess and monitor for signs and symptoms of infection- Monitor lab/diagnostic results- Monitor all insertion sites, i.e. indwelling lines, tubes, and drains- Monitor endotracheal if appropriate and nasal secretions for changes in amount and color- [place] appropriate cooling/warming therapies per order- Administer medications as ordered- Instruct and encourage patient and family to use good hand hygiene technique- Identify and instruct in appropriate isolation  precautions for identified infection/condition  Outcome: Progressing     Problem: DISCHARGE PLANNING  Goal: Discharge to home or other facility with appropriate resources  Description: INTERVENTIONS:- Identify barriers to discharge w/patient and caregiver- Arrange for needed discharge resources and transportation as appropriate- Identify discharge learning needs (meds, wound care, etc.)- Arrange for interpretive services to assist at discharge as needed- Refer to Case Management Department for coordinating discharge planning if the patient needs post-hospital services based on physician/advanced practitioner order or complex needs related to functional status, cognitive ability, or social support system  Outcome: Progressing

## 2025-05-01 NOTE — ASSESSMENT & PLAN NOTE
89 year old male presented with left ear pain, hearing loss, and drainage possibly secondary to otitis externa, cellulitis, and subperiosteal abscess in imaging. Unfortunately, it appears that he failed outpatient treatment hence his presentation to our ED.   Continue cefepime, cultures growing pseudomonas so far. Continue Ofloxacin BID left ear.  Appreciate ENT recommendations. They will assist in arranging his follow-up for this Monday.

## 2025-05-01 NOTE — PLAN OF CARE
Problem: Potential for Falls  Goal: Patient will remain free of falls  Description: INTERVENTIONS:- Educate patient/family on patient safety including physical limitations- Instruct patient to call for assistance with activity - Consult OT/PT to assist with strengthening/mobility - Keep Call bell within reach- Keep bed low and locked with side rails adjusted as appropriate- Keep care items and personal belongings within reach- Initiate and maintain comfort rounds- Make Fall Risk Sign visible to staff- Offer Toileting every 2 Hours, in advance of need- Initiate/Maintain BED alarm- Obtain necessary fall risk management equipment: - Apply yellow socks and bracelet for high fall risk patients- Consider moving patient to room near nurses station  Outcome: Adequate for Discharge     Problem: PAIN - ADULT  Goal: Verbalizes/displays adequate comfort level or baseline comfort level  Description: Interventions:- Encourage patient to monitor pain and request assistance- Assess pain using appropriate pain scale- Administer analgesics based on type and severity of pain and evaluate response- Implement non-pharmacological measures as appropriate and evaluate response- Consider cultural and social influences on pain and pain management- Notify physician/advanced practitioner if interventions unsuccessful or patient reports new pain  Outcome: Adequate for Discharge     Problem: INFECTION - ADULT  Goal: Absence or prevention of progression during hospitalization  Description: INTERVENTIONS:- Assess and monitor for signs and symptoms of infection- Monitor lab/diagnostic results- Monitor all insertion sites, i.e. indwelling lines, tubes, and drains- Monitor endotracheal if appropriate and nasal secretions for changes in amount and color- [place] appropriate cooling/warming therapies per order- Administer medications as ordered- Instruct and encourage patient and family to use good hand hygiene technique- Identify and instruct in  appropriate isolation precautions for identified infection/condition  Outcome: Adequate for Discharge     Problem: DISCHARGE PLANNING  Goal: Discharge to home or other facility with appropriate resources  Description: INTERVENTIONS:- Identify barriers to discharge w/patient and caregiver- Arrange for needed discharge resources and transportation as appropriate- Identify discharge learning needs (meds, wound care, etc.)- Arrange for interpretive services to assist at discharge as needed- Refer to Case Management Department for coordinating discharge planning if the patient needs post-hospital services based on physician/advanced practitioner order or complex needs related to functional status, cognitive ability, or social support system  Outcome: Adequate for Discharge

## 2025-05-02 VITALS
DIASTOLIC BLOOD PRESSURE: 79 MMHG | OXYGEN SATURATION: 100 % | HEIGHT: 60 IN | HEART RATE: 101 BPM | RESPIRATION RATE: 19 BRPM | WEIGHT: 115 LBS | SYSTOLIC BLOOD PRESSURE: 120 MMHG | BODY MASS INDEX: 22.58 KG/M2 | TEMPERATURE: 98.4 F

## 2025-05-02 LAB
ANION GAP SERPL CALCULATED.3IONS-SCNC: 11 MMOL/L (ref 4–13)
BACTERIA SPEC BFLD CULT: ABNORMAL
BUN SERPL-MCNC: 41 MG/DL (ref 5–25)
CALCIUM SERPL-MCNC: 9.5 MG/DL (ref 8.4–10.2)
CHLORIDE SERPL-SCNC: 95 MMOL/L (ref 96–108)
CO2 SERPL-SCNC: 29 MMOL/L (ref 21–32)
CREAT SERPL-MCNC: 1.36 MG/DL (ref 0.6–1.3)
ERYTHROCYTE [DISTWIDTH] IN BLOOD BY AUTOMATED COUNT: 14.4 % (ref 11.6–15.1)
GFR SERPL CREATININE-BSD FRML MDRD: 45 ML/MIN/1.73SQ M
GLUCOSE SERPL-MCNC: 106 MG/DL (ref 65–140)
GLUCOSE SERPL-MCNC: 127 MG/DL (ref 65–140)
GLUCOSE SERPL-MCNC: 128 MG/DL (ref 65–140)
GRAM STN SPEC: ABNORMAL
HCT VFR BLD AUTO: 39.2 % (ref 36.5–49.3)
HGB BLD-MCNC: 12.8 G/DL (ref 12–17)
MAGNESIUM SERPL-MCNC: 2.5 MG/DL (ref 1.9–2.7)
MCH RBC QN AUTO: 28.3 PG (ref 26.8–34.3)
MCHC RBC AUTO-ENTMCNC: 32.7 G/DL (ref 31.4–37.4)
MCV RBC AUTO: 87 FL (ref 82–98)
PLATELET # BLD AUTO: 425 THOUSANDS/UL (ref 149–390)
PMV BLD AUTO: 9.5 FL (ref 8.9–12.7)
POTASSIUM SERPL-SCNC: 3.4 MMOL/L (ref 3.5–5.3)
RBC # BLD AUTO: 4.52 MILLION/UL (ref 3.88–5.62)
SODIUM SERPL-SCNC: 135 MMOL/L (ref 135–147)
WBC # BLD AUTO: 17.07 THOUSAND/UL (ref 4.31–10.16)

## 2025-05-02 PROCEDURE — 82948 REAGENT STRIP/BLOOD GLUCOSE: CPT

## 2025-05-02 PROCEDURE — 85027 COMPLETE CBC AUTOMATED: CPT | Performed by: STUDENT IN AN ORGANIZED HEALTH CARE EDUCATION/TRAINING PROGRAM

## 2025-05-02 PROCEDURE — 99239 HOSP IP/OBS DSCHRG MGMT >30: CPT | Performed by: STUDENT IN AN ORGANIZED HEALTH CARE EDUCATION/TRAINING PROGRAM

## 2025-05-02 PROCEDURE — 80048 BASIC METABOLIC PNL TOTAL CA: CPT | Performed by: STUDENT IN AN ORGANIZED HEALTH CARE EDUCATION/TRAINING PROGRAM

## 2025-05-02 PROCEDURE — 83735 ASSAY OF MAGNESIUM: CPT | Performed by: STUDENT IN AN ORGANIZED HEALTH CARE EDUCATION/TRAINING PROGRAM

## 2025-05-02 RX ORDER — ENOXAPARIN SODIUM 100 MG/ML
30 INJECTION SUBCUTANEOUS DAILY
Status: DISCONTINUED | OUTPATIENT
Start: 2025-05-02 | End: 2025-05-02 | Stop reason: HOSPADM

## 2025-05-02 RX ORDER — OFLOXACIN 3 MG/ML
5 SOLUTION AURICULAR (OTIC) 2 TIMES DAILY
Qty: 5 ML | Refills: 0 | Status: SHIPPED | OUTPATIENT
Start: 2025-05-02

## 2025-05-02 RX ORDER — CIPROFLOXACIN 750 MG/1
750 TABLET, FILM COATED ORAL EVERY 24 HOURS
Qty: 6 TABLET | Refills: 0 | Status: SHIPPED | OUTPATIENT
Start: 2025-05-02 | End: 2025-05-08

## 2025-05-02 RX ADMIN — CEFEPIME 1000 MG: 1 INJECTION, POWDER, FOR SOLUTION INTRAMUSCULAR; INTRAVENOUS at 08:48

## 2025-05-02 RX ADMIN — OFLOXACIN 1 DROP: 3 SOLUTION/ DROPS OPHTHALMIC at 08:48

## 2025-05-02 RX ADMIN — ASPIRIN 81 MG: 81 TABLET, CHEWABLE ORAL at 08:48

## 2025-05-02 RX ADMIN — DORZOLAMIDE HYDROCHLORIDE AND TIMOLOL MALEATE 1 DROP: 20; 5 SOLUTION/ DROPS OPHTHALMIC at 09:54

## 2025-05-02 RX ADMIN — OFLOXACIN 1 DROP: 3 SOLUTION/ DROPS OPHTHALMIC at 12:38

## 2025-05-02 RX ADMIN — ENOXAPARIN SODIUM 30 MG: 30 INJECTION SUBCUTANEOUS at 08:49

## 2025-05-02 NOTE — ASSESSMENT & PLAN NOTE
Lab Results   Component Value Date    EGFR 45 05/02/2025    EGFR 58 05/01/2025    EGFR 68 04/30/2025    CREATININE 1.36 (H) 05/02/2025    CREATININE 1.10 05/01/2025    CREATININE 0.97 04/30/2025     Baseline appears to be 1-1.2.   Does not meet CHELSEA criteria given baseline.  Patient and family was informed to repeat BMP on discharge.

## 2025-05-02 NOTE — DISCHARGE SUMMARY
Discharge Summary - Hospitalist   Name: Saud Ardon 89 y.o. male I MRN: [id number]  Unit/Bed#: E5 -01 I Date of Admission: 4/29/2025   Date of Service: 5/2/2025 I Hospital Day: 3     Assessment & Plan  Otitis externa  89 year old male presented with left ear pain, hearing loss, and drainage possibly secondary to otitis externa, cellulitis, and subperiosteal abscess in imaging. Unfortunately, it appears that he failed outpatient treatment hence his presentation to our ED.   Cultures growing pseudomonas sensitive to fluoroquinolones. Continue Ofloxacin BID left ear and fluoroquinolone to complete a 10 day course.  Appreciate ENT recommendations. They will assist in arranging his follow-up for this Monday.  Type 2 diabetes mellitus with stage 3a chronic kidney disease, without long-term current use of insulin (HCC)  Lab Results   Component Value Date    HGBA1C 6.6 (H) 04/07/2025     Recent Labs     05/01/25  1604 05/01/25  2058 05/02/25  0725 05/02/25  1132   POCGLU 217* 105 128 106     Blood Sugar Average: Last 72 hrs:  (P) 141.3188652845733677    Resume home regimen.   Basal cell carcinoma (BCC) of skin of neck  History of basal cell carcinoma. Continue outpatient follow-up with dermatology / wound care  Hyperlipidemia  Continue fenofibrate  Stage 3a chronic kidney disease (HCC)  Lab Results   Component Value Date    EGFR 45 05/02/2025    EGFR 58 05/01/2025    EGFR 68 04/30/2025    CREATININE 1.36 (H) 05/02/2025    CREATININE 1.10 05/01/2025    CREATININE 0.97 04/30/2025     Baseline appears to be 1-1.2.   Does not meet CHELSEA criteria given baseline.  Patient and family was informed to repeat BMP on discharge.     Discharging Physician / Practitioner: Jeremías Guaman MD  PCP: Ca Vargas PA-C  Admission Date:   Admission Orders (From admission, onward)       Ordered        04/29/25 1804  INPATIENT ADMISSION  Once                          Discharge Date: 05/02/25    Medical Problems       Resolved  Problems  Date Reviewed: 4/16/2025   None         Consultations During Hospital Stay:  ENT    Procedures Performed:   none    Significant Findings / Test Results:   Imaging  Ct temporal bones:    - Findings suggestive of acute left otitis externa with surrounding acute cellulitis in left ear and left periauricular region. Suspected 0.6 cm subperiosteal abscess adjacent to the left mastoid temporal bone just inferolateral to opening of bony   external auditory canal. Recommend evaluation by ENT.  - Small left mastoid effusion. No osseous destruction.     RIGHT TEMPORAL BONE  - Normal.    Incidental Findings:   As written above     Test Results Pending at Discharge (will require follow up):   cultures     Outpatient Tests Requested:  Pcp, ENT  CBC, BMP    Complications:  none    Reason for Admission: otitis externa    Hospital Course:     Saud Ardon is a 89 y.o. male patient who originally presented to the hospital on 4/29/2025 due to ear pain.    Patient 89-year-old male with a history of diet-controlled diabetes who presents with left ear pain, hearing loss, and drainage.  His primary care provider prescribed Augmentin and CiproDex for him, without clinical improvement.  ENT evaluated him and did not see any need for surgical intervention.  They recommended outpatient follow-up on Monday and they have arranged an appointment for him.  They would like him to complete his 10-day antibiotic course along with Cipro Dex eardrops.  Patient is feeling much better and is looking forward to his discharge today.  He verbalized understanding of the risks of taking fluoroquinolones. He is also aware of his elevated creatinine levels, but prefers to follow this up on an outpatient basis.     Patient agrees to follow-up with his providers as scheduled and to take his medications as prescribed. All questions were addressed.    he understood the need to seek immediate medical attention should he develop any chest pain, shortness  of breath, severe pain, fever, chills, or any other concerning symptoms.    Please see above list of diagnoses and related plan for additional information.     Condition at Discharge: good     Discharge Day Visit / Exam:     Subjective:  patient seen and examined at bedside. No new issues overnight.   Vitals: Blood Pressure: 120/79 (05/02/25 0724)  Pulse: 101 (05/02/25 0724)  Temperature: 98.4 °F (36.9 °C) (05/02/25 0724)  Temp Source: Oral (05/02/25 0724)  Respirations: 19 (05/02/25 0724)  Height: 5' (152.4 cm) (04/30/25 0759)  Weight - Scale: 52.2 kg (115 lb) (04/30/25 0759)  SpO2: 100 % (05/02/25 0724)  Exam:   Physical Exam  Vitals reviewed.   Constitutional:       General: He is not in acute distress.  HENT:      Head: Normocephalic.      Ears:      Comments: Left ear dressing     Nose: Nose normal.      Mouth/Throat:      Mouth: Mucous membranes are moist.   Eyes:      General: No scleral icterus.  Cardiovascular:      Rate and Rhythm: Normal rate and regular rhythm.   Pulmonary:      Effort: Pulmonary effort is normal. No respiratory distress.      Breath sounds: No wheezing.   Abdominal:      General: There is no distension.      Palpations: Abdomen is soft.      Tenderness: There is no abdominal tenderness.   Skin:     General: Skin is warm.   Neurological:      Mental Status: He is alert.   Psychiatric:         Mood and Affect: Mood normal.         Behavior: Behavior normal.       Discussion with Family: granddaughter    Discharge instructions/Information to patient and family:   See after visit summary for information provided to patient and family.      Provisions for Follow-Up Care:  See after visit summary for information related to follow-up care and any pertinent home health orders.      Disposition:     Home    Planned Readmission: No     Discharge Statement:  I spent 40 minutes discharging the patient. This time was spent on the day of discharge. I had direct contact with the patient on the day of  discharge. Greater than 50% of the total time was spent examining patient, answering all patient questions, arranging and discussing plan of care with patient as well as directly providing post-discharge instructions.  Additional time then spent on discharge activities.    Discharge Medications:  See after visit summary for reconciled discharge medications provided to patient and family.      ** Please Note: This note has been constructed using a voice recognition system **

## 2025-05-02 NOTE — ASSESSMENT & PLAN NOTE
89 year old male presented with left ear pain, hearing loss, and drainage possibly secondary to otitis externa, cellulitis, and subperiosteal abscess in imaging. Unfortunately, it appears that he failed outpatient treatment hence his presentation to our ED.   Cultures growing pseudomonas sensitive to fluoroquinolones. Continue Ofloxacin BID left ear and fluoroquinolone to complete a 10 day course.  Appreciate ENT recommendations. They will assist in arranging his follow-up for this Monday.

## 2025-05-02 NOTE — PLAN OF CARE
Problem: Potential for Falls  Goal: Patient will remain free of falls  Description: INTERVENTIONS:- Educate patient/family on patient safety including physical limitations- Instruct patient to call for assistance with activity - Consult OT/PT to assist with strengthening/mobility - Keep Call bell within reach- Keep bed low and locked with side rails adjusted as appropriate- Keep care items and personal belongings within reach- Initiate and maintain comfort rounds- Make Fall Risk Sign visible to staff- Offer Toileting every 2 Hours, in advance of need- Initiate/Maintain BED alarm- Obtain necessary fall risk management equipment: - Apply yellow socks and bracelet for high fall risk patients- Consider moving patient to room near nurses station  Outcome: Progressing     Problem: PAIN - ADULT  Goal: Verbalizes/displays adequate comfort level or baseline comfort level  Description: Interventions:- Encourage patient to monitor pain and request assistance- Assess pain using appropriate pain scale- Administer analgesics based on type and severity of pain and evaluate response- Implement non-pharmacological measures as appropriate and evaluate response- Consider cultural and social influences on pain and pain management- Notify physician/advanced practitioner if interventions unsuccessful or patient reports new pain  Outcome: Progressing     Problem: INFECTION - ADULT  Goal: Absence or prevention of progression during hospitalization  Description: INTERVENTIONS:- Assess and monitor for signs and symptoms of infection- Monitor lab/diagnostic results- Monitor all insertion sites, i.e. indwelling lines, tubes, and drains- Monitor endotracheal if appropriate and nasal secretions for changes in amount and color- [place] appropriate cooling/warming therapies per order- Administer medications as ordered- Instruct and encourage patient and family to use good hand hygiene technique- Identify and instruct in appropriate isolation  precautions for identified infection/condition  Outcome: Progressing     Problem: DISCHARGE PLANNING  Goal: Discharge to home or other facility with appropriate resources  Description: INTERVENTIONS:- Identify barriers to discharge w/patient and caregiver- Arrange for needed discharge resources and transportation as appropriate- Identify discharge learning needs (meds, wound care, etc.)- Arrange for interpretive services to assist at discharge as needed- Refer to Case Management Department for coordinating discharge planning if the patient needs post-hospital services based on physician/advanced practitioner order or complex needs related to functional status, cognitive ability, or social support system  Outcome: Progressing

## 2025-05-02 NOTE — CASE MANAGEMENT
Case Management Discharge Planning Note    Patient name Saud Ardon  Location [facility] /E5 -* MRN [id number]  : 1934 Date 2025       Current Admission Date: 2025  Current Admission Diagnosis:Otitis externa   Patient Active Problem List    Diagnosis Date Noted Date Diagnosed    Otitis externa 2025     Ischemic cardiomyopathy 2024     Basal cell carcinoma (BCC) of skin of neck 2024     Basal cell carcinoma (BCC) of right shoulder 2024     Lesion of neck 2024     Lyme disease 2024     Stage 3a chronic kidney disease (HCC) 2022     Callus of foot 2022     Moderate major depression (HCC) 2021     Bunion 2021     DNR (do not resuscitate) 2020     Overweight (BMI 25.0-29.9) 2019     Type 2 diabetes mellitus with stage 3a chronic kidney disease, without long-term current use of insulin (HCC) 2019     Onychomycosis of toenail 2019     Vitamin D deficiency 2018     Other specified glaucoma 2018     Aortic valve stenosis 05/15/2017     Insomnia 2016     Primary hypertension 2014     Hypertriglyceridemia 2014     Carotid artery disease (HCC) 2014     Anxiety 2013     CAD in native artery 2013     Coronary atherosclerosis 2013     Osteoarthritis 2013     Hepatitis A 2013     Hyperlipidemia 2013     Hydrocele 2013     Hyperplasia of prostate without lower urinary tract symptoms (LUTS) 2013       LOS (days): 3  Geometric Mean LOS (GMLOS) (days): 2.8  Days to GMLOS:0.2     OBJECTIVE:  Risk of Unplanned Readmission Score: 9.48         Current admission status: Inpatient   Preferred Pharmacy:   [id number] - [place] - [address] [place] [address]  Phone: [phone] Fax: [phone]    Primary Care Provider: Ca Vargas PA-C    Primary Insurance: [facility] REP  Secondary Insurance:      DISCHARGE DETAILS:               Additional Comments: CM spoke with patient at bedside to offer visiting nurse service, patient declined need for home nursing. Says he and his grand daughter can manage the earwick at home. States he will also follow up with ENT outpatient.

## 2025-05-02 NOTE — ASSESSMENT & PLAN NOTE
Lab Results   Component Value Date    HGBA1C 6.6 (H) 04/07/2025     Recent Labs     05/01/25  1604 05/01/25 2058 05/02/25 0725 05/02/25  1132   POCGLU 217* 105 128 106     Blood Sugar Average: Last 72 hrs:  (P) 141.3570293840015166    Resume home regimen.

## 2025-05-04 LAB
BACTERIA WND AEROBE CULT: ABNORMAL
GRAM STN SPEC: ABNORMAL

## 2025-05-05 ENCOUNTER — TRANSITIONAL CARE MANAGEMENT (OUTPATIENT)
Dept: FAMILY MEDICINE CLINIC | Facility: CLINIC | Age: OVER 89
End: 2025-05-05

## 2025-05-05 ENCOUNTER — PATIENT OUTREACH (OUTPATIENT)
Dept: CASE MANAGEMENT | Facility: OTHER | Age: OVER 89
End: 2025-05-05

## 2025-05-05 NOTE — UTILIZATION REVIEW
NOTIFICATION OF ADMISSION DISCHARGE   This is a Notification of Discharge from [facility]. Please be advised that this patient has been discharge from our facility. Below you will find the admission and discharge date and time including the patient’s disposition.   UTILIZATION REVIEW CONTACT:  Utilization Review Assistants  Network Utilization Review Department  Phone: [phone] x carefully listen to the prompts. All voicemails are confidential.  Email: [email or web address]     ADMISSION INFORMATION  PRESENTATION DATE: 4/29/2025  1:44 PM  OBERVATION ADMISSION DATE: N/A  INPATIENT ADMISSION DATE: 4/29/25  6:04 PM   DISCHARGE DATE: 5/2/2025  3:42 PM   DISPOSITION:Home/Self Care    Network Utilization Review Department  ATTENTION: Please call with any questions or concerns to [phone] and carefully listen to the prompts so that you are directed to the right person. All voicemails are confidential.   For Discharge needs, contact Care Management DC Support Team at [phone] opt. 2  Send all requests for admission clinical reviews, approved or denied determinations and any other requests to dedicated fax number below belonging to the campus where the patient is receiving treatment. List of dedicated fax numbers for the Facilities:  FACILITY NAME UR FAX NUMBER   ADMISSION DENIALS (Administrative/Medical Necessity) [phone]   DISCHARGE SUPPORT TEAM ([facility]) [phone]   PARENT CHILD HEALTH (Maternity/NICU/Pediatrics) [phone]   [facility] [phone]   [facility] [phone]   [facility] [phone]   [facility] [phone]   [facility] [phone]   [facility] [phone]   [facility] [phone]   [facility] [phone]   [facility]  [facility] [phone]   [facility] [phone]   [facility] [phone]   [facility] [phone]

## 2025-05-06 ENCOUNTER — APPOINTMENT (EMERGENCY)
Dept: RADIOLOGY | Facility: HOSPITAL | Age: OVER 89
DRG: 682 | End: 2025-05-06
Payer: COMMERCIAL

## 2025-05-06 ENCOUNTER — PATIENT OUTREACH (OUTPATIENT)
Dept: CASE MANAGEMENT | Facility: HOSPITAL | Age: OVER 89
End: 2025-05-06

## 2025-05-06 ENCOUNTER — HOSPITAL ENCOUNTER (INPATIENT)
Facility: HOSPITAL | Age: OVER 89
LOS: 2 days | Discharge: HOSPICE | DRG: 682 | End: 2025-05-08
Attending: EMERGENCY MEDICINE | Admitting: INTERNAL MEDICINE
Payer: COMMERCIAL

## 2025-05-06 ENCOUNTER — APPOINTMENT (EMERGENCY)
Dept: CT IMAGING | Facility: HOSPITAL | Age: OVER 89
DRG: 682 | End: 2025-05-06
Payer: COMMERCIAL

## 2025-05-06 DIAGNOSIS — K62.89 RECTAL MASS: ICD-10-CM

## 2025-05-06 DIAGNOSIS — N17.9 AKI (ACUTE KIDNEY INJURY) (HCC): Primary | ICD-10-CM

## 2025-05-06 DIAGNOSIS — E87.1 HYPONATREMIA: ICD-10-CM

## 2025-05-06 DIAGNOSIS — H60.502 ACUTE OTITIS EXTERNA OF LEFT EAR, UNSPECIFIED TYPE: ICD-10-CM

## 2025-05-06 DIAGNOSIS — R79.89 ELEVATED TROPONIN: ICD-10-CM

## 2025-05-06 DIAGNOSIS — K59.00 CONSTIPATION: ICD-10-CM

## 2025-05-06 DIAGNOSIS — R11.10 VOMITING: ICD-10-CM

## 2025-05-06 PROBLEM — R11.2 NAUSEA & VOMITING: Status: ACTIVE | Noted: 2025-05-06

## 2025-05-06 PROBLEM — K59.09 OTHER CONSTIPATION: Status: ACTIVE | Noted: 2025-05-06

## 2025-05-06 LAB
2HR DELTA HS TROPONIN: 13 NG/L
ALBUMIN SERPL BCG-MCNC: 4.4 G/DL (ref 3.5–5)
ALP SERPL-CCNC: 98 U/L (ref 34–104)
ALT SERPL W P-5'-P-CCNC: 15 U/L (ref 7–52)
ANION GAP SERPL CALCULATED.3IONS-SCNC: 30 MMOL/L (ref 4–13)
APTT PPP: 28 SECONDS (ref 23–34)
AST SERPL W P-5'-P-CCNC: 27 U/L (ref 13–39)
ATRIAL RATE: 144 BPM
ATRIAL RATE: 187 BPM
BASOPHILS # BLD AUTO: 0.05 THOUSANDS/ÂΜL (ref 0–0.1)
BASOPHILS NFR BLD AUTO: 0 % (ref 0–1)
BILIRUB SERPL-MCNC: 0.82 MG/DL (ref 0.2–1)
BUN SERPL-MCNC: 125 MG/DL (ref 5–25)
CALCIUM SERPL-MCNC: 9.4 MG/DL (ref 8.4–10.2)
CARDIAC TROPONIN I PNL SERPL HS: 41 NG/L (ref ?–50)
CARDIAC TROPONIN I PNL SERPL HS: 54 NG/L (ref ?–50)
CHLORIDE SERPL-SCNC: 77 MMOL/L (ref 96–108)
CO2 SERPL-SCNC: 21 MMOL/L (ref 21–32)
CREAT SERPL-MCNC: 5.03 MG/DL (ref 0.6–1.3)
EOSINOPHIL # BLD AUTO: 0 THOUSAND/ÂΜL (ref 0–0.61)
EOSINOPHIL NFR BLD AUTO: 0 % (ref 0–6)
ERYTHROCYTE [DISTWIDTH] IN BLOOD BY AUTOMATED COUNT: 14 % (ref 11.6–15.1)
GFR SERPL CREATININE-BSD FRML MDRD: 9 ML/MIN/1.73SQ M
GLUCOSE SERPL-MCNC: 160 MG/DL (ref 65–140)
HCT VFR BLD AUTO: 41.3 % (ref 36.5–49.3)
HGB BLD-MCNC: 14.5 G/DL (ref 12–17)
IMM GRANULOCYTES # BLD AUTO: 0.09 THOUSAND/UL (ref 0–0.2)
IMM GRANULOCYTES NFR BLD AUTO: 1 % (ref 0–2)
INR PPP: 1.25 (ref 0.85–1.19)
LYMPHOCYTES # BLD AUTO: 0.91 THOUSANDS/ÂΜL (ref 0.6–4.47)
LYMPHOCYTES NFR BLD AUTO: 7 % (ref 14–44)
MCH RBC QN AUTO: 28.8 PG (ref 26.8–34.3)
MCHC RBC AUTO-ENTMCNC: 35.1 G/DL (ref 31.4–37.4)
MCV RBC AUTO: 82 FL (ref 82–98)
MONOCYTES # BLD AUTO: 0.97 THOUSAND/ÂΜL (ref 0.17–1.22)
MONOCYTES NFR BLD AUTO: 7 % (ref 4–12)
NEUTROPHILS # BLD AUTO: 11.49 THOUSANDS/ÂΜL (ref 1.85–7.62)
NEUTS SEG NFR BLD AUTO: 85 % (ref 43–75)
NRBC BLD AUTO-RTO: 0 /100 WBCS
PLATELET # BLD AUTO: 508 THOUSANDS/UL (ref 149–390)
PMV BLD AUTO: 10.1 FL (ref 8.9–12.7)
POTASSIUM SERPL-SCNC: 3.5 MMOL/L (ref 3.5–5.3)
PROT SERPL-MCNC: 8.4 G/DL (ref 6.4–8.4)
PROTHROMBIN TIME: 15.9 SECONDS (ref 12.3–15)
QRS AXIS: -25 DEGREES
QRS AXIS: -9 DEGREES
QRSD INTERVAL: 104 MS
QRSD INTERVAL: 106 MS
QT INTERVAL: 534 MS
QT INTERVAL: 554 MS
QTC INTERVAL: 608 MS
QTC INTERVAL: 647 MS
RBC # BLD AUTO: 5.04 MILLION/UL (ref 3.88–5.62)
SODIUM SERPL-SCNC: 128 MMOL/L (ref 135–147)
T WAVE AXIS: 123 DEGREES
T WAVE AXIS: 195 DEGREES
TSH SERPL DL<=0.05 MIU/L-ACNC: 2.3 UIU/ML (ref 0.45–4.5)
VENTRICULAR RATE: 78 BPM
VENTRICULAR RATE: 82 BPM
WBC # BLD AUTO: 13.51 THOUSAND/UL (ref 4.31–10.16)

## 2025-05-06 PROCEDURE — 96376 TX/PRO/DX INJ SAME DRUG ADON: CPT

## 2025-05-06 PROCEDURE — 96375 TX/PRO/DX INJ NEW DRUG ADDON: CPT

## 2025-05-06 PROCEDURE — 36415 COLL VENOUS BLD VENIPUNCTURE: CPT | Performed by: PHYSICIAN ASSISTANT

## 2025-05-06 PROCEDURE — 72125 CT NECK SPINE W/O DYE: CPT

## 2025-05-06 PROCEDURE — 99285 EMERGENCY DEPT VISIT HI MDM: CPT

## 2025-05-06 PROCEDURE — 71046 X-RAY EXAM CHEST 2 VIEWS: CPT

## 2025-05-06 PROCEDURE — 96365 THER/PROPH/DIAG IV INF INIT: CPT

## 2025-05-06 PROCEDURE — 69210 REMOVE IMPACTED EAR WAX UNI: CPT | Performed by: PHYSICIAN ASSISTANT

## 2025-05-06 PROCEDURE — 96361 HYDRATE IV INFUSION ADD-ON: CPT

## 2025-05-06 PROCEDURE — 80053 COMPREHEN METABOLIC PANEL: CPT | Performed by: PHYSICIAN ASSISTANT

## 2025-05-06 PROCEDURE — 85730 THROMBOPLASTIN TIME PARTIAL: CPT | Performed by: PHYSICIAN ASSISTANT

## 2025-05-06 PROCEDURE — 85610 PROTHROMBIN TIME: CPT | Performed by: PHYSICIAN ASSISTANT

## 2025-05-06 PROCEDURE — 70450 CT HEAD/BRAIN W/O DYE: CPT

## 2025-05-06 PROCEDURE — 85025 COMPLETE CBC W/AUTO DIFF WBC: CPT | Performed by: PHYSICIAN ASSISTANT

## 2025-05-06 PROCEDURE — 74176 CT ABD & PELVIS W/O CONTRAST: CPT

## 2025-05-06 PROCEDURE — 84484 ASSAY OF TROPONIN QUANT: CPT | Performed by: PHYSICIAN ASSISTANT

## 2025-05-06 PROCEDURE — 84443 ASSAY THYROID STIM HORMONE: CPT | Performed by: PHYSICIAN ASSISTANT

## 2025-05-06 PROCEDURE — 93005 ELECTROCARDIOGRAM TRACING: CPT

## 2025-05-06 PROCEDURE — 99285 EMERGENCY DEPT VISIT HI MDM: CPT | Performed by: PHYSICIAN ASSISTANT

## 2025-05-06 RX ORDER — ONDANSETRON 2 MG/ML
4 INJECTION INTRAMUSCULAR; INTRAVENOUS ONCE
Status: COMPLETED | OUTPATIENT
Start: 2025-05-06 | End: 2025-05-06

## 2025-05-06 RX ORDER — ACETAMINOPHEN 10 MG/ML
1000 INJECTION, SOLUTION INTRAVENOUS ONCE
Status: COMPLETED | OUTPATIENT
Start: 2025-05-06 | End: 2025-05-06

## 2025-05-06 RX ORDER — OFLOXACIN 3 MG/ML
2 SOLUTION/ DROPS OPHTHALMIC ONCE
Status: COMPLETED | OUTPATIENT
Start: 2025-05-06 | End: 2025-05-06

## 2025-05-06 RX ORDER — SODIUM CHLORIDE 9 MG/ML
100 INJECTION, SOLUTION INTRAVENOUS CONTINUOUS
Status: DISCONTINUED | OUTPATIENT
Start: 2025-05-06 | End: 2025-05-07

## 2025-05-06 RX ADMIN — OFLOXACIN 2 DROP: 3 SOLUTION/ DROPS OPHTHALMIC at 19:55

## 2025-05-06 RX ADMIN — IOHEXOL 50 ML: 240 INJECTION, SOLUTION INTRATHECAL; INTRAVASCULAR; INTRAVENOUS; ORAL at 21:21

## 2025-05-06 RX ADMIN — ONDANSETRON 4 MG: 2 INJECTION INTRAMUSCULAR; INTRAVENOUS at 19:46

## 2025-05-06 RX ADMIN — SODIUM CHLORIDE 100 ML/HR: 0.9 INJECTION, SOLUTION INTRAVENOUS at 21:46

## 2025-05-06 RX ADMIN — MORPHINE SULFATE 2 MG: 2 INJECTION, SOLUTION INTRAMUSCULAR; INTRAVENOUS at 20:02

## 2025-05-06 RX ADMIN — SODIUM CHLORIDE 1000 ML: 0.9 INJECTION, SOLUTION INTRAVENOUS at 19:45

## 2025-05-06 RX ADMIN — ONDANSETRON 4 MG: 2 INJECTION INTRAMUSCULAR; INTRAVENOUS at 21:57

## 2025-05-06 RX ADMIN — ACETAMINOPHEN 1000 MG: 10 INJECTION INTRAVENOUS at 19:47

## 2025-05-06 NOTE — ED PROVIDER NOTES
Time reflects when diagnosis was documented in both MDM as applicable and the Disposition within this note       Time User Action Codes Description Comment    5/6/2025 10:31 PM GarciaBrenda Add [N17.9] CHELSEA (acute kidney injury) (HCC)     5/6/2025 10:31 PM GarciaBrenda Add [K62.89] Rectal mass     5/6/2025 10:35 PM Garcia Brenda Add [R11.10] Vomiting     5/6/2025 10:35 PM Radha Brenda Add [K59.00] Constipation     5/6/2025 11:22 PM Garcia Brenda Add [R79.89] Elevated troponin     5/6/2025 11:22 PM Radha Brenda Walters [E87.1] Hyponatremia           ED Disposition       None          Assessment & Plan       Medical Decision Making  Will get ct - concern for obstruction  Will get labs for cr, anemia, wbc, and electrolytes   Dizzy - so weill get EKG/torponin for acs/arrythmias     Amount and/or Complexity of Data Reviewed  Independent Historian:      [facility]   External Data Reviewed: labs, radiology, ECG and notes.     Details: 1/2025  Circumferential soft tissue thickening within the mid to upper rectum measuring   approximately 4.0 cm in length, concerning for underlying primary rectal   malignancy. Recommend colonoscopy for further evaluation.     Labs: ordered. Decision-making details documented in ED Course.  Radiology: ordered and independent interpretation performed.  ECG/medicine tests: ordered and independent interpretation performed.  Discussion of management or test interpretation with external provider(s): Surgery   Radiology   Needs admission - reached out to [facility] - signed to [facility] pending acceptance to [facility]     Risk  Prescription drug management.  Decision regarding hospitalization.  Emergency major surgery.        ED Course as of 05/06/25 2324   Tue May 06, 2025   2020 WBC(!): 13.51  Improved from admisison    2034 hs TnI 0hr: 41   2034 Creatinine(!): 5.03  Sig CHELSEA   2034 Sodium(!): 128   2110 TSH 3RD GENERATON: 2.301  Normal    2125 Pt on ct table   2135 Ct reviewed by  myself - ct head - no sign bleed or fracture and has aeration into mastoids  Ct abdomen and pelvis - mass in colon - possible partial obstruction - doesn't appear full - awaiting official ct read.    2142 Only 100 in bladder - pt desnt remember when he last urinated - states he kept trying to poop at home and thinks some urine came out but he doesn't remember. 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 - Discussed with radiology - not obstructed - has sig enlarged since first ct at [facility] in jan - discussed with pt and family    2228 Delta 2hr hsTnI: 13  Strain on heart 2nd to CHELSEA and dehydration        Medications   sodium chloride 0.9 % infusion (100 mL/hr Intravenous New Bag 5/6/25 2146)   sodium chloride 0.9 % bolus 1,000 mL (0 mL Intravenous Stopped 5/6/25 2045)   ondansetron (ZOFRAN) injection 4 mg (4 mg Intravenous Given 5/6/25 1946)   acetaminophen (Ofirmev) injection 1,000 mg (0 mg Intravenous Stopped 5/6/25 2004)   ofloxacin (OCUFLOX) 0.3 % ophthalmic solution 2 drop (2 drops Left Eye Given 5/6/25 1955)   morphine injection 2 mg (2 mg Intravenous Given 5/6/25 2002)   iohexol (OMNIPAQUE) 240 MG/ML solution 50 mL (50 mL Oral Given 5/6/25 2121)   ondansetron (ZOFRAN) injection 4 mg (4 mg Intravenous Given 5/6/25 2157)       ED Risk Strat Scores   HEART Risk Score      Flowsheet Row Most Recent Value   Heart Score Risk Calculator    History 0 Filed at: 05/06/2025 2230   ECG 1 Filed at: 05/06/2025 2230   Age 2 Filed at: 05/06/2025 2230   Risk Factors 1 Filed at: 05/06/2025 2230   Troponin 1 Filed at: 05/06/2025 2230   HEART Score 5 Filed at: 05/06/2025 2230          HEART Risk Score      Flowsheet Row Most Recent Value   Heart Score Risk Calculator    History 0 Filed at: 05/06/2025 2230   ECG 1 Filed at: 05/06/2025 2230   Age 2 Filed at: 05/06/2025 2230   Risk Factors 1 Filed at: 05/06/2025 2232    Troponin 1 Filed at: 05/06/2025 2230   HEART Score 5 Filed at: 05/06/2025 2230                      No data recorded        SBIRT 22yo+      Flowsheet Row Most Recent Value   Initial Alcohol Screen: US AUDIT-C     1. How often do you have a drink containing alcohol? 0 Filed at: 05/06/2025 1837   2. How many drinks containing alcohol do you have on a typical day you are drinking?  0 Filed at: 05/06/2025 1837   3a. Male UNDER 65: How often do you have five or more drinks on one occasion? 0 Filed at: 05/06/2025 1837   3b. FEMALE Any Age, or MALE 65+: How often do you have 4 or more drinks on one occassion? 0 Filed at: 05/06/2025 1837   Audit-C Score 0 Filed at: 05/06/2025 1837   NATALIO: How many times in the past year have you...    Used an illegal drug or used a prescription medication for non-medical reasons? Never Filed at: 05/06/2025 1837                            History of Present Illness       Chief Complaint   Patient presents with    Medical Problem     Pt arrives via ems from home. Per ems family states pt being dx w an ear infection on Friday, has been N/V/D, dizziness, feeling more weak, family also states pt having a syncopal episode earlier today but pt doesn't remember. AOX4.        Past Medical History:   Diagnosis Date    Anxiety     CAD (coronary artery disease)     Cancer (HCC)     Capsular cataract     mature    Chronic ulcer of skin (HCC) 05/18/2015    DJD (degenerative joint disease)     Encounter for routine adult medical exam with abnormal findings 08/21/2019    Hepatitis A     Hydrocele     Hyperglycemia     Hyperlipidemia     Hypertension     Impaired fasting glucose 04/30/2014    Ingrown toenail     Myocardial infarct (HCC) 2007    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HCC)     Potassium (K) excess 12/27/2019    Prostatic hyperplasia     Skin cancer       Past Surgical History:   Procedure Laterality Date    ANGIOPLASTY  2008    FL LUMBAR PUNCTURE DIAGNOSTIC  1/14/2025      Family History   Problem Relation Age  of Onset    Coronary artery disease Father       Social History     Tobacco Use    Smoking status: Never     Passive exposure: Never    Smokeless tobacco: Never   Vaping Use    Vaping status: Never Used   Substance Use Topics    Alcohol use: Never    Drug use: Never      E-Cigarette/Vaping    E-Cigarette Use Never User       E-Cigarette/Vaping Substances    Nicotine No     THC No     CBD No     Flavoring No       I have reviewed and agree with the history as documented.     Pt presents to the ED with weakness and has been vomiting and constipated for several days. Family gave enema - has been having diarrhea since. But still feels like needs to go- and they report his vomit looks like bile, can't eat anything, difficulty drinking liquids.  Was recently admitted - ear infection -   Has basal cell ca - sees wound care  Also had dx of colonic mass at [facility] - family states he doesn't want to do anything with it.   No fevers.   Pt fell./ so weak - legs went out from him in bathroom today -granddaughter caught him- doesn't think he actually passed out.   Recent admission - ear infection - on cipro and drops.         Review of Systems   Constitutional:  Negative for fever.   Respiratory: Negative.     Cardiovascular: Negative.    Gastrointestinal:  Positive for abdominal pain, constipation, diarrhea, nausea and vomiting. Negative for blood in stool.   Genitourinary:  Negative for dysuria.   Neurological:  Positive for weakness.   All other systems reviewed and are negative.          Objective       ED Triage Vitals   Temperature Pulse Blood Pressure Respirations SpO2 Patient Position - Orthostatic VS   05/06/25 1837 05/06/25 1837 05/06/25 1837 05/06/25 1837 05/06/25 1837 05/06/25 1837   97.5 °F (36.4 °C) 87 128/60 20 99 % Lying      Temp Source Heart Rate Source BP Location FiO2 (%) Pain Score    05/06/25 1837 05/06/25 1837 05/06/25 1837 -- 05/06/25 2002    Oral Monitor Left arm  Med Not Given for Pain - for MAR use only       Vitals      Date and Time Temp Pulse SpO2 Resp BP Pain Score FACES Pain Rating User   05/06/25 2300 -- 78 99 % 16 114/61 -- -- T   05/06/25 2100 -- 68 98 % 16 131/60 -- -- T   05/06/25 2030 -- 74 98 % 16 142/63 -- -- J   05/06/25 2002 -- -- -- -- -- Med Not Given for Pain - for MAR use only --    05/06/25 1945 -- 82 99 % 16 157/70 -- --    05/06/25 1915 -- 80 99 % 16 145/67 -- --    05/06/25 1837 97.5 °F (36.4 °C) 87 99 % 20 128/60 -- -- CF            Physical Exam  Vitals and nursing note reviewed.   Constitutional:       Appearance: He is well-developed.   HENT:      Head: Normocephalic and atraumatic.      Right Ear: Tympanic membrane, ear canal and external ear normal.      Left Ear: Ear canal and external ear normal.   Eyes:      Conjunctiva/sclera: Conjunctivae normal.   Cardiovascular:      Rate and Rhythm: Normal rate and regular rhythm.      Heart sounds: Normal heart sounds.   Pulmonary:      Effort: Pulmonary effort is normal.      Breath sounds: Normal breath sounds.   Abdominal:      General: Bowel sounds are normal.      Palpations: Abdomen is soft.      Tenderness: There is abdominal tenderness. There is guarding. There is no rebound.   Musculoskeletal:         General: Normal range of motion.      Cervical back: Normal range of motion and neck supple.   Lymphadenopathy:      Cervical: No cervical adenopathy.   Skin:     General: Skin is warm.      Findings: No rash.   Neurological:      Mental Status: He is alert and oriented to person, place, and time.      Motor: No abnormal muscle tone.      Coordination: Coordination normal.   Psychiatric:         Mood and Affect: Mood normal.         Behavior: Behavior normal.         Results Reviewed       Procedure Component Value Units Date/Time    HS Troponin I 2hr [[id number]]  (Abnormal) Collected: 05/06/25 2144    Lab Status: Final result Specimen: Blood from Arm, Left Updated: 05/06/25 2216     hs TnI 2hr 54 ng/L      Delta 2hr hsTnI 13 ng/L      Urinalysis with microscopic [[id number]]     Lab Status: No result Specimen: Urine, Clean Catch     TSH, 3rd generation with Free T4 reflex [[id number]]  (Normal) Collected: 05/06/25 1941    Lab Status: Final result Specimen: Blood from Arm, Left Updated: 05/06/25 2035     TSH 3RD GENERATON 2.301 uIU/mL     HS Troponin 0hr (reflex protocol) [[id number]]  (Normal) Collected: 05/06/25 1941    Lab Status: Final result Specimen: Blood from Arm, Left Updated: 05/06/25 2027     hs TnI 0hr 41 ng/L     HS Troponin I 4hr [[id number]]     Lab Status: No result Specimen: Blood     Comprehensive metabolic panel [[id number]]  (Abnormal) Collected: 05/06/25 1941    Lab Status: Final result Specimen: Blood from Arm, Left Updated: 05/06/25 2021     Sodium 128 mmol/L      Potassium 3.5 mmol/L      Chloride 77 mmol/L      CO2 21 mmol/L      ANION GAP 30 mmol/L       mg/dL      Creatinine 5.03 mg/dL      Glucose 160 mg/dL      Calcium 9.4 mg/dL      AST 27 U/L      ALT 15 U/L      Alkaline Phosphatase 98 U/L      Total Protein 8.4 g/dL      Albumin 4.4 g/dL      Total Bilirubin 0.82 mg/dL      eGFR 9 ml/min/1.73sq m     Narrative:      National Kidney Disease Foundation guidelines for Chronic Kidney Disease (CKD):     Stage 1 with normal or high GFR (GFR > 90 mL/min/1.73 square meters)    Stage 2 Mild CKD (GFR = 60-89 mL/min/1.73 square meters)    Stage 3A Moderate CKD (GFR = 45-59 mL/min/1.73 square meters)    Stage 3B Moderate CKD (GFR = 30-44 mL/min/1.73 square meters)    Stage 4 Severe CKD (GFR = 15-29 mL/min/1.73 square meters)    Stage 5 End Stage CKD (GFR <15 mL/min/1.73 square meters)  Note: GFR calculation is accurate only with a steady state creatinine    Protime-INR [[id number]]  (Abnormal) Collected: 05/06/25 1941    Lab Status: Final result Specimen: Blood from Arm, Left Updated: 05/06/25 2013     Protime 15.9 seconds      INR 1.25    Narrative:      INR Therapeutic Range    Indication                                              INR Range      Atrial Fibrillation                                               2.0-3.0  Hypercoagulable State                                    2.0.2.3  Left Ventricular Asist Device                            2.0-3.0  Mechanical Heart Valve                                  -    Aortic(with afib, MI, embolism, HF, LA enlargement,    and/or coagulopathy)                                     2.0-3.0 (2.5-3.5)     Mitral                                                             2.5-3.5  Prosthetic/Bioprosthetic Heart Valve               2.0-3.0  Venous thromboembolism (VTE: VT, PE        2.0-3.0    APTT [[id number]]  (Normal) Collected: 05/06/25 1941    Lab Status: Final result Specimen: Blood from Arm, Left Updated: 05/06/25 2013     PTT 28 seconds     CBC and differential [[id number]]  (Abnormal) Collected: 05/06/25 1941    Lab Status: Final result Specimen: Blood from Arm, Left Updated: 05/06/25 1956     WBC 13.51 Thousand/uL      RBC 5.04 Million/uL      Hemoglobin 14.5 g/dL      Hematocrit 41.3 %      MCV 82 fL      MCH 28.8 pg      MCHC 35.1 g/dL      RDW 14.0 %      MPV 10.1 fL      Platelets 508 Thousands/uL      nRBC 0 /100 WBCs      Segmented % 85 %      Immature Grans % 1 %      Lymphocytes % 7 %      Monocytes % 7 %      Eosinophils Relative 0 %      Basophils Relative 0 %      Absolute Neutrophils 11.49 Thousands/µL      Absolute Immature Grans 0.09 Thousand/uL      Absolute Lymphocytes 0.91 Thousands/µL      Absolute Monocytes 0.97 Thousand/µL      Eosinophils Absolute 0.00 Thousand/µL      Basophils Absolute 0.05 Thousands/µL             CT head without contrast   Final Interpretation by Lara Boyle MD (05/06 2155)      No acute intracranial abnormality.  Chronic microangiopathic changes.                  Workstation performed: [id number]         CT cervical spine without contrast   Final Interpretation by Lara Boyle MD (05/06 2158)      No cervical spine fracture or traumatic  malalignment.                  Workstation performed: [id number]         CT abdomen pelvis wo contrast   Final Interpretation by Lara Boyle MD (05/06 2206)      Irregular soft tissue density partially fills the rectal colon with upstream fluid distention concerning for blood product versus neoplasm. Gastroenterology consult recommended.      Workstation performed: [id number]         XR chest 2 views    (Results Pending)       ECG 12 Lead Documentation Only    Date/Time: 5/6/2025 10:29 PM    Performed by: Brenda Garcia PA-C  Authorized by: Brenda Garcia PA-C    Indications / Diagnosis:  Weak  ECG reviewed by me, the ED Provider: yes    Patient location:  ED  Previous ECG:     Previous ECG:  Compared to current    Comparison ECG info:  June 2024    Similarity:  Changes noted  Rate:     ECG rate:  72    ECG rate assessment: normal    Rhythm:     Rhythm: sinus rhythm    Ectopy:     Ectopy: none    QRS:     QRS axis:  Normal  Conduction:     Conduction: normal    ST segments:     ST segments:  Normal  T waves:     T waves: non-specific    Ear cerumen removal    Date/Time: 5/6/2025 9:40 PM    Performed by: Brenda Garcia PA-C  Authorized by: Brenda Garcia PA-C    Universal Protocol:  Consent: Verbal consent obtained.  Consent given by: patient  Patient identity confirmed: verbally with patient    Patient location:  ED  Procedure details:     Location:  L ear    Procedure type: curette      Procedure type comment:  Forecepts    Approach:  External  Post-procedure details:     Complication:  None    Hearing quality:  Improved    Patient tolerance of procedure:  Tolerated well, no immediate complications  Comments:      Large amount of purulent /old blood - and ear wick - all removed from left ear - multiple applications of floxin to left ear - until no additional purulent drainage removed       ED Medication and Procedure Management   Prior to Admission Medications   Prescriptions Last Dose Informant Patient  Reported? Taking?   Aspirin 81 MG CAPS   No No   Sig: Take 1 tablet by mouth in the morning   Metoprolol Tartrate 75 MG TABS   Yes No   Sig: Take 75 mg by mouth   Multiple Vitamins-Minerals (MULTIVITAL) tablet  Self, Family Member Yes No   Sig: every 24 hours   Multiple Vitamins-Minerals (PRESERVISION AREDS 2+MULTI VIT PO)  Self, Family Member Yes No   Sig: take 2 tabs daily   QUEtiapine (SEROquel) 25 mg tablet   Yes No   Sig: Take 12.5 mg by mouth daily as needed   RHOPRESSA 0.02 % SOLN  Self, Family Member Yes No   Sig: INSTILL 1 DROP AT BEDTIME INTO BOTH EYES   ciprofloxacin (CIPRO) 750 mg tablet   No No   Sig: Take 1 tablet (750 mg total) by mouth every 24 hours for 6 days   dorzolamide-timolol (COSOPT) 22.3-6.8 MG/ML ophthalmic solution  Self, Family Member Yes No   Sig: Every 12 hours   fenofibrate (TRICOR) 54 MG tablet   No No   Sig: Take 1 tablet (54 mg total) by mouth daily   latanoprost (XALATAN) 0.005 % ophthalmic solution  Self, Family Member Yes No   Sig: INSTILL 1 DROP AT BEDTIME INTO BOTH EYES   mupirocin (BACTROBAN) 2 % ointment  Self, Family Member No No   Sig: Apply topically daily During wound bandage changes   Patient not taking: Reported on 5/6/2025   mupirocin (BACTROBAN) 2 % ointment   No No   Sig: Apply topically 3 (three) times a day   Patient not taking: Reported on 5/6/2025   nitroglycerin (NITROSTAT) 0.4 mg SL tablet  Self, Family Member No No   Sig: Take one under tongue for chest pain. Can repet in 5 minutes if necessary.   ofloxacin (FLOXIN) 0.3 % otic solution   No No   Sig: Administer 5 drops into the left ear 2 (two) times a day   simvastatin (ZOCOR) 40 mg tablet   No No   Sig: Take 1 tablet (40 mg total) by mouth daily at bedtime      Facility-Administered Medications: None     Patient's Medications   Discharge Prescriptions    No medications on file     No discharge procedures on file.  ED SEPSIS DOCUMENTATION   Time reflects when diagnosis was documented in both MDM as applicable  and the Disposition within this note       Time User Action Codes Description Comment    5/6/2025 10:31 PM Brenda Garcia [N17.9] CHELSEA (acute kidney injury) (HCC)     5/6/2025 10:31 PM Brenda Garcia [K62.89] Rectal mass     5/6/2025 10:35 PM Brenda Garcia [R11.10] Vomiting     5/6/2025 10:35 PM Brenda Garcia [K59.00] Constipation     5/6/2025 11:22 PM Brenda Garcia [R79.89] Elevated troponin     5/6/2025 11:22 PM Brenda Garcia [E87.1] Hyponatremia                  Brenda Garcia PA-C  05/06/25 1260       Brenda Garcia PA-C  05/06/25 6811

## 2025-05-06 NOTE — PROGRESS NOTES
HRR referral received.    Chart reviewed. Pt admitted to IP at [facility] 4/29-5/2 with otitis externa. Pt presented with c/o left ear pain, hearing loss and drainage possibly secondary to otitis externa and cellulitis. Failed OP treatment with Augment x 7 days. Patient also on Cipro Dex ear drops x 7 days. Cultures (+) Pseudomonas. ENT consulted. ENT placed wick in left ear and will need f/u as OP. Per CCM notes patient lives alone and is independent of ADL's and ambulates without assist. Owns no DME. Drives self. Patient declined VNA/HHS at discharge.    Call placed to patient and spoke with his granddaughter/care giver Leola. She says that patient is doing well since home. She says he is pretty independent of ADL's and ambulates without any issues. She says she manages his medications and appointments. We reviewed AVS and medications at this time. Patient is taking the antibiotics and eye drops as ordered. She denies any questions or concerns. She denies the need for any refills. She says patient has no pain, fever or chills since home. She says he still has some drainage from the ear and he has an apt on Friday to have the wick removed. She says patient does drive but she will accompany him to all of his apt's. She is also aware of patient [facility] apt with PCP on 5/13. Patient also has an apt with the [facility] for the skin lesion on his back on 5/14.  Leola was appreciative of the call but declined ccm services or need for follow up.    PMH: T2DM, A1C 6.6 on 4/7/25, CKD3a, CAD, anxiety, DJD, HLD, basal cell carcinoma skin/neck, HLD    Apt's:  5/9: ENT  5/13: PCP [facility]  5/14: [facility]  6/11: Podiatry

## 2025-05-07 ENCOUNTER — HOSPICE ADMISSION (OUTPATIENT)
Dept: HOME HOSPICE | Facility: HOSPICE | Age: OVER 89
End: 2025-05-07
Payer: MEDICARE

## 2025-05-07 ENCOUNTER — HOME CARE VISIT (OUTPATIENT)
Dept: HOME HEALTH SERVICES | Facility: HOME HEALTHCARE | Age: OVER 89
End: 2025-05-07
Payer: MEDICARE

## 2025-05-07 ENCOUNTER — PATIENT OUTREACH (OUTPATIENT)
Dept: CASE MANAGEMENT | Facility: HOSPITAL | Age: OVER 89
End: 2025-05-07

## 2025-05-07 PROBLEM — E87.6 HYPOKALEMIA: Status: ACTIVE | Noted: 2025-05-07

## 2025-05-07 PROBLEM — E43 SEVERE PROTEIN-CALORIE MALNUTRITION (HCC): Status: ACTIVE | Noted: 2025-05-07

## 2025-05-07 PROBLEM — E87.1 HYPONATREMIA: Status: ACTIVE | Noted: 2025-05-07

## 2025-05-07 PROBLEM — D72.829 LEUKOCYTOSIS: Status: ACTIVE | Noted: 2025-05-07

## 2025-05-07 PROBLEM — R55 SYNCOPE: Status: ACTIVE | Noted: 2025-05-07

## 2025-05-07 LAB
4HR DELTA HS TROPONIN: 29 NG/L
ANION GAP SERPL CALCULATED.3IONS-SCNC: 25 MMOL/L (ref 4–13)
ATRIAL RATE: 72 BPM
ATRIAL RATE: 74 BPM
ATRIAL RATE: 81 BPM
BASOPHILS # BLD AUTO: 0.08 THOUSANDS/ÂΜL (ref 0–0.1)
BASOPHILS NFR BLD AUTO: 1 % (ref 0–1)
BUN SERPL-MCNC: 117 MG/DL (ref 5–25)
CALCIUM SERPL-MCNC: 8.5 MG/DL (ref 8.4–10.2)
CARDIAC TROPONIN I PNL SERPL HS: 70 NG/L (ref ?–50)
CHLORIDE SERPL-SCNC: 82 MMOL/L (ref 96–108)
CO2 SERPL-SCNC: 22 MMOL/L (ref 21–32)
CREAT SERPL-MCNC: 4.7 MG/DL (ref 0.6–1.3)
EOSINOPHIL # BLD AUTO: 0.03 THOUSAND/ÂΜL (ref 0–0.61)
EOSINOPHIL NFR BLD AUTO: 0 % (ref 0–6)
ERYTHROCYTE [DISTWIDTH] IN BLOOD BY AUTOMATED COUNT: 14.1 % (ref 11.6–15.1)
GFR SERPL CREATININE-BSD FRML MDRD: 10 ML/MIN/1.73SQ M
GLUCOSE SERPL-MCNC: 108 MG/DL (ref 65–140)
HCT VFR BLD AUTO: 37.2 % (ref 36.5–49.3)
HGB BLD-MCNC: 12.7 G/DL (ref 12–17)
IMM GRANULOCYTES # BLD AUTO: 0.08 THOUSAND/UL (ref 0–0.2)
IMM GRANULOCYTES NFR BLD AUTO: 1 % (ref 0–2)
LACTATE SERPL-SCNC: 1.1 MMOL/L (ref 0.5–2)
LACTATE SERPL-SCNC: 2.7 MMOL/L (ref 0.5–2)
LYMPHOCYTES # BLD AUTO: 1.46 THOUSANDS/ÂΜL (ref 0.6–4.47)
LYMPHOCYTES NFR BLD AUTO: 10 % (ref 14–44)
MCH RBC QN AUTO: 28.2 PG (ref 26.8–34.3)
MCHC RBC AUTO-ENTMCNC: 34.1 G/DL (ref 31.4–37.4)
MCV RBC AUTO: 83 FL (ref 82–98)
MONOCYTES # BLD AUTO: 1.4 THOUSAND/ÂΜL (ref 0.17–1.22)
MONOCYTES NFR BLD AUTO: 10 % (ref 4–12)
NEUTROPHILS # BLD AUTO: 11.33 THOUSANDS/ÂΜL (ref 1.85–7.62)
NEUTS SEG NFR BLD AUTO: 78 % (ref 43–75)
NRBC BLD AUTO-RTO: 0 /100 WBCS
P AXIS: 0 DEGREES
P AXIS: 75 DEGREES
P AXIS: 75 DEGREES
PLATELET # BLD AUTO: 437 THOUSANDS/UL (ref 149–390)
PMV BLD AUTO: 9.7 FL (ref 8.9–12.7)
POTASSIUM SERPL-SCNC: 2.3 MMOL/L (ref 3.5–5.3)
PR INTERVAL: 144 MS
QRS AXIS: -21 DEGREES
QRS AXIS: -29 DEGREES
QRS AXIS: -36 DEGREES
QRSD INTERVAL: 108 MS
QRSD INTERVAL: 110 MS
QRSD INTERVAL: 112 MS
QT INTERVAL: 558 MS
QT INTERVAL: 568 MS
QT INTERVAL: 612 MS
QTC INTERVAL: 619 MS
QTC INTERVAL: 659 MS
QTC INTERVAL: 670 MS
RBC # BLD AUTO: 4.5 MILLION/UL (ref 3.88–5.62)
SODIUM SERPL-SCNC: 129 MMOL/L (ref 135–147)
T WAVE AXIS: 159 DEGREES
T WAVE AXIS: 182 DEGREES
T WAVE AXIS: 98 DEGREES
VENTRICULAR RATE: 72 BPM
VENTRICULAR RATE: 74 BPM
VENTRICULAR RATE: 81 BPM
WBC # BLD AUTO: 14.38 THOUSAND/UL (ref 4.31–10.16)

## 2025-05-07 PROCEDURE — 80048 BASIC METABOLIC PNL TOTAL CA: CPT | Performed by: INTERNAL MEDICINE

## 2025-05-07 PROCEDURE — 83605 ASSAY OF LACTIC ACID: CPT | Performed by: INTERNAL MEDICINE

## 2025-05-07 PROCEDURE — 99223 1ST HOSP IP/OBS HIGH 75: CPT | Performed by: INTERNAL MEDICINE

## 2025-05-07 PROCEDURE — 3E1B78Z IRRIGATION OF EAR USING IRRIGATING SUBSTANCE, VIA NATURAL OR ARTIFICIAL OPENING: ICD-10-PCS | Performed by: OTOLARYNGOLOGY

## 2025-05-07 PROCEDURE — 93010 ELECTROCARDIOGRAM REPORT: CPT | Performed by: INTERNAL MEDICINE

## 2025-05-07 PROCEDURE — 85025 COMPLETE CBC W/AUTO DIFF WBC: CPT | Performed by: INTERNAL MEDICINE

## 2025-05-07 RX ORDER — SODIUM CHLORIDE 9 MG/ML
75 INJECTION, SOLUTION INTRAVENOUS CONTINUOUS
Status: DISCONTINUED | OUTPATIENT
Start: 2025-05-07 | End: 2025-05-08 | Stop reason: HOSPADM

## 2025-05-07 RX ORDER — ONDANSETRON 2 MG/ML
4 INJECTION INTRAMUSCULAR; INTRAVENOUS EVERY 6 HOURS PRN
Status: DISCONTINUED | OUTPATIENT
Start: 2025-05-07 | End: 2025-05-08 | Stop reason: HOSPADM

## 2025-05-07 RX ORDER — POTASSIUM CHLORIDE 1500 MG/1
40 TABLET, EXTENDED RELEASE ORAL ONCE
Status: COMPLETED | OUTPATIENT
Start: 2025-05-07 | End: 2025-05-07

## 2025-05-07 RX ORDER — HEPARIN SODIUM 5000 [USP'U]/ML
5000 INJECTION, SOLUTION INTRAVENOUS; SUBCUTANEOUS EVERY 8 HOURS SCHEDULED
Status: DISCONTINUED | OUTPATIENT
Start: 2025-05-07 | End: 2025-05-08 | Stop reason: HOSPADM

## 2025-05-07 RX ADMIN — SODIUM CHLORIDE 125 ML/HR: 0.9 INJECTION, SOLUTION INTRAVENOUS at 01:53

## 2025-05-07 RX ADMIN — SODIUM CHLORIDE 125 ML/HR: 0.9 INJECTION, SOLUTION INTRAVENOUS at 07:28

## 2025-05-07 RX ADMIN — HEPARIN SODIUM 5000 UNITS: 5000 INJECTION INTRAVENOUS; SUBCUTANEOUS at 21:36

## 2025-05-07 RX ADMIN — POTASSIUM CHLORIDE 40 MEQ: 1500 TABLET, EXTENDED RELEASE ORAL at 17:41

## 2025-05-07 RX ADMIN — SODIUM CHLORIDE 125 ML/HR: 0.9 INJECTION, SOLUTION INTRAVENOUS at 15:40

## 2025-05-07 RX ADMIN — SODIUM CHLORIDE 125 ML/HR: 0.9 INJECTION, SOLUTION INTRAVENOUS at 23:56

## 2025-05-07 NOTE — ASSESSMENT & PLAN NOTE
- Irregular soft tissue density partially fills the rectal colon with upstream fluid distention concerning for blood product versus neoplasm.   - Gi consulted  - Pt with assd symptoms of constipation, nausea and vomiting  - zofran for nausea  - passing gas. No evidence of obstruction. Abd NT.

## 2025-05-07 NOTE — ASSESSMENT & PLAN NOTE
Pt reportedly passed out at home as per family  Patient does not recall  Trauma work up negative  Head CT negative for any acute pathology

## 2025-05-07 NOTE — ASSESSMENT & PLAN NOTE
Severe CHELSEA, likely prerenal  Improving on IV fluids  Goals of care discussion, patient would not be interested in dialysis  No indication for dialysis at this time

## 2025-05-07 NOTE — NURSING NOTE
"QUICK NOTE - Stat RN  Saud RODRIGUEZ Reva 89 y.o. male MRN: [id number]  Unit/Bed#: [place] [address] -01 Encounter: [id number]    Date Paged: 25  Time Paged: 0018  Room #: 210-1  Arrival Time: 0100  Deterioration index score at time of page: 51.26 %  Spoke with jasper from primary team      PROBLEMS resulting in high DI score:   Tachypnea   Age   Systolic blood pressure     PLAN:    Spoke with primary RN, no concerns at this time, pt resting comfortably, tachypnea resolved as well as systolic blood pressure.  Continue current plan of care.      Please contact critical care via [employer] with any questions or concerns.     Vitals:   Vitals:    25 0017 25 0018 25 0026 25 0028   BP:  (!) 85/53 102/55 102/55   BP Location:  Right arm Right arm Right arm   Pulse:  70  70   Resp:  20  20   Temp:  (!) 97.1 °F (36.2 °C)  (!) 97.1 °F (36.2 °C)   TempSrc:  Oral  Oral   SpO2:  99%  99%   Weight: 49.1 kg (108 lb 3.9 oz)   48.7 kg (107 lb 5.8 oz)   Height:  5' 1\" (1.549 m)  5' 3\" (1.6 m)       Respiratory:  SpO2: SpO2: 99 %, SpO2 Activity: SpO2 Activity: At Rest, SpO2 Device: O2 Device: None (Room air)       Temperature: Temp (24hrs), Av.2 °F (36.2 °C), Min:97.1 °F (36.2 °C), Max:97.5 °F (36.4 °C)  Current: Temperature: (!) 97.1 °F (36.2 °C)    Code Status: Level 3 - DNAR and DNI   "

## 2025-05-07 NOTE — ASSESSMENT & PLAN NOTE
- Creatinine elevated to 5.1 which is up from normal.  - pt with poor PO intake and n/v  - appears dehydrated.   - pre-renal etiology, elevated lactate  - IV hydration. Lactate improved  - afebrile and HDS

## 2025-05-07 NOTE — CASE MANAGEMENT
Case Management Assessment & Discharge Planning Note    Patient name Saud Ardon  Location [facility] M* MRN [id number]  : 1934 Date 2025       Current Admission Date: 2025  Current Admission Diagnosis:CHELSEA (acute kidney injury) (HCC)   Patient Active Problem List    Diagnosis Date Noted Date Diagnosed    Syncope 2025     Rectal mass 2025     CHELSEA (acute kidney injury) (HCC) 2025     Nausea & vomiting 2025     Other constipation 2025     Otitis externa 2025     Ischemic cardiomyopathy 2024     Basal cell carcinoma (BCC) of skin of neck 2024     Basal cell carcinoma (BCC) of right shoulder 2024     Lesion of neck 2024     Lyme disease 2024     Stage 3a chronic kidney disease (HCC) 2022     Callus of foot 2022     Moderate major depression (HCC) 2021     Bunion 2021     DNR (do not resuscitate) 2020     Overweight (BMI 25.0-29.9) 2019     Type 2 diabetes mellitus with stage 3a chronic kidney disease, without long-term current use of insulin (HCC) 2019     Onychomycosis of toenail 2019     Vitamin D deficiency 2018     Other specified glaucoma 2018     Aortic valve stenosis 05/15/2017     Insomnia 2016     Primary hypertension 2014     Hypertriglyceridemia 2014     Carotid artery disease (HCC) 2014     Anxiety 2013     CAD in native artery 2013     Coronary atherosclerosis 2013     Osteoarthritis 2013     Hepatitis A 2013     Hyperlipidemia 2013     Hydrocele 2013     Hyperplasia of prostate without lower urinary tract symptoms (LUTS) 2013       LOS (days): 1  Geometric Mean LOS (GMLOS) (days): 2.2  Days to GMLOS:1.6     OBJECTIVE:  PATIENT READMITTED TO HOSPITAL  Risk of Unplanned Readmission Score: 14.88         Current admission status: Inpatient  Referral Reason: Hospice    Preferred  Pharmacy:   [id number] - [place] - [address] [place] [address]  Phone: [phone] Fax: [phone]    Primary Care Provider: Ca Vargas PA-C    Primary Insurance: [facility]  Secondary Insurance:     ASSESSMENT:  Active Health Care Proxies    There are no active Health Care Proxies on file.       Advance Directives  Does patient have a Health Care POA?: No  Does patient have Advance Directives?: No  Primary Contact: Leola Andre (Grandchild)  [phone] (Mobile)         Readmission Root Cause  30 Day Readmission: Yes  During your hospital stay, did someone (provider, nurse, ) explain your care to you in a way you could understand?: Yes  Did you feel medically stable to leave the hospital?: Yes  Were you able to pay for your medication at the pharmacy?: Yes  Did you have reliable transportation to take you to your appointments?: Yes  During previous admission, was a post-acute recommendation made?: Yes  What post-acute resources were offered?: [facility]  Patient was readmitted due to: CHELSEA  Action Plan: Home w/ [facility]    Patient Information  Admitted from:: Home  Mental Status: Alert  During Assessment patient was accompanied by: Other-Comment (daughter, Leola Andre)  Assessment information provided by:: Patient, Other - please comment (Granddaughter- Leola and great granddaughter, Krista)  Support Systems: Self, Children, Daughter, Family members (Granddaughter and great granddaughter)  County of Residence: [place]  What city do you live in?: [place]  Home entry access options. Select all that apply.: Stairs  Number of steps to enter home.: 3  Type of Current Residence: [facility]  Living Arrangements: Lives Alone  Is patient a ?: No    Activities of Daily Living Prior to Admission  Functional Status: Independent  Completes ADLs independently?: Yes  Ambulates independently?: Yes  Does patient use assisted devices?: Yes  Assisted  Devices (DME) used: Walker  Does patient currently own DME?: Yes  What DME does the patient currently own?: Walker  Does patient have a history of Outpatient Therapy (PT/OT)?: No  Does the patient have a history of Short-Term Rehab?: No  Does patient currently have HHC?: No      Patient Information Continued  Income Source: SSI/SSD  Does patient have prescription coverage?: Yes  Can the patient afford their medications and any related supplies (such as glucometers or test strips)?: Yes  Does patient receive dialysis treatments?: No  Does patient have a history of substance abuse?: No  Does patient have a history of Mental Health Diagnosis?: Yes (Anxiety, Depression)  Is patient receiving treatment for mental health?: No. Patient declined treatment information.      Means of Transportation  Means of Transport to [facility]:: Drives Self      DISCHARGE DETAILS:    Discharge planning discussed with:: Patient, patient's granddaughter- Leola and great granddaughterKrista  Freedom of Choice: Yes  Comments - Freedom of Choice: CM explained locations for hospice; CM reviewed hospice agency and family choice. Family agreeable and chose home hospice with  Hospice. CM made referral in Aidin.  CM contacted family/caregiver?: Yes (Patient's granddaugther- Leola)  Were Treatment Team discharge recommendations reviewed with patient/caregiver?: Yes  Did patient/caregiver verbalize understanding of patient care needs?: Yes       Contacts  Patient Contacts: chrystal Bhagat  Relationship to Patient:: Family  Contact Method: Phone  Phone Number: [phone]  Reason/Outcome: Emergency Contact, Continuity of Care    Requested Home Health Care         Is the patient interested in HHC at discharge?: No    DME Referral Provided  Referral made for DME?: No    Other Referral/Resources/Interventions Provided:  Interventions: Hospice  Referral Comments: Teresita and family chose  hospice services.    Treatment Team Recommendation:  Hospice, Home  Discharge Destination Plan:: Home, Hospice                Additional Comments: CM notified by [facility] attending patient and family agreeable to hospice services. CM received message from patient's great granddaughter, Krista via phone. CM spoke with Krista (PH: [phone]). CM left message for patient's daughters, Cassy and Lissy. CM met with patient and patient's granddaughter, Leola (daughter of Lissy) at bedside. CM reviewed and completed assessmet, patient alert. CM described locations and agency choice for hospice services. Family agreeable and chose  hospice. CM made referral in Aidin. Patient's granddaughter confirmed patient resides alone at home and is not patient HHA, although provides daily caregiving. Patient confirmed would like  hospice services at home. Leola provided CM with patient 'Five Wishes' CM made copy and placed in scan bin and returned original to caregiver, Leola.  CM to follow for further discharge planning.

## 2025-05-07 NOTE — ASSESSMENT & PLAN NOTE
Malnutrition Findings:   Adult Malnutrition type: Acute illness  Adult Degree of Malnutrition: Other severe protein calorie malnutrition  Malnutrition Characteristics: Inadequate energy, Weight loss                  360 Statement: severe protein calorie malnutrition in the setting of acute illness as evidenced by:  significant 12 lb (10%) weight loss over the past 3 months;  <=50% energy intake compared to estimated energy needs for >= 5 days.  treated with diet and supplement recommendations for when able to initiate oral diet.    BMI Findings:           Body mass index is 19.21 kg/m².   In setting of rectal mass  Continue diet, nutritional supplements

## 2025-05-07 NOTE — CONSULTS
Assessment and Plan:  The wick was removed from the left ear canal  yesterday.  I have recommended continuation of ofloxacin drops for another 4 days.  Will follow up with patient next week in office.     Diagnosis:  Resolving left otitis externa    History of Present Illness   Saud Ardon is a 89 y.o. male who is known to my practice and has been under our care for left otitis externa.  He was last seen in my office on 5/1/2025 at which point he had severe otitis externa on the left side and a wick was placed.  Tympanic membrane was unable to be visualized.  Culture was taken which demonstrated growth of Pseudomonas aeruginosa.  He was ultimately placed on oral Cipro and topical ofloxacin he was scheduled to be seen in my office earlier this week but was admitted.  He came to the hospital at 5:04 AM on 5/7/2025.  He presented with nausea, vomiting, and weakness.  His family stated that this has been going on for 2 to 3 days and he has become progressively weak with poor p.o. intake.  He was diagnosed with acute kidney injury, a rectal mass, nausea and vomiting, and syncope.  Because he was unable to get to the office I was called to evaluate his ER once again.  The wick was removed yesterday.     Review of Systems  Historical Information   Past Medical History:   Diagnosis Date    Anxiety     CAD (coronary artery disease)     Cancer (HCC)     Capsular cataract     mature    Chronic ulcer of skin (HCC) 05/18/2015    DJD (degenerative joint disease)     Encounter for routine adult medical exam with abnormal findings 08/21/2019    Hepatitis A     Hydrocele     Hyperglycemia     Hyperlipidemia     Hypertension     Impaired fasting glucose 04/30/2014    Ingrown toenail     Myocardial infarct (HCC) 2007    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HCC)     Potassium (K) excess 12/27/2019    Prostatic hyperplasia     Skin cancer      Past Surgical History:   Procedure Laterality Date    ANGIOPLASTY  2008    FL LUMBAR PUNCTURE DIAGNOSTIC   1/14/2025     Social History     Tobacco Use    Smoking status: Never     Passive exposure: Never    Smokeless tobacco: Never   Vaping Use    Vaping status: Never Used   Substance and Sexual Activity    Alcohol use: Never    Drug use: Never    Sexual activity: Not Currently     Partners: Female     E-Cigarette/Vaping    E-Cigarette Use Never User      E-Cigarette/Vaping Substances    Nicotine No     THC No     CBD No     Flavoring No      Family History   Problem Relation Age of Onset    Coronary artery disease Father      Social History     Tobacco Use    Smoking status: Never     Passive exposure: Never    Smokeless tobacco: Never   Vaping Use    Vaping status: Never Used   Substance and Sexual Activity    Alcohol use: Never    Drug use: Never    Sexual activity: Not Currently     Partners: Female       Current Facility-Administered Medications:     heparin (porcine) subcutaneous injection 5,000 Units, Q8H TOSHIA    ondansetron (ZOFRAN) injection 4 mg, Q6H PRN    sodium chloride 0.9 % infusion, Continuous, Last Rate: 125 mL/hr (05/07/25 1540)  Prior to Admission Medications   Prescriptions Last Dose Informant Patient Reported? Taking?   Aspirin 81 MG CAPS Past Week  No Yes   Sig: Take 1 tablet by mouth in the morning   Metoprolol Tartrate 75 MG TABS Unknown  Yes No   Sig: Take 75 mg by mouth   Multiple Vitamins-Minerals (MULTIVITAL) tablet Unknown Self, Family Member Yes No   Sig: every 24 hours   Multiple Vitamins-Minerals (PRESERVISION AREDS 2+MULTI VIT PO) Unknown Self, Family Member Yes No   Sig: take 2 tabs daily   QUEtiapine (SEROquel) 25 mg tablet Unknown  Yes No   Sig: Take 12.5 mg by mouth daily as needed   RHOPRESSA 0.02 % SOLN Unknown Self, Family Member Yes No   Sig: INSTILL 1 DROP AT BEDTIME INTO BOTH EYES   ciprofloxacin (CIPRO) 750 mg tablet Unknown  No No   Sig: Take 1 tablet (750 mg total) by mouth every 24 hours for 6 days   dorzolamide-timolol (COSOPT) 22.3-6.8 MG/ML ophthalmic solution Unknown  Self, Family Member Yes No   Sig: Every 12 hours   fenofibrate (TRICOR) 54 MG tablet   No No   Sig: Take 1 tablet (54 mg total) by mouth daily   latanoprost (XALATAN) 0.005 % ophthalmic solution Unknown Self, Family Member Yes No   Sig: INSTILL 1 DROP AT BEDTIME INTO BOTH EYES   mupirocin (BACTROBAN) 2 % ointment  Self, Family Member No No   Sig: Apply topically daily During wound bandage changes   Patient not taking: Reported on 5/6/2025   mupirocin (BACTROBAN) 2 % ointment Unknown  No No   Sig: Apply topically 3 (three) times a day   nitroglycerin (NITROSTAT) 0.4 mg SL tablet Not Taking Self, Family Member No No   Sig: Take one under tongue for chest pain. Can repet in 5 minutes if necessary.   Patient not taking: Reported on 5/7/2025   ofloxacin (FLOXIN) 0.3 % otic solution Unknown  No No   Sig: Administer 5 drops into the left ear 2 (two) times a day   simvastatin (ZOCOR) 40 mg tablet Unknown  No No   Sig: Take 1 tablet (40 mg total) by mouth daily at bedtime      Facility-Administered Medications: None     Brimonidine    Objective :  Temp:  [97.1 °F (36.2 °C)-97.9 °F (36.6 °C)] 97.9 °F (36.6 °C)  HR:  [68-87] 72  BP: ()/(49-70) 120/49  Resp:  [16-20] 18  SpO2:  [98 %-99 %] 98 %  O2 Device: None (Room air)    Physical Exam   PHYSICAL  EXAMINATION    CONSTITUTION:    appears appropriate for age.    No evidence of any acute distress.    Communicates normally.    Voice quality is clear.    Alert and oriented.    HEAD/FACE:    atraumatic, normocephalic on inspection.    No scars present.    Salivary glands are normal in texture and size without any asymmetry.    Facial nerve function is symmetric and normal.    EYES:    Extraocular muscles intact in both eyes, normal gaze bilaterally and no evidence of nystagmus.    Pupils equal, round, and accommodate to light bilaterally.    EARS:    External ears normal.    External canal right side is clear and dry.  The left with moist squamous debris in canal - no longer  edematous - small area of granulation tissue inferior and lateral.  Tympanic membranes intact with normal mobility, no effusion, no retraction, no perforation.  Some debris on the left membrane  Post auricular area is normal    NOSE:    External nose without deformity.    Internal mucosa pink and moist.    Septum midline anteriorly  - deflection to the right side posteriorly.    Nasal turbinates normal in color and size.    ORAL CAVITY:    lips normal and healthy in appearance.    Edentulous upper - lowers with missing dentition.    Gums healthy, pink and moist.    Tongue appears pink and moist with no lesions.    Floor of mouth pink, moist, and smooth.    Submandibular ducts patent with clear saliva.    Parotid ducts patent with clear saliva.    Oral mucosa pink and moist.    Hard palate normal in appearance without any lesions.    OROPHARYNX:    soft palate pink and moist without any lesions.    Uvula midline without any lesions.    Tonsils atrophied.    Posterior pharynx pink and moist without any lesions    NECK:    supple and symmetric.    No masses noted.    Trachea midline.    No thyromegaly or nodules noted.    LYMPH:    No palpable adenopathy in left or right neck    SKIN:    No rashes. No lesions noted.       Lab Results: I have reviewed the following results:  Results from last 7 days   Lab Units 05/07/25 0420 05/06/25 1941 05/02/25 0436   WBC Thousand/uL 14.38* 13.51* 17.07*   HEMOGLOBIN g/dL 12.7 14.5 12.8   HEMATOCRIT % 37.2 41.3 39.2   PLATELETS Thousands/uL 437* 508* 425*     Results from last 7 days   Lab Units 05/07/25 0420 05/06/25 1941 05/02/25 0436   POTASSIUM mmol/L 2.3* 3.5 3.4*   CHLORIDE mmol/L 82* 77* 95*   CO2 mmol/L 22 21 29   BUN mg/dL 117* 125* 41*   CREATININE mg/dL 4.70* 5.03* 1.36*   CALCIUM mg/dL 8.5 9.4 9.5     Results from last 7 days   Lab Units 05/06/25 1941   INR  1.25*   PTT seconds 28       Imaging Results Review: I personally reviewed the following image studies in  PACS and associated radiology reports: CT head. My interpretation of the radiology images/reports is: opacification of the left ear canal - possibly pack - no significant middle ear pathology noted..    CT head without contrast  Result Date: 5/6/2025  Narrative: CT BRAIN - WITHOUT CONTRAST INDICATION:   dizzy/fall. COMPARISON: 7/15/2024, TECHNIQUE:  CT examination of the brain was performed.  Multiplanar 2D reformatted images were created from the source data. Radiation dose length product (DLP) for this visit:  869 mGy-cm. .  This examination, like all CT scans performed in the [facility], was performed utilizing techniques to minimize radiation dose exposure, including the use of iterative reconstruction and automated exposure control. IMAGE QUALITY:  Diagnostic. FINDINGS: PARENCHYMA: Decreased attenuation is noted in periventricular and subcortical white matter demonstrating an appearance that is statistically most likely to represent mild microangiopathic change. No CT signs of acute infarction.  No intracranial mass, mass effect or midline shift.  No acute parenchymal hemorrhage. VENTRICLES AND EXTRA-AXIAL SPACES:  Normal for the patient's age. VISUALIZED ORBITS: Normal visualized orbits. PARANASAL SINUSES: Normal visualized paranasal sinuses. CALVARIUM AND EXTRACRANIAL SOFT TISSUES: Normal.     Impression: No acute intracranial abnormality.  Chronic microangiopathic changes. Workstation performed: [id number]     CT Temporal Bones/IACs/Mastoids w contrast  Result Date: 4/29/2025  Narrative: CT TEMPORAL BONES WITHOUT CONTRAST INDICATION:   left ear pain, decreased hearing, copious purulent drainage, left post-auricular tenderness. COMPARISON: CT head with and without contrast 7/11/2024. TECHNIQUE: Using a multi-detector scanner, 0.625 mm axial scans of the temporal bone were acquired using a high-resolution bone technique.  Targeted reconstructions were obtained of each side, including axial,  coronal, and oblique views.  All reconstructed images were reviewed. Soft tissue reconstructions were performed as well. Radiation dose length product (DLP) for this visit:  815 mGy-cm. .  This examination, like all CT scans performed in the [facility], was performed utilizing techniques to minimize radiation dose exposure, including the use of iterative reconstruction and automated exposure control. IV Contrast: 85 mL of iohexol IMAGE QUALITY:  Diagnostic. FINDINGS: RIGHT TEMPORAL BONE: PERIAURICULAR SOFT TISSUES:  Normal. EXTERNAL AUDITORY CANAL: Normal. MIDDLE EAR: Normal. OSSICLES:Normal. MASTOID AIR CELLS: Normal. COCHLEA: Normal. OTIC CAPSULE: Normal mineralization. VESTIBULE: Normal. VESTIBULAR AND COCHLEAR AQUEDUCT: Normal SEMICIRCULAR CANALS: Normal. INTERNAL AUDITORY CANAL: Normal. FACIAL NERVE CANAL: Normal. CAROTID CANAL: Normal. JUGULAR FORAMEN: Normal. LEFT TEMPORAL BONE: PERIAURICULAR SOFT TISSUES: Asymmetric soft tissue swelling of the left ear and left periauricular region with associated subcutaneous fat stranding. Suspected 0.6 cm subperiosteal abscess adjacent to left mastoid temporal bone just inferolateral to opening of bony external auditory canal (4:7). EXTERNAL AUDITORY CANAL: Narrowed left external auditory canal is completely occluded with associated mucosal hyperemia. No osseous destruction. MIDDLE EAR: Normal. OSSICLES:Normal. MASTOID AIR CELLS: Small left mastoid effusion. No osseous destruction. COCHLEA: Normal. OTIC CAPSULE: Normal mineralization. VESTIBULE: Normal. VESTIBULAR AND COCHLEAR AQUEDUCT: Normal SEMICIRCULAR CANALS: Normal. INTERNAL AUDITORY CANAL: Normal. FACIAL NERVE CANAL: Normal. CAROTID CANAL: Normal. JUGULAR FORAMEN: Normal. OTHER FINDINGS: Normal intravascular contrast enhancement. Arterial calcifications of bilateral ICA intracranial segments (worse in cavernous to supraclinoid segments).     Impression: LEFT TEMPORAL BONE - Findings suggestive of  acute left otitis externa with surrounding acute cellulitis in left ear and left periauricular region. Suspected 0.6 cm subperiosteal abscess adjacent to the left mastoid temporal bone just inferolateral to opening of bony external auditory canal. Recommend evaluation by ENT. - Small left mastoid effusion. No osseous destruction. RIGHT TEMPORAL BONE - Normal. The study was marked in EPIC for immediate notification. Workstation performed: [id number]       Other Study Results Review: No additional pertinent studies reviewed.

## 2025-05-07 NOTE — H&P
H&P - Hospitalist   Name: Saud Ardon 89 y.o. male I MRN: [id number]  Unit/Bed#: [place] [address] -01 I Date of Admission: 5/6/2025   Date of Service: 5/7/2025 I Hospital Day: 1     Assessment & Plan  CHELSEA (acute kidney injury) (HCC)  - Creatinine elevated to 5.1 which is up from normal.  - pt with poor PO intake and n/v  - appears dehydrated.   - pre-renal etiology, elevated lactate  - IV hydration. Lactate improved  - afebrile and HDS  Rectal mass  - Irregular soft tissue density partially fills the rectal colon with upstream fluid distention concerning for blood product versus neoplasm.   - Gi consulted  - Pt with assd symptoms of constipation, nausea and vomiting  - zofran for nausea  - passing gas. No evidence of obstruction. Abd NT.   Nausea & vomiting  - zofran prn  - IVF hydration  Other constipation  - monitor for obstruction  Syncope  Pt reportedly passed out at home as per family  Patient does not recall  Trauma work up negative  Head CT negative for any acute pathology      VTE Pharmacologic Prophylaxis:   SCD  Code Status: Level 3 - DNAR and DNI     Anticipated Length of Stay: Patient will be admitted on an inpatient basis with an anticipated length of stay of greater than 2 midnights secondary to rectal mass.    History of Present Illness   Chief Complaint: N/V    Saud Ardon is a 89 y.o. male with a PMH of coronary artery disease, degenerative joint disease, hypertension, hyperlipidemia, basal cell carcinoma, chronic kidney disease, recent ear infection discharged from the hospital May 2, 2025.  Who presents with nausea, vomiting and weakness.    As per family patient's symptoms have been going on for about 2 to 3 days and he has become progressively weak.  Patient has had very poor p.o. intake during the past few days and he Cannot recall when he last urinated.  Patient also reports that he has not had a bowel movement in a few days.  No complaints of abdominal pain, nausea, diarrhea, bright red  blood per rectum, melena.  Family also reports that the patient fell today but patient does not recall falling.    Patient was reportedly diagnosed with a colonic mass at [facility].  As per family he does not want any intervention done at this point.    Review of Systems  12 point review of systems is grossly negative unless stated above in HPI  Historical Information   Past Medical History:   Diagnosis Date    Anxiety     CAD (coronary artery disease)     Cancer (HCC)     Capsular cataract     mature    Chronic ulcer of skin (HCC) 05/18/2015    DJD (degenerative joint disease)     Encounter for routine adult medical exam with abnormal findings 08/21/2019    Hepatitis A     Hydrocele     Hyperglycemia     Hyperlipidemia     Hypertension     Impaired fasting glucose 04/30/2014    Ingrown toenail     Myocardial infarct (HCC) 2007    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HCC)     Potassium (K) excess 12/27/2019    Prostatic hyperplasia     Skin cancer      Past Surgical History:   Procedure Laterality Date    ANGIOPLASTY  2008    FL LUMBAR PUNCTURE DIAGNOSTIC  1/14/2025     Social History     Tobacco Use    Smoking status: Never     Passive exposure: Never    Smokeless tobacco: Never   Vaping Use    Vaping status: Never Used   Substance and Sexual Activity    Alcohol use: Never    Drug use: Never    Sexual activity: Not Currently     Partners: Female     E-Cigarette/Vaping    E-Cigarette Use Never User      E-Cigarette/Vaping Substances    Nicotine No     THC No     CBD No     Flavoring No      Family history non-contributory  Social History:  Marital Status:    Patient Pre-hospital Living Situation: Home  Patient Pre-hospital Level of Mobility: walks      Meds/Allergies   I have reviewed home medications using recent Epic encounter.  Prior to Admission medications    Medication Sig Start Date End Date Taking? Authorizing Provider   Aspirin 81 MG CAPS Take 1 tablet by mouth in the morning 10/8/24  Yes Annia Edwards  DO   ciprofloxacin (CIPRO) 750 mg tablet Take 1 tablet (750 mg total) by mouth every 24 hours for 6 days 5/2/25 5/8/25  Jeremías Guaman MD   dorzolamide-timolol (COSOPT) 22.3-6.8 MG/ML ophthalmic solution Every 12 hours 9/26/16   Historical Provider, MD   fenofibrate (TRICOR) 54 MG tablet Take 1 tablet (54 mg total) by mouth daily 1/14/25 5/6/25  Annia Edwards DO   latanoprost (XALATAN) 0.005 % ophthalmic solution INSTILL 1 DROP AT BEDTIME INTO BOTH EYES 7/19/18   Historical Provider, MD   Metoprolol Tartrate 75 MG TABS Take 75 mg by mouth 1/20/25   Historical Provider, MD   Multiple Vitamins-Minerals (MULTIVITAL) tablet every 24 hours 12/31/13   Historical Provider, MD   Multiple Vitamins-Minerals (PRESERVISION AREDS 2+MULTI VIT PO) take 2 tabs daily 6/7/16   Historical Provider, MD   mupirocin (BACTROBAN) 2 % ointment Apply topically daily During wound bandage changes  Patient not taking: Reported on 5/6/2025 6/25/24   Hoda Murry MD   mupirocin (BACTROBAN) 2 % ointment Apply topically 3 (three) times a day 8/2/24   Gaby Rivera MD   nitroglycerin (NITROSTAT) 0.4 mg SL tablet Take one under tongue for chest pain. Can repet in 5 minutes if necessary.  Patient not taking: Reported on 5/7/2025 6/28/24   Vale Aguiar DO   ofloxacin (FLOXIN) 0.3 % otic solution Administer 5 drops into the left ear 2 (two) times a day 5/2/25   Jeremías Guaman MD   QUEtiapine (SEROquel) 25 mg tablet Take 12.5 mg by mouth daily as needed 1/20/25 1/20/26  Historical Provider, MD   RHOPRESSA 0.02 % SOLN INSTILL 1 DROP AT BEDTIME INTO BOTH EYES 11/12/18   Historical Provider, MD   simvastatin (ZOCOR) 40 mg tablet Take 1 tablet (40 mg total) by mouth daily at bedtime 4/8/25   Ca Vargas PA-C     Allergies   Allergen Reactions    Brimonidine Other (See Comments)       Objective :  Temp:  [97.1 °F (36.2 °C)-97.5 °F (36.4 °C)] 97.1 °F (36.2 °C)  HR:  [68-87] 70  BP: ()/(53-70) 102/55  Resp:  [16-20] 20  SpO2:  [98 %-99  %] 99 %  O2 Device: None (Room air)    Physical Exam   Vitals and nursing note reviewed.   Constitutional:       Appearance: Elderly male, NAD  HENT:      MM dry  Eyes:      Conjunctiva/sclera: Conjunctivae normal.   Cardiovascular:      Rate and Rhythm: Normal rate and regular rhythm.      Heart sounds: Normal heart sounds.   Pulmonary:      Effort: Pulmonary effort is normal.      Breath sounds: Normal breath sounds.   Abdominal:      General: Bowel sounds are normal.      Palpations: Abdomen is soft.      Tenderness: There is abdominal tenderness.   Musculoskeletal:         General: Normal range of motion.      Cervical back: Normal range of motion and neck supple.   Lymphadenopathy:      Cervical: No cervical adenopathy.   Skin:     General: Skin is warm.      Findings: No rash.   Neurological:      Mental Status: He is alert and oriented to person, place, and time.      Motor: No abnormal muscle tone.      Coordination: Coordination normal.   Psychiatric:         Mood and Affect: Mood normal.         Behavior: Behavior normal.         Lab Results: I have reviewed the following results:  Results from last 7 days   Lab Units 05/06/25 1941   WBC Thousand/uL 13.51*   HEMOGLOBIN g/dL 14.5   HEMATOCRIT % 41.3   PLATELETS Thousands/uL 508*   SEGS PCT % 85*   LYMPHO PCT % 7*   MONO PCT % 7   EOS PCT % 0     Results from last 7 days   Lab Units 05/06/25 1941   SODIUM mmol/L 128*   POTASSIUM mmol/L 3.5   CHLORIDE mmol/L 77*   CO2 mmol/L 21   BUN mg/dL 125*   CREATININE mg/dL 5.03*   ANION GAP mmol/L 30*   CALCIUM mg/dL 9.4   ALBUMIN g/dL 4.4   TOTAL BILIRUBIN mg/dL 0.82   ALK PHOS U/L 98   ALT U/L 15   AST U/L 27   GLUCOSE RANDOM mg/dL 160*     Results from last 7 days   Lab Units 05/06/25 1941   INR  1.25*     Results from last 7 days   Lab Units 05/02/25  1132 05/02/25  0725 05/01/25 2058 05/01/25  1604 05/01/25  1141 05/01/25  0737 04/30/25  2051 04/30/25  1618 04/30/25  1053 04/30/25  0733   POC GLUCOSE mg/dl 106  128 105 217* 102 122 198* 113 126 118     Lab Results   Component Value Date    HGBA1C 6.6 (H) 04/07/2025    HGBA1C 6.4 (H) 01/14/2025    HGBA1C 6.3 (H) 07/30/2024     Results from last 7 days   Lab Units 05/07/25  0302 05/07/25  0035   LACTIC ACID mmol/L 1.1 2.7*       Imaging Results Review: I reviewed radiology reports from this admission including: CT abdomen/pelvis and CT head.  Other Study Results Review: EKG was reviewed.     Administrative Statements   I have spent a total time of 44 minutes in caring for this patient on the day of the visit/encounter including Diagnostic results, Prognosis, Counseling / Coordination of care, Documenting in the medical record, Reviewing/placing orders in the medical record (including tests, medications, and/or procedures), Obtaining or reviewing history  , and Communicating with other healthcare professionals .    ** Please Note: This note has been constructed using a voice recognition system. **

## 2025-05-07 NOTE — WOUND OSTOMY CARE
Consult Note - Wound   Saud RODRIGUEZ Reva 89 y.o. male MRN: [id number]  Unit/Bed#: [place] [address] -01 Encounter: [id number]      History and Present Illness:  89 year old male presented to the hospital with nausea, vomiting, and weakness.  Patient's history significant for CAD, HTN, basal cell carcinoma.    Assessment Findings:   Patient agreeable to assessment.  Caregiver/granddaughter at bedside.  Patient is able to turn/reposition independently in bed.  Requires assistance to get to commode.  Normally continent of bowel and bladder.  Nutrition team following.  Bilateral heels, buttocks, and sacrum intact with blanchable erythema--preventative foam dressings applied.  Left upper back malignant wound--wound bed with pink base and scattered areas of yellow slough.  Full thickness tissue loss with brown scaling to edges.  Moderate serosanguinous drainage.  Maria G-wound intact.  No foul odor appreciated.    Patient follows at the wound center for palliative wound care.    See flowsheet for wound details.    Wound Care Plan:   1-Apply silicone bordered foam dressings to bilateral heels and sacrum for prevention.  Alonso with GAYE Sanchez back for skin assessments at least daily and re-apply.  Change dressings every 3 days and as needed.  2-Elevate heels off of bed/chair surface to offload pressure.  3-Offloading air cushion in chair when out of bed.  4-Apply moisturizing skin cream to body daily and as needed.  5-Turn/reposition every 2 hours for pressure re-distribution on skin.   6-Accumax pump to bed mattress.  7-Left upper back-apply Vashe soak, remove, pat dry. Apply adaptic, silver alginate (melgisorb ag) and silicone foam dressing. Change dressing daily and as needed. OR patient may bring and use wound dressing from home to use daily (Polymem Ag).    Wound care team to follow.  Plan of care reviewed with primary RN.    Wound 08/19/24 Malignant  Back Left;Upper (Active)   Wound Image   05/07/25 1116   Wound Description  Pink;Yellow;Brown 05/07/25 1116   Non-staged Wound Description Full thickness 05/07/25 1116   Wound Length (cm) 10 cm 05/07/25 1116   Wound Width (cm) 9.5 cm 05/07/25 1116   Wound Depth (cm) 0.3 cm 05/07/25 1116   Wound Surface Area (cm^2) 95 cm^2 05/07/25 1116   Wound Volume (cm^3) 28.5 cm^3 05/07/25 1116   Calculated Wound Volume (cm^3) 28.5 cm^3 05/07/25 1116   Change in Wound Size % -121.79 05/07/25 1116   Drainage Amount Moderate 05/07/25 1116   Drainage Description Serosanguineous 05/07/25 1116   Maria G-wound Assessment Intact 05/07/25 1116   Treatments Cleansed;Other (Comment) 05/07/25 1116   Dressing Non adherent;Calcium Alginate with Silver;Foam, Silicon (eg. Allevyn, etc) 05/07/25 1116   Dressing Changed Changed 05/07/25 1116   Patient Tolerance Tolerated well 05/07/25 1116   Dressing Status Clean;Dry;Intact 05/07/25 1116       Chandrika Adame RN, BSN, CWON

## 2025-05-07 NOTE — UTILIZATION REVIEW
Initial Clinical Review    Admission: Date/Time/Statement:   Admission Orders (From admission, onward)       Ordered        05/06/25 2332  INPATIENT ADMISSION  Once                          Orders Placed This Encounter   Procedures    INPATIENT ADMISSION     Standing Status:   Standing     Number of Occurrences:   1     Level of Care:   Med Surg [16]     Estimated length of stay:   More than 2 Midnights     Certification:   I certify that inpatient services are medically necessary for this patient for a duration of greater than two midnights. See H&P and MD Progress Notes for additional information about the patient's course of treatment.     ED Arrival Information       Expected   -    Arrival   5/6/2025 18:27    Acuity   Urgent              Means of arrival   Ambulance    Escorted by   [facility]    Service   Hospitalist    Admission type   Emergency              Arrival complaint   Weakness             Chief Complaint   Patient presents with    Medical Problem     Pt arrives via ems from home. Per ems family states pt being dx w an ear infection on Friday, has been N/V/D, dizziness, feeling more weak, family also states pt having a syncopal episode earlier today but pt doesn't remember. AOX4.      Initial Presentation: 89 y.o. male presents to the ED via EMS from home with c/o nausea, vomiting, weakness, poor oral intake, unsure of last urination, no BM  x 2-3 days and fall 1 day PTA.  Recent dx of colonic mass - does not want intervention.  PMH: CAD, DJD, HTN, HLD, basal cell CA, CK< recent ear infection w/ hosp d/c on 5/2.  In the ED VS stable. Treated with IV fluids, IV Zofran x2, IV Tylenol, eye drops, IV Morphine.  Labs - elevated WBC, anion gap, BUN/CR, troponin, glucose, lactic acid, low NA.  ECG - NSR, SR w/ PVCs.  Imaging - no acute traumatic injuries; blood product vs neoplasm in rectal colon.  GI consult recommended.  On exam A&O, abd tenderness. Admitted to INPATIENT status with CHELSEA, rectal  mass, N/V, constipation, Syncope - PLAN: IV fluids, GI Consult, PRN antiemetics, monitor for obstruction, trauma work up negative.     Anticipated Length of Stay/Certification Statement: Patient will be admitted on an inpatient basis with an anticipated length of stay of greater than 2 midnights secondary to rectal mass.     Date: 5/7   Day 2:   Severe CHELSEA, rectal mass, N/V, constipation, Syncope - afebrile, intermittent soft BP.  Increased WBC to 14.89  K low 2.3, down-trending anion gap, BUN/CR, lactic acidosis resolved. Improving on IV fluids. No need for dialysis. Pt does not want further work up or treatment. Trend and replete lytes. Pt feels improvement, having Bms, wants to consider hospice. On exam no abd pain.  Accepted to Hospice home care, plan to start  hospice services on Friday 5/9.     ENT Consult - followed in office for severe otitis externa on the left side and a wick was placed. Culture + Pseudomonas aeruginosa and placed on oral Cipro and topical ofloxacin.  Wick removed today. Has resolving L otitis externa.     Date: 5/8  Day 3: Has surpassed a 2nd midnight with active treatments and services.  Severe CHELSEA, rectal mass, N/V, constipation, Syncope - WBC WNL, K 2.3, downtrending BUN/CR.  Pt will be d/c on home hospice services.        ED Treatment-Medication Administration from 05/06/2025 1827 to 05/07/2025 0011         Date/Time Order Dose Route Action     05/06/2025 1945 sodium chloride 0.9 % bolus 1,000 mL 1,000 mL Intravenous New Bag     05/06/2025 1946 ondansetron (ZOFRAN) injection 4 mg 4 mg Intravenous Given     05/06/2025 1947 acetaminophen (Ofirmev) injection 1,000 mg 1,000 mg Intravenous New Bag     05/06/2025 1955 ofloxacin (OCUFLOX) 0.3 % ophthalmic solution 2 drop 2 drop Left Eye Given     05/06/2025 2002 morphine injection 2 mg 2 mg Intravenous Given     05/06/2025 2121 iohexol (OMNIPAQUE) 240 MG/ML solution 50 mL 50 mL Oral Given     05/06/2025 2146 sodium chloride 0.9 % infusion  100 mL/hr Intravenous New Bag     05/06/2025 2157 ondansetron (ZOFRAN) injection 4 mg 4 mg Intravenous Given            Scheduled Medications:  heparin (porcine), 5,000 Units, Subcutaneous, Q8H TOSHIA      Continuous IV Infusions:  sodium chloride, 75 mL/hr, Intravenous, Continuous      PRN Meds:  ondansetron, 4 mg, Intravenous, Q6H PRN      ED Triage Vitals   Temperature Pulse Respirations Blood Pressure SpO2 Pain Score   05/06/25 1837 05/06/25 1837 05/06/25 1837 05/06/25 1837 05/06/25 1837 05/06/25 2002   97.5 °F (36.4 °C) 87 20 128/60 99 % Med Not Given for Pain - for MAR use only     Weight (last 2 days)       Date/Time Weight    05/08/25 0600 47.1 (103.8)    05/07/25 0532 49.2 (108.47)    05/07/25 0028 48.7 (107.36)    05/07/25 0017 49.1 (108.25)    05/06/25 2106 49.1 (108.25)            Vital Signs (last 3 days)       Date/Time Temp Pulse Resp BP MAP (mmHg) SpO2 O2 Device Patient Position - Orthostatic VS [place] Coma Scale Score Pain    05/08/25 0800 -- -- -- -- -- -- None (Room air) -- 15 No Pain    05/08/25 07:48:43 97.6 °F (36.4 °C) 73 17 122/79 93 98 % -- -- -- --    05/07/25 22:17:04 98 °F (36.7 °C) -- 18 115/54 74 -- -- -- -- --    05/07/25 2015 -- -- -- -- -- -- None (Room air) -- 15 No Pain    05/07/25 15:02:07 97.9 °F (36.6 °C) 72 18 120/49 73 98 % None (Room air) -- -- --    05/07/25 12:09:28 97.6 °F (36.4 °C) 76 -- 115/56 76 99 % -- -- -- --    05/07/25 12:08:59 97.6 °F (36.4 °C) -- -- 115/56 76 -- -- -- -- --    05/07/25 07:23:46 97.4 °F (36.3 °C) 72 19 117/56 76 98 % None (Room air) Lying -- No Pain    05/07/25 0100 -- -- -- -- -- -- -- -- 15 --    05/07/25 0035 -- -- -- -- -- -- -- -- -- No Pain    05/07/25 0028 97.1 °F (36.2 °C) 70 20 102/55 64 99 % -- -- -- --    05/07/25 0026 -- -- -- 102/55 -- -- -- -- -- --    05/07/25 00:18:22 97.1 °F (36.2 °C) 70 20 85/53 64 99 % None (Room air) Lying -- --    05/06/25 2300 -- 78 16 114/61 82 99 % None (Room air) Lying -- --    05/06/25 2100 -- 68 16 131/60  87 98 % None (Room air) Lying -- --    05/06/25 2030 -- 74 16 142/63 89 98 % None (Room air) Lying -- --    05/06/25 2002 -- -- -- -- -- -- -- -- -- Med Not Given for Pain - for MAR use only    05/06/25 1945 -- 82 16 157/70 100 99 % None (Room air) Lying -- --    05/06/25 1915 -- 80 16 145/67 93 99 % None (Room air) Lying -- --    05/06/25 1839 -- -- -- -- -- -- -- -- 15 --    05/06/25 1837 97.5 °F (36.4 °C) 87 20 128/60 86 99 % None (Room air) Lying -- --              Pertinent Labs/Diagnostic Test Results:   Radiology:  CT head without contrast   Final Interpretation by Lara Boyle MD (05/06 2155)      No acute intracranial abnormality.  Chronic microangiopathic changes.                  Workstation performed: [id number]         CT cervical spine without contrast   Final Interpretation by Lara Boyle MD (05/06 2158)      No cervical spine fracture or traumatic malalignment.                  Workstation performed: [id number]         CT abdomen pelvis wo contrast   Final Interpretation by Lara Boyle MD (05/06 2206)      Irregular soft tissue density partially fills the rectal colon with upstream fluid distention concerning for blood product versus neoplasm. Gastroenterology consult recommended.      Workstation performed: [id number]         XR chest 2 views   Final Interpretation by Michael Moise MD (05/07 0702)      No acute cardiopulmonary disease.            Workstation performed: [id number]           Cardiology:  ECG 12 lead   Final Result by Sandra Gomez DO (05/07 1107)   Age and gender specific ECG analysis    Sinus rhythm with occasional Premature ventricular complexes   Inferior infarct (cited on or before 06-May-2025)   ST & T wave abnormality, consider anterolateral ischemia   Prolonged QT   Abnormal ECG   Confirmed by Sandra Gomez ([id number]) on 5/7/2025 11:07:40 AM      ECG 12 lead    by Masha, Ris Results In (05/06 9489)      ECG 12 lead   Final Result by Sandra Gomez DO (05/07 1106)   Age  and gender specific ECG analysis    Normal sinus rhythm   Inferior infarct , age undetermined   ST & T wave abnormality, consider lateral ischemia   Prolonged QT   Abnormal ECG   When compared with ECG of 06-May-2025 19:44, (unconfirmed)   Fusion complexes are no longer Present   Premature ventricular complexes are no longer Present   Inferior infarct is now Present   Confirmed by Sandra Gomez ([id number]) on 5/7/2025 11:06:37 AM      ECG 12 lead   Final Result by Sandra Gomez DO (05/07 1105)   Age and gender specific ECG analysis    Sinus rhythm with occasional Premature ventricular complexes and Fusion    complexes   Left axis deviation   ST & T wave abnormality, consider lateral ischemia   Prolonged QT   Abnormal ECG   When compared with ECG of 06-May-2025 19:34,   Criteria for Inferior infarct are no longer Present   ST now depressed in Lateral leads   QT has lengthened   Confirmed by Sandra Gomez ([id number]) on 5/7/2025 11:05:30 AM      ECG 12 lead    by Masha, Perla Results In (1935)        GI:  No orders to display           Results from last 7 days   Lab Units 05/08/25 0520 05/07/25 0420 05/06/25 1941 05/02/25  0436   WBC Thousand/uL 8.22 14.38* 13.51* 17.07*   HEMOGLOBIN g/dL 12.0 12.7 14.5 12.8   HEMATOCRIT % 36.5 37.2 41.3 39.2   PLATELETS Thousands/uL 330 437* 508* 425*   TOTAL NEUT ABS Thousands/µL 6.47 11.33* 11.49*  --          Results from last 7 days   Lab Units 05/08/25 0520 05/07/25 0420 05/06/25 1941 05/02/25  0436   SODIUM mmol/L 141 129* 128* 135   POTASSIUM mmol/L 2.3* 2.3* 3.5 3.4*   CHLORIDE mmol/L 107 82* 77* 95*   CO2 mmol/L 22 22 21 29   ANION GAP mmol/L 12 25* 30* 11   BUN mg/dL 77* 117* 125* 41*   CREATININE mg/dL 1.91* 4.70* 5.03* 1.36*   EGFR ml/min/1.73sq m 30 10 9 45   CALCIUM mg/dL 6.9* 8.5 9.4 9.5   MAGNESIUM mg/dL 2.5  --   --  2.5     Results from last 7 days   Lab Units 05/06/25  1941   AST U/L 27   ALT U/L 15   ALK PHOS U/L 98   TOTAL PROTEIN g/dL 8.4   ALBUMIN g/dL 4.4    TOTAL BILIRUBIN mg/dL 0.82     Results from last 7 days   Lab Units 05/02/25  1132 05/02/25  0725 05/01/25  2058 05/01/25  1604 05/01/25  1141   POC GLUCOSE mg/dl 106 128 105 217* 102     Results from last 7 days   Lab Units 05/08/25  0520 05/07/25  0420 05/06/25  1941 05/02/25  0436   GLUCOSE RANDOM mg/dL 81 108 160* 127           Results from last 7 days   Lab Units 05/06/25  2341 05/06/25  2144 05/06/25  1941   HS TNI 0HR ng/L  --   --  41   HS TNI 2HR ng/L  --  54*  --    HSTNI D2 ng/L  --  13  --    HS TNI 4HR ng/L 70*  --   --    HSTNI D4 ng/L 29*  --   --          Results from last 7 days   Lab Units 05/06/25  1941   PROTIME seconds 15.9*   INR  1.25*   PTT seconds 28     Results from last 7 days   Lab Units 05/06/25  1941   TSH 3RD GENERATON uIU/mL 2.301         Results from last 7 days   Lab Units 05/07/25  0302 05/07/25  0035   LACTIC ACID mmol/L 1.1 2.7*           Past Medical History:   Diagnosis Date    Anxiety     CAD (coronary artery disease)     Cancer (HCC)     Capsular cataract     mature    Chronic ulcer of skin (HCC) 05/18/2015    DJD (degenerative joint disease)     Encounter for routine adult medical exam with abnormal findings 08/21/2019    Hepatitis A     Hydrocele     Hyperglycemia     Hyperlipidemia     Hypertension     Impaired fasting glucose 04/30/2014    Ingrown toenail     Myocardial infarct (HCC) 2007    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HCC)     Potassium (K) excess 12/27/2019    Prostatic hyperplasia     Skin cancer      Present on Admission:   Rectal mass   CHELSEA (acute kidney injury) (HCC)   Nausea & vomiting   Other constipation   Syncope   Basal cell carcinoma (BCC) of skin of neck      Admitting Diagnosis: Hyponatremia [E87.1]  Vomiting [R11.10]  Constipation [K59.00]  Rectal mass [K62.89]  Elevated troponin [R79.89]  CHELSEA (acute kidney injury) (HCC) [N17.9]  Known medical problems [Z78.9]  Age/Sex: 89 y.o. male    Network Utilization Review Department  ATTENTION: Please call with any questions  or concerns to [phone] and carefully listen to the prompts so that you are directed to the right person. All voicemails are confidential.   For Discharge needs, contact Care Management DC Support Team at [phone] opt. [phone]  Send all requests for admission clinical reviews, approved or denied determinations and any other requests to dedicated fax number below belonging to the [facility] where the patient is receiving treatment. List of dedicated fax numbers for the Facilities:  FACILITY NAME UR FAX NUMBER   ADMISSION DENIALS (Administrative/Medical Necessity) [phone]   DISCHARGE SUPPORT TEAM (NETWORK) [phone]   PARENT CHILD HEALTH (Maternity/NICU/Pediatrics) [phone]   [facility] [phone]   [facility] [phone]   [facility] [phone]   [facility] [phone]   [facility] [phone]   [facility] [phone]   [facility] [phone]   [facility] [phone]   [facility] [phone]   [facility] [phone]   [facility] [phone]   [facility] [phone]

## 2025-05-07 NOTE — DISCHARGE INSTR - OTHER ORDERS
Wound Care Plan:   1-Apply Remedy silicone cream to buttocks, sacrum, and heels twice daily for skin protection.  2-Elevate heels off of bed/chair surface to offload pressure.  3-Offloading air cushion in chair when out of bed.  4-Apply lotion to body daily and as needed.  5-Turn/reposition every 2 hours for pressure re-distribution on skin.   6-Left upper back wound--continue pre-hospital wound care per the wound center:  Prior to dressing application please cleanse wound and rinse wound with Prophase.   Apply polymem Max AG to the wound.    Cover with ABD.  Secure with paper tape or medfix tape.   Change dressing three times a week.    Follow-up at the [facility]--[phone].

## 2025-05-07 NOTE — ASSESSMENT & PLAN NOTE
Irregular soft tissue density partially fills the rectal colon with upstream fluid distention concerning for blood product versus neoplasm.   Appreciate GI input  No evidence of obstruction at this time  As above, patient would not be interested in further workup or treatment, plan for hospice transition

## 2025-05-07 NOTE — PROGRESS NOTES
Progress Note - Hospitalist   Name: Saud Ardon 89 y.o. male I MRN: [id number]  Unit/Bed#: [place] [address] -01 I Date of Admission: 5/6/2025   Date of Service: 5/7/2025 I Hospital Day: 1    Assessment & Plan  CHELSEA (acute kidney injury) (HCC)  Severe CHELSEA, likely prerenal  Improving on IV fluids  Goals of care discussion, patient would not be interested in dialysis  No indication for dialysis at this time  Rectal mass   Irregular soft tissue density partially fills the rectal colon with upstream fluid distention concerning for blood product versus neoplasm.   Appreciate GI input  No evidence of obstruction at this time  As above, patient would not be interested in further workup or treatment, plan for hospice transition  Nausea & vomiting  - zofran prn  - IVF hydration  Other constipation  In setting of above  Improved on bowel regimen  Syncope  Pt reportedly passed out at home as per family  Patient does not recall  Trauma work up negative  Head CT negative for any acute pathology  Hyponatremia  Hypovolemic  Continue IV fluids  Severe protein-calorie malnutrition (HCC)  Malnutrition Findings:   Adult Malnutrition type: Acute illness  Adult Degree of Malnutrition: Other severe protein calorie malnutrition  Malnutrition Characteristics: Inadequate energy, Weight loss                  360 Statement: severe protein calorie malnutrition in the setting of acute illness as evidenced by:  significant 12 lb (10%) weight loss over the past 3 months;  <=50% energy intake compared to estimated energy needs for >= 5 days.  treated with diet and supplement recommendations for when able to initiate oral diet.    BMI Findings:           Body mass index is 19.21 kg/m².   In setting of rectal mass  Continue diet, nutritional supplements  Hypokalemia  In setting of poor oral intake  Replace and monitor electrolytes  Leukocytosis  Reactive secondary to volume depletion  No evidence of infection  Basal cell carcinoma (BCC) of skin of  neck  Appreciate wound care recommendations    VTE Pharmacologic Prophylaxis:    heparin SQ    Mobility:   Basic Mobility Inpatient Raw Score: 18  JH-HLM Goal: 6: Walk 10 steps or more  JH-HLM Achieved: 7: Walk 25 feet or more      Patient Centered Rounds: I performed bedside rounds with nursing staff today.   Discussions with Specialists or Other Care Team Provider: GI    Education and Discussions with Family / Patient: Daughter, granddaughter at bedside    Current Length of Stay: 1 day(s)  Current Patient Status: Inpatient   Certification Statement: The patient will continue to require additional inpatient hospital stay due to close monitoring  Discharge Plan: Anticipate discharge in 24-48 hrs to home hospice    Code Status: Level 3 - DNAR and DNI    Subjective   Patient reports feeling improved.  Having bowel movements.  Would like to consider hospice.    Objective :  Temp:  [97.1 °F (36.2 °C)-97.9 °F (36.6 °C)] 97.9 °F (36.6 °C)  HR:  [68-87] 72  BP: ()/(49-70) 120/49  Resp:  [16-20] 20  SpO2:  [98 %-99 %] 98 %  O2 Device: None (Room air)    Body mass index is 19.21 kg/m².     Input and Output Summary (last 24 hours):     Intake/Output Summary (Last 24 hours) at 5/7/2025 1716  Last data filed at 5/7/2025 1540  Gross per 24 hour   Intake 1060 ml   Output 180 ml   Net 880 ml       Physical Exam  Constitutional:       General: He is not in acute distress.  HENT:      Head: Normocephalic and atraumatic.   Eyes:      Conjunctiva/sclera: Conjunctivae normal.   Cardiovascular:      Rate and Rhythm: Normal rate and regular rhythm.   Pulmonary:      Effort: No respiratory distress.      Breath sounds: No wheezing or rales.   Abdominal:      General: There is no distension.      Tenderness: There is no abdominal tenderness. There is no guarding.   Musculoskeletal:      Right lower leg: No edema.      Left lower leg: No edema.   Skin:     General: Skin is warm and dry.   Neurological:      Mental Status: He is  oriented to person, place, and time.         Lines/Drains:        Telemetry:  Telemetry Orders (From admission, onward)               24 Hour Telemetry Monitoring  Continuous x 24 Hours (Telem)        Expiring   Question:  Reason for 24 Hour Telemetry  Answer:  Metabolic/electrolyte disturbance with high probability of dysrhythmia. K level <3 or >6 OR KCL infusion >10mEq/hr                     Telemetry Reviewed: Normal Sinus Rhythm  Indication for Continued Telemetry Use: No indication for continued use. Will discontinue.                Lab Results: I have reviewed the following results:   Results from last 7 days   Lab Units 05/07/25  0420   WBC Thousand/uL 14.38*   HEMOGLOBIN g/dL 12.7   HEMATOCRIT % 37.2   PLATELETS Thousands/uL 437*   SEGS PCT % 78*   LYMPHO PCT % 10*   MONO PCT % 10   EOS PCT % 0     Results from last 7 days   Lab Units 05/07/25  0420 05/06/25  1941   SODIUM mmol/L 129* 128*   POTASSIUM mmol/L 2.3* 3.5   CHLORIDE mmol/L 82* 77*   CO2 mmol/L 22 21   BUN mg/dL 117* 125*   CREATININE mg/dL 4.70* 5.03*   ANION GAP mmol/L 25* 30*   CALCIUM mg/dL 8.5 9.4   ALBUMIN g/dL  --  4.4   TOTAL BILIRUBIN mg/dL  --  0.82   ALK PHOS U/L  --  98   ALT U/L  --  15   AST U/L  --  27   GLUCOSE RANDOM mg/dL 108 160*     Results from last 7 days   Lab Units 05/06/25  1941   INR  1.25*     Results from last 7 days   Lab Units 05/02/25  1132 05/02/25  0725 05/01/25  2058 05/01/25  1604 05/01/25  1141 05/01/25  0737 04/30/25  2051   POC GLUCOSE mg/dl 106 128 105 217* 102 122 198*         Results from last 7 days   Lab Units 05/07/25  0302 05/07/25  0035   LACTIC ACID mmol/L 1.1 2.7*       Recent Cultures (last 7 days):   Results from last 7 days   Lab Units 05/01/25  0909   GRAM STAIN RESULT  No Polys or Bacteria seen   WOUND CULTURE  Few Colonies of Pseudomonas aeruginosa*             Last 24 Hours Medication List:     Current Facility-Administered Medications:     ondansetron (ZOFRAN) injection 4 mg, Q6H PRN     sodium chloride 0.9 % infusion, Continuous, Last Rate: 125 mL/hr (05/07/25 1540)    Administrative Statements   Today, Patient Was Seen By: Rea Quinonez MD  I have spent a total time of 56 minutes in caring for this patient on the day of the visit/encounter including Patient and family education, Counseling / Coordination of care, Documenting in the medical record, Reviewing/placing orders in the medical record (including tests, medications, and/or procedures), and Communicating with other healthcare professionals .    **Please Note: This note may have been constructed using a voice recognition system.**

## 2025-05-07 NOTE — PLAN OF CARE
Problem: Potential for Falls  Goal: Patient will remain free of falls  Description: INTERVENTIONS:- Educate patient/family on patient safety including physical limitations- Instruct patient to call for assistance with activity - Consult OT/PT to assist with strengthening/mobility - Keep Call bell within reach- Keep bed low and locked with side rails adjusted as appropriate- Keep care items and personal belongings within reach- Initiate and maintain comfort rounds- Make Fall Risk Sign visible to staff- Offer Toileting every 2 Hours, in advance of need- Initiate/Maintain bed/ chair alarm- Obtain necessary fall risk management equipment: yellow socks- Apply yellow socks and bracelet for high fall risk patients- Consider moving patient to room near nurses station  INTERVENTIONS:- Educate patient/family on patient safety including physical limitations- Instruct patient to call for assistance with activity - Consult OT/PT to assist with strengthening/mobility - Keep Call bell within reach- Keep bed low and locked with side rails adjusted as appropriate- Keep care items and personal belongings within reach- Initiate and maintain comfort rounds- Make Fall Risk Sign visible to staff- Offer Toileting every 2 Hours, in advance of need- Initiate/Maintain bed alarm- Obtain necessary fall risk management equipment: bed- Apply yellow socks and bracelet for high fall risk patients- Consider moving patient to room near nurses station  Outcome: Progressing     Problem: PAIN - ADULT  Goal: Verbalizes/displays adequate comfort level or baseline comfort level  Description: Interventions:- Encourage patient to monitor pain and request assistance- Assess pain using appropriate pain scale- Administer analgesics based on type and severity of pain and evaluate response- Implement non-pharmacological measures as appropriate and evaluate response- Consider cultural and social influences on pain and pain management- Notify physician/advanced  practitioner if interventions unsuccessful or patient reports new pain  Outcome: Progressing     Problem: INFECTION - ADULT  Goal: Absence or prevention of progression during hospitalization  Description: INTERVENTIONS:- Assess and monitor for signs and symptoms of infection- Monitor lab/diagnostic results- Monitor all insertion sites, i.e. indwelling lines, tubes, and drains- Monitor endotracheal if appropriate and nasal secretions for changes in amount and color- [place] appropriate cooling/warming therapies per order- Administer medications as ordered- Instruct and encourage patient and family to use good hand hygiene technique- Identify and instruct in appropriate isolation precautions for identified infection/condition  Outcome: Progressing  Goal: Absence of fever/infection during neutropenic period  Description: INTERVENTIONS:- Monitor WBC  Outcome: Progressing     Problem: SAFETY ADULT  Goal: Patient will remain free of falls  Description: INTERVENTIONS:- Educate patient/family on patient safety including physical limitations- Instruct patient to call for assistance with activity - Consult OT/PT to assist with strengthening/mobility - Keep Call bell within reach- Keep bed low and locked with side rails adjusted as appropriate- Keep care items and personal belongings within reach- Initiate and maintain comfort rounds- Make Fall Risk Sign visible to staff- Offer Toileting every 2 Hours, in advance of need- Initiate/Maintain bed/ chair alarm- Obtain necessary fall risk management equipment: yellow socks- Apply yellow socks and bracelet for high fall risk patients- Consider moving patient to room near nurses station  INTERVENTIONS:- Educate patient/family on patient safety including physical limitations- Instruct patient to call for assistance with activity - Consult OT/PT to assist with strengthening/mobility - Keep Call bell within reach- Keep bed low and locked with side rails adjusted as appropriate- Keep  care items and personal belongings within reach- Initiate and maintain comfort rounds- Make Fall Risk Sign visible to staff- Offer Toileting every 2 Hours, in advance of need- Initiate/Maintain bed alarm- Obtain necessary fall risk management equipment: bed- Apply yellow socks and bracelet for high fall risk patients- Consider moving patient to room near nurses station  Outcome: Progressing  Goal: Maintain or return to baseline ADL function  Description: INTERVENTIONS:-  Assess patient's ability to carry out ADLs; assess patient's baseline for ADL function and identify physical deficits which impact ability to perform ADLs (bathing, care of mouth/teeth, toileting, grooming, dressing, etc.)- Assess/evaluate cause of self-care deficits - Assess range of motion- Assess patient's mobility; develop plan if impaired- Assess patient's need for assistive devices and provide as appropriate- Encourage maximum independence but intervene and supervise when necessary- Involve family in performance of ADLs- Assess for home care needs following discharge - Consider OT consult to assist with ADL evaluation and planning for discharge- Provide patient education as appropriate  Outcome: Progressing  Goal: Maintains/Returns to pre admission functional level  Description: INTERVENTIONS:- Perform AM-PAC 6 Click Basic Mobility/ Daily Activity assessment daily.- Set and communicate daily mobility goal to care team and patient/family/caregiver. - Collaborate with rehabilitation services on mobility goals if consulted- Perform Range of Motion 4 times a day.- Reposition patient every 2 hours.- Dangle patient 6 times a day- Stand patient 4 times a day- Ambulate patient 3 times a day- Out of bed to chair 3 times a day - Out of bed for meals 3 times a day- Out of bed for toileting- Record patient progress and toleration of activity level   Outcome: Progressing     Problem: DISCHARGE PLANNING  Goal: Discharge to home or other facility with  appropriate resources  Description: INTERVENTIONS:- Identify barriers to discharge w/patient and caregiver- Arrange for needed discharge resources and transportation as appropriate- Identify discharge learning needs (meds, wound care, etc.)- Arrange for interpretive services to assist at discharge as needed- Refer to Case Management Department for coordinating discharge planning if the patient needs post-hospital services based on physician/advanced practitioner order or complex needs related to functional status, cognitive ability, or social support system  Outcome: Progressing     Problem: Knowledge Deficit  Goal: Patient/family/caregiver demonstrates understanding of disease process, treatment plan, medications, and discharge instructions  Description: Complete learning assessment and assess knowledge base.Interventions:- Provide teaching at level of understanding- Provide teaching via preferred learning methods  Outcome: Progressing     Problem: Prexisting or High Potential for Compromised Skin Integrity  Goal: Skin integrity is maintained or improved  Description: INTERVENTIONS:- Identify patients at risk for skin breakdown- Assess and monitor skin integrity- Assess and monitor nutrition and hydration status- Monitor labs - Assess for incontinence - Turn and reposition patient- Assist with mobility/ambulation- Relieve pressure over bony prominences- Avoid friction and shearing- Provide appropriate hygiene as needed including keeping skin clean and dry- Evaluate need for skin moisturizer/barrier cream- Collaborate with interdisciplinary team - Patient/family teaching- Consider wound care consult   Outcome: Progressing     Problem: NEUROSENSORY - ADULT  Goal: Achieves stable or improved neurological status  Description: INTERVENTIONS- Monitor and report changes in neurological status- Monitor vital signs such as temperature, blood pressure, glucose, and any other labs ordered - Initiate measures to prevent  increased intracranial pressure- Monitor for seizure activity and implement precautions if appropriate    Outcome: Progressing     Problem: GASTROINTESTINAL - ADULT  Goal: Minimal or absence of nausea and/or vomiting  Description: INTERVENTIONS:- Administer IV fluids if ordered to ensure adequate hydration- Maintain NPO status until nausea and vomiting are resolved- Nasogastric tube if ordered- Administer ordered antiemetic medications as needed- Provide nonpharmacologic comfort measures as appropriate- Advance diet as tolerated, if ordered- Consider nutrition services referral to assist patient with adequate nutrition and appropriate food choices  Outcome: Progressing  Goal: Maintains adequate nutritional intake  Description: INTERVENTIONS:- Monitor percentage of each meal consumed- Identify factors contributing to decreased intake, treat as appropriate- Assist with meals as needed- Monitor I&O, weight, and lab values if indicated- Obtain nutrition services referral as needed  Outcome: Progressing     Problem: METABOLIC, FLUID AND ELECTROLYTES - ADULT  Goal: Fluid balance maintained  Description: INTERVENTIONS:- Monitor labs - Monitor I/O and WT- Instruct patient on fluid and nutrition as appropriate- Assess for signs & symptoms of volume excess or deficit  Outcome: Progressing     Problem: SKIN/TISSUE INTEGRITY - ADULT  Goal: Incision(s), wounds(s) or drain site(s) healing without S/S of infection  Description: INTERVENTIONS- Assess and document dressing, incision, wound bed, drain sites and surrounding tissue- Provide patient and family education- Perform skin care/dressing changes every shift  Outcome: Progressing

## 2025-05-07 NOTE — HOSPICE NOTE
Hospice referral received.  Patient is approved for routine/home hospice services.  Liaison talked with granddaughter Leola.  Hospice care and services reviewed and questions answered. Ordered DME for delivery tomorrow. (Hospital bed, OBT, PRN O2 and bedside commode)  We can open patient to hospice services Friday 5/9/25. Please arrange transport for the AM before noon.  Will need PRN prescriptions sent to his pharmacy for symptom management (pain, anxiety, SOB)

## 2025-05-07 NOTE — PLAN OF CARE
Problem: Potential for Falls  Goal: Patient will remain free of falls  Description: INTERVENTIONS:- Educate patient/family on patient safety including physical limitations- Instruct patient to call for assistance with activity - Consult OT/PT to assist with strengthening/mobility - Keep Call bell within reach- Keep bed low and locked with side rails adjusted as appropriate- Keep care items and personal belongings within reach- Initiate and maintain comfort rounds- Make Fall Risk Sign visible to staff- Offer Toileting every 2 Hours, in advance of need- Initiate/Maintain bed/ chair alarm- Obtain necessary fall risk management equipment: yellow socks- Apply yellow socks and bracelet for high fall risk patients- Consider moving patient to room near nurses station  INTERVENTIONS:- Educate patient/family on patient safety including physical limitations- Instruct patient to call for assistance with activity - Consult OT/PT to assist with strengthening/mobility - Keep Call bell within reach- Keep bed low and locked with side rails adjusted as appropriate- Keep care items and personal belongings within reach- Initiate and maintain comfort rounds- Make Fall Risk Sign visible to staff- Offer Toileting every 2 Hours, in advance of need- Initiate/Maintain bed alarm- Obtain necessary fall risk management equipment: bed- Apply yellow socks and bracelet for high fall risk patients- Consider moving patient to room near nurses station  5/7/2025 0133 by Leonel Read RN  Outcome: Progressing  5/7/2025 0131 by Leonel Read RN  Outcome: Progressing  5/7/2025 0130 by Leonel Read RN  Outcome: Progressing     Problem: SAFETY ADULT  Goal: Maintain or return to baseline ADL function  Description: INTERVENTIONS:-  Assess patient's ability to carry out ADLs; assess patient's baseline for ADL function and identify physical deficits which impact ability to perform ADLs (bathing, care of mouth/teeth, toileting, grooming, dressing, etc.)-  Assess/evaluate cause of self-care deficits - Assess range of motion- Assess patient's mobility; develop plan if impaired- Assess patient's need for assistive devices and provide as appropriate- Encourage maximum independence but intervene and supervise when necessary- Involve family in performance of ADLs- Assess for home care needs following discharge - Consider OT consult to assist with ADL evaluation and planning for discharge- Provide patient education as appropriate  5/7/2025 0133 by Leonel Read RN  Outcome: Progressing  5/7/2025 0131 by Leonel Read RN  Outcome: Progressing  5/7/2025 0130 by Leonel Read RN  Outcome: Progressing     Problem: SAFETY ADULT  Goal: Maintains/Returns to pre admission functional level  Description: INTERVENTIONS:- Perform AM-PAC 6 Click Basic Mobility/ Daily Activity assessment daily.- Set and communicate daily mobility goal to care team and patient/family/caregiver. - Collaborate with rehabilitation services on mobility goals if consulted- Perform Range of Motion 4 times a day.- Reposition patient every 2 hours.- Dangle patient 6 times a day- Stand patient 4 times a day- Ambulate patient 3 times a day- Out of bed to chair 3 times a day - Out of bed for meals 3 times a day- Out of bed for toileting- Record patient progress and toleration of activity level   5/7/2025 0133 by Leonel Read RN  Outcome: Progressing  5/7/2025 0131 by Leonel Read RN  Outcome: Progressing  5/7/2025 0130 by Leonel Read RN  Outcome: Progressing     Problem: DISCHARGE PLANNING  Goal: Discharge to home or other facility with appropriate resources  Description: INTERVENTIONS:- Identify barriers to discharge w/patient and caregiver- Arrange for needed discharge resources and transportation as appropriate- Identify discharge learning needs (meds, wound care, etc.)- Arrange for interpretive services to assist at discharge as needed- Refer to Case Management Department for coordinating discharge  planning if the patient needs post-hospital services based on physician/advanced practitioner order or complex needs related to functional status, cognitive ability, or social support system  5/7/2025 0133 by Leonel Read RN  Outcome: Progressing  5/7/2025 0131 by Leonel Read RN  Outcome: Progressing  5/7/2025 0130 by Leonel Read RN  Outcome: Progressing     Problem: Knowledge Deficit  Goal: Patient/family/caregiver demonstrates understanding of disease process, treatment plan, medications, and discharge instructions  Description: Complete learning assessment and assess knowledge base.Interventions:- Provide teaching at level of understanding- Provide teaching via preferred learning methods  5/7/2025 0133 by Leonel Read RN  Outcome: Progressing  5/7/2025 0131 by Leonel Read RN  Outcome: Progressing  5/7/2025 0130 by Leonel Read RN  Outcome: Progressing

## 2025-05-07 NOTE — PROGRESS NOTES
Progress Note - Hospitalist   Name: Saud Ardon 89 y.o. male I MRN: [id number]  Unit/Bed#: [place] [address] -01 I Date of Admission: 5/6/2025   Date of Service: 5/7/2025 I Hospital Day: 1     This SmartSection is not supported in the current .    Sepsis time zero alert  (+) [place]  (-) Resp rate, HR, afebrile    Lactic mildly elevated, BP soft. CTM

## 2025-05-08 ENCOUNTER — HOME CARE VISIT (OUTPATIENT)
Dept: HOME HEALTH SERVICES | Facility: HOME HEALTHCARE | Age: OVER 89
End: 2025-05-08
Attending: FAMILY MEDICINE
Payer: MEDICARE

## 2025-05-08 VITALS
OXYGEN SATURATION: 98 % | RESPIRATION RATE: 17 BRPM | DIASTOLIC BLOOD PRESSURE: 79 MMHG | HEIGHT: 63 IN | BODY MASS INDEX: 18.39 KG/M2 | WEIGHT: 103.8 LBS | TEMPERATURE: 97.6 F | SYSTOLIC BLOOD PRESSURE: 122 MMHG | HEART RATE: 73 BPM

## 2025-05-08 LAB
ANION GAP SERPL CALCULATED.3IONS-SCNC: 12 MMOL/L (ref 4–13)
BASOPHILS # BLD AUTO: 0.06 THOUSANDS/ÂΜL (ref 0–0.1)
BASOPHILS NFR BLD AUTO: 1 % (ref 0–1)
BUN SERPL-MCNC: 77 MG/DL (ref 5–25)
CALCIUM SERPL-MCNC: 6.9 MG/DL (ref 8.4–10.2)
CHLORIDE SERPL-SCNC: 107 MMOL/L (ref 96–108)
CO2 SERPL-SCNC: 22 MMOL/L (ref 21–32)
CREAT SERPL-MCNC: 1.91 MG/DL (ref 0.6–1.3)
EOSINOPHIL # BLD AUTO: 0.04 THOUSAND/ÂΜL (ref 0–0.61)
EOSINOPHIL NFR BLD AUTO: 1 % (ref 0–6)
ERYTHROCYTE [DISTWIDTH] IN BLOOD BY AUTOMATED COUNT: 14.2 % (ref 11.6–15.1)
GFR SERPL CREATININE-BSD FRML MDRD: 30 ML/MIN/1.73SQ M
GLUCOSE SERPL-MCNC: 81 MG/DL (ref 65–140)
HCT VFR BLD AUTO: 36.5 % (ref 36.5–49.3)
HGB BLD-MCNC: 12 G/DL (ref 12–17)
IMM GRANULOCYTES # BLD AUTO: 0.04 THOUSAND/UL (ref 0–0.2)
IMM GRANULOCYTES NFR BLD AUTO: 1 % (ref 0–2)
LYMPHOCYTES # BLD AUTO: 0.91 THOUSANDS/ÂΜL (ref 0.6–4.47)
LYMPHOCYTES NFR BLD AUTO: 11 % (ref 14–44)
MAGNESIUM SERPL-MCNC: 2.5 MG/DL (ref 1.9–2.7)
MCH RBC QN AUTO: 27.8 PG (ref 26.8–34.3)
MCHC RBC AUTO-ENTMCNC: 32.9 G/DL (ref 31.4–37.4)
MCV RBC AUTO: 85 FL (ref 82–98)
MONOCYTES # BLD AUTO: 0.7 THOUSAND/ÂΜL (ref 0.17–1.22)
MONOCYTES NFR BLD AUTO: 9 % (ref 4–12)
NEUTROPHILS # BLD AUTO: 6.47 THOUSANDS/ÂΜL (ref 1.85–7.62)
NEUTS SEG NFR BLD AUTO: 77 % (ref 43–75)
NRBC BLD AUTO-RTO: 0 /100 WBCS
PLATELET # BLD AUTO: 330 THOUSANDS/UL (ref 149–390)
PMV BLD AUTO: 9.1 FL (ref 8.9–12.7)
POTASSIUM SERPL-SCNC: 2.3 MMOL/L (ref 3.5–5.3)
RBC # BLD AUTO: 4.32 MILLION/UL (ref 3.88–5.62)
SODIUM SERPL-SCNC: 141 MMOL/L (ref 135–147)
WBC # BLD AUTO: 8.22 THOUSAND/UL (ref 4.31–10.16)

## 2025-05-08 PROCEDURE — G0299 HHS/HOSPICE OF RN EA 15 MIN: HCPCS

## 2025-05-08 PROCEDURE — 80048 BASIC METABOLIC PNL TOTAL CA: CPT | Performed by: FAMILY MEDICINE

## 2025-05-08 PROCEDURE — 83735 ASSAY OF MAGNESIUM: CPT | Performed by: FAMILY MEDICINE

## 2025-05-08 PROCEDURE — 85025 COMPLETE CBC W/AUTO DIFF WBC: CPT | Performed by: FAMILY MEDICINE

## 2025-05-08 PROCEDURE — 99239 HOSP IP/OBS DSCHRG MGMT >30: CPT | Performed by: FAMILY MEDICINE

## 2025-05-08 PROCEDURE — 10330057 MEDICATION, GENERAL

## 2025-05-08 RX ORDER — POTASSIUM CHLORIDE 14.9 MG/ML
20 INJECTION INTRAVENOUS
Status: COMPLETED | OUTPATIENT
Start: 2025-05-08 | End: 2025-05-08

## 2025-05-08 RX ORDER — SENNA AND DOCUSATE SODIUM 50; 8.6 MG/1; MG/1
1 TABLET, FILM COATED ORAL DAILY
Qty: 30 TABLET | Refills: 0 | Status: SHIPPED | OUTPATIENT
Start: 2025-05-08 | End: 2025-05-12

## 2025-05-08 RX ORDER — OXYCODONE HYDROCHLORIDE 5 MG/1
5 TABLET ORAL EVERY 6 HOURS PRN
Qty: 20 TABLET | Refills: 0 | Status: SHIPPED | OUTPATIENT
Start: 2025-05-08 | End: 2025-05-12

## 2025-05-08 RX ORDER — POTASSIUM CHLORIDE 1500 MG/1
40 TABLET, EXTENDED RELEASE ORAL ONCE
Status: COMPLETED | OUTPATIENT
Start: 2025-05-08 | End: 2025-05-08

## 2025-05-08 RX ORDER — ONDANSETRON 4 MG/1
4 TABLET, FILM COATED ORAL EVERY 8 HOURS PRN
Qty: 20 TABLET | Refills: 0 | Status: SHIPPED | OUTPATIENT
Start: 2025-05-08 | End: 2025-05-12

## 2025-05-08 RX ORDER — LORAZEPAM 0.5 MG/1
0.5 TABLET ORAL EVERY 6 HOURS PRN
Qty: 20 TABLET | Refills: 0 | Status: SHIPPED | OUTPATIENT
Start: 2025-05-08 | End: 2025-05-12

## 2025-05-08 RX ORDER — POLYETHYLENE GLYCOL 3350 17 G/17G
17 POWDER, FOR SOLUTION ORAL DAILY
Qty: 30 EACH | Refills: 0 | Status: SHIPPED | OUTPATIENT
Start: 2025-05-08 | End: 2025-05-12

## 2025-05-08 RX ADMIN — POTASSIUM CHLORIDE 20 MEQ: 14.9 INJECTION, SOLUTION INTRAVENOUS at 06:32

## 2025-05-08 RX ADMIN — POTASSIUM CHLORIDE 20 MEQ: 14.9 INJECTION, SOLUTION INTRAVENOUS at 07:58

## 2025-05-08 RX ADMIN — POTASSIUM CHLORIDE 40 MEQ: 1500 TABLET, EXTENDED RELEASE ORAL at 06:30

## 2025-05-08 RX ADMIN — SODIUM CHLORIDE 125 ML/HR: 0.9 INJECTION, SOLUTION INTRAVENOUS at 07:59

## 2025-05-08 NOTE — PLAN OF CARE
Problem: Potential for Falls  Goal: Patient will remain free of falls  Description: INTERVENTIONS:- Educate patient/family on patient safety including physical limitations- Instruct patient to call for assistance with activity - Consult OT/PT to assist with strengthening/mobility - Keep Call bell within reach- Keep bed low and locked with side rails adjusted as appropriate- Keep care items and personal belongings within reach- Initiate and maintain comfort rounds- Make Fall Risk Sign visible to staff- Offer Toileting every 2 Hours, in advance of need- Initiate/Maintain bed/ chair alarm- Obtain necessary fall risk management equipment: yellow socks- Apply yellow socks and bracelet for high fall risk patients- Consider moving patient to room near nurses station  INTERVENTIONS:- Educate patient/family on patient safety including physical limitations- Instruct patient to call for assistance with activity - Consult OT/PT to assist with strengthening/mobility - Keep Call bell within reach- Keep bed low and locked with side rails adjusted as appropriate- Keep care items and personal belongings within reach- Initiate and maintain comfort rounds- Make Fall Risk Sign visible to staff- Offer Toileting every 2 Hours, in advance of need- Initiate/Maintain bed alarm- Obtain necessary fall risk management equipment: bed- Apply yellow socks and bracelet for high fall risk patients- Consider moving patient to room near nurses station  5/8/2025 1303 by Aman Rm RN  Outcome: Adequate for Discharge  5/8/2025 0805 by Aman Rm RN  Outcome: Progressing     Problem: PAIN - ADULT  Goal: Verbalizes/displays adequate comfort level or baseline comfort level  Description: Interventions:- Encourage patient to monitor pain and request assistance- Assess pain using appropriate pain scale- Administer analgesics based on type and severity of pain and evaluate response- Implement non-pharmacological measures as appropriate and  evaluate response- Consider cultural and social influences on pain and pain management- Notify physician/advanced practitioner if interventions unsuccessful or patient reports new pain  5/8/2025 1303 by Aman Rm RN  Outcome: Adequate for Discharge  5/8/2025 0805 by Aman Rm RN  Outcome: Progressing     Problem: INFECTION - ADULT  Goal: Absence or prevention of progression during hospitalization  Description: INTERVENTIONS:- Assess and monitor for signs and symptoms of infection- Monitor lab/diagnostic results- Monitor all insertion sites, i.e. indwelling lines, tubes, and drains- Monitor endotracheal if appropriate and nasal secretions for changes in amount and color- [place] appropriate cooling/warming therapies per order- Administer medications as ordered- Instruct and encourage patient and family to use good hand hygiene technique- Identify and instruct in appropriate isolation precautions for identified infection/condition  5/8/2025 1303 by Aman Rm RN  Outcome: Adequate for Discharge  5/8/2025 0805 by Aman Rm RN  Outcome: Progressing  Goal: Absence of fever/infection during neutropenic period  Description: INTERVENTIONS:- Monitor WBC  5/8/2025 1303 by Aman Rm RN  Outcome: Adequate for Discharge  5/8/2025 0805 by Aman Rm RN  Outcome: Progressing     Problem: SAFETY ADULT  Goal: Patient will remain free of falls  Description: INTERVENTIONS:- Educate patient/family on patient safety including physical limitations- Instruct patient to call for assistance with activity - Consult OT/PT to assist with strengthening/mobility - Keep Call bell within reach- Keep bed low and locked with side rails adjusted as appropriate- Keep care items and personal belongings within reach- Initiate and maintain comfort rounds- Make Fall Risk Sign visible to staff- Offer Toileting every 2 Hours, in advance of need- Initiate/Maintain bed/ chair alarm- Obtain necessary fall risk  management equipment: yellow socks- Apply yellow socks and bracelet for high fall risk patients- Consider moving patient to room near nurses station  INTERVENTIONS:- Educate patient/family on patient safety including physical limitations- Instruct patient to call for assistance with activity - Consult OT/PT to assist with strengthening/mobility - Keep Call bell within reach- Keep bed low and locked with side rails adjusted as appropriate- Keep care items and personal belongings within reach- Initiate and maintain comfort rounds- Make Fall Risk Sign visible to staff- Offer Toileting every 2 Hours, in advance of need- Initiate/Maintain bed alarm- Obtain necessary fall risk management equipment: bed- Apply yellow socks and bracelet for high fall risk patients- Consider moving patient to room near nurses station  5/8/2025 1303 by Aman Rm RN  Outcome: Adequate for Discharge  5/8/2025 0805 by Aman Rm RN  Outcome: Progressing  Goal: Maintain or return to baseline ADL function  Description: INTERVENTIONS:-  Assess patient's ability to carry out ADLs; assess patient's baseline for ADL function and identify physical deficits which impact ability to perform ADLs (bathing, care of mouth/teeth, toileting, grooming, dressing, etc.)- Assess/evaluate cause of self-care deficits - Assess range of motion- Assess patient's mobility; develop plan if impaired- Assess patient's need for assistive devices and provide as appropriate- Encourage maximum independence but intervene and supervise when necessary- Involve family in performance of ADLs- Assess for home care needs following discharge - Consider OT consult to assist with ADL evaluation and planning for discharge- Provide patient education as appropriate  5/8/2025 1303 by Aman Rm RN  Outcome: Adequate for Discharge  5/8/2025 0805 by Aman Rm RN  Outcome: Progressing  Goal: Maintains/Returns to pre admission functional level  Description:  INTERVENTIONS:- Perform AM-PAC 6 Click Basic Mobility/ Daily Activity assessment daily.- Set and communicate daily mobility goal to care team and patient/family/caregiver. - Collaborate with rehabilitation services on mobility goals if consulted- Perform Range of Motion 4 times a day.- Reposition patient every 2 hours.- Dangle patient 6 times a day- Stand patient 4 times a day- Ambulate patient 3 times a day- Out of bed to chair 3 times a day - Out of bed for meals 3 times a day- Out of bed for toileting- Record patient progress and toleration of activity level   5/8/2025 1303 by Aman Rm RN  Outcome: Adequate for Discharge  5/8/2025 0805 by Aman Rm, RN  Outcome: Progressing

## 2025-05-08 NOTE — ASSESSMENT & PLAN NOTE
Severe CHELSEA, likely prerenal  Significantly improved with IV fluids  Patient is tolerating diet  Associated electrolyte disturbances or being replaced  Patient is requesting to be transition to hospice care, he is discharged home on hospice

## 2025-05-08 NOTE — PLAN OF CARE
Problem: Potential for Falls  Goal: Patient will remain free of falls  Description: INTERVENTIONS:- Educate patient/family on patient safety including physical limitations- Instruct patient to call for assistance with activity - Consult OT/PT to assist with strengthening/mobility - Keep Call bell within reach- Keep bed low and locked with side rails adjusted as appropriate- Keep care items and personal belongings within reach- Initiate and maintain comfort rounds- Make Fall Risk Sign visible to staff- Offer Toileting every 2 Hours, in advance of need- Initiate/Maintain bed/ chair alarm- Obtain necessary fall risk management equipment: yellow socks- Apply yellow socks and bracelet for high fall risk patients- Consider moving patient to room near nurses station  INTERVENTIONS:- Educate patient/family on patient safety including physical limitations- Instruct patient to call for assistance with activity - Consult OT/PT to assist with strengthening/mobility - Keep Call bell within reach- Keep bed low and locked with side rails adjusted as appropriate- Keep care items and personal belongings within reach- Initiate and maintain comfort rounds- Make Fall Risk Sign visible to staff- Offer Toileting every 2 Hours, in advance of need- Initiate/Maintain bed alarm- Obtain necessary fall risk management equipment: bed- Apply yellow socks and bracelet for high fall risk patients- Consider moving patient to room near nurses station  Outcome: Progressing     Problem: PAIN - ADULT  Goal: Verbalizes/displays adequate comfort level or baseline comfort level  Description: Interventions:- Encourage patient to monitor pain and request assistance- Assess pain using appropriate pain scale- Administer analgesics based on type and severity of pain and evaluate response- Implement non-pharmacological measures as appropriate and evaluate response- Consider cultural and social influences on pain and pain management- Notify physician/advanced  practitioner if interventions unsuccessful or patient reports new pain  Outcome: Progressing     Problem: INFECTION - ADULT  Goal: Absence or prevention of progression during hospitalization  Description: INTERVENTIONS:- Assess and monitor for signs and symptoms of infection- Monitor lab/diagnostic results- Monitor all insertion sites, i.e. indwelling lines, tubes, and drains- Monitor endotracheal if appropriate and nasal secretions for changes in amount and color- [place] appropriate cooling/warming therapies per order- Administer medications as ordered- Instruct and encourage patient and family to use good hand hygiene technique- Identify and instruct in appropriate isolation precautions for identified infection/condition  Outcome: Progressing  Goal: Absence of fever/infection during neutropenic period  Description: INTERVENTIONS:- Monitor WBC  Outcome: Progressing     Problem: SAFETY ADULT  Goal: Patient will remain free of falls  Description: INTERVENTIONS:- Educate patient/family on patient safety including physical limitations- Instruct patient to call for assistance with activity - Consult OT/PT to assist with strengthening/mobility - Keep Call bell within reach- Keep bed low and locked with side rails adjusted as appropriate- Keep care items and personal belongings within reach- Initiate and maintain comfort rounds- Make Fall Risk Sign visible to staff- Offer Toileting every 2 Hours, in advance of need- Initiate/Maintain bed/ chair alarm- Obtain necessary fall risk management equipment: yellow socks- Apply yellow socks and bracelet for high fall risk patients- Consider moving patient to room near nurses station  INTERVENTIONS:- Educate patient/family on patient safety including physical limitations- Instruct patient to call for assistance with activity - Consult OT/PT to assist with strengthening/mobility - Keep Call bell within reach- Keep bed low and locked with side rails adjusted as appropriate- Keep  care items and personal belongings within reach- Initiate and maintain comfort rounds- Make Fall Risk Sign visible to staff- Offer Toileting every 2 Hours, in advance of need- Initiate/Maintain bed alarm- Obtain necessary fall risk management equipment: bed- Apply yellow socks and bracelet for high fall risk patients- Consider moving patient to room near nurses station  Outcome: Progressing  Goal: Maintain or return to baseline ADL function  Description: INTERVENTIONS:-  Assess patient's ability to carry out ADLs; assess patient's baseline for ADL function and identify physical deficits which impact ability to perform ADLs (bathing, care of mouth/teeth, toileting, grooming, dressing, etc.)- Assess/evaluate cause of self-care deficits - Assess range of motion- Assess patient's mobility; develop plan if impaired- Assess patient's need for assistive devices and provide as appropriate- Encourage maximum independence but intervene and supervise when necessary- Involve family in performance of ADLs- Assess for home care needs following discharge - Consider OT consult to assist with ADL evaluation and planning for discharge- Provide patient education as appropriate  Outcome: Progressing  Goal: Maintains/Returns to pre admission functional level  Description: INTERVENTIONS:- Perform AM-PAC 6 Click Basic Mobility/ Daily Activity assessment daily.- Set and communicate daily mobility goal to care team and patient/family/caregiver. - Collaborate with rehabilitation services on mobility goals if consulted- Perform Range of Motion 4 times a day.- Reposition patient every 2 hours.- Dangle patient 6 times a day- Stand patient 4 times a day- Ambulate patient 3 times a day- Out of bed to chair 3 times a day - Out of bed for meals 3 times a day- Out of bed for toileting- Record patient progress and toleration of activity level   Outcome: Progressing

## 2025-05-08 NOTE — ASSESSMENT & PLAN NOTE
Irregular soft tissue density partially fills the rectal colon with upstream fluid distention concerning for blood product versus neoplasm.   Appreciate GI input  No evidence of obstruction at this time  As above, patient is elected to transition to hospice care

## 2025-05-08 NOTE — DISCHARGE INSTR - AVS FIRST PAGE
Dear Carloz,    It was a pleasure to have you under our care.    We recommend that you take medications to prevent constipation, please take MiraLAX and Senokot.    We have also provided you with medications to help with any symptoms of discomfort should you develop nausea, anxiety or pain.    Take care,    Rea Quinonez MD

## 2025-05-08 NOTE — ASSESSMENT & PLAN NOTE
Malnutrition Findings:   Adult Malnutrition type: Acute illness  Adult Degree of Malnutrition: Other severe protein calorie malnutrition  Malnutrition Characteristics: Inadequate energy, Weight loss                  360 Statement: severe protein calorie malnutrition in the setting of acute illness as evidenced by:  significant 12 lb (10%) weight loss over the past 3 months;  <=50% energy intake compared to estimated energy needs for >= 5 days.  treated with diet and supplement recommendations for when able to initiate oral diet.    BMI Findings:           Body mass index is 18.39 kg/m².   In setting of rectal mass  Continue diet, nutritional supplements

## 2025-05-08 NOTE — DISCHARGE SUMMARY
Discharge Summary - Hospitalist   Name: Saud Ardon 89 y.o. male I MRN: [id number]  Unit/Bed#: [place] [address] -01 I Date of Admission: 5/6/2025   Date of Service: 5/8/2025 I Hospital Day: 2     Assessment & Plan  CHELSEA (acute kidney injury) (HCC)  Severe CHELSEA, likely prerenal  Significantly improved with IV fluids  Patient is tolerating diet  Associated electrolyte disturbances or being replaced  Patient is requesting to be transition to hospice care, he is discharged home on hospice  Rectal mass  Irregular soft tissue density partially fills the rectal colon with upstream fluid distention concerning for blood product versus neoplasm.   Appreciate GI input  No evidence of obstruction at this time  As above, patient is elected to transition to hospice care  Nausea & vomiting  Resolved, patient tolerating diet  Discharged home on hospice, Zofran as needed  Other constipation  In setting of above  Improved on bowel regimen  Continue bowel regimen on discharge  Syncope  Pt reportedly passed out at home as per family  Patient does not recall  Trauma work up negative  Head CT negative for any acute pathology  Hyponatremia  Hypovolemic  Resolved with IV fluids  Severe protein-calorie malnutrition (HCC)  Malnutrition Findings:   Adult Malnutrition type: Acute illness  Adult Degree of Malnutrition: Other severe protein calorie malnutrition  Malnutrition Characteristics: Inadequate energy, Weight loss                  360 Statement: severe protein calorie malnutrition in the setting of acute illness as evidenced by:  significant 12 lb (10%) weight loss over the past 3 months;  <=50% energy intake compared to estimated energy needs for >= 5 days.  treated with diet and supplement recommendations for when able to initiate oral diet.    BMI Findings:           Body mass index is 18.39 kg/m².   In setting of rectal mass  Continue diet, nutritional supplements  Hypokalemia  Significant hypokalemia in setting of poor oral intake  which has now improved  Aggressive replacement prior to discharge    Leukocytosis  Reactive secondary to volume depletion  No evidence of infection  Basal cell carcinoma (BCC) of skin of neck  Appreciate wound care recommendations     Medical Problems       Resolved Problems  Date Reviewed: 4/16/2025   None       Discharging Physician / Practitioner: Rea Quinonez MD  PCP: Ca Vargas PA-C  Admission Date:   Admission Orders (From admission, onward)       Ordered        05/06/25 2332  INPATIENT ADMISSION  Once                          Discharge Date: 05/08/25    Consultations During Hospital Stay:  GI    Procedures Performed:   CT head without contrast   Final Result by Lara Boyle MD (05/06 2155)      No acute intracranial abnormality.  Chronic microangiopathic changes.                  Workstation performed: [id number]         CT cervical spine without contrast   Final Result by Lara Boyle MD (05/06 2158)      No cervical spine fracture or traumatic malalignment.                  Workstation performed: [id number]         CT abdomen pelvis wo contrast   Final Result by Lara Boyle MD (05/06 2206)      Irregular soft tissue density partially fills the rectal colon with upstream fluid distention concerning for blood product versus neoplasm. Gastroenterology consult recommended.      Workstation performed: [id number]         XR chest 2 views   Final Result by Michael Moise MD (05/07 0702)      No acute cardiopulmonary disease.            Workstation performed: [id number]               Significant Findings / Test Results:   Results from last 7 days   Lab Units 05/08/25  0520   WBC Thousand/uL 8.22   HEMOGLOBIN g/dL 12.0   HEMATOCRIT % 36.5   PLATELETS Thousands/uL 330     Results from last 7 days   Lab Units 05/08/25  0520   SODIUM mmol/L 141   POTASSIUM mmol/L 2.3*   CHLORIDE mmol/L 107   CO2 mmol/L 22   BUN mg/dL 77*   CREATININE mg/dL 1.91*   CALCIUM mg/dL 6.9*         Test Results Pending  "at Discharge (will require follow up):   None     Outpatient Tests Requested:  Routine    Complications:  None    Reason for Admission: Syncope, nausea, vomiting, CHELSEA    Hospital Course:   Saud Ardon is a 89 y.o. male patient with known rectal mass who originally presented to the hospital on 5/6/2025 due to syncope, nausea, vomiting and CHELSEA as well as constipation.  Patient significantly improved with supportive management and IV fluids as well as bowel regimen.  He ultimately elected to transition to hospice care measures.  His family was of support.  Hospice was consulted and the patient was provided necessary equipment for home hospice care.  He was discharged in improved and stable condition.      Please see above list of diagnoses and related plan for additional information.     Condition at Discharge: stable    Discharge Day Visit / Exam:     Subjective: Patient reports feeling well and is requesting to be discharged home as soon as possible.  He reports improvement in his oral intake and resolution of constipation.    Vitals: Blood Pressure: 122/79 (05/08/25 0748)  Pulse: 73 (05/08/25 0748)  Temperature: 97.6 °F (36.4 °C) (05/08/25 0748)  Temp Source: Oral (05/07/25 1502)  Respirations: 17 (05/08/25 0748)  Height: 5' 3\" (160 cm) (05/07/25 0028)  Weight - Scale: 47.1 kg (103 lb 12.8 oz) (05/08/25 0600)  SpO2: 98 % (05/08/25 0748)    Physical Exam  Constitutional:       General: He is not in acute distress.  HENT:      Head: Normocephalic and atraumatic.   Eyes:      Conjunctiva/sclera: Conjunctivae normal.   Cardiovascular:      Rate and Rhythm: Normal rate and regular rhythm.   Pulmonary:      Effort: No respiratory distress.      Breath sounds: No wheezing or rales.   Abdominal:      General: There is no distension.      Tenderness: There is no abdominal tenderness. There is no guarding.   Musculoskeletal:      Right lower leg: No edema.      Left lower leg: No edema.   Skin:     General: Skin is warm and " dry.   Neurological:      Mental Status: He is oriented to person, place, and time.   Psychiatric:         Mood and Affect: Mood normal.          Discussion with Family: Updated  (granddaughter) at bedside.    Discharge instructions/Information to patient and family:   See after visit summary for information provided to patient and family.      Provisions for Follow-Up Care:  See after visit summary for information related to follow-up care and any pertinent home health orders.      Mobility at time of Discharge:   Basic Mobility Inpatient Raw Score: 18  -HLM Goal: 6: Walk 10 steps or more  JH-HLM Achieved: 6: Walk 10 steps or more       Disposition:   Other: home hospice    Planned Readmission: No    Discharge Medications:  See after visit summary for reconciled discharge medications provided to patient and/or family.      Administrative Statements   Discharge Statement:  I have spent a total time of 36 minutes in caring for this patient on the day of the visit/encounter. >30 minutes of time was spent on: Diagnostic results, Counseling / Coordination of care, Documenting in the medical record, and Communicating with other healthcare professionals .    **Please Note: This note may have been constructed using a voice recognition system**

## 2025-05-08 NOTE — NURSING NOTE
Patient was d/c'd to home in no distress. Able to make needs known. Instructions were reviewed with the granddaughter who was at the bedside. Both made aware that prescriptions were sent to pharmacy of choice and ready for . Patient was transferred to the front entrance via wheelchair and transferred into the car with all safety precautions taken. All belongings left with the patient.   What Type Of Note Output Would You Prefer (Optional)?: Standard Output Hpi Title: Evaluation of Skin Lesions How Severe Are Your Spot(S)?: moderate Have Your Spot(S) Been Treated In The Past?: has not been treated Family Member: Paternal grandmother Location: Back and around belly button Year Removed: 2016 Additional History: New pt. FBSE. 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 Paternal grandmother hx of MM. [facility]

## 2025-05-08 NOTE — PLAN OF CARE
Problem: Potential for Falls  Goal: Patient will remain free of falls  Description: INTERVENTIONS:- Educate patient/family on patient safety including physical limitations- Instruct patient to call for assistance with activity - Consult OT/PT to assist with strengthening/mobility - Keep Call bell within reach- Keep bed low and locked with side rails adjusted as appropriate- Keep care items and personal belongings within reach- Initiate and maintain comfort rounds- Make Fall Risk Sign visible to staff- Offer Toileting every 2 Hours, in advance of need- Initiate/Maintain bed/ chair alarm- Obtain necessary fall risk management equipment: yellow socks- Apply yellow socks and bracelet for high fall risk patients- Consider moving patient to room near nurses station  INTERVENTIONS:- Educate patient/family on patient safety including physical limitations- Instruct patient to call for assistance with activity - Consult OT/PT to assist with strengthening/mobility - Keep Call bell within reach- Keep bed low and locked with side rails adjusted as appropriate- Keep care items and personal belongings within reach- Initiate and maintain comfort rounds- Make Fall Risk Sign visible to staff- Offer Toileting every 2 Hours, in advance of need- Initiate/Maintain bed alarm- Obtain necessary fall risk management equipment: bed- Apply yellow socks and bracelet for high fall risk patients- Consider moving patient to room near nurses station  Outcome: Progressing     Problem: PAIN - ADULT  Goal: Verbalizes/displays adequate comfort level or baseline comfort level  Description: Interventions:- Encourage patient to monitor pain and request assistance- Assess pain using appropriate pain scale- Administer analgesics based on type and severity of pain and evaluate response- Implement non-pharmacological measures as appropriate and evaluate response- Consider cultural and social influences on pain and pain management- Notify physician/advanced  practitioner if interventions unsuccessful or patient reports new pain  Outcome: Progressing     Problem: INFECTION - ADULT  Goal: Absence or prevention of progression during hospitalization  Description: INTERVENTIONS:- Assess and monitor for signs and symptoms of infection- Monitor lab/diagnostic results- Monitor all insertion sites, i.e. indwelling lines, tubes, and drains- Monitor endotracheal if appropriate and nasal secretions for changes in amount and color- [place] appropriate cooling/warming therapies per order- Administer medications as ordered- Instruct and encourage patient and family to use good hand hygiene technique- Identify and instruct in appropriate isolation precautions for identified infection/condition  Outcome: Progressing  Goal: Absence of fever/infection during neutropenic period  Description: INTERVENTIONS:- Monitor WBC  Outcome: Progressing     Problem: SAFETY ADULT  Goal: Patient will remain free of falls  Description: INTERVENTIONS:- Educate patient/family on patient safety including physical limitations- Instruct patient to call for assistance with activity - Consult OT/PT to assist with strengthening/mobility - Keep Call bell within reach- Keep bed low and locked with side rails adjusted as appropriate- Keep care items and personal belongings within reach- Initiate and maintain comfort rounds- Make Fall Risk Sign visible to staff- Offer Toileting every 2 Hours, in advance of need- Initiate/Maintain bed/ chair alarm- Obtain necessary fall risk management equipment: yellow socks- Apply yellow socks and bracelet for high fall risk patients- Consider moving patient to room near nurses station  INTERVENTIONS:- Educate patient/family on patient safety including physical limitations- Instruct patient to call for assistance with activity - Consult OT/PT to assist with strengthening/mobility - Keep Call bell within reach- Keep bed low and locked with side rails adjusted as appropriate- Keep  care items and personal belongings within reach- Initiate and maintain comfort rounds- Make Fall Risk Sign visible to staff- Offer Toileting every 2 Hours, in advance of need- Initiate/Maintain bed alarm- Obtain necessary fall risk management equipment: bed- Apply yellow socks and bracelet for high fall risk patients- Consider moving patient to room near nurses station  Outcome: Progressing  Goal: Maintain or return to baseline ADL function  Description: INTERVENTIONS:-  Assess patient's ability to carry out ADLs; assess patient's baseline for ADL function and identify physical deficits which impact ability to perform ADLs (bathing, care of mouth/teeth, toileting, grooming, dressing, etc.)- Assess/evaluate cause of self-care deficits - Assess range of motion- Assess patient's mobility; develop plan if impaired- Assess patient's need for assistive devices and provide as appropriate- Encourage maximum independence but intervene and supervise when necessary- Involve family in performance of ADLs- Assess for home care needs following discharge - Consider OT consult to assist with ADL evaluation and planning for discharge- Provide patient education as appropriate  Outcome: Progressing  Goal: Maintains/Returns to pre admission functional level  Description: INTERVENTIONS:- Perform AM-PAC 6 Click Basic Mobility/ Daily Activity assessment daily.- Set and communicate daily mobility goal to care team and patient/family/caregiver. - Collaborate with rehabilitation services on mobility goals if consulted- Perform Range of Motion 4 times a day.- Reposition patient every 2 hours.- Dangle patient 6 times a day- Stand patient 4 times a day- Ambulate patient 3 times a day- Out of bed to chair 3 times a day - Out of bed for meals 3 times a day- Out of bed for toileting- Record patient progress and toleration of activity level   Outcome: Progressing     Problem: DISCHARGE PLANNING  Goal: Discharge to home or other facility with  appropriate resources  Description: INTERVENTIONS:- Identify barriers to discharge w/patient and caregiver- Arrange for needed discharge resources and transportation as appropriate- Identify discharge learning needs (meds, wound care, etc.)- Arrange for interpretive services to assist at discharge as needed- Refer to Case Management Department for coordinating discharge planning if the patient needs post-hospital services based on physician/advanced practitioner order or complex needs related to functional status, cognitive ability, or social support system  Outcome: Progressing     Problem: Knowledge Deficit  Goal: Patient/family/caregiver demonstrates understanding of disease process, treatment plan, medications, and discharge instructions  Description: Complete learning assessment and assess knowledge base.Interventions:- Provide teaching at level of understanding- Provide teaching via preferred learning methods  Outcome: Progressing     Problem: Prexisting or High Potential for Compromised Skin Integrity  Goal: Skin integrity is maintained or improved  Description: INTERVENTIONS:- Identify patients at risk for skin breakdown- Assess and monitor skin integrity- Assess and monitor nutrition and hydration status- Monitor labs - Assess for incontinence - Turn and reposition patient- Assist with mobility/ambulation- Relieve pressure over bony prominences- Avoid friction and shearing- Provide appropriate hygiene as needed including keeping skin clean and dry- Evaluate need for skin moisturizer/barrier cream- Collaborate with interdisciplinary team - Patient/family teaching- Consider wound care consult   Outcome: Progressing     Problem: NEUROSENSORY - ADULT  Goal: Achieves stable or improved neurological status  Description: INTERVENTIONS- Monitor and report changes in neurological status- Monitor vital signs such as temperature, blood pressure, glucose, and any other labs ordered - Initiate measures to prevent  increased intracranial pressure- Monitor for seizure activity and implement precautions if appropriate    Outcome: Progressing     Problem: GASTROINTESTINAL - ADULT  Goal: Minimal or absence of nausea and/or vomiting  Description: INTERVENTIONS:- Administer IV fluids if ordered to ensure adequate hydration- Maintain NPO status until nausea and vomiting are resolved- Nasogastric tube if ordered- Administer ordered antiemetic medications as needed- Provide nonpharmacologic comfort measures as appropriate- Advance diet as tolerated, if ordered- Consider nutrition services referral to assist patient with adequate nutrition and appropriate food choices  Outcome: Progressing  Goal: Maintains adequate nutritional intake  Description: INTERVENTIONS:- Monitor percentage of each meal consumed- Identify factors contributing to decreased intake, treat as appropriate- Assist with meals as needed- Monitor I&O, weight, and lab values if indicated- Obtain nutrition services referral as needed  Outcome: Progressing     Problem: METABOLIC, FLUID AND ELECTROLYTES - ADULT  Goal: Fluid balance maintained  Description: INTERVENTIONS:- Monitor labs - Monitor I/O and WT- Instruct patient on fluid and nutrition as appropriate- Assess for signs & symptoms of volume excess or deficit  Outcome: Progressing     Problem: SKIN/TISSUE INTEGRITY - ADULT  Goal: Incision(s), wounds(s) or drain site(s) healing without S/S of infection  Description: INTERVENTIONS- Assess and document dressing, incision, wound bed, drain sites and surrounding tissue- Provide patient and family education- Perform skin care/dressing changes every shift  Outcome: Progressing     Problem: Nutrition/Hydration-ADULT  Goal: Nutrient/Hydration intake appropriate for improving, restoring or maintaining nutritional needs  Description: Monitor and assess patient's nutrition/hydration status for malnutrition. Collaborate with interdisciplinary team and initiate plan and  interventions as ordered.  Monitor patient's weight and dietary intake as ordered or per policy. Utilize nutrition screening tool and intervene as necessary. Determine patient's food preferences and provide high-protein, high-caloric foods as appropriate. INTERVENTIONS:- Monitor oral intake, urinary output, labs, and treatment plans- Assess nutrition and hydration status and recommend course of action- Evaluate amount of meals eaten- Assist patient with eating if necessary - Allow adequate time for meals- Recommend/ encourage appropriate diets, oral nutritional supplements, and vitamin/mineral supplements- Order, calculate, and assess calorie counts as needed- Recommend, monitor, and adjust tube feedings and TPN/PPN based on assessed needs- Assess need for intravenous fluids- Provide specific nutrition/hydration education as appropriate- Include patient/family/caregiver in decisions related to nutrition  Outcome: Progressing

## 2025-05-08 NOTE — PLAN OF CARE
Problem: Potential for Falls  Goal: Patient will remain free of falls  Description: INTERVENTIONS:- Educate patient/family on patient safety including physical limitations- Instruct patient to call for assistance with activity - Consult OT/PT to assist with strengthening/mobility - Keep Call bell within reach- Keep bed low and locked with side rails adjusted as appropriate- Keep care items and personal belongings within reach- Initiate and maintain comfort rounds- Make Fall Risk Sign visible to staff- Offer Toileting every 2 Hours, in advance of need- Initiate/Maintain bed/ chair alarm- Obtain necessary fall risk management equipment: yellow socks- Apply yellow socks and bracelet for high fall risk patients- Consider moving patient to room near nurses station  INTERVENTIONS:- Educate patient/family on patient safety including physical limitations- Instruct patient to call for assistance with activity - Consult OT/PT to assist with strengthening/mobility - Keep Call bell within reach- Keep bed low and locked with side rails adjusted as appropriate- Keep care items and personal belongings within reach- Initiate and maintain comfort rounds- Make Fall Risk Sign visible to staff- Offer Toileting every 2 Hours, in advance of need- Initiate/Maintain bed alarm- Obtain necessary fall risk management equipment: bed- Apply yellow socks and bracelet for high fall risk patients- Consider moving patient to room near nurses station  5/8/2025 0300 by Ifeoma Shearer  Outcome: Progressing  5/8/2025 0256 by Ifeoma Shearer  Outcome: Progressing     Problem: PAIN - ADULT  Goal: Verbalizes/displays adequate comfort level or baseline comfort level  Description: Interventions:- Encourage patient to monitor pain and request assistance- Assess pain using appropriate pain scale- Administer analgesics based on type and severity of pain and evaluate response- Implement non-pharmacological measures as appropriate and evaluate response-  Consider cultural and social influences on pain and pain management- Notify physician/advanced practitioner if interventions unsuccessful or patient reports new pain  5/8/2025 0300 by Ifeoma Shearer  Outcome: Progressing  5/8/2025 0256 by Ifeoma Shearer  Outcome: Progressing     Problem: INFECTION - ADULT  Goal: Absence or prevention of progression during hospitalization  Description: INTERVENTIONS:- Assess and monitor for signs and symptoms of infection- Monitor lab/diagnostic results- Monitor all insertion sites, i.e. indwelling lines, tubes, and drains- Monitor endotracheal if appropriate and nasal secretions for changes in amount and color- [place] appropriate cooling/warming therapies per order- Administer medications as ordered- Instruct and encourage patient and family to use good hand hygiene technique- Identify and instruct in appropriate isolation precautions for identified infection/condition  5/8/2025 0300 by Ifeoma Shearer  Outcome: Progressing  5/8/2025 0256 by Ifeoma Shearer  Outcome: Progressing  Goal: Absence of fever/infection during neutropenic period  Description: INTERVENTIONS:- Monitor WBC  5/8/2025 0300 by Ifeoma Shearer  Outcome: Progressing  5/8/2025 0256 by Ifeoma Shearer  Outcome: Progressing     Problem: SAFETY ADULT  Goal: Patient will remain free of falls  Description: INTERVENTIONS:- Educate patient/family on patient safety including physical limitations- Instruct patient to call for assistance with activity - Consult OT/PT to assist with strengthening/mobility - Keep Call bell within reach- Keep bed low and locked with side rails adjusted as appropriate- Keep care items and personal belongings within reach- Initiate and maintain comfort rounds- Make Fall Risk Sign visible to staff- Offer Toileting every 2 Hours, in advance of need- Initiate/Maintain bed/ chair alarm- Obtain necessary fall risk management equipment: yellow socks- Apply yellow socks and bracelet for high fall risk  patients- Consider moving patient to room near nurses station  INTERVENTIONS:- Educate patient/family on patient safety including physical limitations- Instruct patient to call for assistance with activity - Consult OT/PT to assist with strengthening/mobility - Keep Call bell within reach- Keep bed low and locked with side rails adjusted as appropriate- Keep care items and personal belongings within reach- Initiate and maintain comfort rounds- Make Fall Risk Sign visible to staff- Offer Toileting every 2 Hours, in advance of need- Initiate/Maintain bed alarm- Obtain necessary fall risk management equipment: bed- Apply yellow socks and bracelet for high fall risk patients- Consider moving patient to room near nurses station  5/8/2025 0300 by Ifeoma Shearer  Outcome: Progressing  5/8/2025 0256 by Ifeoma Shearer  Outcome: Progressing  Goal: Maintain or return to baseline ADL function  Description: INTERVENTIONS:-  Assess patient's ability to carry out ADLs; assess patient's baseline for ADL function and identify physical deficits which impact ability to perform ADLs (bathing, care of mouth/teeth, toileting, grooming, dressing, etc.)- Assess/evaluate cause of self-care deficits - Assess range of motion- Assess patient's mobility; develop plan if impaired- Assess patient's need for assistive devices and provide as appropriate- Encourage maximum independence but intervene and supervise when necessary- Involve family in performance of ADLs- Assess for home care needs following discharge - Consider OT consult to assist with ADL evaluation and planning for discharge- Provide patient education as appropriate  5/8/2025 0300 by Ifeoma Shearer  Outcome: Progressing  5/8/2025 0256 by Ifeoma Shearer  Outcome: Progressing  Goal: Maintains/Returns to pre admission functional level  Description: INTERVENTIONS:- Perform AM-PAC 6 Click Basic Mobility/ Daily Activity assessment daily.- Set and communicate daily mobility goal to care  team and patient/family/caregiver. - Collaborate with rehabilitation services on mobility goals if consulted- Perform Range of Motion 4 times a day.- Reposition patient every 2 hours.- Dangle patient 6 times a day- Stand patient 4 times a day- Ambulate patient 3 times a day- Out of bed to chair 3 times a day - Out of bed for meals 3 times a day- Out of bed for toileting- Record patient progress and toleration of activity level   5/8/2025 0300 by Ifeoma Shearer  Outcome: Progressing  5/8/2025 0256 by Ifeoma Shearer  Outcome: Progressing     Problem: DISCHARGE PLANNING  Goal: Discharge to home or other facility with appropriate resources  Description: INTERVENTIONS:- Identify barriers to discharge w/patient and caregiver- Arrange for needed discharge resources and transportation as appropriate- Identify discharge learning needs (meds, wound care, etc.)- Arrange for interpretive services to assist at discharge as needed- Refer to Case Management Department for coordinating discharge planning if the patient needs post-hospital services based on physician/advanced practitioner order or complex needs related to functional status, cognitive ability, or social support system  5/8/2025 0300 by Ifeoma Shearer  Outcome: Progressing  5/8/2025 0256 by Ifeoma Shearer  Outcome: Progressing     Problem: Knowledge Deficit  Goal: Patient/family/caregiver demonstrates understanding of disease process, treatment plan, medications, and discharge instructions  Description: Complete learning assessment and assess knowledge base.Interventions:- Provide teaching at level of understanding- Provide teaching via preferred learning methods  5/8/2025 0300 by Ifeoma Shearer  Outcome: Progressing  5/8/2025 0256 by Ifeoma Shearer  Outcome: Progressing     Problem: Prexisting or High Potential for Compromised Skin Integrity  Goal: Skin integrity is maintained or improved  Description: INTERVENTIONS:- Identify patients at risk for skin breakdown-  Assess and monitor skin integrity- Assess and monitor nutrition and hydration status- Monitor labs - Assess for incontinence - Turn and reposition patient- Assist with mobility/ambulation- Relieve pressure over bony prominences- Avoid friction and shearing- Provide appropriate hygiene as needed including keeping skin clean and dry- Evaluate need for skin moisturizer/barrier cream- Collaborate with interdisciplinary team - Patient/family teaching- Consider wound care consult   5/8/2025 0300 by Ifeoma Shearer  Outcome: Progressing  5/8/2025 0256 by Ifeoma Shearer  Outcome: Progressing     Problem: NEUROSENSORY - ADULT  Goal: Achieves stable or improved neurological status  Description: INTERVENTIONS- Monitor and report changes in neurological status- Monitor vital signs such as temperature, blood pressure, glucose, and any other labs ordered - Initiate measures to prevent increased intracranial pressure- Monitor for seizure activity and implement precautions if appropriate    5/8/2025 0300 by Ifeoma Shearer  Outcome: Progressing  5/8/2025 0256 by Ifeoma Shearer  Outcome: Progressing     Problem: GASTROINTESTINAL - ADULT  Goal: Minimal or absence of nausea and/or vomiting  Description: INTERVENTIONS:- Administer IV fluids if ordered to ensure adequate hydration- Maintain NPO status until nausea and vomiting are resolved- Nasogastric tube if ordered- Administer ordered antiemetic medications as needed- Provide nonpharmacologic comfort measures as appropriate- Advance diet as tolerated, if ordered- Consider nutrition services referral to assist patient with adequate nutrition and appropriate food choices  5/8/2025 0300 by Ifeoma Shearer  Outcome: Progressing  5/8/2025 0256 by Ifeoma Shearer  Outcome: Progressing  Goal: Maintains adequate nutritional intake  Description: INTERVENTIONS:- Monitor percentage of each meal consumed- Identify factors contributing to decreased intake, treat as appropriate- Assist with meals as  needed- Monitor I&O, weight, and lab values if indicated- Obtain nutrition services referral as needed  5/8/2025 0300 by Ifeoma Shearer  Outcome: Progressing  5/8/2025 0256 by Ifeoma Shearer  Outcome: Progressing     Problem: METABOLIC, FLUID AND ELECTROLYTES - ADULT  Goal: Fluid balance maintained  Description: INTERVENTIONS:- Monitor labs - Monitor I/O and WT- Instruct patient on fluid and nutrition as appropriate- Assess for signs & symptoms of volume excess or deficit  5/8/2025 0300 by Ifeoma Shearer  Outcome: Progressing  5/8/2025 0256 by Ifeoma Shearer  Outcome: Progressing     Problem: SKIN/TISSUE INTEGRITY - ADULT  Goal: Incision(s), wounds(s) or drain site(s) healing without S/S of infection  Description: INTERVENTIONS- Assess and document dressing, incision, wound bed, drain sites and surrounding tissue- Provide patient and family education- Perform skin care/dressing changes every shift  5/8/2025 0300 by Ifeoma Shearer  Outcome: Progressing  5/8/2025 0256 by Ifeoma Shearer  Outcome: Progressing     Problem: Nutrition/Hydration-ADULT  Goal: Nutrient/Hydration intake appropriate for improving, restoring or maintaining nutritional needs  Description: Monitor and assess patient's nutrition/hydration status for malnutrition. Collaborate with interdisciplinary team and initiate plan and interventions as ordered.  Monitor patient's weight and dietary intake as ordered or per policy. Utilize nutrition screening tool and intervene as necessary. Determine patient's food preferences and provide high-protein, high-caloric foods as appropriate. INTERVENTIONS:- Monitor oral intake, urinary output, labs, and treatment plans- Assess nutrition and hydration status and recommend course of action- Evaluate amount of meals eaten- Assist patient with eating if necessary - Allow adequate time for meals- Recommend/ encourage appropriate diets, oral nutritional supplements, and vitamin/mineral supplements- Order, calculate, and  assess calorie counts as needed- Recommend, monitor, and adjust tube feedings and TPN/PPN based on assessed needs- Assess need for intravenous fluids- Provide specific nutrition/hydration education as appropriate- Include patient/family/caregiver in decisions related to nutrition  5/8/2025 0300 by Ifeoma Shearer  Outcome: Progressing  5/8/2025 0256 by Ifeoma Shearer  Outcome: Progressing

## 2025-05-08 NOTE — ASSESSMENT & PLAN NOTE
Significant hypokalemia in setting of poor oral intake which has now improved  Aggressive replacement prior to discharge

## 2025-05-08 NOTE — CASE MANAGEMENT
Case Management Discharge Planning Note    Patient name Saud Ardon  Location [facility] 2 /South 2 M* MRN [id number]  : 1934 Date 2025       Current Admission Date: 2025  Current Admission Diagnosis:CHELSEA (acute kidney injury) (HCC)   Patient Active Problem List    Diagnosis Date Noted Date Diagnosed    Syncope 2025     Hyponatremia 2025     Severe protein-calorie malnutrition (HCC) 2025     Hypokalemia 2025     Leukocytosis 2025     Rectal mass 2025     CHELSEA (acute kidney injury) (HCC) 2025     Nausea & vomiting 2025     Other constipation 2025     Otitis externa 2025     Ischemic cardiomyopathy 2024     Basal cell carcinoma (BCC) of skin of neck 2024     Basal cell carcinoma (BCC) of right shoulder 2024     Lesion of neck 2024     Lyme disease 2024     Stage 3a chronic kidney disease (HCC) 2022     Callus of foot 2022     Moderate major depression (HCC) 2021     Bunion 2021     DNR (do not resuscitate) 2020     Overweight (BMI 25.0-29.9) 2019     Type 2 diabetes mellitus with stage 3a chronic kidney disease, without long-term current use of insulin (HCC) 2019     Onychomycosis of toenail 2019     Vitamin D deficiency 2018     Other specified glaucoma 2018     Aortic valve stenosis 05/15/2017     Insomnia 2016     Primary hypertension 2014     Hypertriglyceridemia 2014     Carotid artery disease (HCC) 2014     Anxiety 2013     CAD in native artery 2013     Coronary atherosclerosis 2013     Osteoarthritis 2013     Hepatitis A 2013     Hyperlipidemia 2013     Hydrocele 2013     Hyperplasia of prostate without lower urinary tract symptoms (LUTS) 2013       LOS (days): 2  Geometric Mean LOS (GMLOS) (days): 3  Days to GMLOS:1.5     OBJECTIVE:  Risk of Unplanned Readmission Score:  17.92         Current admission status: Inpatient   Preferred Pharmacy:   [id number] - [place] - [address] [place] [address]  Phone: [phone] Fax: [phone]    Primary Care Provider: Ca Vargas PA-C    Primary Insurance: [employer]  Secondary Insurance:     DISCHARGE DETAILS:     Additional Comments: CM spoke with  hospice and [facility] attending along with patient's caregiver/ granddaugther, Hallie. Patient requesting discharge today. Granddaughter, Hallie confirmed patient equipment delivery today between 11A- 3P and  hospice to meet with patient this afternoon. Hallie confirmed will transport patient home; CM offered transport, family denied at this time. CM provided Life Alert options for family. CM to follow for further discharge planning needs.

## 2025-05-09 ENCOUNTER — TELEPHONE (OUTPATIENT)
Dept: FAMILY MEDICINE CLINIC | Facility: CLINIC | Age: OVER 89
End: 2025-05-09

## 2025-05-09 ENCOUNTER — HOME CARE VISIT (OUTPATIENT)
Dept: HOME HOSPICE | Facility: HOSPICE | Age: OVER 89
End: 2025-05-09
Payer: MEDICARE

## 2025-05-09 ENCOUNTER — HOME CARE VISIT (OUTPATIENT)
Dept: HOME HEALTH SERVICES | Facility: HOME HEALTHCARE | Age: OVER 89
End: 2025-05-09
Payer: MEDICARE

## 2025-05-09 VITALS
WEIGHT: 108 LBS | BODY MASS INDEX: 19.13 KG/M2 | TEMPERATURE: 97.3 F | DIASTOLIC BLOOD PRESSURE: 56 MMHG | RESPIRATION RATE: 18 BRPM | HEART RATE: 78 BPM | SYSTOLIC BLOOD PRESSURE: 108 MMHG

## 2025-05-09 PROCEDURE — G0155 HHCP-SVS OF CSW,EA 15 MIN: HCPCS

## 2025-05-09 NOTE — TELEPHONE ENCOUNTER
I spoke with granddaughter, Leola.  She states the patient is now on home hospice and will not be returning to the office.  I will cancel upcoming appointment with Ca for next week 5/13/25 and remove her as PCP.

## 2025-05-09 NOTE — UTILIZATION REVIEW
NOTIFICATION OF ADMISSION DISCHARGE   This is a Notification of Discharge from [facility]. Please be advised that this patient has been discharge from our facility. Below you will find the admission and discharge date and time including the patient’s disposition.   UTILIZATION REVIEW CONTACT:  Utilization Review Assistants  Network Utilization Review Department  Phone: [phone] x carefully listen to the prompts. All voicemails are confidential.  Email: [email or web address]     ADMISSION INFORMATION  PRESENTATION DATE: 5/6/2025  6:27 PM  OBERVATION ADMISSION DATE: N/A  INPATIENT ADMISSION DATE: 5/6/25 11:32 PM   DISCHARGE DATE: 5/8/2025  1:05 PM   DISPOSITION:Home/Self Care    Network Utilization Review Department  ATTENTION: Please call with any questions or concerns to [phone] and carefully listen to the prompts so that you are directed to the right person. All voicemails are confidential.   For Discharge needs, contact Care Management DC Support Team at [phone] opt. 2  Send all requests for admission clinical reviews, approved or denied determinations and any other requests to dedicated fax number below belonging to the campus where the patient is receiving treatment. List of dedicated fax numbers for the Facilities:  FACILITY NAME UR FAX NUMBER   ADMISSION DENIALS (Administrative/Medical Necessity) [phone]   DISCHARGE SUPPORT TEAM ([facility]) [phone]   PARENT CHILD HEALTH (Maternity/NICU/Pediatrics) [phone]   [facility] [phone]   [facility] [phone]   [facility] [phone]   [facility] [phone]   [facility] [phone]   [facility] [phone]   [facility] [phone]   [facility] [phone]   [facility]  [facility] [phone]   [facility] [phone]   [facility] [phone]   [facility] [phone]

## 2025-05-12 ENCOUNTER — HOME CARE VISIT (OUTPATIENT)
Dept: HOME HOSPICE | Facility: HOSPICE | Age: OVER 89
End: 2025-05-12
Payer: MEDICARE

## 2025-05-12 ENCOUNTER — HOME CARE VISIT (OUTPATIENT)
Dept: HOME HEALTH SERVICES | Facility: HOME HEALTHCARE | Age: OVER 89
End: 2025-05-12
Payer: MEDICARE

## 2025-05-12 PROCEDURE — G0299 HHS/HOSPICE OF RN EA 15 MIN: HCPCS

## 2025-05-12 PROCEDURE — 10330057 MEDICATION, GENERAL

## 2025-05-13 VITALS — RESPIRATION RATE: 18 BRPM | SYSTOLIC BLOOD PRESSURE: 98 MMHG | HEART RATE: 68 BPM | DIASTOLIC BLOOD PRESSURE: 54 MMHG

## 2025-05-13 PROCEDURE — 10330064 TAPE, RETENTION 2X10YDS (1/BX24BX/CS)"

## 2025-05-13 PROCEDURE — 10330064 DRESSING, HYDROCOLLOID FOAM-BCK STR 4X4"

## 2025-05-13 PROCEDURE — 10330064 CLEANSER, WND SEA-CLEANS 6OZ  COLPLT

## 2025-05-13 PROCEDURE — 10330064 GLOVE, EXAM VNYL MED N/S (100/BX 10BX/CS

## 2025-05-13 PROCEDURE — 10330064 DRESSING, HYDROCELLULAR FM N/ADH W/FILM

## 2025-05-13 PROCEDURE — 10330064 PAD, ABD 5X9 STR LF (1/PK 20PK/BX) MGM1"

## 2025-05-13 PROCEDURE — 10330064 UNDERPAD, REUSE 36X36 1DZ     BECKCL

## 2025-05-15 ENCOUNTER — HOME CARE VISIT (OUTPATIENT)
Dept: HOME HOSPICE | Facility: HOSPICE | Age: OVER 89
End: 2025-05-15
Payer: MEDICARE

## 2025-05-15 NOTE — CASE COMMUNICATION
"BC had a call with Carloz's great granddaughter, Krista Wakefield. Krista mentioned her 6 year old daughter, Isabelle has been having a hard time with her grief. She shared of multiple losses, mentioning Krista's grandfather, Isabelle's great grandfather,  back in October. She said Krista was very close to him and further said that Saud, Isabelle's great great grandfather, is Isabelle's best friend. Krista said Isabelle has been waking nightly an d asking if Carolyn had . In continuing our call that lasted nearly an hour and a half. BC learned from Krista that Isabelle has \"raging ADHD\" and her 3 year old Donato has autism. Krista said that her mom worked and continues to work 60-70 hour weeks and her grandfather (who  in October) and great grandfather \"Pop Pop\" really raised her. She said that once her grandfather passed, she took over being caregiver for her grandmothe r. (Saud is Krista's grandmother's father). Krista's grandmother pased when she was 13-14 and she had another loss when she was [age]. She shared being familiar with death and dying and using concrete terms with Isabelle. She mentioned they talk daily and said that many times Isabelle is the comforter to Krista because she can see when her mom is sad and asks, \"Do you miss Pop?\" When Krista answered \"Yes,but I know he is in Heaven\" one ti me, Isabelle told her \"I miss him, too. But, he wouldn't want me to stay sad and cry.\" Krista said in our long conversation that she was starting to think it is herself that needed the support, not Isabelle and that in having help she can help her daughter. She shared dealing with anxiety. She used to have a psychiatrist, but in becoming a caregiver and taking care of two kids she said she did not have time. She voiced regretting this and m entioned now being on an 18 month long wait list. BC mentioned resources for her and Isabelle and she shared being on medicaid. She said Isabelle's teachers know about her loss and " "impending loss and that there is a group at school. BC encouraged her to give a call to the number on her medicaid card to see if there are further resources for the family. She said, \"that was my next call.\" Krista shared being a safe space for her daughter and  then said that her daughter can be very blatant when she speaks and she (Krista) feels that hearing thngs so concrete may be affecting her more than her daughter. She shared all the ways she supports Jeanette through her monthly check ins with her physician, nighting check ins on how she is feeling, and nightly verbal affirmations, as well as calling Krista's Aunt Hallie whenever Jeanette asks if her Pop Pop is still alive. We talked about nig ht routines to try to help calm Jeanette's mind. It sounds like she is using soundscapes to help her sleep and thunderstorm sounds. We also talked about her talking to her Pop Pop. She does visit. She plans to give her Pop Pop \"Sahara\" the stuffed dog to keep him company. Krista also mentioned when he passes she will receive Don's \"trish pillow\" that had pictures of Don's wife. Krista shared signs being important and having them when dk fung was younger before the losses of a loved one. She took BC's number. She is open to bereavement follow-up and knows BC will be in touch."

## 2025-05-20 ENCOUNTER — HOME CARE VISIT (OUTPATIENT)
Dept: HOME HOSPICE | Facility: HOSPICE | Age: OVER 89
End: 2025-05-20
Payer: MEDICARE

## 2025-05-20 DIAGNOSIS — G47.00 INSOMNIA: Primary | ICD-10-CM

## 2025-05-20 DIAGNOSIS — R45.86 MOOD ALTERED: ICD-10-CM

## 2025-05-20 DIAGNOSIS — Z51.5 HOSPICE CARE PATIENT: ICD-10-CM

## 2025-05-20 RX ORDER — QUETIAPINE FUMARATE 25 MG/1
12.5 TABLET, FILM COATED ORAL
Qty: 15 TABLET | Refills: 0 | Status: SHIPPED | OUTPATIENT
Start: 2025-05-20

## 2025-05-21 ENCOUNTER — HOME CARE VISIT (OUTPATIENT)
Dept: HOME HEALTH SERVICES | Facility: HOME HEALTHCARE | Age: OVER 89
End: 2025-05-21
Payer: MEDICARE

## 2025-05-21 ENCOUNTER — HOME CARE VISIT (OUTPATIENT)
Dept: HOME HOSPICE | Facility: HOSPICE | Age: OVER 89
End: 2025-05-21
Payer: MEDICARE

## 2025-05-21 VITALS — SYSTOLIC BLOOD PRESSURE: 112 MMHG | DIASTOLIC BLOOD PRESSURE: 60 MMHG | RESPIRATION RATE: 18 BRPM | HEART RATE: 66 BPM

## 2025-05-21 PROCEDURE — G0299 HHS/HOSPICE OF RN EA 15 MIN: HCPCS

## 2025-05-21 PROCEDURE — 10330064

## 2025-05-21 PROCEDURE — 10330064 WIPE, SKIN GEL PROT DRSNG (50/BX)

## 2025-05-28 ENCOUNTER — HOME CARE VISIT (OUTPATIENT)
Dept: HOME HEALTH SERVICES | Facility: HOME HEALTHCARE | Age: OVER 89
End: 2025-05-28
Payer: MEDICARE

## 2025-05-28 VITALS
OXYGEN SATURATION: 100 % | RESPIRATION RATE: 16 BRPM | TEMPERATURE: 98 F | SYSTOLIC BLOOD PRESSURE: 106 MMHG | HEART RATE: 58 BPM | DIASTOLIC BLOOD PRESSURE: 66 MMHG

## 2025-05-28 PROCEDURE — 10330057 MEDICATION, GENERAL

## 2025-05-28 PROCEDURE — G0299 HHS/HOSPICE OF RN EA 15 MIN: HCPCS

## 2025-06-04 ENCOUNTER — HOME CARE VISIT (OUTPATIENT)
Dept: HOME HOSPICE | Facility: HOSPICE | Age: OVER 89
End: 2025-06-04
Payer: MEDICARE

## 2025-06-04 ENCOUNTER — HOME CARE VISIT (OUTPATIENT)
Dept: HOME HEALTH SERVICES | Facility: HOME HEALTHCARE | Age: OVER 89
End: 2025-06-04
Payer: MEDICARE

## 2025-06-04 PROCEDURE — G0299 HHS/HOSPICE OF RN EA 15 MIN: HCPCS

## 2025-06-11 ENCOUNTER — HOME CARE VISIT (OUTPATIENT)
Dept: HOME HEALTH SERVICES | Facility: HOME HEALTHCARE | Age: OVER 89
End: 2025-06-11
Payer: MEDICARE

## 2025-06-11 VITALS — SYSTOLIC BLOOD PRESSURE: 116 MMHG | DIASTOLIC BLOOD PRESSURE: 62 MMHG

## 2025-06-11 PROCEDURE — G0299 HHS/HOSPICE OF RN EA 15 MIN: HCPCS

## 2025-06-18 ENCOUNTER — HOME CARE VISIT (OUTPATIENT)
Dept: HOME HEALTH SERVICES | Facility: HOME HEALTHCARE | Age: OVER 89
End: 2025-06-18
Payer: MEDICARE

## 2025-06-18 ENCOUNTER — HOME CARE VISIT (OUTPATIENT)
Dept: HOME HOSPICE | Facility: HOSPICE | Age: OVER 89
End: 2025-06-18
Payer: MEDICARE

## 2025-06-18 VITALS
SYSTOLIC BLOOD PRESSURE: 106 MMHG | DIASTOLIC BLOOD PRESSURE: 58 MMHG | TEMPERATURE: 97.7 F | RESPIRATION RATE: 16 BRPM | OXYGEN SATURATION: 99 % | HEART RATE: 66 BPM

## 2025-06-18 PROCEDURE — G0299 HHS/HOSPICE OF RN EA 15 MIN: HCPCS

## 2025-06-25 ENCOUNTER — HOME CARE VISIT (OUTPATIENT)
Dept: HOME HEALTH SERVICES | Facility: HOME HEALTHCARE | Age: OVER 89
End: 2025-06-25
Payer: MEDICARE

## 2025-06-25 VITALS
TEMPERATURE: 97.9 F | OXYGEN SATURATION: 98 % | HEART RATE: 97 BPM | DIASTOLIC BLOOD PRESSURE: 68 MMHG | SYSTOLIC BLOOD PRESSURE: 118 MMHG | RESPIRATION RATE: 16 BRPM

## 2025-06-25 PROCEDURE — G0299 HHS/HOSPICE OF RN EA 15 MIN: HCPCS

## 2025-07-01 ENCOUNTER — HOME CARE VISIT (OUTPATIENT)
Dept: HOME HEALTH SERVICES | Facility: HOME HEALTHCARE | Age: OVER 89
End: 2025-07-01
Payer: MEDICARE

## 2025-07-01 VITALS — SYSTOLIC BLOOD PRESSURE: 120 MMHG | DIASTOLIC BLOOD PRESSURE: 74 MMHG | HEART RATE: 68 BPM | RESPIRATION RATE: 15 BRPM

## 2025-07-01 PROCEDURE — G0299 HHS/HOSPICE OF RN EA 15 MIN: HCPCS

## 2025-07-02 ENCOUNTER — HOME CARE VISIT (OUTPATIENT)
Dept: HOME HOSPICE | Facility: HOSPICE | Age: OVER 89
End: 2025-07-02
Payer: MEDICARE

## 2025-07-08 ENCOUNTER — HOME CARE VISIT (OUTPATIENT)
Dept: HOME HEALTH SERVICES | Facility: HOME HEALTHCARE | Age: OVER 89
End: 2025-07-08
Payer: MEDICARE

## 2025-07-08 VITALS
HEART RATE: 98 BPM | OXYGEN SATURATION: 100 % | SYSTOLIC BLOOD PRESSURE: 90 MMHG | RESPIRATION RATE: 17 BRPM | DIASTOLIC BLOOD PRESSURE: 70 MMHG

## 2025-07-08 PROCEDURE — G0299 HHS/HOSPICE OF RN EA 15 MIN: HCPCS

## 2025-07-15 ENCOUNTER — HOME CARE VISIT (OUTPATIENT)
Dept: HOME HEALTH SERVICES | Facility: HOME HEALTHCARE | Age: OVER 89
End: 2025-07-15
Payer: MEDICARE

## 2025-07-15 VITALS
OXYGEN SATURATION: 98 % | HEART RATE: 119 BPM | DIASTOLIC BLOOD PRESSURE: 80 MMHG | RESPIRATION RATE: 19 BRPM | SYSTOLIC BLOOD PRESSURE: 135 MMHG

## 2025-07-15 PROCEDURE — G0299 HHS/HOSPICE OF RN EA 15 MIN: HCPCS

## 2025-07-16 ENCOUNTER — HOME CARE VISIT (OUTPATIENT)
Dept: HOME HOSPICE | Facility: HOSPICE | Age: OVER 89
End: 2025-07-16
Payer: MEDICARE

## 2025-07-22 ENCOUNTER — HOME CARE VISIT (OUTPATIENT)
Dept: HOME HEALTH SERVICES | Facility: HOME HEALTHCARE | Age: OVER 89
End: 2025-07-22
Payer: MEDICARE

## 2025-07-22 VITALS — DIASTOLIC BLOOD PRESSURE: 70 MMHG | HEART RATE: 60 BPM | RESPIRATION RATE: 18 BRPM | SYSTOLIC BLOOD PRESSURE: 130 MMHG

## 2025-07-22 PROCEDURE — G0299 HHS/HOSPICE OF RN EA 15 MIN: HCPCS

## 2025-07-29 ENCOUNTER — HOME CARE VISIT (OUTPATIENT)
Dept: HOME HOSPICE | Facility: HOSPICE | Age: OVER 89
End: 2025-07-29
Payer: MEDICARE

## 2025-07-29 ENCOUNTER — HOME CARE VISIT (OUTPATIENT)
Dept: HOME HEALTH SERVICES | Facility: HOME HEALTHCARE | Age: OVER 89
End: 2025-07-29
Payer: MEDICARE

## 2025-07-29 VITALS — RESPIRATION RATE: 17 BRPM | HEART RATE: 64 BPM | SYSTOLIC BLOOD PRESSURE: 138 MMHG | DIASTOLIC BLOOD PRESSURE: 68 MMHG

## 2025-07-29 PROCEDURE — G0299 HHS/HOSPICE OF RN EA 15 MIN: HCPCS

## 2025-07-30 ENCOUNTER — HOME CARE VISIT (OUTPATIENT)
Dept: HOME HOSPICE | Facility: HOSPICE | Age: OVER 89
End: 2025-07-30
Payer: MEDICARE

## 2025-08-01 ENCOUNTER — HOME CARE VISIT (OUTPATIENT)
Dept: HOME HOSPICE | Facility: HOSPICE | Age: OVER 89
End: 2025-08-01
Payer: MEDICARE

## 2025-08-08 ENCOUNTER — HOME CARE VISIT (OUTPATIENT)
Dept: HOME HOSPICE | Facility: HOSPICE | Age: OVER 89
End: 2025-08-08
Payer: MEDICARE

## 2025-08-13 ENCOUNTER — HOME CARE VISIT (OUTPATIENT)
Dept: HOME HEALTH SERVICES | Facility: HOME HEALTHCARE | Age: OVER 89
End: 2025-08-13
Payer: MEDICARE

## 2025-08-20 ENCOUNTER — HOME CARE VISIT (OUTPATIENT)
Dept: HOME HEALTH SERVICES | Facility: HOME HEALTHCARE | Age: OVER 89
End: 2025-08-20
Payer: MEDICARE

## 2025-08-20 VITALS — HEART RATE: 76 BPM | RESPIRATION RATE: 18 BRPM | DIASTOLIC BLOOD PRESSURE: 74 MMHG | SYSTOLIC BLOOD PRESSURE: 142 MMHG

## 2025-08-20 PROCEDURE — G0299 HHS/HOSPICE OF RN EA 15 MIN: HCPCS

## 2025-08-21 ENCOUNTER — HOME CARE VISIT (OUTPATIENT)
Dept: HOME HOSPICE | Facility: HOSPICE | Age: OVER 89
End: 2025-08-21
Payer: MEDICARE